# Patient Record
Sex: FEMALE | Race: WHITE | Employment: OTHER | URBAN - METROPOLITAN AREA
[De-identification: names, ages, dates, MRNs, and addresses within clinical notes are randomized per-mention and may not be internally consistent; named-entity substitution may affect disease eponyms.]

---

## 2017-08-17 ENCOUNTER — HOSPITAL ENCOUNTER (OUTPATIENT)
Dept: SLEEP MEDICINE | Age: 72
Discharge: HOME | End: 2017-08-17
Payer: MEDICARE

## 2017-08-17 PROCEDURE — 95810 POLYSOM 6/> YRS 4/> PARAM: CPT

## 2017-09-12 ENCOUNTER — HOSPITAL ENCOUNTER (OUTPATIENT)
Dept: SLEEP MEDICINE | Age: 72
Discharge: HOME | End: 2017-09-12
Payer: MEDICARE

## 2017-09-12 PROCEDURE — 95811 POLYSOM 6/>YRS CPAP 4/> PARM: CPT

## 2017-09-25 PROBLEM — G47.33 OSA AND COPD OVERLAP SYNDROME (HCC): Status: ACTIVE | Noted: 2017-09-25

## 2017-09-25 PROBLEM — G47.33 OSA (OBSTRUCTIVE SLEEP APNEA): Status: ACTIVE | Noted: 2017-09-25

## 2017-09-25 PROBLEM — G25.81 RLS (RESTLESS LEGS SYNDROME): Status: ACTIVE | Noted: 2017-09-25

## 2017-09-25 PROBLEM — J44.9 OSA AND COPD OVERLAP SYNDROME (HCC): Status: ACTIVE | Noted: 2017-09-25

## 2017-09-25 PROBLEM — J44.1 COPD EXACERBATION (HCC): Status: ACTIVE | Noted: 2017-09-25

## 2017-09-25 PROBLEM — E66.9 OBESITY (BMI 30-39.9): Status: ACTIVE | Noted: 2017-09-25

## 2017-10-10 ENCOUNTER — HOSPITAL ENCOUNTER (OUTPATIENT)
Dept: SLEEP MEDICINE | Age: 72
Discharge: HOME | End: 2017-10-10
Payer: MEDICARE

## 2017-10-10 PROCEDURE — 95807 SLEEP STUDY ATTENDED: CPT

## 2017-12-22 ENCOUNTER — HOSPITAL ENCOUNTER (OUTPATIENT)
Dept: SURGERY | Age: 72
Discharge: HOME | End: 2017-12-22
Payer: MEDICARE

## 2017-12-22 VITALS
DIASTOLIC BLOOD PRESSURE: 73 MMHG | BODY MASS INDEX: 33.43 KG/M2 | WEIGHT: 208 LBS | RESPIRATION RATE: 20 BRPM | HEIGHT: 66 IN | OXYGEN SATURATION: 94 % | HEART RATE: 72 BPM | SYSTOLIC BLOOD PRESSURE: 199 MMHG | TEMPERATURE: 98.1 F

## 2017-12-22 LAB — POTASSIUM SERPL-SCNC: 4.1 MMOL/L (ref 3.5–5.1)

## 2017-12-22 PROCEDURE — 84132 ASSAY OF SERUM POTASSIUM: CPT | Performed by: ANESTHESIOLOGY

## 2017-12-22 NOTE — PERIOP NOTES
Patient verified name, , and surgery as listed in [facility]. Patient provided medical/health information and PTA medications to the best of their ability. TYPE  CASE:1b  Orders per surgeon: Received and dated 17. Labs per surgeon:none. *  Labs per anesthesia protocol: potassium. Results: WNL  EKG  :  None per protocol    Patient provided with and instructed on education handouts including Guide to Surgery, blood transfusions, pain management, and hand hygiene for the family and community, and [facility] brochure.     hibiclens and instructions given per hospital policy. Instructed patient to continue previous medications as prescribed prior to surgery unless otherwise directed and to take the following medications the day of surgery according to anesthesia guidelines : anora inhaler, metoprolol, flecainide . Instructed patient to hold  the following medications: eliquis (after 17 per MD), diclofenac, all vitamins supplements and nsaids. Original medication prescription bottles  visualized during patient appointment. Patient teach back successful and patient demonstrates knowledge of instruction.

## 2017-12-28 ENCOUNTER — ANESTHESIA EVENT (OUTPATIENT)
Dept: SURGERY | Age: 72
End: 2017-12-28
Payer: MEDICARE

## 2017-12-29 ENCOUNTER — HOSPITAL ENCOUNTER (OUTPATIENT)
Age: 72
Setting detail: OUTPATIENT SURGERY
Discharge: HOME | End: 2017-12-29
Attending: ORTHOPAEDIC SURGERY | Admitting: ORTHOPAEDIC SURGERY
Payer: MEDICARE

## 2017-12-29 ENCOUNTER — ANESTHESIA (OUTPATIENT)
Dept: SURGERY | Age: 72
End: 2017-12-29
Payer: MEDICARE

## 2017-12-29 VITALS
RESPIRATION RATE: 16 BRPM | WEIGHT: 207 LBS | SYSTOLIC BLOOD PRESSURE: 143 MMHG | TEMPERATURE: 97.3 F | DIASTOLIC BLOOD PRESSURE: 65 MMHG | HEART RATE: 76 BPM | OXYGEN SATURATION: 92 % | BODY MASS INDEX: 33.41 KG/M2

## 2017-12-29 PROCEDURE — 77030002933 HC SUT MCRYL J&J -A: Performed by: ORTHOPAEDIC SURGERY

## 2017-12-29 PROCEDURE — 77030036560 HC SHLDR ARM PAD ABDUCTN S2SG -B: Performed by: ORTHOPAEDIC SURGERY

## 2017-12-29 PROCEDURE — 77030016370 HC SUT ULTBRD S&N -B: Performed by: ORTHOPAEDIC SURGERY

## 2017-12-29 PROCEDURE — 74011250636 HC RX REV CODE- 250/636: Performed by: ORTHOPAEDIC SURGERY

## 2017-12-29 PROCEDURE — 77030006891 HC BLD SHV RESECT STRY -B: Performed by: ORTHOPAEDIC SURGERY

## 2017-12-29 PROCEDURE — 74011250636 HC RX REV CODE- 250/636: Performed by: ANESTHESIOLOGY

## 2017-12-29 PROCEDURE — 77030003602 HC NDL NRV BLK BBMI -B: Performed by: ANESTHESIOLOGY

## 2017-12-29 PROCEDURE — 77030003660 HC NDL SHTTL S&N -C: Performed by: ORTHOPAEDIC SURGERY

## 2017-12-29 PROCEDURE — 76210000006 HC OR PH I REC 0.5 TO 1 HR: Performed by: ORTHOPAEDIC SURGERY

## 2017-12-29 PROCEDURE — 77030016570 HC BLNKT BAIR HGGR 3M -B: Performed by: ANESTHESIOLOGY

## 2017-12-29 PROCEDURE — 77030029203 HC IRR KT CYSTO/TUR BBMI -A: Performed by: ORTHOPAEDIC SURGERY

## 2017-12-29 PROCEDURE — 77030006668 HC BLD SHV MENSCS STRY -B: Performed by: ORTHOPAEDIC SURGERY

## 2017-12-29 PROCEDURE — 77030008703 HC TU ET UNCUF COVD -A: Performed by: ANESTHESIOLOGY

## 2017-12-29 PROCEDURE — 77030004453 HC BUR SHV STRY -B: Performed by: ORTHOPAEDIC SURGERY

## 2017-12-29 PROCEDURE — 74011000250 HC RX REV CODE- 250

## 2017-12-29 PROCEDURE — 76010000161 HC OR TIME 1 TO 1.5 HR INTENSV-TIER 1: Performed by: ORTHOPAEDIC SURGERY

## 2017-12-29 PROCEDURE — 76060000034 HC ANESTHESIA 1.5 TO 2 HR: Performed by: ORTHOPAEDIC SURGERY

## 2017-12-29 PROCEDURE — 74011250636 HC RX REV CODE- 250/636

## 2017-12-29 PROCEDURE — 77030018836 HC SOL IRR NACL ICUM -A: Performed by: ORTHOPAEDIC SURGERY

## 2017-12-29 PROCEDURE — 77030027384 HC PRB ARTHSCP SERFAS STRY -C: Performed by: ORTHOPAEDIC SURGERY

## 2017-12-29 PROCEDURE — 76942 ECHO GUIDE FOR BIOPSY: CPT | Performed by: ORTHOPAEDIC SURGERY

## 2017-12-29 PROCEDURE — 76010010054 HC POST OP PAIN BLOCK: Performed by: ORTHOPAEDIC SURGERY

## 2017-12-29 PROCEDURE — C1713 ANCHOR/SCREW BN/BN,TIS/BN: HCPCS | Performed by: ORTHOPAEDIC SURGERY

## 2017-12-29 PROCEDURE — 77030019605: Performed by: ORTHOPAEDIC SURGERY

## 2017-12-29 PROCEDURE — 77030003666 HC NDL SPINAL BD -A: Performed by: ORTHOPAEDIC SURGERY

## 2017-12-29 PROCEDURE — 77030032490 HC SLV COMPR SCD KNE COVD -B: Performed by: ORTHOPAEDIC SURGERY

## 2017-12-29 PROCEDURE — 77030013367: Performed by: ORTHOPAEDIC SURGERY

## 2017-12-29 PROCEDURE — 77030000032 HC CUF TRNQT ZIMM -B: Performed by: ORTHOPAEDIC SURGERY

## 2017-12-29 PROCEDURE — 77030008477 HC STYL SATN SLP COVD -A: Performed by: ANESTHESIOLOGY

## 2017-12-29 PROCEDURE — 76210000021 HC REC RM PH II 0.5 TO 1 HR: Performed by: ORTHOPAEDIC SURGERY

## 2017-12-29 DEVICE — FOOTPRINT ULTRA PK SUTURE ANCHOR 4.5
Type: IMPLANTABLE DEVICE | Site: SHOULDER | Status: FUNCTIONAL
Brand: FOOTPRINT

## 2017-12-29 RX ORDER — MIDAZOLAM HYDROCHLORIDE 1 MG/ML
2 INJECTION, SOLUTION INTRAMUSCULAR; INTRAVENOUS ONCE
Status: COMPLETED | OUTPATIENT
Start: 2017-12-29 | End: 2017-12-29

## 2017-12-29 RX ORDER — HYDROMORPHONE HYDROCHLORIDE 2 MG/ML
0.5 INJECTION, SOLUTION INTRAMUSCULAR; INTRAVENOUS; SUBCUTANEOUS
Status: DISCONTINUED | OUTPATIENT
Start: 2017-12-29 | End: 2017-12-29 | Stop reason: HOSPADM

## 2017-12-29 RX ORDER — ROPIVACAINE HYDROCHLORIDE 5 MG/ML
INJECTION, SOLUTION EPIDURAL; INFILTRATION; PERINEURAL AS NEEDED
Status: DISCONTINUED | OUTPATIENT
Start: 2017-12-29 | End: 2017-12-29 | Stop reason: HOSPADM

## 2017-12-29 RX ORDER — SODIUM CHLORIDE, SODIUM LACTATE, POTASSIUM CHLORIDE, CALCIUM CHLORIDE 600; 310; 30; 20 MG/100ML; MG/100ML; MG/100ML; MG/100ML
100 INJECTION, SOLUTION INTRAVENOUS CONTINUOUS
Status: DISCONTINUED | OUTPATIENT
Start: 2017-12-29 | End: 2017-12-29 | Stop reason: HOSPADM

## 2017-12-29 RX ORDER — MIDAZOLAM HYDROCHLORIDE 1 MG/ML
2 INJECTION, SOLUTION INTRAMUSCULAR; INTRAVENOUS
Status: DISCONTINUED | OUTPATIENT
Start: 2017-12-29 | End: 2017-12-29 | Stop reason: HOSPADM

## 2017-12-29 RX ORDER — DIPHENHYDRAMINE HYDROCHLORIDE 50 MG/ML
12.5 INJECTION, SOLUTION INTRAMUSCULAR; INTRAVENOUS
Status: DISCONTINUED | OUTPATIENT
Start: 2017-12-29 | End: 2017-12-29 | Stop reason: HOSPADM

## 2017-12-29 RX ORDER — EPINEPHRINE 1 MG/ML
INJECTION, SOLUTION, CONCENTRATE INTRAVENOUS AS NEEDED
Status: DISCONTINUED | OUTPATIENT
Start: 2017-12-29 | End: 2017-12-29 | Stop reason: HOSPADM

## 2017-12-29 RX ORDER — LIDOCAINE HYDROCHLORIDE 20 MG/ML
INJECTION, SOLUTION EPIDURAL; INFILTRATION; INTRACAUDAL; PERINEURAL AS NEEDED
Status: DISCONTINUED | OUTPATIENT
Start: 2017-12-29 | End: 2017-12-29 | Stop reason: HOSPADM

## 2017-12-29 RX ORDER — DEXAMETHASONE SODIUM PHOSPHATE 4 MG/ML
INJECTION, SOLUTION INTRA-ARTICULAR; INTRALESIONAL; INTRAMUSCULAR; INTRAVENOUS; SOFT TISSUE AS NEEDED
Status: DISCONTINUED | OUTPATIENT
Start: 2017-12-29 | End: 2017-12-29 | Stop reason: HOSPADM

## 2017-12-29 RX ORDER — ONDANSETRON 2 MG/ML
4 INJECTION INTRAMUSCULAR; INTRAVENOUS ONCE
Status: COMPLETED | OUTPATIENT
Start: 2017-12-29 | End: 2017-12-29

## 2017-12-29 RX ORDER — FENTANYL CITRATE 50 UG/ML
INJECTION, SOLUTION INTRAMUSCULAR; INTRAVENOUS AS NEEDED
Status: DISCONTINUED | OUTPATIENT
Start: 2017-12-29 | End: 2017-12-29 | Stop reason: HOSPADM

## 2017-12-29 RX ORDER — PROPOFOL 10 MG/ML
INJECTION, EMULSION INTRAVENOUS AS NEEDED
Status: DISCONTINUED | OUTPATIENT
Start: 2017-12-29 | End: 2017-12-29 | Stop reason: HOSPADM

## 2017-12-29 RX ORDER — ROCURONIUM BROMIDE 10 MG/ML
INJECTION, SOLUTION INTRAVENOUS AS NEEDED
Status: DISCONTINUED | OUTPATIENT
Start: 2017-12-29 | End: 2017-12-29 | Stop reason: HOSPADM

## 2017-12-29 RX ORDER — OXYCODONE HYDROCHLORIDE 5 MG/1
5 TABLET ORAL
Status: DISCONTINUED | OUTPATIENT
Start: 2017-12-29 | End: 2017-12-29 | Stop reason: HOSPADM

## 2017-12-29 RX ORDER — OXYCODONE HYDROCHLORIDE 5 MG/1
10 TABLET ORAL
Status: DISCONTINUED | OUTPATIENT
Start: 2017-12-29 | End: 2017-12-29 | Stop reason: HOSPADM

## 2017-12-29 RX ORDER — GLYCOPYRROLATE 0.2 MG/ML
INJECTION INTRAMUSCULAR; INTRAVENOUS AS NEEDED
Status: DISCONTINUED | OUTPATIENT
Start: 2017-12-29 | End: 2017-12-29 | Stop reason: HOSPADM

## 2017-12-29 RX ORDER — NEOSTIGMINE METHYLSULFATE 1 MG/ML
INJECTION INTRAVENOUS AS NEEDED
Status: DISCONTINUED | OUTPATIENT
Start: 2017-12-29 | End: 2017-12-29 | Stop reason: HOSPADM

## 2017-12-29 RX ORDER — ONDANSETRON 2 MG/ML
INJECTION INTRAMUSCULAR; INTRAVENOUS AS NEEDED
Status: DISCONTINUED | OUTPATIENT
Start: 2017-12-29 | End: 2017-12-29 | Stop reason: HOSPADM

## 2017-12-29 RX ORDER — CEFAZOLIN SODIUM/WATER 2 G/20 ML
2 SYRINGE (ML) INTRAVENOUS
Status: COMPLETED | OUTPATIENT
Start: 2017-12-29 | End: 2017-12-29

## 2017-12-29 RX ORDER — ALBUTEROL SULFATE 0.83 MG/ML
2.5 SOLUTION RESPIRATORY (INHALATION) AS NEEDED
Status: DISCONTINUED | OUTPATIENT
Start: 2017-12-29 | End: 2017-12-29 | Stop reason: HOSPADM

## 2017-12-29 RX ORDER — NALOXONE HYDROCHLORIDE 0.4 MG/ML
0.1 INJECTION, SOLUTION INTRAMUSCULAR; INTRAVENOUS; SUBCUTANEOUS AS NEEDED
Status: DISCONTINUED | OUTPATIENT
Start: 2017-12-29 | End: 2017-12-29 | Stop reason: HOSPADM

## 2017-12-29 RX ORDER — EPHEDRINE SULFATE 50 MG/ML
INJECTION, SOLUTION INTRAVENOUS AS NEEDED
Status: DISCONTINUED | OUTPATIENT
Start: 2017-12-29 | End: 2017-12-29 | Stop reason: HOSPADM

## 2017-12-29 RX ORDER — BUPIVACAINE HYDROCHLORIDE AND EPINEPHRINE 2.5; 5 MG/ML; UG/ML
INJECTION, SOLUTION EPIDURAL; INFILTRATION; INTRACAUDAL; PERINEURAL AS NEEDED
Status: DISCONTINUED | OUTPATIENT
Start: 2017-12-29 | End: 2017-12-29 | Stop reason: HOSPADM

## 2017-12-29 RX ORDER — LIDOCAINE HYDROCHLORIDE 10 MG/ML
0.1 INJECTION INFILTRATION; PERINEURAL AS NEEDED
Status: DISCONTINUED | OUTPATIENT
Start: 2017-12-29 | End: 2017-12-29 | Stop reason: HOSPADM

## 2017-12-29 RX ORDER — FENTANYL CITRATE 50 UG/ML
100 INJECTION, SOLUTION INTRAMUSCULAR; INTRAVENOUS ONCE
Status: DISCONTINUED | OUTPATIENT
Start: 2017-12-29 | End: 2017-12-29 | Stop reason: HOSPADM

## 2017-12-29 RX ADMIN — EPHEDRINE SULFATE 5 MG: 50 INJECTION, SOLUTION INTRAVENOUS at 07:46

## 2017-12-29 RX ADMIN — GLYCOPYRROLATE 0.4 MG: 0.2 INJECTION INTRAMUSCULAR; INTRAVENOUS at 08:26

## 2017-12-29 RX ADMIN — EPHEDRINE SULFATE 5 MG: 50 INJECTION, SOLUTION INTRAVENOUS at 07:50

## 2017-12-29 RX ADMIN — ONDANSETRON 4 MG: 2 INJECTION INTRAMUSCULAR; INTRAVENOUS at 08:22

## 2017-12-29 RX ADMIN — Medication 2 G: at 07:28

## 2017-12-29 RX ADMIN — ONDANSETRON 4 MG: 2 INJECTION INTRAMUSCULAR; INTRAVENOUS at 09:08

## 2017-12-29 RX ADMIN — PROPOFOL 150 MG: 10 INJECTION, EMULSION INTRAVENOUS at 07:17

## 2017-12-29 RX ADMIN — NEOSTIGMINE METHYLSULFATE 2 MG: 1 INJECTION INTRAVENOUS at 08:26

## 2017-12-29 RX ADMIN — ROCURONIUM BROMIDE 40 MG: 10 INJECTION, SOLUTION INTRAVENOUS at 07:17

## 2017-12-29 RX ADMIN — SODIUM CHLORIDE, SODIUM LACTATE, POTASSIUM CHLORIDE, AND CALCIUM CHLORIDE 100 ML/HR: 600; 310; 30; 20 INJECTION, SOLUTION INTRAVENOUS at 05:45

## 2017-12-29 RX ADMIN — ROPIVACAINE HYDROCHLORIDE 15 ML: 5 INJECTION, SOLUTION EPIDURAL; INFILTRATION; PERINEURAL at 06:51

## 2017-12-29 RX ADMIN — MIDAZOLAM HYDROCHLORIDE 2 MG: 1 INJECTION, SOLUTION INTRAMUSCULAR; INTRAVENOUS at 06:48

## 2017-12-29 RX ADMIN — BUPIVACAINE HYDROCHLORIDE AND EPINEPHRINE 15 ML: 2.5; 5 INJECTION, SOLUTION EPIDURAL; INFILTRATION; INTRACAUDAL; PERINEURAL at 06:51

## 2017-12-29 RX ADMIN — LIDOCAINE HYDROCHLORIDE 60 MG: 20 INJECTION, SOLUTION EPIDURAL; INFILTRATION; INTRACAUDAL; PERINEURAL at 07:17

## 2017-12-29 RX ADMIN — EPHEDRINE SULFATE 5 MG: 50 INJECTION, SOLUTION INTRAVENOUS at 07:43

## 2017-12-29 RX ADMIN — FENTANYL CITRATE 100 MCG: 50 INJECTION, SOLUTION INTRAMUSCULAR; INTRAVENOUS at 07:17

## 2017-12-29 RX ADMIN — DEXAMETHASONE SODIUM PHOSPHATE 4 MG: 4 INJECTION, SOLUTION INTRA-ARTICULAR; INTRALESIONAL; INTRAMUSCULAR; INTRAVENOUS; SOFT TISSUE at 07:39

## 2017-12-29 RX ADMIN — EPHEDRINE SULFATE 5 MG: 50 INJECTION, SOLUTION INTRAVENOUS at 08:00

## 2017-12-29 RX ADMIN — SODIUM CHLORIDE, SODIUM LACTATE, POTASSIUM CHLORIDE, AND CALCIUM CHLORIDE: 600; 310; 30; 20 INJECTION, SOLUTION INTRAVENOUS at 08:25

## 2017-12-29 NOTE — PROGRESS NOTES
's pre-op visit with patient's spouse in waiting area. Offered prayer for patient, spouse, and staff as requested.     Sheldon Mcqueen MDiv, BS  Board Certified

## 2017-12-29 NOTE — DISCHARGE INSTRUCTIONS
Post-Operative Instructions   For  Shoulder Arthroscopy Rotator Cuff Repair  Phone:  [phone]    1. Apply ice to the shoulder as needed. 2. You may shower after the arthroscopy. Keep dressings in place for the first three days and do not get them wet. After three days, you may remove the dressings and leave the steri-strip bandages in place; they will peel off naturally. 3. Stay in sling at all times except to shower. 4. Begin therapy as ordered. 5. Use any pain medication as instructed. You should take your pain medication as soon as you feel the anesthetic wearing off. Do not wait until you are in severe pain to begin taking your pain medication. 6. You may have some side effects from your pain medication. If you have nausea, try taking your medication with food. For itching, you may take over the counter Benadryl. 7. You may have been given a prescription for Phenergan. This medication is used for nausea and vomiting. You do not need to get this prescription filled unless you have a problem. 8. If you have a problem, please call [facility] at [phone]    Poppy Davis MD    [employer] POST OPERATIVE INSTRUCTIONS FOR KNEE ARTHROSCOPY    1. Unless you receive other instructions, you may weight bear as tolerated      2. Apply ice to your knee for 20-30 minutes several times per day for the first 3 days and then as needed. Icing will decrease the amount of inflammation and is helpful after exercise    3. You may remove the dressings the following day and apply waterproof band aids. Some bleeding and drainage may persist for several days following  surgery. Waterproof band aids may be used while showering and avoid tub baths for two weeks. 4. You will be given pain medications and do not drive under the influence. Some patients take pain medication at night and antiinflammatory medication during day  when pain decreases.     5. You may be given Phenergan for nausea    6. You will receive a phone call from our office notifying you of your follow up visit    · 7. If any problems call [phone]. DIET  · Clear liquids until no nausea or vomiting; then light diet for the first day. · Advance to regular diet on second day, unless your doctor orders otherwise. · If nausea and vomiting continues, call your doctor. CALL YOUR DOCTOR IF   · Excessive bleeding that does not stop after holding pressure over the area  · Temperature of 101 degrees F or above  · Excessive redness, swelling or bruising, and/ or green or yellow, smelly discharge from incision    AFTER ANESTHESIA   · For the first 24 hours: DO NOT Drive, Drink alcoholic beverages, or Make important decisions. · Be aware of dizziness following anesthesia and while taking pain medication. APPOINTMENT DATE/ TIME Office will call    YOUR DOCTOR'S PHONE NUMBER [phone]      DISCHARGE SUMMARY from Nurse    PATIENT INSTRUCTIONS:    After general anesthesia or intravenous sedation, for 24 hours or while taking prescription Narcotics:  · Limit your activities  · Do not drive and operate hazardous machinery  · Do not make important personal or business decisions  · Do  not drink alcoholic beverages  · If you have not urinated within 8 hours after discharge, please contact your surgeon on call. *  Please give a list of your current medications to your Primary Care Provider. *  Please update this list whenever your medications are discontinued, doses are      changed, or new medications (including over-the-counter products) are added. *  Please carry medication information at all times in case of emergency situations. These are general instructions for a healthy lifestyle:    No smoking/ No tobacco products/ Avoid exposure to second hand smoke    Surgeon General's Warning:  Quitting smoking now greatly reduces serious risk to your health.     Obesity, smoking, and sedentary lifestyle greatly increases your risk for illness    A healthy diet, regular physical exercise & weight monitoring are important for maintaining a healthy lifestyle    You may be retaining fluid if you have a history of heart failure or if you experience any of the following symptoms:  Weight gain of 3 pounds or more overnight or 5 pounds in a week, increased swelling in our hands or feet or shortness of breath while lying flat in bed. Please call your doctor as soon as you notice any of these symptoms; do not wait until your next office visit. Recognize signs and symptoms of STROKE:    F-face looks uneven    A-arms unable to move or move unevenly    S-speech slurred or non-existent    T-time-call 911 as soon as signs and symptoms begin-DO NOT go       Back to bed or wait to see if you get better-TIME IS BRAIN.

## 2017-12-29 NOTE — ANESTHESIA PREPROCEDURE EVALUATION
Anesthetic History               Review of Systems / Medical History  Patient summary reviewed, nursing notes reviewed and pertinent labs reviewed    Pulmonary    COPD: moderate    Sleep apnea: CPAP           Neuro/Psych              Cardiovascular    Hypertension: well controlled        Dysrhythmias : atrial fibrillation      Exercise tolerance: >4 METS     GI/Hepatic/Renal     GERD: well controlled           Endo/Other             Other Findings            Physical Exam    Airway  Mallampati: II  TM Distance: 4 - 6 cm  Neck ROM: normal range of motion   Mouth opening: Normal     Cardiovascular  Regular rate and rhythm,  S1 and S2 normal,  no murmur, click, rub, or gallop  Rhythm: irregular  Rate: normal         Dental    Dentition: Full lower dentures, Full upper dentures and Edentulous     Pulmonary  Breath sounds clear to auscultation               Abdominal         Other Findings            Anesthetic Plan    ASA: 3  Anesthesia type: general      Post-op pain plan if not by surgeon: peripheral nerve block single    Induction: Intravenous  Anesthetic plan and risks discussed with: Patient

## 2017-12-29 NOTE — BRIEF OP NOTE
BRIEF OPERATIVE NOTE    Date of Procedure: 12/29/2017   Preoperative Diagnosis: Complete rupture of left rotator cuff [M75.122]  Degenerative joint disease of left acromioclavicular joint [M19.012]  Old complex tear of lateral meniscus of right knee [M23.200]  Postoperative Diagnosis: Left Shoulder Rotator cuff tear/ impingement/ ac joint arthritis  Right Knee lateral meniscus tear    Procedure(s):  LEFT SHOULDER ARTHROSCOPY DISTAL CLAVICLE RESECTION ROTATOR CUFF REPAIR/SUBACROMIAL DECOMPRESSION    RIGHT KNEE ARTHROSCOPY WITH LATERAL MENISCECTOMY  Surgeon(s) and Role:     * Norbert Cuellar MD - Primary         Assistant Staff:       Surgical Staff:  Circ-1: Ramses Mcduffie RN  Scrub Tech-1: Tavo Martinez  Scrub Tech-2: Mary Grimes Time In   Incision Start 9595   Incision Close      Anesthesia: General   Estimated Blood Loss:     Specimens: * No specimens in log *   Findings:      Complications:       Implants:   Implant Name Type Inv.  Item Serial No.  Lot No. LRB No. Used Action   ANCHOR FOOTPRINT SUT 4.5MM --  - [id number]   ANCHOR FOOTPRINT SUT 4.5MM --    [facility] [employer] [id number]   1 Implanted

## 2017-12-29 NOTE — ANESTHESIA PROCEDURE NOTES
Peripheral Block    Start time: 12/29/2017 6:48 AM  End time: 12/29/2017 6:51 AM  Performed by: Marie Walls  Authorized by: Marie Walls       Pre-procedure: Indications: at surgeon's request and post-op pain management    Preanesthetic Checklist: patient identified, risks and benefits discussed, site marked, timeout performed, anesthesia consent given and patient being monitored    Timeout Time: 06:48          Block Type:   Block Type:   Interscalene  Laterality:  Left  Monitoring:  Standard ASA monitoring, continuous pulse ox, frequent vital sign checks, heart rate, oxygen and responsive to questions  Injection Technique:  Single shot  Procedures: ultrasound guided    Patient Position: supine  Prep: chlorhexidine    Location:  Interscalene  Needle Type:  Stimuplex  Needle Gauge:  21 G  Needle Localization:  Ultrasound guidance and anatomical landmarks  Medication Injected:  0.25%  bupivacaine  Adds:  Epi 1:200K  Volume (mL):  15  Add'l Medication Injected:  0.5%  ropivacaine  Volume (mL):  15    Assessment:  Number of attempts:  1  Injection Assessment:  Incremental injection every 5 mL, local visualized surrounding nerve on ultrasound, negative aspiration for blood, no paresthesia, no intravascular symptoms and ultrasound image on chart  Patient tolerance:  Patient tolerated the procedure well with no immediate complications

## 2017-12-29 NOTE — H&P
Outpatient Surgery History and Physical:  Julio Silvestre was seen and examined. CHIEF COMPLAINT:   L shoulder  r knee. PE:     Visit Vitals    /78 (BP 1 Location: Right arm, BP Patient Position: At rest)    Pulse 75    Temp 97.8 °F (36.6 °C)    Resp 17    Wt 93.9 kg (207 lb)    SpO2 96%    BMI 33.41 kg/m2       Heart:   Regular rhythm      Lungs:  Are clear      Past Medical History:    Patient Active Problem List    Diagnosis    GOPAL (obstructive sleep apnea)    GOPAL and COPD overlap syndrome (HCC)    COPD exacerbation (HCC)    Obesity (BMI 30-39. 9)    RLS (restless legs syndrome)    Dyspnea on exertion     08/2014:  Normal coronaries      Atrial fibrillation (HCC)    HLD (hyperlipidemia)    Pulmonary embolus (HCC)    Essential hypertension    COPD (chronic obstructive pulmonary disease) (HCC)       Surgical History:   Past Surgical History:   Procedure Laterality Date    HX CHOLECYSTECTOMY      HX HYSTERECTOMY      HX ORTHOPAEDIC      bilateral feet, rotator cuff    HX UROLOGICAL      bladder tack       Social History: Patient  reports that she quit smoking about 19 years ago. Her smoking use included Cigarettes. She has a 90.00 pack-year smoking history. She has never used smokeless tobacco. She reports that she does not drink alcohol or use illicit drugs. Family History:   Family History   Problem Relation Age of Onset    Stroke Mother     Heart Disease Mother     Cancer Father     Stroke Father     Coronary Artery Disease Father      premature    Heart Disease Brother        Allergies: Reviewed per EMR  Allergies   Allergen Reactions    Latex Atopic Dermatitis    Latex, Natural Rubber Contact Dermatitis    Codeine Other (comments)     Headache       Medications:    No current facility-administered medications on file prior to encounter.       Current Outpatient Prescriptions on File Prior to Encounter   Medication Sig    flecainide (TAMBOCOR) 50 mg tablet TAKE ONE TABLET BY MOUTH TWICE DAILY (Patient taking differently: two (2) times a day. TAKE ONE TABLET BY MOUTH TWICE DAILY  Indications: PAROXYSMAL ATRIAL FIBRILLATION)    umeclidinium-vilanterol (ANORO ELLIPTA) 62.5-25 mcg/actuation inhaler Take 1 Puff by inhalation daily.  metoprolol succinate (TOPROL-XL) 100 mg tablet Take 1 Tab by mouth daily.  acyclovir (ZOVIRAX) 400 mg tablet as needed. Indications: herpes zoster    rOPINIRole (REQUIP) 0.5 mg tablet nightly.  PROAIR HFA 90 mcg/actuation inhaler     furosemide (LASIX) 40 mg tablet Take  by mouth as needed.  apixaban (ELIQUIS) 5 mg tablet Take 5 mg by mouth two (2) times a day. Indications: PREVENT THROMBOEMBOLISM IN CHRONIC ATRIAL FIBRILLATION       The surgery is planned for the  Shoulder knee . History and physical has been reviewed. The patient has been examined. There have been no significant clinical changes since the completion of the originally dated History and Physical.  Patient identified by surgeon; surgical site was confirmed by patient and surgeon. The patient is here today for outpatient surgery. I have examined the patient, no changes are noted in the patient's medical status. Necessity for the procedure/care is still present and the history and physical above is current. See the office notes for the full long term history of the problem. Please see the recent office notes for the musculoskeletal examination.     Signed By: Zafar Gonzalez MD     December 29, 2017 6:52 AM

## 2017-12-29 NOTE — IP AVS SNAPSHOT
[facility] 
 
 
 [address] [facility] [address] [facility] 
[phone] Patient: Olive Parker MRN: [id number] :1945 About your hospitalization You were admitted on:  2017 You last received care in the:  [facility] OP PACU You were discharged on:  2017 Why you were hospitalized Your primary diagnosis was:  Not on File Things You Need To Do (next 8 weeks) Follow up with Nicole Hanna MD  
office will call Phone:  [phone] Where:  [address] [address]  Return appointment with GINNY Walls at  2:20 PM  
Where:  [address] [place], [place] [address] [address] ([facility])  Office Visit with Heather Rizo MD at  2:30 PM  
Where:  [facility] ([address] [place] [address]) Discharge Orders None A check jennie indicates which time of day the medication should be taken. My Medications ASK your physician about these medications Instructions Each Dose to Equal  
 Morning Noon Evening Bedtime  
 acyclovir 400 mg tablet Commonly known as:  ZOVIRAX Your last dose was: Your next dose is:    
   
   
 as needed. Indications: herpes zoster ELIQUIS 5 mg tablet Generic drug:  apixaban Your last dose was: Your next dose is: Take 5 mg by mouth two (2) times a day. Indications: PREVENT THROMBOEMBOLISM IN CHRONIC ATRIAL FIBRILLATION  
 5 mg  
    
   
   
   
  
 flecainide 50 mg tablet Commonly known as:  TAMBOCOR Your last dose was: Your next dose is: TAKE ONE TABLET BY MOUTH TWICE DAILY  
     
   
   
   
  
 furosemide 40 mg tablet Commonly known as:  LASIX Your last dose was: Your next dose is: Take  by mouth as needed. metoprolol succinate 100 mg tablet Commonly known as:  TOPROL-XL Your last dose was: Your next dose is: Take 1 Tab by mouth daily. 100 mg PROAIR HFA 90 mcg/actuation inhaler Generic drug:  albuterol Your last dose was: Your next dose is:    
   
   
      
   
   
   
  
 rOPINIRole 0.5 mg tablet Commonly known as:  Rock Sondra Your last dose was: Your next dose is:    
   
   
 nightly. spironolactone 25 mg tablet Commonly known as:  ALDACTONE Your last dose was: Your next dose is: Take 1 Tab by mouth daily. 25 mg  
    
   
   
   
  
 umeclidinium-vilanterol 62.5-25 mcg/actuation inhaler Commonly known as:  Shaun Lung Your last dose was: Your next dose is: Take 1 Puff by inhalation daily. 1 Puff Discharge Instructions Post-Operative Instructions For Shoulder Arthroscopy Rotator Cuff Repair Phone:  [phone] 1. Apply ice to the shoulder as needed. 2. You may shower after the arthroscopy. Keep dressings in place for the first three days and do not get them wet. After three days, you may remove the dressings and leave the steri-strip bandages in place; they will peel off naturally. 3. Stay in sling at all times except to shower. 4. Begin therapy as ordered. 5. Use any pain medication as instructed. You should take your pain medication as soon as you feel the anesthetic wearing off. Do not wait until you are in severe pain to begin taking your pain medication. 6. You may have some side effects from your pain medication. If you have nausea, try taking your medication with food. For itching, you may take over the counter Benadryl. 7. You may have been given a prescription for Phenergan.   This medication is used for nausea and vomiting. You do not need to get this prescription filled unless you have a problem. 8. If you have a problem, please call [address] [place] [address] at [phone] Emely Rivera MD 
 
[employer] POST OPERATIVE INSTRUCTIONS FOR KNEE ARTHROSCOPY 1. Unless you receive other instructions, you may weight bear as tolerated 2. Apply ice to your knee for 20-30 minutes several times per day for the first 3 days and then as needed. Icing will decrease the amount of inflammation and is helpful after exercise 3. You may remove the dressings the following day and apply waterproof band aids. Some bleeding and drainage may persist for several days following  surgery. Waterproof band aids may be used while showering and avoid tub baths for two weeks. 4. You will be given pain medications and do not drive under the influence. Some patients take pain medication at night and antiinflammatory medication during day  when pain decreases. 5. You may be given Phenergan for nausea 6. You will receive a phone call from our office notifying you of your follow up visit · 7. If any problems call [phone]. DIET · Clear liquids until no nausea or vomiting; then light diet for the first day. · Advance to regular diet on second day, unless your doctor orders otherwise. · If nausea and vomiting continues, call your doctor. CALL YOUR DOCTOR IF  
· Excessive bleeding that does not stop after holding pressure over the area · Temperature of 101 degrees F or above · Excessive redness, swelling or bruising, and/ or green or yellow, smelly discharge from incision AFTER ANESTHESIA · For the first 24 hours: DO NOT Drive, Drink alcoholic beverages, or Make important decisions. · Be aware of dizziness following anesthesia and while taking pain medication. APPOINTMENT DATE/ TIME Office will call YOUR DOCTOR'S PHONE NUMBER [phone] DISCHARGE SUMMARY from Nurse PATIENT INSTRUCTIONS: 
 
After general anesthesia or intravenous sedation, for 24 hours or while taking prescription Narcotics: · Limit your activities · Do not drive and operate hazardous machinery · Do not make important personal or business decisions · Do  not drink alcoholic beverages · If you have not urinated within 8 hours after discharge, please contact your surgeon on call. *  Please give a list of your current medications to your Primary Care Provider. *  Please update this list whenever your medications are discontinued, doses are 
    changed, or new medications (including over-the-counter products) are added. *  Please carry medication information at all times in case of emergency situations. These are general instructions for a healthy lifestyle: No smoking/ No tobacco products/ Avoid exposure to second hand smoke Surgeon General's Warning:  Quitting smoking now greatly reduces serious risk to your health. Obesity, smoking, and sedentary lifestyle greatly increases your risk for illness A healthy diet, regular physical exercise & weight monitoring are important for maintaining a healthy lifestyle You may be retaining fluid if you have a history of heart failure or if you experience any of the following symptoms:  Weight gain of 3 pounds or more overnight or 5 pounds in a week, increased swelling in our hands or feet or shortness of breath while lying flat in bed. Please call your doctor as soon as you notice any of these symptoms; do not wait until your next office visit. Recognize signs and symptoms of STROKE: 
 
F-face looks uneven A-arms unable to move or move unevenly S-speech slurred or non-existent T-time-call 911 as soon as signs and symptoms begin-DO NOT go Back to bed or wait to see if you get better-TIME IS BRAIN. Introducing [facility]! [employer] introduces [employer] patient portal. Now you can access parts of your medical record, email your doctor's office, and request medication refills online. 1. In your internet browser, go to https://[email or web address]. [employer] 2. Click on the First Time User? Click Here link in the Sign In box. You will see the New Member Sign Up page. 3. Enter your [employer] Access Code exactly as it appears below. You will not need to use this code after youve completed the sign-up process. If you do not sign up before the expiration date, you must request a new code. · [employer] Access Code: [email or web address] Expires: 2/14/2018 10:47 AM 
 
4. Enter the last four digits of your Social Security Number (xxxx) and Date of Birth (mm/dd/yyyy) as indicated and click Submit. You will be taken to the next sign-up page. 5. Create a [employer] ID. This will be your [employer] login ID and cannot be changed, so think of one that is secure and easy to remember. 6. Create a [employer] password. You can change your password at any time. 7. Enter your Password Reset Question and Answer. This can be used at a later time if you forget your password. 8. Enter your e-mail address. You will receive e-mail notification when new information is available in [address] 9. Click Sign Up. You can now view and download portions of your medical record. 10. Click the Download Summary menu link to download a portable copy of your medical information. If you have questions, please visit the Frequently Asked Questions section of the [employer] website. Remember, [employer] is NOT to be used for urgent needs. For medical emergencies, dial 911. Now available from your iPhone and Android! Providers Seen During Your Hospitalization Provider Specialty Primary office phone Iris Irizarry MD Orthopedic Surgery [phone] Your Primary Care Physician (PCP) Primary Care Physician Office Phone Office Fax Leanne Monae [phone] You are allergic to the following Allergen Reactions Latex Atopic Dermatitis Latex, Natural Rubber Contact Dermatitis Codeine Other (comments) Headache Recent Documentation Weight BMI OB Status Smoking Status 93.9 kg 33.41 kg/m2 Hysterectomy Former Smoker Emergency Contacts Name Discharge Info Relation Home Work Mobile Joseph Morel  Spouse [3] [phone] Patient Belongings The following personal items are in your possession at time of discharge: 
  Dental Appliances: None  Visual Aid: None      Home Medications: Kept at bedside   Jewelry: None  Clothing: Undergarments, Pants, Shirt    Other Valuables: None Please provide this summary of care documentation to your next provider. Signatures-by signing, you are acknowledging that this After Visit Summary has been reviewed with you and you have received a copy. Patient Signature:  ____________________________________________________________ Date:  ____________________________________________________________  
  
Pancho Buchanan Provider Signature:  ____________________________________________________________ Date:  ____________________________________________________________

## 2017-12-29 NOTE — OP NOTES
[address] REPORT    Dee Grimm  MR#: [id number]  : 1945  ACCOUNT #: [de-identified]   DATE OF SERVICE: 2017    PREOPERATIVE DIAGNOSIS:  Left rotator cuff tear. POSTOPERATIVE DIAGNOSES   1. Full thickness tear of the left rotator cuff. 1.  Acromioclavicular joint arthritis. 2.  Impingement of the left shoulder. PROCEDURES PERFORMED:   1. Arthroscopic rotator cuff repair using ASYM [employer] system. 2.  Arthroscopic resection of distal clavicle. 3.  Subacromial decompression. PREOPERATIVE DIAGNOSIS:  Right knee lateral meniscus tear. POSTOPERATIVE DIAGNOSES:   1. Flap tear of the lateral meniscus. 1.  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 PROCEDURES PERFORMED:  On the right knee was:   1. Lateral meniscectomy. 2.  Chondroplasty of the femoral sulcus at the patellofemoral joint. SURGEON: Rocky Ferraro MD.      ANESTHESIA:  General.     ESTIMATED BLOOD LOSS:  min     SPECIMENS REMOVED: none     COMPLICATIONS:  none     PROCEDURE:  After an adequate level of general anesthesia obtained, the left shoulder was prepped and draped in usual sterile fashion after a block per anesthesia for postop pain management and receiving antibiotics. She had overall good motion of the shoulder, some mild loss of range of motion. I manipulated the shoulder regaining her motion. The joint was distended posteriorly with saline. The arthroscope introduced. The articular surface looked fine. There was some mild chondromalacia. The biceps overall looked good and was probed through an anterior portal.  Otherwise, the labrum looked fine. The arthroscope was placed in the subacromial space and a lateral portal was made. The patient had marked hooking of the acromion. A decompression was performed with a shaver and the bur.   She had degenerative changes noted on the distal clavicle and circumferentially the soft tissue was removed and then the inferior osteophytes were removed and then the superior osteophytes with a bur. This was performed through interchangeable portals. The patient had a small full thickness tear of the rotator cuff and the tear was mobilized. A cannula was used. Additional portals used. [employer] and [employer] footprint system used. The tear was mobilized and pulled to an area where it could be secured without any undue tension. A suture anchor was placed snugly into the bone and the sutures had been spaced accordingly. Suture anchor was placed snugly to the bone, the sutures pulled and the device tightened and it was probed and was very stable. The portals closed with Monocryl. At the end of the procedure, the arm was placed in a shoulder immobilizer. The right knee was then arthroscoped through anteromedial and anterolateral portals after it was prepped and draped in usual sterile fashion. A tourniquet was used for part of the procedure. Findings revealed normal tracking of the patella. The gutters were clear and particularly in the lateral periphery of the lateral meniscus, I did not see a large communication to a soft tissue mass. The patient had a flap tear of the lateral meniscus at the middle third and this was contoured to a stable base, but I did not see a large extension into the periphery. There was not a lot of significant mucoid degeneration. There was, however, some mild chondromalacia on the lateral condyle. There was no eburnated bone. The medial compartment looked fine. Medial meniscus was stressed and probed and stable. There was grade II chondromalacia of the femoral sulcus at the patellofemoral joint and a chondroplasty was performed. The knee was then copiously irrigated. Sterile dressings applied.       MD Steffanie Pinto / ALISHA  D: 12/29/2017 08:31     T: 12/29/2017 09:17  JOB #: [id number]

## 2018-08-30 PROBLEM — E66.01 SEVERE OBESITY (BMI 35.0-39.9): Status: ACTIVE | Noted: 2018-08-30

## 2018-10-27 ENCOUNTER — HOSPITAL ENCOUNTER (EMERGENCY)
Age: 73
Discharge: HOME | End: 2018-10-27
Attending: EMERGENCY MEDICINE
Payer: MEDICARE

## 2018-10-27 ENCOUNTER — APPOINTMENT (OUTPATIENT)
Dept: CT IMAGING | Age: 73
End: 2018-10-27
Attending: EMERGENCY MEDICINE
Payer: MEDICARE

## 2018-10-27 VITALS
WEIGHT: 200 LBS | RESPIRATION RATE: 20 BRPM | HEIGHT: 65 IN | TEMPERATURE: 98 F | DIASTOLIC BLOOD PRESSURE: 66 MMHG | SYSTOLIC BLOOD PRESSURE: 150 MMHG | HEART RATE: 74 BPM | BODY MASS INDEX: 33.32 KG/M2 | OXYGEN SATURATION: 91 %

## 2018-10-27 DIAGNOSIS — K57.92 DIVERTICULITIS: Primary | ICD-10-CM

## 2018-10-27 LAB
ALBUMIN SERPL-MCNC: 3.3 G/DL (ref 3.2–4.6)
ALBUMIN/GLOB SERPL: 0.8 {RATIO} (ref 1.2–3.5)
ALP SERPL-CCNC: 98 U/L (ref 50–136)
ALT SERPL-CCNC: 23 U/L (ref 12–65)
ANION GAP SERPL CALC-SCNC: 8 MMOL/L (ref 7–16)
AST SERPL-CCNC: 18 U/L (ref 15–37)
BASOPHILS # BLD: 0 K/UL (ref 0–0.2)
BASOPHILS NFR BLD: 0 % (ref 0–2)
BILIRUB SERPL-MCNC: 0.6 MG/DL (ref 0.2–1.1)
BUN SERPL-MCNC: 9 MG/DL (ref 8–23)
CALCIUM SERPL-MCNC: 8 MG/DL (ref 8.3–10.4)
CHLORIDE SERPL-SCNC: 105 MMOL/L (ref 98–107)
CO2 SERPL-SCNC: 30 MMOL/L (ref 21–32)
CREAT SERPL-MCNC: 0.78 MG/DL (ref 0.6–1)
DIFFERENTIAL METHOD BLD: NORMAL
EOSINOPHIL # BLD: 0.1 K/UL (ref 0–0.8)
EOSINOPHIL NFR BLD: 1 % (ref 0.5–7.8)
ERYTHROCYTE [DISTWIDTH] IN BLOOD BY AUTOMATED COUNT: 13.1 %
GLOBULIN SER CALC-MCNC: 3.9 G/DL (ref 2.3–3.5)
GLUCOSE SERPL-MCNC: 99 MG/DL (ref 65–100)
HCT VFR BLD AUTO: 42.3 % (ref 35.8–46.3)
HGB BLD-MCNC: 14 G/DL (ref 11.7–15.4)
IMM GRANULOCYTES # BLD: 0 K/UL (ref 0–0.5)
IMM GRANULOCYTES NFR BLD AUTO: 0 % (ref 0–5)
LACTATE BLD-SCNC: 0.9 MMOL/L (ref 0.5–1.9)
LIPASE SERPL-CCNC: 102 U/L (ref 73–393)
LYMPHOCYTES # BLD: 2.2 K/UL (ref 0.5–4.6)
LYMPHOCYTES NFR BLD: 23 % (ref 13–44)
MCH RBC QN AUTO: 31.6 PG (ref 26.1–32.9)
MCHC RBC AUTO-ENTMCNC: 33.1 G/DL (ref 31.4–35)
MCV RBC AUTO: 95.5 FL (ref 79.6–97.8)
MONOCYTES # BLD: 0.7 K/UL (ref 0.1–1.3)
MONOCYTES NFR BLD: 8 % (ref 4–12)
NEUTS SEG # BLD: 6.2 K/UL (ref 1.7–8.2)
NEUTS SEG NFR BLD: 67 % (ref 43–78)
NRBC # BLD: 0 K/UL (ref 0–0.2)
PLATELET # BLD AUTO: 175 K/UL (ref 150–450)
PMV BLD AUTO: 10.7 FL (ref 9.4–12.3)
POTASSIUM SERPL-SCNC: 3.3 MMOL/L (ref 3.5–5.1)
PROT SERPL-MCNC: 7.2 G/DL (ref 6.3–8.2)
RBC # BLD AUTO: 4.43 M/UL (ref 4.05–5.2)
SODIUM SERPL-SCNC: 143 MMOL/L (ref 136–145)
WBC # BLD AUTO: 9.4 K/UL (ref 4.3–11.1)

## 2018-10-27 PROCEDURE — 74177 CT ABD & PELVIS W/CONTRAST: CPT

## 2018-10-27 PROCEDURE — 83690 ASSAY OF LIPASE: CPT

## 2018-10-27 PROCEDURE — 74011250637 HC RX REV CODE- 250/637: Performed by: EMERGENCY MEDICINE

## 2018-10-27 PROCEDURE — 74011000258 HC RX REV CODE- 258: Performed by: EMERGENCY MEDICINE

## 2018-10-27 PROCEDURE — 81003 URINALYSIS AUTO W/O SCOPE: CPT | Performed by: EMERGENCY MEDICINE

## 2018-10-27 PROCEDURE — 83605 ASSAY OF LACTIC ACID: CPT

## 2018-10-27 PROCEDURE — 85025 COMPLETE CBC W/AUTO DIFF WBC: CPT

## 2018-10-27 PROCEDURE — 80053 COMPREHEN METABOLIC PANEL: CPT

## 2018-10-27 PROCEDURE — 99285 EMERGENCY DEPT VISIT HI MDM: CPT | Performed by: EMERGENCY MEDICINE

## 2018-10-27 PROCEDURE — 74011636320 HC RX REV CODE- 636/320: Performed by: EMERGENCY MEDICINE

## 2018-10-27 RX ORDER — SODIUM CHLORIDE 0.9 % (FLUSH) 0.9 %
10 SYRINGE (ML) INJECTION
Status: COMPLETED | OUTPATIENT
Start: 2018-10-27 | End: 2018-10-27

## 2018-10-27 RX ORDER — METRONIDAZOLE 500 MG/1
500 TABLET ORAL 2 TIMES DAILY
Qty: 14 TAB | Refills: 0 | Status: SHIPPED | OUTPATIENT
Start: 2018-10-27 | End: 2018-11-03

## 2018-10-27 RX ORDER — CIPROFLOXACIN 500 MG/1
500 TABLET ORAL
Status: COMPLETED | OUTPATIENT
Start: 2018-10-27 | End: 2018-10-27

## 2018-10-27 RX ORDER — CIPROFLOXACIN 500 MG/1
500 TABLET ORAL 2 TIMES DAILY
Qty: 14 TAB | Refills: 0 | Status: SHIPPED | OUTPATIENT
Start: 2018-10-27 | End: 2018-11-03

## 2018-10-27 RX ORDER — METRONIDAZOLE 500 MG/1
500 TABLET ORAL
Status: COMPLETED | OUTPATIENT
Start: 2018-10-27 | End: 2018-10-27

## 2018-10-27 RX ADMIN — METRONIDAZOLE 500 MG: 500 TABLET ORAL at 21:10

## 2018-10-27 RX ADMIN — IOPAMIDOL 100 ML: 755 INJECTION, SOLUTION INTRAVENOUS at 20:08

## 2018-10-27 RX ADMIN — Medication 10 ML: at 20:08

## 2018-10-27 RX ADMIN — SODIUM CHLORIDE 100 ML: 900 INJECTION, SOLUTION INTRAVENOUS at 20:09

## 2018-10-27 RX ADMIN — CIPROFLOXACIN 500 MG: 500 TABLET, FILM COATED ORAL at 21:10

## 2018-10-27 NOTE — ED TRIAGE NOTES
\"My stomach and my whole stomach area is sore as everything. Feels like its swollen and when I sit down and pressure gets on it its like it presses up on it. \" States problem started Wednesday night. Denies n/v/d, chest pain and SOB.

## 2018-10-27 NOTE — ED PROVIDER NOTES
Patient is a 19-year-old female with history of atrial fibrillation and COPD who presents to the ER today complaining of 4 days of abdominal pain and a bloated sensation. She thought she was constipated but took some milk of magnesia and had a bowel movement. The history is provided by the patient and the spouse. Abdominal Pain This is a new problem. The current episode started more than 2 days ago. The problem has been gradually worsening. The pain is located in the generalized abdominal region. The quality of the pain is dull. The pain is mild. Pertinent negatives include no diarrhea, no nausea, no vomiting and no constipation. Nothing worsens the pain. The pain is relieved by nothing. Past Medical History:  
Diagnosis Date  Arrhythmia   
 palpitations.  Arthritis   
 rt shoulder  Atrial fibrillation ([address] 01/21/2016  
 takes eliquis  Chest pain  Chronic obstructive pulmonary disease ([address]  Dyspnea  Essential hypertension 1/21/2016  GERD (gastroesophageal reflux disease)   
 managed by meds  HLD (hyperlipidemia) 1/21/2016  Hypertension   
 managed by meds  Morbid obesity ([address]  Nausea & vomiting  Pulmonary embolus ([address] 1/21/2016  Sleep apnea   
 waiting stated she is currently waiting on cpap so she can wear it at night  Thromboembolus ([address] 4 month ago. right leg  Venous embolism and thrombosis Past Surgical History:  
Procedure Laterality Date  HX CHOLECYSTECTOMY  HX HYSTERECTOMY  HX ORTHOPAEDIC    
 bilateral feet, rotator cuff  HX UROLOGICAL    
 bladder tack Family History:  
Problem Relation Age of Onset  Stroke Mother  Heart Disease Mother  Cancer Father  Stroke Father  Coronary Artery Disease Father   
     premature  Heart Disease Brother Social History Socioeconomic History  Marital status:  Spouse name: Not on file  Number of children: Not on file  Years of education: Not on file  Highest education level: Not on file Social Needs  Financial resource strain: Not on file  Food insecurity - worry: Not on file  Food insecurity - inability: Not on file  Transportation needs - medical: Not on file  Transportation needs - non-medical: Not on file Occupational History  Not on file Tobacco Use  Smoking status: Former Smoker Packs/day: 3.00 Years: 30.00 Pack years: 90.00 Types: Cigarettes Last attempt to quit: 1998 Years since quittin.1  Smokeless tobacco: Never Used Substance and Sexual Activity  Alcohol use: No  
 Drug use: No  
 Sexual activity: Not on file Other Topics Concern  Not on file Social History Narrative  Not on file ALLERGIES: Latex; Latex, natural rubber; Codeine; and Shellfish derived Review of Systems Constitutional: Negative. HENT: Negative. Eyes: Negative. Respiratory: Negative. Cardiovascular: Negative. Gastrointestinal: Positive for abdominal pain. Negative for constipation, diarrhea, nausea and vomiting. Endocrine: Negative. Genitourinary: Negative. Musculoskeletal: Negative. Neurological: Negative. Vitals:  
 10/27/18 1836 BP: 158/74 Pulse: 75 Resp: 18 Temp: 97.5 °F (36.4 °C) SpO2: 90% Weight: 90.7 kg (200 lb) Height: 5' 5\" (1.651 m) Physical Exam  
Constitutional: She is oriented to person, place, and time. She appears well-developed and well-nourished. HENT:  
Head: Normocephalic and atraumatic. Eyes: Conjunctivae and EOM are normal. Pupils are equal, round, and reactive to light. Neck: Normal range of motion. Neck supple. Cardiovascular: Normal rate, regular rhythm and intact distal pulses. Pulmonary/Chest: Effort normal and breath sounds normal.  
Abdominal: Soft. There is tenderness. There is guarding. There is no rebound. Musculoskeletal: Normal range of motion. She exhibits no edema or deformity. Lymphadenopathy:  
  She has no cervical adenopathy. Neurological: She is alert and oriented to person, place, and time. She has normal strength. No cranial nerve deficit or sensory deficit. GCS eye subscore is 4. GCS verbal subscore is 5. GCS motor subscore is 6. Skin: Skin is warm and dry. No rash noted. Nursing note and vitals reviewed. MDM Number of Diagnoses or Management Options Diagnosis management comments: Blood work is unremarkable, urinalysis negative, CAT scan of the abdomen and pelvis shows mild diverticulitis. She is still refusing any pain medications. I will prescribe some Cipro and Flagyl. She will follow up with her doctor next week for recheck. Voice dictation software was used during the making of this note. This software is not perfect and grammatical and other typographical errors may be present. This note has been proofread, but may still contain errors. Rajeev Denis MD; 10/27/2018 @8:46 PM  
=================================================================== Amount and/or Complexity of Data Reviewed Clinical lab tests: ordered and reviewed Tests in the radiology section of CPT®: ordered and reviewed Review and summarize past medical records: yes Independent visualization of images, tracings, or specimens: yes Risk of Complications, Morbidity, and/or Mortality Presenting problems: moderate Diagnostic procedures: moderate Management options: moderate Patient Progress Patient progress: stable Procedures

## 2018-10-28 NOTE — ED NOTES
I have reviewed discharge instructions with the patient. The patient verbalized understanding. Patient left ED via Discharge Method: ambulatory to Home with family member. Opportunity for questions and clarification provided. Patient given 2 scripts. To continue your aftercare when you leave the hospital, you may receive an automated call from our care team to check in on how you are doing. This is a free service and part of our promise to provide the best care and service to meet your aftercare needs.  If you have questions, or wish to unsubscribe from this service please call [phone]. Thank you for Choosing our [facility].

## 2018-10-28 NOTE — DISCHARGE INSTRUCTIONS

## 2018-12-03 RX ORDER — SODIUM CHLORIDE 0.9 % (FLUSH) 0.9 %
5-10 SYRINGE (ML) INJECTION AS NEEDED
Status: CANCELLED | OUTPATIENT
Start: 2018-12-03

## 2018-12-03 RX ORDER — SODIUM CHLORIDE 0.9 % (FLUSH) 0.9 %
5-10 SYRINGE (ML) INJECTION EVERY 8 HOURS
Status: CANCELLED | OUTPATIENT
Start: 2018-12-03

## 2018-12-06 ENCOUNTER — ANESTHESIA EVENT (OUTPATIENT)
Dept: SURGERY | Age: 73
End: 2018-12-06
Payer: MEDICARE

## 2018-12-07 ENCOUNTER — HOSPITAL ENCOUNTER (OUTPATIENT)
Age: 73
Setting detail: OUTPATIENT SURGERY
Discharge: HOME | End: 2018-12-07
Attending: ORTHOPAEDIC SURGERY | Admitting: ORTHOPAEDIC SURGERY
Payer: MEDICARE

## 2018-12-07 ENCOUNTER — ANESTHESIA (OUTPATIENT)
Dept: SURGERY | Age: 73
End: 2018-12-07
Payer: MEDICARE

## 2018-12-07 VITALS
TEMPERATURE: 97.6 F | OXYGEN SATURATION: 93 % | BODY MASS INDEX: 32.28 KG/M2 | DIASTOLIC BLOOD PRESSURE: 65 MMHG | SYSTOLIC BLOOD PRESSURE: 151 MMHG | HEART RATE: 62 BPM | RESPIRATION RATE: 16 BRPM | WEIGHT: 200 LBS

## 2018-12-07 PROCEDURE — 77030032490 HC SLV COMPR SCD KNE COVD -B: Performed by: ORTHOPAEDIC SURGERY

## 2018-12-07 PROCEDURE — 77030003666 HC NDL SPINAL BD -A: Performed by: ORTHOPAEDIC SURGERY

## 2018-12-07 PROCEDURE — 77030002933 HC SUT MCRYL J&J -A: Performed by: ORTHOPAEDIC SURGERY

## 2018-12-07 PROCEDURE — 77030004453 HC BUR SHV STRY -B: Performed by: ORTHOPAEDIC SURGERY

## 2018-12-07 PROCEDURE — 76010000160 HC OR TIME 0.5 TO 1 HR INTENSV-TIER 1: Performed by: ORTHOPAEDIC SURGERY

## 2018-12-07 PROCEDURE — 76210000063 HC OR PH I REC FIRST 0.5 HR: Performed by: ORTHOPAEDIC SURGERY

## 2018-12-07 PROCEDURE — 77030010509 HC AIRWY LMA MSK TELE -A: Performed by: ANESTHESIOLOGY

## 2018-12-07 PROCEDURE — 76060000032 HC ANESTHESIA 0.5 TO 1 HR: Performed by: ORTHOPAEDIC SURGERY

## 2018-12-07 PROCEDURE — 76210000026 HC REC RM PH II 1 TO 1.5 HR: Performed by: ORTHOPAEDIC SURGERY

## 2018-12-07 PROCEDURE — 74011250636 HC RX REV CODE- 250/636

## 2018-12-07 PROCEDURE — 74011250636 HC RX REV CODE- 250/636: Performed by: ORTHOPAEDIC SURGERY

## 2018-12-07 PROCEDURE — A4565 SLINGS: HCPCS | Performed by: ORTHOPAEDIC SURGERY

## 2018-12-07 PROCEDURE — 77030006891 HC BLD SHV RESECT STRY -B: Performed by: ORTHOPAEDIC SURGERY

## 2018-12-07 PROCEDURE — 74011250636 HC RX REV CODE- 250/636: Performed by: ANESTHESIOLOGY

## 2018-12-07 PROCEDURE — 77030006788 HC BLD SAW OSC STRY -B: Performed by: ORTHOPAEDIC SURGERY

## 2018-12-07 PROCEDURE — 77030018836 HC SOL IRR NACL ICUM -A: Performed by: ORTHOPAEDIC SURGERY

## 2018-12-07 PROCEDURE — 77030027384 HC PRB ARTHSCP SERFAS STRY -C: Performed by: ORTHOPAEDIC SURGERY

## 2018-12-07 PROCEDURE — 77030019605: Performed by: ORTHOPAEDIC SURGERY

## 2018-12-07 RX ORDER — SODIUM CHLORIDE 0.9 % (FLUSH) 0.9 %
5-10 SYRINGE (ML) INJECTION AS NEEDED
Status: DISCONTINUED | OUTPATIENT
Start: 2018-12-07 | End: 2018-12-07 | Stop reason: HOSPADM

## 2018-12-07 RX ORDER — SODIUM CHLORIDE, SODIUM LACTATE, POTASSIUM CHLORIDE, CALCIUM CHLORIDE 600; 310; 30; 20 MG/100ML; MG/100ML; MG/100ML; MG/100ML
75 INJECTION, SOLUTION INTRAVENOUS CONTINUOUS
Status: DISCONTINUED | OUTPATIENT
Start: 2018-12-07 | End: 2018-12-07 | Stop reason: HOSPADM

## 2018-12-07 RX ORDER — EPINEPHRINE 1 MG/ML
INJECTION INTRAMUSCULAR; INTRAVENOUS; SUBCUTANEOUS AS NEEDED
Status: DISCONTINUED | OUTPATIENT
Start: 2018-12-07 | End: 2018-12-07 | Stop reason: HOSPADM

## 2018-12-07 RX ORDER — OXYCODONE HYDROCHLORIDE 5 MG/1
5 TABLET ORAL
Status: DISCONTINUED | OUTPATIENT
Start: 2018-12-07 | End: 2018-12-07 | Stop reason: HOSPADM

## 2018-12-07 RX ORDER — FENTANYL CITRATE 50 UG/ML
100 INJECTION, SOLUTION INTRAMUSCULAR; INTRAVENOUS ONCE
Status: COMPLETED | OUTPATIENT
Start: 2018-12-07 | End: 2018-12-07

## 2018-12-07 RX ORDER — HYDROMORPHONE HYDROCHLORIDE 2 MG/ML
0.5 INJECTION, SOLUTION INTRAMUSCULAR; INTRAVENOUS; SUBCUTANEOUS
Status: DISCONTINUED | OUTPATIENT
Start: 2018-12-07 | End: 2018-12-07 | Stop reason: HOSPADM

## 2018-12-07 RX ORDER — CEFAZOLIN SODIUM/WATER 2 G/20 ML
2 SYRINGE (ML) INTRAVENOUS ONCE
Status: COMPLETED | OUTPATIENT
Start: 2018-12-07 | End: 2018-12-07

## 2018-12-07 RX ORDER — GUAIFENESIN 100 MG/5ML
81 LIQUID (ML) ORAL
Status: DISCONTINUED | OUTPATIENT
Start: 2018-12-07 | End: 2018-12-07

## 2018-12-07 RX ORDER — MIDAZOLAM HYDROCHLORIDE 1 MG/ML
2 INJECTION, SOLUTION INTRAMUSCULAR; INTRAVENOUS
Status: DISCONTINUED | OUTPATIENT
Start: 2018-12-07 | End: 2018-12-07 | Stop reason: HOSPADM

## 2018-12-07 RX ORDER — LIDOCAINE HYDROCHLORIDE 10 MG/ML
0.1 INJECTION INFILTRATION; PERINEURAL AS NEEDED
Status: DISCONTINUED | OUTPATIENT
Start: 2018-12-07 | End: 2018-12-07 | Stop reason: HOSPADM

## 2018-12-07 RX ORDER — SODIUM CHLORIDE, SODIUM LACTATE, POTASSIUM CHLORIDE, CALCIUM CHLORIDE 600; 310; 30; 20 MG/100ML; MG/100ML; MG/100ML; MG/100ML
INJECTION, SOLUTION INTRAVENOUS
Status: DISCONTINUED | OUTPATIENT
Start: 2018-12-07 | End: 2018-12-07 | Stop reason: HOSPADM

## 2018-12-07 RX ORDER — DEXAMETHASONE SODIUM PHOSPHATE 4 MG/ML
INJECTION, SOLUTION INTRA-ARTICULAR; INTRALESIONAL; INTRAMUSCULAR; INTRAVENOUS; SOFT TISSUE
Status: COMPLETED | OUTPATIENT
Start: 2018-12-07 | End: 2018-12-07

## 2018-12-07 RX ORDER — ONDANSETRON 2 MG/ML
INJECTION INTRAMUSCULAR; INTRAVENOUS AS NEEDED
Status: DISCONTINUED | OUTPATIENT
Start: 2018-12-07 | End: 2018-12-07 | Stop reason: HOSPADM

## 2018-12-07 RX ORDER — SODIUM CHLORIDE 0.9 % (FLUSH) 0.9 %
5-10 SYRINGE (ML) INJECTION EVERY 8 HOURS
Status: DISCONTINUED | OUTPATIENT
Start: 2018-12-07 | End: 2018-12-07 | Stop reason: HOSPADM

## 2018-12-07 RX ORDER — PROPOFOL 10 MG/ML
INJECTION, EMULSION INTRAVENOUS AS NEEDED
Status: DISCONTINUED | OUTPATIENT
Start: 2018-12-07 | End: 2018-12-07 | Stop reason: HOSPADM

## 2018-12-07 RX ORDER — MIDAZOLAM HYDROCHLORIDE 1 MG/ML
5 INJECTION, SOLUTION INTRAMUSCULAR; INTRAVENOUS ONCE
Status: COMPLETED | OUTPATIENT
Start: 2018-12-07 | End: 2018-12-07

## 2018-12-07 RX ORDER — HYDROCODONE BITARTRATE AND ACETAMINOPHEN 5; 325 MG/1; MG/1
2 TABLET ORAL AS NEEDED
Status: DISCONTINUED | OUTPATIENT
Start: 2018-12-07 | End: 2018-12-07 | Stop reason: HOSPADM

## 2018-12-07 RX ADMIN — FENTANYL CITRATE 50 MCG: 50 INJECTION INTRAMUSCULAR; INTRAVENOUS at 10:08

## 2018-12-07 RX ADMIN — ONDANSETRON 4 MG: 2 INJECTION INTRAMUSCULAR; INTRAVENOUS at 10:57

## 2018-12-07 RX ADMIN — MIDAZOLAM 3 MG: 1 INJECTION INTRAMUSCULAR; INTRAVENOUS at 10:08

## 2018-12-07 RX ADMIN — DEXAMETHASONE SODIUM PHOSPHATE 4 MG: 4 INJECTION, SOLUTION INTRA-ARTICULAR; INTRALESIONAL; INTRAMUSCULAR; INTRAVENOUS; SOFT TISSUE at 10:13

## 2018-12-07 RX ADMIN — SODIUM CHLORIDE, SODIUM LACTATE, POTASSIUM CHLORIDE, AND CALCIUM CHLORIDE 75 ML/HR: 600; 310; 30; 20 INJECTION, SOLUTION INTRAVENOUS at 10:00

## 2018-12-07 RX ADMIN — LIDOCAINE HYDROCHLORIDE 80 MG: 10 INJECTION, SOLUTION INFILTRATION; PERINEURAL at 10:25

## 2018-12-07 RX ADMIN — Medication 2 G: at 10:25

## 2018-12-07 RX ADMIN — PROPOFOL 200 MG: 10 INJECTION, EMULSION INTRAVENOUS at 10:25

## 2018-12-07 RX ADMIN — SODIUM CHLORIDE, SODIUM LACTATE, POTASSIUM CHLORIDE, CALCIUM CHLORIDE: 600; 310; 30; 20 INJECTION, SOLUTION INTRAVENOUS at 10:21

## 2018-12-07 NOTE — ANESTHESIA POSTPROCEDURE EVALUATION
Procedure(s): LEFT SHOULDER ARTHROSCOPY, SYNOVECTOMY, MANIPULATION GENERAL. Anesthesia Post Evaluation Multimodal analgesia: multimodal analgesia not used between 6 hours prior to anesthesia start to PACU discharge Patient location during evaluation: bedside Patient participation: complete - patient participated Level of consciousness: awake and alert Pain score: 3 Pain management: adequate Airway patency: patent Anesthetic complications: no 
Cardiovascular status: acceptable and hemodynamically stable Respiratory status: acceptable Hydration status: acceptable Visit Vitals /59 Pulse 66 Temp 36.4 °C (97.6 °F) Resp 16 Wt 90.7 kg (200 lb) SpO2 91% BMI 32.28 kg/m²

## 2018-12-07 NOTE — OP NOTES
[facility] REPORT    Rosalina Grade  MR#: [id number]  : 1945  ACCOUNT #: [de-identified]   DATE OF SERVICE: 2018    PREOPERATIVE DIAGNOSES:    1. Adhesive capsulitis of the left shoulder. 2.  Small bursal tearing of the rotator cuff. POSTOPERATIVE DIAGNOSES:    1. Adhesive capsulitis of the left shoulder with synovitis. 2.  Small bursal tearing of the rotator cuff. PROCEDURES PERFORMED:  1. Arthroscopic synovectomy of the left shoulder. 2.  Manipulation of the left shoulder. SURGEON:  Markell Frye MD.    ASSISTANT:  None. ANESTHESIA:  General.    ESTIMATED BLOOD LOSS:  Minimal.    COMPLICATIONS:  None. SPECIMENS REMOVED:  None. IMPLANTS:  None. DESCRIPTION OF PROCEDURE:  After an adequate level of general anesthesia was obtained, the patient was noted to have limited range of motion of the shoulder. I was able to manipulate her shoulder regaining all of her motion. The arthroscope was introduced into the joint after distention of the joint. The articular surface looked good. She had a moderate degree of synovitis which was debrided. The biceps was still intact and the articular surface looked good. Minimal chondromalacia. Arthroscope was placed in the subacromial space and the lateral portal was made. The patient had some bursal tearing of the rotator cuff, but she still had good integrity of the rotator cuff and multiple photos made for the patient. The wound was then copiously irrigated and sterile dressings were then applied. The arm was placed in a sling. Instructions given. The patient tolerated procedure well.       MD Carlos Manuel Adams / TN  D: 2018 10:57     T: 2018 11:19  JOB #: [id number]  CC: Suzette Grande MD

## 2018-12-07 NOTE — ANESTHESIA PREPROCEDURE EVALUATION
Anesthetic History PONV Review of Systems / Medical History Patient summary reviewed and pertinent labs reviewed Pulmonary COPD: mild Sleep apnea: No treatment Neuro/Psych Within defined limits Cardiovascular Hypertension: well controlled Dysrhythmias ( eliquis held taking bASA) : atrial fibrillation Exercise tolerance: >4 METS 
  
GI/Hepatic/Renal 
  
GERD: well controlled Endo/Other Obesity and arthritis Other Findings Physical Exam 
 
Airway Mallampati: II 
TM Distance: 4 - 6 cm Neck ROM: normal range of motion Mouth opening: Normal 
 
 Cardiovascular Rhythm: irregular Rate: abnormal 
 
 
 
 Dental 
 
Dentition: Lower partial plate and Full upper dentures Pulmonary Breath sounds clear to auscultation Abdominal 
 
 
 
 Other Findings Anesthetic Plan ASA: 3 Anesthesia type: general 
 
 
Post-op pain plan if not by surgeon: peripheral nerve block single Induction: Intravenous Anesthetic plan and risks discussed with: Patient

## 2018-12-07 NOTE — DISCHARGE INSTRUCTIONS
Post-Operative Instructions   For  Shoulder Arthroscopy Rotator Cuff Repair  Phone:  [phone]    1. Apply ice to the shoulder as needed. 2. You may shower after the arthroscopy. Keep dressings in place for the first three days and do not get them wet. After three days, you may remove the dressings and leave the steri-strip bandages in place; they will peel off naturally. 3. Stay in sling at all times except to shower. 4. Begin therapy as ordered. 5. Use any pain medication as instructed. You should take your pain medication as soon as you feel the anesthetic wearing off. Do not wait until you are in severe pain to begin taking your pain medication. 6. You may have some side effects from your pain medication. If you have nausea, try taking your medication with food. For itching, you may take over the counter Benadryl. 7. You may have been given a prescription for Phenergan. This medication is used for nausea and vomiting. You do not need to get this prescription filled unless you have a problem. 8. If you have a problem, please call [address] [place] [address] at [phone]    Vince Luong MD    [employer] ACTIVITY  · As tolerated and as directed by your doctor. · Bathe or shower as directed by your doctor. DIET  · Clear liquids until no nausea or vomiting; then light diet for the first day. · Advance to regular diet on second day, unless your doctor orders otherwise. · If nausea and vomiting continues, call your doctor. PAIN  · Take pain medication as directed by your doctor. · Call your doctor if pain is NOT relieved by medication. · DO NOT take aspirin of blood thinners unless directed by your doctor.      CALL YOUR DOCTOR IF   · Excessive bleeding that does not stop after holding pressure over the area  · Temperature of 101 degrees F or above  · Excessive redness, swelling or bruising, and/ or green or yellow, smelly discharge from incision    AFTER ANESTHESIA   · For the first 24 hours: DO NOT Drive, Drink alcoholic beverages, or Make important decisions. · Be aware of dizziness following anesthesia and while taking pain medication. DISCHARGE SUMMARY from Nurse    PATIENT INSTRUCTIONS:    After general anesthesia or intravenous sedation, for 24 hours or while taking prescription Narcotics:  · Limit your activities  · Do not drive and operate hazardous machinery  · Do not make important personal or business decisions  · Do  not drink alcoholic beverages  · If you have not urinated within 8 hours after discharge, please contact your surgeon on call. *  Please give a list of your current medications to your Primary Care Provider. *  Please update this list whenever your medications are discontinued, doses are      changed, or new medications (including over-the-counter products) are added. *  Please carry medication information at all times in case of emergency situations. These are general instructions for a healthy lifestyle:    No smoking/ No tobacco products/ Avoid exposure to second hand smoke    Surgeon General's Warning:  Quitting smoking now greatly reduces serious risk to your health. Obesity, smoking, and sedentary lifestyle greatly increases your risk for illness    A healthy diet, regular physical exercise & weight monitoring are important for maintaining a healthy lifestyle    You may be retaining fluid if you have a history of heart failure or if you experience any of the following symptoms:  Weight gain of 3 pounds or more overnight or 5 pounds in a week, increased swelling in our hands or feet or shortness of breath while lying flat in bed. Please call your doctor as soon as you notice any of these symptoms; do not wait until your next office visit.     Recognize signs and symptoms of STROKE:    F-face looks uneven    A-arms unable to move or move unevenly    S-speech slurred or non-existent    T-time-call 911 as soon as signs and symptoms begin-DO NOT go       Back to bed or wait to see if you get better-TIME IS BRAIN.

## 2018-12-07 NOTE — ANESTHESIA PROCEDURE NOTES
Peripheral Block Start time: 12/7/2018 10:09 AM 
End time: 12/7/2018 10:14 AM 
Performed by: Bessie Angelucci., MD 
Authorized by: Bessie Angelucci., MD  
 
 
Pre-procedure: Indications: at surgeon's request, post-op pain management and procedure for pain Preanesthetic Checklist: patient identified, risks and benefits discussed, site marked, timeout performed, anesthesia consent given and patient being monitored Timeout Time: 10:08 Block Type:  
Block Type: Interscalene Laterality:  Left Monitoring:  Standard ASA monitoring, responsive to questions, oxygen, continuous pulse ox, heart rate and frequent vital sign checks Injection Technique:  Single shot Procedures: ultrasound guided and nerve stimulator Patient Position: supine Prep: chlorhexidine Location:  Interscalene Needle Type:  Stimuplex Needle Gauge:  22 G Needle Localization:  Nerve stimulator and ultrasound guidance Motor Response: minimal motor response >0.4 mA Assessment: 
Number of attempts:  1 Injection Assessment:  Incremental injection every 5 mL, no paresthesia, ultrasound image on chart, local visualized surrounding nerve on ultrasound, negative aspiration for blood and no intravascular symptoms Patient tolerance:  Patient tolerated the procedure well with no immediate complications

## 2018-12-07 NOTE — H&P
Outpatient Surgery History and Physical: [place] was seen and examined. CHIEF COMPLAINT:    l shoulder . PE: There were no vitals taken for this visit. Heart:   Regular rhythm Lungs:  Are clear Past Medical History:   
Patient Active Problem List  
 Diagnosis  Severe obesity (BMI 35.0-39. 9)  GOPAL (obstructive sleep apnea)  GOPAL and COPD overlap syndrome ([address]  COPD exacerbation ([address]  Obesity (BMI 30-39. 9)  RLS (restless legs syndrome)  Dyspnea on exertion 08/2014:  Normal coronaries  Atrial fibrillation ([address]  HLD (hyperlipidemia)  Pulmonary embolus ([address]  Essential hypertension  COPD (chronic obstructive pulmonary disease) (HCC) Surgical History:  
Past Surgical History:  
Procedure Laterality Date  HX APPENDECTOMY  HX CHOLECYSTECTOMY  HX ORTHOPAEDIC    
 bilateral feet,   
 HX ROTATOR CUFF REPAIR Right  HX ROTATOR CUFF REPAIR Left  HX KOURTNEY AND BSO  HX UROLOGICAL    
 bladder tack Social History: Patient  reports that she quit smoking about 20 years ago. Her smoking use included cigarettes. She has a 90.00 pack-year smoking history. she has never used smokeless tobacco. She reports that she does not drink alcohol or use drugs. Family History:  
Family History Problem Relation Age of Onset  Stroke Mother  Heart Disease Mother  Cancer Father   
     prostate  Stroke Father  Coronary Artery Disease Father   
     premature  Heart Disease Brother  Heart Attack Brother  Cancer Sister  Cancer Sister   
     breast  
 
 
Allergies: Reviewed per EMR Allergies Allergen Reactions  Latex Atopic Dermatitis  Latex, Natural Rubber Contact Dermatitis  Codeine Other (comments) Headache  Shellfish Derived Hives Medications: No current facility-administered medications on file prior to encounter. Current Outpatient Medications on File Prior to Encounter Medication Sig  
 lisinopril (PRINIVIL, ZESTRIL) 10 mg tablet Take 1 Tab by mouth daily.  umeclidinium-vilanterol (ANORO ELLIPTA) 62.5-25 mcg/actuation inhaler Take 1 Puff by inhalation daily.  flecainide (TAMBOCOR) 50 mg tablet TAKE ONE TABLET BY MOUTH TWICE DAILY  metoprolol succinate (TOPROL-XL) 100 mg tablet Take 1 Tab by mouth daily.  omeprazole (PRILOSEC) 40 mg capsule Take 40 mg by mouth daily.  apixaban (ELIQUIS) 5 mg tablet Take 5 mg by mouth two (2) times a day. Indications: PREVENT THROMBOEMBOLISM IN CHRONIC ATRIAL FIBRILLATION  
 rOPINIRole (REQUIP) 0.5 mg tablet Take 0.5 mg by mouth nightly.  PROAIR HFA 90 mcg/actuation inhaler Take 2 Puffs by inhalation every four (4) hours as needed.  furosemide (LASIX) 40 mg tablet Take  by mouth as needed. The surgery is planned for the  shoulder. Consent scope shoulder History and physical has been reviewed. The patient has been examined. There have been no significant clinical changes since the completion of the originally dated History and Physical. 
Patient identified by surgeon; surgical site was confirmed by patient and surgeon. The patient is here today for outpatient surgery. I have examined the patient, no changes are noted in the patient's medical status. Necessity for the procedure/care is still present and the history and physical above is current. See the office notes for the full long term history of the problem. Please see the recent office notes for the musculoskeletal examination.  
 
Signed By: Graznya Hernandez MD   
 December 7, 2018 7:09 AM

## 2018-12-07 NOTE — BRIEF OP NOTE
BRIEF OPERATIVE NOTE Date of Procedure: 12/7/2018 Preoperative Diagnosis: Synovitis of left shoulder [H65.290] Adhesive capsulitis of left shoulder [M75.02] Postoperative Diagnosis: Synovitis of left shoulder [C15.487] Adhesive capsulitis of left shoulder [M75.02] Procedure(s): LEFT SHOULDER ARTHROSCOPY, SYNOVECTOMY, MANIPULATION GENERAL Surgeon(s) and Role: Nichol Bhakta MD - Primary Surgical Assistant:  
 
 
 
 
Surgical Staff: 
Circ-1: Vijay Chambers RN Scrub Tech-1: Angella Lopez Event Time In Time Out Incision Start 1046 Incision Close 1055 Anesthesia: General  
Estimated Blood Loss:  
 
Specimens: * No specimens in log * Findings:  
  
Complications:  
 
Implants: * No implants in log *

## 2018-12-07 NOTE — PROGRESS NOTES
Patient was calm Thanked patient for choosing us Prayer offered No concerns voiced James [place], staff [address], [address] [facility] [address]  /   [email or web address]

## 2019-07-02 PROBLEM — I27.20 PULMONARY HYPERTENSION (HCC): Status: ACTIVE | Noted: 2019-07-02

## 2019-07-02 PROBLEM — I51.89 DIASTOLIC DYSFUNCTION: Status: ACTIVE | Noted: 2019-07-02

## 2019-07-02 PROBLEM — Z78.9 INTOLERANCE OF CONTINUOUS POSITIVE AIRWAY PRESSURE (CPAP) VENTILATION: Chronic | Status: ACTIVE | Noted: 2019-07-02

## 2019-07-02 PROBLEM — I27.20 PULMONARY HYPERTENSION (HCC): Chronic | Status: ACTIVE | Noted: 2019-07-02

## 2019-07-02 PROBLEM — Z78.9 INTOLERANCE OF CONTINUOUS POSITIVE AIRWAY PRESSURE (CPAP) VENTILATION: Status: ACTIVE | Noted: 2019-07-02

## 2019-07-02 PROBLEM — I51.89 DIASTOLIC DYSFUNCTION: Chronic | Status: ACTIVE | Noted: 2019-07-02

## 2019-07-02 PROBLEM — J44.1 COPD EXACERBATION (HCC): Status: RESOLVED | Noted: 2017-09-25 | Resolved: 2019-07-02

## 2019-08-13 PROBLEM — G47.34 NOCTURNAL HYPOXIA: Status: ACTIVE | Noted: 2019-08-13

## 2019-09-26 PROBLEM — E66.9 CLASS 1 OBESITY IN ADULT: Status: ACTIVE | Noted: 2018-08-30

## 2019-09-26 PROBLEM — I87.8 VENOUS STASIS: Chronic | Status: ACTIVE | Noted: 2019-09-26

## 2020-06-15 PROBLEM — E66.01 SEVERE OBESITY (HCC): Status: ACTIVE | Noted: 2020-06-15

## 2020-06-15 PROBLEM — L03.116 CELLULITIS OF LEFT LOWER EXTREMITY: Status: ACTIVE | Noted: 2020-06-15

## 2020-06-15 PROBLEM — L97.221 CALF ULCER, LEFT, LIMITED TO BREAKDOWN OF SKIN (HCC): Status: ACTIVE | Noted: 2019-09-13

## 2020-06-24 ENCOUNTER — HOME HEALTH ADMISSION (OUTPATIENT)
Dept: HOME HEALTH SERVICES | Facility: HOME HEALTH | Age: 75
End: 2020-06-24
Payer: MEDICARE

## 2020-06-24 PROBLEM — I83.009 STASIS ULCER (HCC): Status: ACTIVE | Noted: 2020-06-24

## 2020-06-24 PROBLEM — S81.802A OPEN WOUND OF LEFT LOWER EXTREMITY: Status: ACTIVE | Noted: 2020-06-24

## 2020-06-24 PROBLEM — L97.909 STASIS ULCER (HCC): Status: ACTIVE | Noted: 2020-06-24

## 2020-06-24 PROBLEM — L03.119 CELLULITIS OF LOWER EXTREMITY: Status: ACTIVE | Noted: 2020-06-24

## 2020-06-25 ENCOUNTER — HOME CARE VISIT (OUTPATIENT)
Dept: SCHEDULING | Facility: HOME HEALTH | Age: 75
End: 2020-06-25
Payer: MEDICARE

## 2020-06-25 VITALS
HEART RATE: 82 BPM | RESPIRATION RATE: 20 BRPM | SYSTOLIC BLOOD PRESSURE: 132 MMHG | OXYGEN SATURATION: 94 % | TEMPERATURE: 98.3 F | DIASTOLIC BLOOD PRESSURE: 84 MMHG

## 2020-06-25 PROCEDURE — 3331090001 HH PPS REVENUE CREDIT

## 2020-06-25 PROCEDURE — 3331090002 HH PPS REVENUE DEBIT

## 2020-06-25 PROCEDURE — 400013 HH SOC

## 2020-06-25 PROCEDURE — G0299 HHS/HOSPICE OF RN EA 15 MIN: HCPCS

## 2020-06-26 PROCEDURE — 3331090001 HH PPS REVENUE CREDIT

## 2020-06-26 PROCEDURE — 3331090002 HH PPS REVENUE DEBIT

## 2020-06-27 ENCOUNTER — HOME CARE VISIT (OUTPATIENT)
Dept: SCHEDULING | Facility: HOME HEALTH | Age: 75
End: 2020-06-27
Payer: MEDICARE

## 2020-06-27 VITALS
OXYGEN SATURATION: 96 % | TEMPERATURE: 98.1 F | DIASTOLIC BLOOD PRESSURE: 78 MMHG | HEART RATE: 76 BPM | RESPIRATION RATE: 18 BRPM | SYSTOLIC BLOOD PRESSURE: 144 MMHG

## 2020-06-27 PROCEDURE — 3331090001 HH PPS REVENUE CREDIT

## 2020-06-27 PROCEDURE — 3331090002 HH PPS REVENUE DEBIT

## 2020-06-27 PROCEDURE — G0299 HHS/HOSPICE OF RN EA 15 MIN: HCPCS

## 2020-06-28 PROCEDURE — 3331090001 HH PPS REVENUE CREDIT

## 2020-06-28 PROCEDURE — 3331090002 HH PPS REVENUE DEBIT

## 2020-06-29 ENCOUNTER — HOME CARE VISIT (OUTPATIENT)
Dept: SCHEDULING | Facility: HOME HEALTH | Age: 75
End: 2020-06-29
Payer: MEDICARE

## 2020-06-29 VITALS
DIASTOLIC BLOOD PRESSURE: 82 MMHG | SYSTOLIC BLOOD PRESSURE: 142 MMHG | OXYGEN SATURATION: 97 % | RESPIRATION RATE: 18 BRPM | HEART RATE: 82 BPM | TEMPERATURE: 97.1 F

## 2020-06-29 PROCEDURE — 3331090001 HH PPS REVENUE CREDIT

## 2020-06-29 PROCEDURE — A6212 FOAM DRG <=16 SQ IN W/BORDER: HCPCS

## 2020-06-29 PROCEDURE — A6216 NON-STERILE GAUZE<=16 SQ IN: HCPCS

## 2020-06-29 PROCEDURE — G0299 HHS/HOSPICE OF RN EA 15 MIN: HCPCS

## 2020-06-29 PROCEDURE — 3331090002 HH PPS REVENUE DEBIT

## 2020-06-29 PROCEDURE — A6260 WOUND CLEANSER ANY TYPE/SIZE: HCPCS

## 2020-06-30 PROCEDURE — 3331090002 HH PPS REVENUE DEBIT

## 2020-06-30 PROCEDURE — 3331090001 HH PPS REVENUE CREDIT

## 2020-07-01 ENCOUNTER — HOME CARE VISIT (OUTPATIENT)
Dept: SCHEDULING | Facility: HOME HEALTH | Age: 75
End: 2020-07-01
Payer: MEDICARE

## 2020-07-01 VITALS
OXYGEN SATURATION: 97 % | HEART RATE: 76 BPM | TEMPERATURE: 97.9 F | DIASTOLIC BLOOD PRESSURE: 78 MMHG | RESPIRATION RATE: 18 BRPM | SYSTOLIC BLOOD PRESSURE: 138 MMHG

## 2020-07-01 PROCEDURE — G0299 HHS/HOSPICE OF RN EA 15 MIN: HCPCS

## 2020-07-01 PROCEDURE — 3331090001 HH PPS REVENUE CREDIT

## 2020-07-01 PROCEDURE — 3331090002 HH PPS REVENUE DEBIT

## 2020-07-02 PROBLEM — L02.415 ABSCESS OF LEG, RIGHT: Status: ACTIVE | Noted: 2020-07-02

## 2020-07-02 PROBLEM — I73.9 PERIPHERAL VASCULAR DISEASE (HCC): Status: ACTIVE | Noted: 2020-07-02

## 2020-07-02 PROCEDURE — 3331090001 HH PPS REVENUE CREDIT

## 2020-07-02 PROCEDURE — 3331090002 HH PPS REVENUE DEBIT

## 2020-07-03 PROCEDURE — 3331090002 HH PPS REVENUE DEBIT

## 2020-07-03 PROCEDURE — 3331090001 HH PPS REVENUE CREDIT

## 2020-07-04 PROCEDURE — 3331090002 HH PPS REVENUE DEBIT

## 2020-07-04 PROCEDURE — 3331090001 HH PPS REVENUE CREDIT

## 2020-07-05 PROCEDURE — 3331090002 HH PPS REVENUE DEBIT

## 2020-07-05 PROCEDURE — 3331090001 HH PPS REVENUE CREDIT

## 2020-07-06 ENCOUNTER — HOME CARE VISIT (OUTPATIENT)
Dept: SCHEDULING | Facility: HOME HEALTH | Age: 75
End: 2020-07-06
Payer: MEDICARE

## 2020-07-06 PROCEDURE — 3331090001 HH PPS REVENUE CREDIT

## 2020-07-06 PROCEDURE — 3331090002 HH PPS REVENUE DEBIT

## 2020-07-07 PROCEDURE — 3331090001 HH PPS REVENUE CREDIT

## 2020-07-07 PROCEDURE — 3331090002 HH PPS REVENUE DEBIT

## 2020-07-08 PROCEDURE — 3331090001 HH PPS REVENUE CREDIT

## 2020-07-08 PROCEDURE — 3331090002 HH PPS REVENUE DEBIT

## 2020-07-09 ENCOUNTER — HOME CARE VISIT (OUTPATIENT)
Dept: SCHEDULING | Facility: HOME HEALTH | Age: 75
End: 2020-07-09
Payer: MEDICARE

## 2020-07-09 VITALS
DIASTOLIC BLOOD PRESSURE: 64 MMHG | HEART RATE: 70 BPM | SYSTOLIC BLOOD PRESSURE: 120 MMHG | RESPIRATION RATE: 16 BRPM | TEMPERATURE: 97.1 F | OXYGEN SATURATION: 92 %

## 2020-07-09 PROCEDURE — 3331090001 HH PPS REVENUE CREDIT

## 2020-07-09 PROCEDURE — G0299 HHS/HOSPICE OF RN EA 15 MIN: HCPCS

## 2020-07-09 PROCEDURE — 3331090002 HH PPS REVENUE DEBIT

## 2020-07-10 PROCEDURE — 3331090002 HH PPS REVENUE DEBIT

## 2020-07-10 PROCEDURE — 3331090001 HH PPS REVENUE CREDIT

## 2020-07-11 PROCEDURE — 3331090002 HH PPS REVENUE DEBIT

## 2020-07-11 PROCEDURE — 3331090001 HH PPS REVENUE CREDIT

## 2020-07-12 PROCEDURE — 3331090001 HH PPS REVENUE CREDIT

## 2020-07-12 PROCEDURE — 3331090002 HH PPS REVENUE DEBIT

## 2020-07-13 PROCEDURE — 3331090001 HH PPS REVENUE CREDIT

## 2020-07-13 PROCEDURE — 3331090002 HH PPS REVENUE DEBIT

## 2020-07-13 PROCEDURE — A4649 SURGICAL SUPPLIES: HCPCS

## 2020-07-14 ENCOUNTER — HOME CARE VISIT (OUTPATIENT)
Dept: SCHEDULING | Facility: HOME HEALTH | Age: 75
End: 2020-07-14
Payer: MEDICARE

## 2020-07-14 VITALS
OXYGEN SATURATION: 93 % | TEMPERATURE: 98.1 F | HEART RATE: 82 BPM | RESPIRATION RATE: 18 BRPM | DIASTOLIC BLOOD PRESSURE: 76 MMHG | SYSTOLIC BLOOD PRESSURE: 132 MMHG

## 2020-07-14 PROCEDURE — 3331090002 HH PPS REVENUE DEBIT

## 2020-07-14 PROCEDURE — G0299 HHS/HOSPICE OF RN EA 15 MIN: HCPCS

## 2020-07-14 PROCEDURE — 3331090001 HH PPS REVENUE CREDIT

## 2020-07-15 PROCEDURE — 3331090002 HH PPS REVENUE DEBIT

## 2020-07-15 PROCEDURE — 3331090001 HH PPS REVENUE CREDIT

## 2020-07-16 PROCEDURE — 3331090001 HH PPS REVENUE CREDIT

## 2020-07-16 PROCEDURE — 3331090002 HH PPS REVENUE DEBIT

## 2020-07-17 PROCEDURE — 3331090001 HH PPS REVENUE CREDIT

## 2020-07-17 PROCEDURE — 3331090002 HH PPS REVENUE DEBIT

## 2020-07-17 PROCEDURE — A6454 SELF-ADHER BAND W>=3" <5"/YD: HCPCS

## 2020-07-18 ENCOUNTER — HOME CARE VISIT (OUTPATIENT)
Dept: SCHEDULING | Facility: HOME HEALTH | Age: 75
End: 2020-07-18
Payer: MEDICARE

## 2020-07-18 PROCEDURE — 3331090002 HH PPS REVENUE DEBIT

## 2020-07-18 PROCEDURE — 3331090001 HH PPS REVENUE CREDIT

## 2020-07-19 PROCEDURE — 3331090002 HH PPS REVENUE DEBIT

## 2020-07-19 PROCEDURE — 3331090001 HH PPS REVENUE CREDIT

## 2020-07-20 PROCEDURE — 3331090001 HH PPS REVENUE CREDIT

## 2020-07-20 PROCEDURE — 3331090002 HH PPS REVENUE DEBIT

## 2020-07-21 ENCOUNTER — HOME CARE VISIT (OUTPATIENT)
Dept: SCHEDULING | Facility: HOME HEALTH | Age: 75
End: 2020-07-21
Payer: MEDICARE

## 2020-07-21 VITALS
RESPIRATION RATE: 18 BRPM | TEMPERATURE: 98.1 F | SYSTOLIC BLOOD PRESSURE: 142 MMHG | OXYGEN SATURATION: 94 % | HEART RATE: 68 BPM | DIASTOLIC BLOOD PRESSURE: 72 MMHG

## 2020-07-21 PROCEDURE — 3331090002 HH PPS REVENUE DEBIT

## 2020-07-21 PROCEDURE — 3331090001 HH PPS REVENUE CREDIT

## 2020-07-21 PROCEDURE — G0299 HHS/HOSPICE OF RN EA 15 MIN: HCPCS

## 2020-07-22 PROCEDURE — 3331090001 HH PPS REVENUE CREDIT

## 2020-07-22 PROCEDURE — A6456 ZINC PASTE BAND W >=3"<5"/YD: HCPCS

## 2020-07-22 PROCEDURE — 3331090002 HH PPS REVENUE DEBIT

## 2020-07-23 PROCEDURE — 3331090001 HH PPS REVENUE CREDIT

## 2020-07-23 PROCEDURE — 3331090002 HH PPS REVENUE DEBIT

## 2020-07-24 ENCOUNTER — HOME CARE VISIT (OUTPATIENT)
Dept: SCHEDULING | Facility: HOME HEALTH | Age: 75
End: 2020-07-24
Payer: MEDICARE

## 2020-07-24 VITALS
OXYGEN SATURATION: 93 % | RESPIRATION RATE: 18 BRPM | TEMPERATURE: 97.1 F | HEART RATE: 83 BPM | SYSTOLIC BLOOD PRESSURE: 142 MMHG | DIASTOLIC BLOOD PRESSURE: 82 MMHG

## 2020-07-24 PROCEDURE — G0299 HHS/HOSPICE OF RN EA 15 MIN: HCPCS

## 2020-07-24 PROCEDURE — 3331090002 HH PPS REVENUE DEBIT

## 2020-07-24 PROCEDURE — 3331090001 HH PPS REVENUE CREDIT

## 2020-07-25 PROCEDURE — 3331090002 HH PPS REVENUE DEBIT

## 2020-07-25 PROCEDURE — 3331090001 HH PPS REVENUE CREDIT

## 2020-07-26 ENCOUNTER — HOME CARE VISIT (OUTPATIENT)
Dept: SCHEDULING | Facility: HOME HEALTH | Age: 75
End: 2020-07-26
Payer: MEDICARE

## 2020-07-26 VITALS
HEART RATE: 74 BPM | RESPIRATION RATE: 16 BRPM | OXYGEN SATURATION: 94 % | SYSTOLIC BLOOD PRESSURE: 158 MMHG | TEMPERATURE: 98.2 F | DIASTOLIC BLOOD PRESSURE: 82 MMHG

## 2020-07-26 PROCEDURE — 3331090001 HH PPS REVENUE CREDIT

## 2020-07-26 PROCEDURE — 3331090002 HH PPS REVENUE DEBIT

## 2020-07-26 PROCEDURE — G0299 HHS/HOSPICE OF RN EA 15 MIN: HCPCS

## 2020-07-26 PROCEDURE — 400013 HH SOC

## 2020-07-27 PROCEDURE — 3331090001 HH PPS REVENUE CREDIT

## 2020-07-27 PROCEDURE — 3331090002 HH PPS REVENUE DEBIT

## 2020-07-28 PROCEDURE — 3331090001 HH PPS REVENUE CREDIT

## 2020-07-28 PROCEDURE — 3331090002 HH PPS REVENUE DEBIT

## 2020-07-29 PROCEDURE — 3331090002 HH PPS REVENUE DEBIT

## 2020-07-29 PROCEDURE — 3331090001 HH PPS REVENUE CREDIT

## 2020-07-30 ENCOUNTER — HOME CARE VISIT (OUTPATIENT)
Dept: SCHEDULING | Facility: HOME HEALTH | Age: 75
End: 2020-07-30
Payer: MEDICARE

## 2020-07-30 VITALS
RESPIRATION RATE: 12 BRPM | DIASTOLIC BLOOD PRESSURE: 70 MMHG | OXYGEN SATURATION: 94 % | HEART RATE: 67 BPM | SYSTOLIC BLOOD PRESSURE: 138 MMHG | TEMPERATURE: 97.3 F

## 2020-07-30 PROCEDURE — 3331090002 HH PPS REVENUE DEBIT

## 2020-07-30 PROCEDURE — G0299 HHS/HOSPICE OF RN EA 15 MIN: HCPCS

## 2020-07-30 PROCEDURE — 3331090001 HH PPS REVENUE CREDIT

## 2020-07-31 PROCEDURE — 3331090001 HH PPS REVENUE CREDIT

## 2020-07-31 PROCEDURE — 3331090002 HH PPS REVENUE DEBIT

## 2020-08-01 PROCEDURE — 3331090002 HH PPS REVENUE DEBIT

## 2020-08-01 PROCEDURE — 3331090001 HH PPS REVENUE CREDIT

## 2020-08-02 PROCEDURE — 3331090001 HH PPS REVENUE CREDIT

## 2020-08-02 PROCEDURE — 3331090002 HH PPS REVENUE DEBIT

## 2020-08-03 PROCEDURE — 3331090001 HH PPS REVENUE CREDIT

## 2020-08-03 PROCEDURE — 3331090002 HH PPS REVENUE DEBIT

## 2020-08-04 ENCOUNTER — HOME CARE VISIT (OUTPATIENT)
Dept: SCHEDULING | Facility: HOME HEALTH | Age: 75
End: 2020-08-04
Payer: MEDICARE

## 2020-08-04 VITALS
RESPIRATION RATE: 18 BRPM | SYSTOLIC BLOOD PRESSURE: 142 MMHG | TEMPERATURE: 97.9 F | DIASTOLIC BLOOD PRESSURE: 72 MMHG | HEART RATE: 64 BPM | OXYGEN SATURATION: 94 %

## 2020-08-04 PROCEDURE — G0299 HHS/HOSPICE OF RN EA 15 MIN: HCPCS

## 2020-08-04 PROCEDURE — 3331090001 HH PPS REVENUE CREDIT

## 2020-08-04 PROCEDURE — 3331090002 HH PPS REVENUE DEBIT

## 2020-08-05 PROCEDURE — 3331090001 HH PPS REVENUE CREDIT

## 2020-08-05 PROCEDURE — 3331090002 HH PPS REVENUE DEBIT

## 2020-08-06 PROCEDURE — 3331090001 HH PPS REVENUE CREDIT

## 2020-08-06 PROCEDURE — 3331090002 HH PPS REVENUE DEBIT

## 2020-08-07 ENCOUNTER — HOME CARE VISIT (OUTPATIENT)
Dept: SCHEDULING | Facility: HOME HEALTH | Age: 75
End: 2020-08-07
Payer: MEDICARE

## 2020-08-07 VITALS
SYSTOLIC BLOOD PRESSURE: 146 MMHG | TEMPERATURE: 96.9 F | HEART RATE: 76 BPM | OXYGEN SATURATION: 97 % | RESPIRATION RATE: 18 BRPM | DIASTOLIC BLOOD PRESSURE: 84 MMHG

## 2020-08-07 PROCEDURE — G0299 HHS/HOSPICE OF RN EA 15 MIN: HCPCS

## 2020-08-07 PROCEDURE — 3331090002 HH PPS REVENUE DEBIT

## 2020-08-07 PROCEDURE — 3331090001 HH PPS REVENUE CREDIT

## 2020-08-08 PROCEDURE — 3331090001 HH PPS REVENUE CREDIT

## 2020-08-08 PROCEDURE — 3331090002 HH PPS REVENUE DEBIT

## 2020-08-09 PROCEDURE — 3331090002 HH PPS REVENUE DEBIT

## 2020-08-09 PROCEDURE — 3331090001 HH PPS REVENUE CREDIT

## 2020-08-10 PROCEDURE — 3331090001 HH PPS REVENUE CREDIT

## 2020-08-10 PROCEDURE — 3331090002 HH PPS REVENUE DEBIT

## 2020-08-11 PROCEDURE — 3331090002 HH PPS REVENUE DEBIT

## 2020-08-11 PROCEDURE — 3331090001 HH PPS REVENUE CREDIT

## 2020-08-12 ENCOUNTER — HOME CARE VISIT (OUTPATIENT)
Dept: SCHEDULING | Facility: HOME HEALTH | Age: 75
End: 2020-08-12
Payer: MEDICARE

## 2020-08-12 VITALS
SYSTOLIC BLOOD PRESSURE: 148 MMHG | TEMPERATURE: 97.1 F | OXYGEN SATURATION: 93 % | DIASTOLIC BLOOD PRESSURE: 76 MMHG | HEART RATE: 72 BPM | RESPIRATION RATE: 18 BRPM

## 2020-08-12 PROCEDURE — G0299 HHS/HOSPICE OF RN EA 15 MIN: HCPCS

## 2020-08-12 PROCEDURE — 3331090002 HH PPS REVENUE DEBIT

## 2020-08-12 PROCEDURE — 3331090001 HH PPS REVENUE CREDIT

## 2020-08-13 PROCEDURE — 3331090001 HH PPS REVENUE CREDIT

## 2020-08-13 PROCEDURE — 3331090002 HH PPS REVENUE DEBIT

## 2020-08-14 ENCOUNTER — HOME CARE VISIT (OUTPATIENT)
Dept: SCHEDULING | Facility: HOME HEALTH | Age: 75
End: 2020-08-14
Payer: MEDICARE

## 2020-08-14 PROCEDURE — 3331090002 HH PPS REVENUE DEBIT

## 2020-08-14 PROCEDURE — 3331090001 HH PPS REVENUE CREDIT

## 2020-08-15 PROCEDURE — 3331090001 HH PPS REVENUE CREDIT

## 2020-08-15 PROCEDURE — 3331090002 HH PPS REVENUE DEBIT

## 2020-08-16 PROCEDURE — 3331090002 HH PPS REVENUE DEBIT

## 2020-08-16 PROCEDURE — 3331090001 HH PPS REVENUE CREDIT

## 2020-08-17 ENCOUNTER — HOME CARE VISIT (OUTPATIENT)
Dept: SCHEDULING | Facility: HOME HEALTH | Age: 75
End: 2020-08-17
Payer: MEDICARE

## 2020-08-17 VITALS
SYSTOLIC BLOOD PRESSURE: 132 MMHG | RESPIRATION RATE: 18 BRPM | TEMPERATURE: 98.1 F | DIASTOLIC BLOOD PRESSURE: 76 MMHG | HEART RATE: 78 BPM | OXYGEN SATURATION: 96 %

## 2020-08-17 PROCEDURE — A6216 NON-STERILE GAUZE<=16 SQ IN: HCPCS

## 2020-08-17 PROCEDURE — A6260 WOUND CLEANSER ANY TYPE/SIZE: HCPCS

## 2020-08-17 PROCEDURE — G0299 HHS/HOSPICE OF RN EA 15 MIN: HCPCS

## 2020-08-17 PROCEDURE — A6212 FOAM DRG <=16 SQ IN W/BORDER: HCPCS

## 2020-08-17 PROCEDURE — 3331090001 HH PPS REVENUE CREDIT

## 2020-08-17 PROCEDURE — 3331090002 HH PPS REVENUE DEBIT

## 2020-08-18 PROCEDURE — 3331090002 HH PPS REVENUE DEBIT

## 2020-08-18 PROCEDURE — 3331090001 HH PPS REVENUE CREDIT

## 2020-08-19 PROCEDURE — 3331090001 HH PPS REVENUE CREDIT

## 2020-08-19 PROCEDURE — 3331090002 HH PPS REVENUE DEBIT

## 2020-08-20 PROCEDURE — 3331090002 HH PPS REVENUE DEBIT

## 2020-08-20 PROCEDURE — 3331090001 HH PPS REVENUE CREDIT

## 2020-08-21 ENCOUNTER — HOME CARE VISIT (OUTPATIENT)
Dept: SCHEDULING | Facility: HOME HEALTH | Age: 75
End: 2020-08-21
Payer: MEDICARE

## 2020-08-21 VITALS
SYSTOLIC BLOOD PRESSURE: 140 MMHG | HEART RATE: 70 BPM | OXYGEN SATURATION: 92 % | RESPIRATION RATE: 18 BRPM | TEMPERATURE: 98.2 F | DIASTOLIC BLOOD PRESSURE: 72 MMHG

## 2020-08-21 PROCEDURE — 3331090002 HH PPS REVENUE DEBIT

## 2020-08-21 PROCEDURE — 3331090001 HH PPS REVENUE CREDIT

## 2020-08-21 PROCEDURE — G0299 HHS/HOSPICE OF RN EA 15 MIN: HCPCS

## 2020-08-22 PROCEDURE — 3331090001 HH PPS REVENUE CREDIT

## 2020-08-22 PROCEDURE — 3331090002 HH PPS REVENUE DEBIT

## 2020-08-23 PROCEDURE — 3331090001 HH PPS REVENUE CREDIT

## 2020-08-23 PROCEDURE — 3331090002 HH PPS REVENUE DEBIT

## 2020-08-24 PROCEDURE — 3331090001 HH PPS REVENUE CREDIT

## 2020-08-24 PROCEDURE — 3331090002 HH PPS REVENUE DEBIT

## 2020-08-25 ENCOUNTER — HOME CARE VISIT (OUTPATIENT)
Dept: SCHEDULING | Facility: HOME HEALTH | Age: 75
End: 2020-08-25
Payer: MEDICARE

## 2020-08-25 VITALS
HEART RATE: 82 BPM | OXYGEN SATURATION: 95 % | SYSTOLIC BLOOD PRESSURE: 128 MMHG | TEMPERATURE: 97.3 F | DIASTOLIC BLOOD PRESSURE: 76 MMHG | RESPIRATION RATE: 18 BRPM

## 2020-08-25 PROCEDURE — G0299 HHS/HOSPICE OF RN EA 15 MIN: HCPCS

## 2020-08-25 PROCEDURE — 3331090001 HH PPS REVENUE CREDIT

## 2020-08-25 PROCEDURE — 400014 HH F/U

## 2020-08-25 PROCEDURE — 3331090002 HH PPS REVENUE DEBIT

## 2020-08-26 PROCEDURE — 3331090002 HH PPS REVENUE DEBIT

## 2020-08-26 PROCEDURE — 3331090001 HH PPS REVENUE CREDIT

## 2020-08-27 PROCEDURE — 3331090002 HH PPS REVENUE DEBIT

## 2020-08-27 PROCEDURE — 3331090001 HH PPS REVENUE CREDIT

## 2020-08-28 ENCOUNTER — HOME CARE VISIT (OUTPATIENT)
Dept: SCHEDULING | Facility: HOME HEALTH | Age: 75
End: 2020-08-28
Payer: MEDICARE

## 2020-08-28 PROCEDURE — 3331090001 HH PPS REVENUE CREDIT

## 2020-08-28 PROCEDURE — 3331090002 HH PPS REVENUE DEBIT

## 2020-08-29 PROCEDURE — 3331090001 HH PPS REVENUE CREDIT

## 2020-08-29 PROCEDURE — 3331090002 HH PPS REVENUE DEBIT

## 2020-08-30 PROCEDURE — 3331090002 HH PPS REVENUE DEBIT

## 2020-08-30 PROCEDURE — 3331090001 HH PPS REVENUE CREDIT

## 2020-08-31 PROCEDURE — 3331090002 HH PPS REVENUE DEBIT

## 2020-08-31 PROCEDURE — 3331090001 HH PPS REVENUE CREDIT

## 2020-09-01 ENCOUNTER — HOME CARE VISIT (OUTPATIENT)
Dept: SCHEDULING | Facility: HOME HEALTH | Age: 75
End: 2020-09-01
Payer: MEDICARE

## 2020-09-01 VITALS
RESPIRATION RATE: 18 BRPM | SYSTOLIC BLOOD PRESSURE: 132 MMHG | HEART RATE: 66 BPM | TEMPERATURE: 97.9 F | OXYGEN SATURATION: 95 % | DIASTOLIC BLOOD PRESSURE: 74 MMHG

## 2020-09-01 PROCEDURE — G0299 HHS/HOSPICE OF RN EA 15 MIN: HCPCS

## 2020-09-01 PROCEDURE — 3331090001 HH PPS REVENUE CREDIT

## 2020-09-01 PROCEDURE — 3331090002 HH PPS REVENUE DEBIT

## 2020-09-02 PROCEDURE — 3331090002 HH PPS REVENUE DEBIT

## 2020-09-02 PROCEDURE — 3331090001 HH PPS REVENUE CREDIT

## 2020-09-03 PROCEDURE — 3331090001 HH PPS REVENUE CREDIT

## 2020-09-03 PROCEDURE — 3331090002 HH PPS REVENUE DEBIT

## 2020-09-04 ENCOUNTER — HOME CARE VISIT (OUTPATIENT)
Dept: SCHEDULING | Facility: HOME HEALTH | Age: 75
End: 2020-09-04
Payer: MEDICARE

## 2020-09-04 VITALS
SYSTOLIC BLOOD PRESSURE: 128 MMHG | DIASTOLIC BLOOD PRESSURE: 82 MMHG | TEMPERATURE: 97.5 F | HEART RATE: 72 BPM | OXYGEN SATURATION: 96 % | RESPIRATION RATE: 18 BRPM

## 2020-09-04 PROCEDURE — 3331090002 HH PPS REVENUE DEBIT

## 2020-09-04 PROCEDURE — 3331090001 HH PPS REVENUE CREDIT

## 2020-09-04 PROCEDURE — G0299 HHS/HOSPICE OF RN EA 15 MIN: HCPCS

## 2020-09-05 PROCEDURE — 3331090002 HH PPS REVENUE DEBIT

## 2020-09-05 PROCEDURE — 3331090001 HH PPS REVENUE CREDIT

## 2020-09-06 PROCEDURE — 3331090002 HH PPS REVENUE DEBIT

## 2020-09-06 PROCEDURE — 3331090001 HH PPS REVENUE CREDIT

## 2020-09-07 ENCOUNTER — HOME CARE VISIT (OUTPATIENT)
Dept: SCHEDULING | Facility: HOME HEALTH | Age: 75
End: 2020-09-07
Payer: MEDICARE

## 2020-09-07 VITALS
SYSTOLIC BLOOD PRESSURE: 138 MMHG | OXYGEN SATURATION: 98 % | DIASTOLIC BLOOD PRESSURE: 76 MMHG | HEART RATE: 78 BPM | RESPIRATION RATE: 18 BRPM | TEMPERATURE: 98 F

## 2020-09-07 PROCEDURE — 3331090002 HH PPS REVENUE DEBIT

## 2020-09-07 PROCEDURE — 3331090001 HH PPS REVENUE CREDIT

## 2020-09-07 PROCEDURE — G0299 HHS/HOSPICE OF RN EA 15 MIN: HCPCS

## 2020-09-08 PROCEDURE — 3331090001 HH PPS REVENUE CREDIT

## 2020-09-08 PROCEDURE — 3331090002 HH PPS REVENUE DEBIT

## 2020-09-09 ENCOUNTER — HOME CARE VISIT (OUTPATIENT)
Dept: SCHEDULING | Facility: HOME HEALTH | Age: 75
End: 2020-09-09
Payer: MEDICARE

## 2020-09-09 VITALS
SYSTOLIC BLOOD PRESSURE: 128 MMHG | OXYGEN SATURATION: 97 % | DIASTOLIC BLOOD PRESSURE: 68 MMHG | RESPIRATION RATE: 18 BRPM | HEART RATE: 72 BPM | TEMPERATURE: 98.1 F

## 2020-09-09 PROCEDURE — 3331090002 HH PPS REVENUE DEBIT

## 2020-09-09 PROCEDURE — G0299 HHS/HOSPICE OF RN EA 15 MIN: HCPCS

## 2020-09-09 PROCEDURE — 3331090001 HH PPS REVENUE CREDIT

## 2020-09-10 PROCEDURE — 3331090001 HH PPS REVENUE CREDIT

## 2020-09-10 PROCEDURE — 3331090002 HH PPS REVENUE DEBIT

## 2020-09-11 PROCEDURE — 3331090001 HH PPS REVENUE CREDIT

## 2020-09-11 PROCEDURE — 3331090002 HH PPS REVENUE DEBIT

## 2020-09-12 PROCEDURE — 3331090002 HH PPS REVENUE DEBIT

## 2020-09-12 PROCEDURE — 3331090001 HH PPS REVENUE CREDIT

## 2020-09-13 PROCEDURE — 3331090002 HH PPS REVENUE DEBIT

## 2020-09-13 PROCEDURE — 3331090001 HH PPS REVENUE CREDIT

## 2020-09-14 PROCEDURE — 3331090002 HH PPS REVENUE DEBIT

## 2020-09-14 PROCEDURE — 3331090001 HH PPS REVENUE CREDIT

## 2020-09-15 ENCOUNTER — HOME CARE VISIT (OUTPATIENT)
Dept: SCHEDULING | Facility: HOME HEALTH | Age: 75
End: 2020-09-15
Payer: MEDICARE

## 2020-09-15 VITALS
DIASTOLIC BLOOD PRESSURE: 82 MMHG | TEMPERATURE: 97.3 F | SYSTOLIC BLOOD PRESSURE: 142 MMHG | OXYGEN SATURATION: 96 % | HEART RATE: 76 BPM | RESPIRATION RATE: 18 BRPM

## 2020-09-15 PROCEDURE — 3331090001 HH PPS REVENUE CREDIT

## 2020-09-15 PROCEDURE — 3331090002 HH PPS REVENUE DEBIT

## 2020-09-15 PROCEDURE — G0299 HHS/HOSPICE OF RN EA 15 MIN: HCPCS

## 2020-09-16 PROCEDURE — 3331090002 HH PPS REVENUE DEBIT

## 2020-09-16 PROCEDURE — 3331090001 HH PPS REVENUE CREDIT

## 2020-09-17 ENCOUNTER — HOME CARE VISIT (OUTPATIENT)
Dept: SCHEDULING | Facility: HOME HEALTH | Age: 75
End: 2020-09-17
Payer: MEDICARE

## 2020-09-17 VITALS
OXYGEN SATURATION: 97 % | DIASTOLIC BLOOD PRESSURE: 76 MMHG | HEART RATE: 80 BPM | SYSTOLIC BLOOD PRESSURE: 142 MMHG | RESPIRATION RATE: 18 BRPM | TEMPERATURE: 97.2 F

## 2020-09-17 PROCEDURE — G0299 HHS/HOSPICE OF RN EA 15 MIN: HCPCS

## 2020-09-17 PROCEDURE — 3331090001 HH PPS REVENUE CREDIT

## 2020-09-17 PROCEDURE — 3331090002 HH PPS REVENUE DEBIT

## 2020-09-18 PROCEDURE — 3331090002 HH PPS REVENUE DEBIT

## 2020-09-18 PROCEDURE — 3331090001 HH PPS REVENUE CREDIT

## 2020-09-19 PROCEDURE — 3331090002 HH PPS REVENUE DEBIT

## 2020-09-19 PROCEDURE — 3331090001 HH PPS REVENUE CREDIT

## 2020-09-20 PROCEDURE — 3331090002 HH PPS REVENUE DEBIT

## 2020-09-20 PROCEDURE — 3331090001 HH PPS REVENUE CREDIT

## 2020-09-21 PROCEDURE — 3331090001 HH PPS REVENUE CREDIT

## 2020-09-21 PROCEDURE — 3331090002 HH PPS REVENUE DEBIT

## 2020-09-22 PROCEDURE — 3331090001 HH PPS REVENUE CREDIT

## 2020-09-22 PROCEDURE — 3331090002 HH PPS REVENUE DEBIT

## 2020-09-23 ENCOUNTER — HOME CARE VISIT (OUTPATIENT)
Dept: SCHEDULING | Facility: HOME HEALTH | Age: 75
End: 2020-09-23
Payer: MEDICARE

## 2020-09-23 VITALS
OXYGEN SATURATION: 94 % | SYSTOLIC BLOOD PRESSURE: 128 MMHG | HEART RATE: 72 BPM | DIASTOLIC BLOOD PRESSURE: 78 MMHG | RESPIRATION RATE: 18 BRPM | TEMPERATURE: 98.8 F

## 2020-09-23 PROCEDURE — G0299 HHS/HOSPICE OF RN EA 15 MIN: HCPCS

## 2020-09-23 PROCEDURE — 400014 HH F/U

## 2020-09-23 PROCEDURE — 3331090002 HH PPS REVENUE DEBIT

## 2020-09-23 PROCEDURE — 3331090001 HH PPS REVENUE CREDIT

## 2020-09-23 PROCEDURE — 3331090003 HH PPS REVENUE ADJ

## 2021-01-28 PROBLEM — I87.2 VENOUS INSUFFICIENCY: Chronic | Status: ACTIVE | Noted: 2021-01-28

## 2021-02-04 ENCOUNTER — HOSPITAL ENCOUNTER (OUTPATIENT)
Dept: LAB | Age: 76
Discharge: HOME | End: 2021-02-04
Payer: MEDICARE

## 2021-02-04 DIAGNOSIS — R06.09 DYSPNEA ON EXERTION: Chronic | ICD-10-CM

## 2021-02-04 DIAGNOSIS — J44.9 CHRONIC OBSTRUCTIVE PULMONARY DISEASE, UNSPECIFIED COPD TYPE (HCC): Chronic | ICD-10-CM

## 2021-02-04 DIAGNOSIS — I27.20 PULMONARY HYPERTENSION (HCC): Chronic | ICD-10-CM

## 2021-02-04 DIAGNOSIS — G47.33 OSA (OBSTRUCTIVE SLEEP APNEA): ICD-10-CM

## 2021-02-04 LAB
HCV AB SER QL: NONREACTIVE
HIV 1+2 AB+HIV1 P24 AG SERPL QL IA: NONREACTIVE
HIV12 RESULT COMMENT, HHIVC: ABNORMAL

## 2021-02-04 PROCEDURE — 86038 ANTINUCLEAR ANTIBODIES: CPT

## 2021-02-04 PROCEDURE — 87389 HIV-1 AG W/HIV-1&-2 AB AG IA: CPT

## 2021-02-04 PROCEDURE — 36415 COLL VENOUS BLD VENIPUNCTURE: CPT

## 2021-02-04 PROCEDURE — 86803 HEPATITIS C AB TEST: CPT

## 2021-02-05 LAB — ANA SER QL: NEGATIVE

## 2021-02-18 ENCOUNTER — HOSPITAL ENCOUNTER (OUTPATIENT)
Dept: LAB | Age: 76
Discharge: HOME | End: 2021-02-18
Payer: MEDICARE

## 2021-02-18 LAB — BNP SERPL-MCNC: 301 PG/ML

## 2021-02-18 PROCEDURE — 83880 ASSAY OF NATRIURETIC PEPTIDE: CPT

## 2021-02-18 PROCEDURE — 36415 COLL VENOUS BLD VENIPUNCTURE: CPT

## 2021-03-22 ENCOUNTER — TRANSCRIBE ORDER (OUTPATIENT)
Dept: SCHEDULING | Age: 76
End: 2021-03-22

## 2021-03-22 DIAGNOSIS — Z12.31 VISIT FOR SCREENING MAMMOGRAM: Primary | ICD-10-CM

## 2021-03-24 ENCOUNTER — HOSPITAL ENCOUNTER (OUTPATIENT)
Dept: CT IMAGING | Age: 76
Discharge: HOME | End: 2021-03-24
Attending: INTERNAL MEDICINE
Payer: MEDICARE

## 2021-03-24 DIAGNOSIS — J96.11 CHRONIC RESPIRATORY FAILURE WITH HYPOXIA (HCC): ICD-10-CM

## 2021-03-24 DIAGNOSIS — I27.20 PULMONARY HYPERTENSION (HCC): ICD-10-CM

## 2021-03-24 DIAGNOSIS — J44.9 STAGE 3 SEVERE COPD BY GOLD CLASSIFICATION (HCC): ICD-10-CM

## 2021-03-24 PROCEDURE — 71250 CT THORAX DX C-: CPT

## 2021-03-26 ENCOUNTER — HOSPITAL ENCOUNTER (OUTPATIENT)
Dept: NUCLEAR MEDICINE | Age: 76
Discharge: HOME | End: 2021-03-26
Attending: INTERNAL MEDICINE
Payer: MEDICARE

## 2021-03-26 ENCOUNTER — HOSPITAL ENCOUNTER (OUTPATIENT)
Dept: GENERAL RADIOLOGY | Age: 76
Discharge: HOME | End: 2021-03-26
Attending: INTERNAL MEDICINE
Payer: MEDICARE

## 2021-03-26 DIAGNOSIS — I27.20 PULMONARY HYPERTENSION (HCC): ICD-10-CM

## 2021-03-26 PROCEDURE — 78582 LUNG VENTILAT&PERFUS IMAGING: CPT

## 2021-03-26 PROCEDURE — 71046 X-RAY EXAM CHEST 2 VIEWS: CPT

## 2021-04-23 ENCOUNTER — HOSPITAL ENCOUNTER (OUTPATIENT)
Dept: MAMMOGRAPHY | Age: 76
Discharge: HOME | End: 2021-04-23
Attending: FAMILY MEDICINE
Payer: MEDICARE

## 2021-04-23 DIAGNOSIS — Z12.31 VISIT FOR SCREENING MAMMOGRAM: ICD-10-CM

## 2021-04-23 PROCEDURE — 77067 SCR MAMMO BI INCL CAD: CPT

## 2021-04-28 ENCOUNTER — HOSPITAL ENCOUNTER (OUTPATIENT)
Dept: LAB | Age: 76
Discharge: HOME | End: 2021-04-28
Payer: MEDICARE

## 2021-04-28 DIAGNOSIS — I27.20 PULMONARY HYPERTENSION (HCC): ICD-10-CM

## 2021-04-28 LAB
ANION GAP SERPL CALC-SCNC: 4 MMOL/L (ref 7–16)
BUN SERPL-MCNC: 16 MG/DL (ref 8–23)
CALCIUM SERPL-MCNC: 9 MG/DL (ref 8.3–10.4)
CHLORIDE SERPL-SCNC: 106 MMOL/L (ref 98–107)
CO2 SERPL-SCNC: 31 MMOL/L (ref 21–32)
CREAT SERPL-MCNC: 0.69 MG/DL (ref 0.6–1)
GLUCOSE SERPL-MCNC: 95 MG/DL (ref 65–100)
POTASSIUM SERPL-SCNC: 4 MMOL/L (ref 3.5–5.1)
SODIUM SERPL-SCNC: 141 MMOL/L (ref 136–145)

## 2021-04-28 PROCEDURE — 36415 COLL VENOUS BLD VENIPUNCTURE: CPT

## 2021-04-28 PROCEDURE — 80048 BASIC METABOLIC PNL TOTAL CA: CPT

## 2021-04-29 NOTE — PROGRESS NOTES
Please let patient know that labs look good and she is to continue diuretics as we discussed. Also her V/Q scan did not show any blood clots.   Armaan Aguilar MD

## 2021-04-30 NOTE — PROGRESS NOTES
Patient was called and notified that labs look good. She was told Dr. Cassie Underwood recommends that she continue diuretics. She was also told her V/Q scan did not show any blood clots. She verbalized understanding.

## 2021-05-11 ENCOUNTER — HOSPITAL ENCOUNTER (OUTPATIENT)
Dept: SLEEP MEDICINE | Age: 76
Discharge: HOME | End: 2021-05-11
Payer: MEDICARE

## 2021-05-11 PROCEDURE — 95806 SLEEP STUDY UNATT&RESP EFFT: CPT

## 2022-01-13 ENCOUNTER — HOSPITAL ENCOUNTER (OUTPATIENT)
Dept: LAB | Age: 77
Discharge: HOME | End: 2022-01-13
Payer: MEDICARE

## 2022-01-13 ENCOUNTER — HOSPITAL ENCOUNTER (OUTPATIENT)
Dept: GENERAL RADIOLOGY | Age: 77
Discharge: HOME | End: 2022-01-13
Payer: MEDICARE

## 2022-01-13 DIAGNOSIS — I27.20 PULMONARY HYPERTENSION (HCC): ICD-10-CM

## 2022-01-13 DIAGNOSIS — I48.0 PAROXYSMAL ATRIAL FIBRILLATION (HCC): ICD-10-CM

## 2022-01-13 DIAGNOSIS — R06.02 SOB (SHORTNESS OF BREATH): ICD-10-CM

## 2022-01-13 PROBLEM — R07.9 CHEST PAIN: Status: ACTIVE | Noted: 2022-01-13

## 2022-01-13 LAB
ANION GAP SERPL CALC-SCNC: 6 MMOL/L (ref 7–16)
BASOPHILS # BLD: 0 K/UL (ref 0–0.2)
BASOPHILS NFR BLD: 1 % (ref 0–2)
BNP SERPL-MCNC: 557 PG/ML
BUN SERPL-MCNC: 19 MG/DL (ref 8–23)
CALCIUM SERPL-MCNC: 8.9 MG/DL (ref 8.3–10.4)
CHLORIDE SERPL-SCNC: 109 MMOL/L (ref 98–107)
CO2 SERPL-SCNC: 28 MMOL/L (ref 21–32)
CREAT SERPL-MCNC: 0.83 MG/DL (ref 0.6–1)
DIFFERENTIAL METHOD BLD: NORMAL
EOSINOPHIL # BLD: 0.1 K/UL (ref 0–0.8)
EOSINOPHIL NFR BLD: 2 % (ref 0.5–7.8)
ERYTHROCYTE [DISTWIDTH] IN BLOOD BY AUTOMATED COUNT: 14.1 % (ref 11.9–14.6)
GLUCOSE SERPL-MCNC: 93 MG/DL (ref 65–100)
HCT VFR BLD AUTO: 39.2 % (ref 35.8–46.3)
HGB BLD-MCNC: 12.9 G/DL (ref 11.7–15.4)
IMM GRANULOCYTES # BLD AUTO: 0.1 K/UL (ref 0–0.5)
IMM GRANULOCYTES NFR BLD AUTO: 1 % (ref 0–5)
LYMPHOCYTES # BLD: 2.5 K/UL (ref 0.5–4.6)
LYMPHOCYTES NFR BLD: 29 % (ref 13–44)
MAGNESIUM SERPL-MCNC: 1.8 MG/DL (ref 1.8–2.4)
MCH RBC QN AUTO: 31.8 PG (ref 26.1–32.9)
MCHC RBC AUTO-ENTMCNC: 32.9 G/DL (ref 31.4–35)
MCV RBC AUTO: 96.6 FL (ref 79.6–97.8)
MONOCYTES # BLD: 0.5 K/UL (ref 0.1–1.3)
MONOCYTES NFR BLD: 6 % (ref 4–12)
NEUTS SEG # BLD: 5.3 K/UL (ref 1.7–8.2)
NEUTS SEG NFR BLD: 62 % (ref 43–78)
NRBC # BLD: 0 K/UL (ref 0–0.2)
PLATELET # BLD AUTO: 171 K/UL (ref 150–450)
PMV BLD AUTO: 10.5 FL (ref 9.4–12.3)
POTASSIUM SERPL-SCNC: 3.8 MMOL/L (ref 3.5–5.1)
RBC # BLD AUTO: 4.06 M/UL (ref 4.05–5.2)
SODIUM SERPL-SCNC: 143 MMOL/L (ref 136–145)
WBC # BLD AUTO: 8.6 K/UL (ref 4.3–11.1)

## 2022-01-13 PROCEDURE — 83735 ASSAY OF MAGNESIUM: CPT

## 2022-01-13 PROCEDURE — 71046 X-RAY EXAM CHEST 2 VIEWS: CPT

## 2022-01-13 PROCEDURE — 83880 ASSAY OF NATRIURETIC PEPTIDE: CPT

## 2022-01-13 PROCEDURE — 80048 BASIC METABOLIC PNL TOTAL CA: CPT

## 2022-01-13 PROCEDURE — 85025 COMPLETE CBC W/AUTO DIFF WBC: CPT

## 2022-01-14 NOTE — PROGRESS NOTES
Patient's laboratory work and chest x-ray do not suggest heart failure as etiology. We will plan to proceed with right heart catheterization. If patient has significant dyspnea may need to speak to pulmonary also.

## 2022-01-19 NOTE — PROGRESS NOTES
Patient pre-assessment complete for RHC/ORI/CVN scheduled for 0800, arrival time 0600. Called pt to come in early, since first case was cancelled. Patient verified using . Patient instructed to bring a list of all home medications on the day of procedure. NPO status reinforced. Patient instructed to only take Cozzar and metoprolol. . Instructed they can take all other medications excluding vitamins & supplements. Patient verbalizes understanding of all instructions & denies any questions at this time.

## 2022-01-20 ENCOUNTER — HOSPITAL ENCOUNTER (OUTPATIENT)
Dept: CARDIAC CATH/INVASIVE PROCEDURES | Age: 77
Discharge: HOME | End: 2022-01-20
Attending: INTERNAL MEDICINE
Payer: MEDICARE

## 2022-01-20 ENCOUNTER — HOSPITAL ENCOUNTER (OUTPATIENT)
Age: 77
Setting detail: OUTPATIENT SURGERY
Discharge: HOME | End: 2022-01-20
Attending: INTERNAL MEDICINE | Admitting: INTERNAL MEDICINE
Payer: MEDICARE

## 2022-01-20 VITALS
DIASTOLIC BLOOD PRESSURE: 64 MMHG | HEART RATE: 62 BPM | WEIGHT: 215 LBS | RESPIRATION RATE: 16 BRPM | SYSTOLIC BLOOD PRESSURE: 138 MMHG | HEIGHT: 66 IN | BODY MASS INDEX: 34.55 KG/M2 | OXYGEN SATURATION: 93 %

## 2022-01-20 DIAGNOSIS — I48.91 ATRIAL FIBRILLATION, UNSPECIFIED TYPE (HCC): ICD-10-CM

## 2022-01-20 DIAGNOSIS — R07.9 CHEST PAIN: ICD-10-CM

## 2022-01-20 LAB
ATRIAL RATE: 61 BPM
CALCULATED P AXIS, ECG09: 59 DEGREES
CALCULATED R AXIS, ECG10: 21 DEGREES
CALCULATED T AXIS, ECG11: 19 DEGREES
COVID-19 RAPID TEST, COVR: DETECTED
DIAGNOSIS, 93000: NORMAL
P-R INTERVAL, ECG05: 168 MS
Q-T INTERVAL, ECG07: 498 MS
QRS DURATION, ECG06: 92 MS
QTC CALCULATION (BEZET), ECG08: 501 MS
SOURCE, COVRS: ABNORMAL
VENTRICULAR RATE, ECG03: 61 BPM

## 2022-01-20 PROCEDURE — 93010 ELECTROCARDIOGRAM REPORT: CPT | Performed by: INTERNAL MEDICINE

## 2022-01-20 PROCEDURE — 87635 SARS-COV-2 COVID-19 AMP PRB: CPT

## 2022-01-20 PROCEDURE — 93005 ELECTROCARDIOGRAM TRACING: CPT | Performed by: INTERNAL MEDICINE

## 2022-01-20 RX ORDER — METOPROLOL SUCCINATE 100 MG/1
100 TABLET, EXTENDED RELEASE ORAL 2 TIMES DAILY
Qty: 60 TABLET | Refills: 3 | Status: SHIPPED | OUTPATIENT
Start: 2022-01-20 | End: 2022-05-13

## 2022-01-20 RX ORDER — SODIUM CHLORIDE 9 MG/ML
75 INJECTION, SOLUTION INTRAVENOUS CONTINUOUS
Status: DISCONTINUED | OUTPATIENT
Start: 2022-01-20 | End: 2022-01-20 | Stop reason: HOSPADM

## 2022-01-20 NOTE — ROUTINE PROCESS
Patient received to [address] [place] [address] room # 17  Ambulatory from [facility]. Patient scheduled for ORI/RHC today with Dr Priscilla Meng. Procedure reviewed & questions answered, voiced good understanding consent obtained & placed on chart. All medications and medical history reviewed. Will prep patient per orders. Patient & family updated on plan of care.       The patient has a fraility score of 3-MANAGING WELL, based on ambulation from [facility]

## 2022-01-20 NOTE — PROGRESS NOTES
Pt procedure cancelled d/t positive covid test.  Medication changes reviewed with pt and pt discharged home

## 2022-01-22 ENCOUNTER — HOSPITAL ENCOUNTER (EMERGENCY)
Age: 77
Discharge: HOME | End: 2022-01-22
Attending: EMERGENCY MEDICINE
Payer: MEDICARE

## 2022-01-22 VITALS
SYSTOLIC BLOOD PRESSURE: 173 MMHG | BODY MASS INDEX: 34.7 KG/M2 | HEART RATE: 61 BPM | WEIGHT: 215 LBS | TEMPERATURE: 98.2 F | DIASTOLIC BLOOD PRESSURE: 69 MMHG | RESPIRATION RATE: 18 BRPM | OXYGEN SATURATION: 98 %

## 2022-01-22 DIAGNOSIS — U07.1 COVID-19: Primary | ICD-10-CM

## 2022-01-22 PROCEDURE — M0247 HC SOTROVIMAB INFUSION: HCPCS

## 2022-01-22 PROCEDURE — 74011000258 HC RX REV CODE- 258: Performed by: NURSE PRACTITIONER

## 2022-01-22 PROCEDURE — 99283 EMERGENCY DEPT VISIT LOW MDM: CPT

## 2022-01-22 PROCEDURE — 74011250636 HC RX REV CODE- 250/636: Performed by: NURSE PRACTITIONER

## 2022-01-22 RX ADMIN — SODIUM CHLORIDE 500 MG: 9 INJECTION, SOLUTION INTRAVENOUS at 16:58

## 2022-01-22 NOTE — ED NOTES
Pleasant and well-appearing 70-year-old female presents to the emergency department requesting monoclonal antibody therapy for COVID-19. States that she developed URI symptoms approximately 4 days a go, tested positive for COVID-19 on Wednesday. States she has been doing well, has no new or worsening symptoms, cough resolved, no fevers or chills, no difficulty breathing, chest pain, GAITAN, hemoptysis. In the ED to have her  tested for COVID-19 today and while in the department, decided she would like monoclonal antibody therapy. Patient evaluated initially in triage. Rapid Medical Evaluation was conducted and necessary orders have been placed. I have performed a medical screening exam.  Care will now be transferred to the provider in the back of the emergency department.   Rojelio Bolanos NP 3:58 PM

## 2022-01-22 NOTE — ED PROVIDER NOTES
51-year-old female presents today for evaluation of positive COVID-19 test 2 days ago. Patient is requesting monoclonal antibody treatment. Patient reports underlying atrial fibrillation, COPD for which she does follow with pulmonology, hypertension, and hyperlipidemia. Patient states that she was scheduled to undergo heart catheterization for her atrial fibrillation on 1/20 and had a rapid COVID test performed prior to the procedure which did result positive. Patient states that her only symptom over the last couple days has been chills and states that these have since resolved and she is currently asymptomatic. She denies any cough, shortness of breath, chest pain, or fever today. Patient is the primary historian. Patient is here with her  who is sick with COVID symptoms as well. Past Medical History:   Diagnosis Date    Arrhythmia     palpitations.  Arthritis     rt shoulder    Atrial fibrillation ([address] 01/21/2016    takes eliquis    Cancer ([address]     skin ca on nose    Chest pain     Chronic obstructive pulmonary disease (HCC)     COPD exacerbation ([address] 9/25/2017    Dyspnea     Essential hypertension 1/21/2016    GERD (gastroesophageal reflux disease)     managed by meds    HLD (hyperlipidemia) 1/21/2016    Hypertension     managed by meds    Menopause     Morbid obesity (HCC)     Nausea & vomiting     Pulmonary embolus ([address] 1/21/2016    Sleep apnea     no cpap    Thromboembolus (HCC) 4 month ago.      right leg    Venous embolism and thrombosis        Past Surgical History:   Procedure Laterality Date    HX APPENDECTOMY      HX CHOLECYSTECTOMY      HX ORTHOPAEDIC      bilateral feet,     HX ROTATOR CUFF REPAIR Right     HX ROTATOR CUFF REPAIR Left     HX KOURTNEY AND BSO      AGE 29    HX UROLOGICAL      bladder tack         Family History:   Problem Relation Age of Onset    Stroke Mother     Heart Disease Mother     Cancer Father         prostate    Stroke Father     Coronary Art Dis Father         premature    Heart Disease Brother     Heart Attack Brother     Cancer Sister     Cancer Sister         breast    Breast Cancer Sister 70       Social History     Socioeconomic History    Marital status:      Spouse name: Not on file    Number of children: Not on file    Years of education: Not on file    Highest education level: Not on file   Occupational History    Not on file   Tobacco Use    Smoking status: Former Smoker     Packs/day: 3.00     Years: 30.00     Pack years: 90.00     Types: Cigarettes     Quit date: 1998     Years since quittin.4    Smokeless tobacco: Never Used   Substance and Sexual Activity    Alcohol use: No    Drug use: No    Sexual activity: Not on file   Other Topics Concern    Not on file   Social History Narrative    Not on file     Social Determinants of Health     Financial Resource Strain:     Difficulty of Paying Living Expenses: Not on file   Food Insecurity:     Worried About [address] [employer] in the Last Year: Not on file    [place] of Food in the Last Year: Not on file   Transportation Needs:     Lack of Transportation (Medical): Not on file    Lack of Transportation (Non-Medical):  Not on file   Physical Activity:     Days of Exercise per Week: Not on file    Minutes of Exercise per Session: Not on file   Stress:     Feeling of Stress : Not on file   Social Connections:     Frequency of Communication with Friends and Family: Not on file    Frequency of Social Gatherings with Friends and Family: Not on file    Attends Yazidi Services: Not on file    Active Member of Clubs or Organizations: Not on file    Attends Club or Organization Meetings: Not on file    Marital Status: Not on file   Intimate Partner Violence:     Fear of Current or Ex-Partner: Not on file    Emotionally Abused: Not on file    Physically Abused: Not on file    Sexually Abused: Not on file   Housing Stability:  Unable to Pay for Housing in the Last Year: Not on file    Number of Places Lived in the Last Year: Not on file    Unstable Housing in the Last Year: Not on file         ALLERGIES: Latex; Latex, natural rubber; Codeine; and Shellfish derived    Review of Systems   Constitutional: Positive for chills. Negative for fatigue and fever. HENT: Negative for congestion, ear pain, sore throat, trouble swallowing and voice change. Respiratory: Negative for cough, chest tightness and shortness of breath. Cardiovascular: Negative for chest pain. Gastrointestinal: Negative for abdominal pain, diarrhea, nausea and vomiting. Musculoskeletal: Negative for myalgias, neck pain and neck stiffness. Skin: Negative for rash. Neurological: Negative for dizziness, weakness, light-headedness and headaches. Vitals:    01/22/22 1557 01/22/22 1801   BP: (!) 189/97 (!) 173/69   Pulse: (!) 59 61   Resp: 18 18   Temp: 98.2 °F (36.8 °C)    SpO2: 98% 98%   Weight: 97.5 kg (215 lb)             Physical Exam  Vitals and nursing note reviewed. Constitutional:       General: She is awake. She is not in acute distress. Appearance: She is well-developed and well-groomed. She is not toxic-appearing. HENT:      Head: Normocephalic and atraumatic. Right Ear: Tympanic membrane and ear canal normal. No hemotympanum. Tympanic membrane is not erythematous or bulging. Left Ear: Tympanic membrane and ear canal normal. No hemotympanum. Tympanic membrane is not erythematous or bulging. Mouth/Throat:      Mouth: Mucous membranes are moist.      Pharynx: Oropharynx is clear. Uvula midline. No pharyngeal swelling, oropharyngeal exudate or posterior oropharyngeal erythema. Tonsils: No tonsillar abscesses. Cardiovascular:      Rate and Rhythm: Normal rate and regular rhythm. Heart sounds: S1 normal and S2 normal. No murmur heard. No friction rub. No gallop.     Pulmonary:      Effort: Pulmonary effort is normal.      Breath sounds: No wheezing, rhonchi or rales. Abdominal:      General: Bowel sounds are normal.      Palpations: Abdomen is soft. Tenderness: There is no abdominal tenderness. There is no guarding or rebound. Musculoskeletal:      Cervical back: Normal range of motion. No rigidity. Lymphadenopathy:      Cervical: No cervical adenopathy. Skin:     General: Skin is warm and dry. Capillary Refill: Capillary refill takes less than 2 seconds. Neurological:      General: No focal deficit present. Mental Status: She is alert and oriented to person, place, and time. Sensory: Sensation is intact. Motor: Motor function is intact. Coordination: Coordination is intact. Psychiatric:         Mood and Affect: Mood normal.         Speech: Speech normal.         Behavior: Behavior normal.          MDM  Number of Diagnoses or Management Options  COVID-19  Diagnosis management comments: In summary this is a well-appearing 79-year-old female with underlying hypertension, hyperlipidemia, COPD, and atrial fibrillation who tested positive for COVID 2 days ago. Patient states that she is currently asymptomatic and has no complaints here today. She is only requesting antibody treatment. Vital signs are stable and physical exam is unremarkable. Patient treated with the antibodies, she has already been started on azithromycin and prednisone by her primary care physician due to her underlying COPD. She will follow-up with her primary care physician to ensure clinical improvement or return to the ER if she develops any new or worsening symptoms. Riley Tobar, [id number]; 1/22/2022 @6:26 PM Voice dictation software was used during the making of this note. This software is not perfect and grammatical and other typographical errors may be present.   This note has not been proofread for errors.  ====================================         Amount and/or Complexity of Data Reviewed  Tests in the medicine section of CPT®: ordered  Review and summarize past medical records: yes    Patient Progress  Patient progress: improved         Procedures

## 2022-01-22 NOTE — ED TRIAGE NOTES
Patient presents covid positive with request for antibodies. Patient tested positive on Wednesday. Positive for vaccine.

## 2022-01-22 NOTE — DISCHARGE INSTRUCTIONS
Your Covid test was positive on 1/20. Per most recently updated CDC guidelines, you should self quarantine for 5 days. On day 6 if you are asymptomatic you may wear a mask while being around others, however if you still have any COVID-19 symptoms you should continue to self quarantine for the full 10 days. Increase oral hydration at home. Follow up with your PCP in the next 1-2 days if no improvement. Return to the ER for any new or worsening symptoms.

## 2022-01-23 NOTE — ED NOTES
I have reviewed discharge instructions with the patient and spouse. The patient and spouse verbalized understanding. Patient left ED via Discharge Method: ambulatory to Home with family    Opportunity for questions and clarification provided. Patient given 0 scripts. To continue your aftercare when you leave the hospital, you may receive an automated call from our care team to check in on how you are doing. This is a free service and part of our promise to provide the best care and service to meet your aftercare needs.  If you have questions, or wish to unsubscribe from this service please call [phone]. Thank you for Choosing our [address] [place] [address] Emergency Department.

## 2022-02-07 ENCOUNTER — HOSPITAL ENCOUNTER (OUTPATIENT)
Dept: CARDIAC CATH/INVASIVE PROCEDURES | Age: 77
Discharge: HOME | End: 2022-02-07

## 2022-02-07 ENCOUNTER — HOSPITAL ENCOUNTER (OUTPATIENT)
Age: 77
Setting detail: OUTPATIENT SURGERY
Discharge: HOME | End: 2022-02-07
Attending: INTERNAL MEDICINE | Admitting: INTERNAL MEDICINE
Payer: MEDICARE

## 2022-02-07 VITALS
HEART RATE: 63 BPM | DIASTOLIC BLOOD PRESSURE: 59 MMHG | BODY MASS INDEX: 34.55 KG/M2 | HEIGHT: 66 IN | WEIGHT: 215 LBS | SYSTOLIC BLOOD PRESSURE: 134 MMHG | OXYGEN SATURATION: 93 % | TEMPERATURE: 97.4 F

## 2022-02-07 DIAGNOSIS — I48.0 PAROXYSMAL ATRIAL FIBRILLATION (HCC): ICD-10-CM

## 2022-02-07 DIAGNOSIS — R07.9 CHEST PAIN, UNSPECIFIED TYPE: ICD-10-CM

## 2022-02-07 DIAGNOSIS — J43.2 CENTRILOBULAR EMPHYSEMA (HCC): Chronic | ICD-10-CM

## 2022-02-07 LAB
ANION GAP SERPL CALC-SCNC: 5 MMOL/L (ref 7–16)
BUN SERPL-MCNC: 21 MG/DL (ref 8–23)
CALCIUM SERPL-MCNC: 8.7 MG/DL (ref 8.3–10.4)
CHLORIDE SERPL-SCNC: 107 MMOL/L (ref 98–107)
CO2 SERPL-SCNC: 28 MMOL/L (ref 21–32)
CREAT SERPL-MCNC: 0.9 MG/DL (ref 0.6–1)
ERYTHROCYTE [DISTWIDTH] IN BLOOD BY AUTOMATED COUNT: 13.4 % (ref 11.9–14.6)
GLUCOSE SERPL-MCNC: 98 MG/DL (ref 65–100)
HCT VFR BLD AUTO: 40.4 % (ref 35.8–46.3)
HGB BLD-MCNC: 13.3 G/DL (ref 11.7–15.4)
MCH RBC QN AUTO: 31.1 PG (ref 26.1–32.9)
MCHC RBC AUTO-ENTMCNC: 32.9 G/DL (ref 31.4–35)
MCV RBC AUTO: 94.4 FL (ref 79.6–97.8)
NRBC # BLD: 0 K/UL (ref 0–0.2)
PLATELET # BLD AUTO: 139 K/UL (ref 150–450)
PMV BLD AUTO: 10.6 FL (ref 9.4–12.3)
POTASSIUM SERPL-SCNC: 3.9 MMOL/L (ref 3.5–5.1)
RBC # BLD AUTO: 4.28 M/UL (ref 4.05–5.2)
SODIUM SERPL-SCNC: 140 MMOL/L (ref 136–145)
WBC # BLD AUTO: 7.1 K/UL (ref 4.3–11.1)

## 2022-02-07 PROCEDURE — 74011250636 HC RX REV CODE- 250/636: Performed by: INTERNAL MEDICINE

## 2022-02-07 PROCEDURE — C1894 INTRO/SHEATH, NON-LASER: HCPCS | Performed by: INTERNAL MEDICINE

## 2022-02-07 PROCEDURE — 99214 OFFICE O/P EST MOD 30 MIN: CPT | Performed by: INTERNAL MEDICINE

## 2022-02-07 PROCEDURE — 93460 R&L HRT ART/VENTRICLE ANGIO: CPT | Performed by: INTERNAL MEDICINE

## 2022-02-07 PROCEDURE — 99153 MOD SED SAME PHYS/QHP EA: CPT | Performed by: INTERNAL MEDICINE

## 2022-02-07 PROCEDURE — 77030016699 HC CATH ANGI DX INFN1 CARD -A: Performed by: INTERNAL MEDICINE

## 2022-02-07 PROCEDURE — 93005 ELECTROCARDIOGRAM TRACING: CPT | Performed by: INTERNAL MEDICINE

## 2022-02-07 PROCEDURE — 99152 MOD SED SAME PHYS/QHP 5/>YRS: CPT | Performed by: INTERNAL MEDICINE

## 2022-02-07 PROCEDURE — 80048 BASIC METABOLIC PNL TOTAL CA: CPT

## 2022-02-07 PROCEDURE — 85027 COMPLETE CBC AUTOMATED: CPT

## 2022-02-07 PROCEDURE — C1769 GUIDE WIRE: HCPCS | Performed by: INTERNAL MEDICINE

## 2022-02-07 PROCEDURE — 74011250637 HC RX REV CODE- 250/637: Performed by: INTERNAL MEDICINE

## 2022-02-07 PROCEDURE — C1751 CATH, INF, PER/CENT/MIDLINE: HCPCS | Performed by: INTERNAL MEDICINE

## 2022-02-07 PROCEDURE — 74011000250 HC RX REV CODE- 250: Performed by: INTERNAL MEDICINE

## 2022-02-07 PROCEDURE — 77030042317 HC BND COMPR HEMSTAT -B: Performed by: INTERNAL MEDICINE

## 2022-02-07 PROCEDURE — 74011000636 HC RX REV CODE- 636: Performed by: INTERNAL MEDICINE

## 2022-02-07 PROCEDURE — 77030013687 HC GD NDL BARD -B: Performed by: INTERNAL MEDICINE

## 2022-02-07 RX ORDER — FENTANYL CITRATE 50 UG/ML
INJECTION, SOLUTION INTRAMUSCULAR; INTRAVENOUS AS NEEDED
Status: DISCONTINUED | OUTPATIENT
Start: 2022-02-07 | End: 2022-02-07 | Stop reason: HOSPADM

## 2022-02-07 RX ORDER — LIDOCAINE HYDROCHLORIDE 20 MG/ML
15 SOLUTION OROPHARYNGEAL AS NEEDED
Status: DISCONTINUED | OUTPATIENT
Start: 2022-02-07 | End: 2022-02-07

## 2022-02-07 RX ORDER — SODIUM CHLORIDE 0.9 % (FLUSH) 0.9 %
5-40 SYRINGE (ML) INJECTION AS NEEDED
Status: CANCELLED | OUTPATIENT
Start: 2022-02-07

## 2022-02-07 RX ORDER — HYDROCODONE BITARTRATE AND ACETAMINOPHEN 5; 325 MG/1; MG/1
1 TABLET ORAL
Status: CANCELLED | OUTPATIENT
Start: 2022-02-07

## 2022-02-07 RX ORDER — HEPARIN SODIUM 200 [USP'U]/100ML
INJECTION, SOLUTION INTRAVENOUS
Status: DISCONTINUED | OUTPATIENT
Start: 2022-02-07 | End: 2022-02-07 | Stop reason: HOSPADM

## 2022-02-07 RX ORDER — ONDANSETRON 2 MG/ML
4 INJECTION INTRAMUSCULAR; INTRAVENOUS
Status: CANCELLED | OUTPATIENT
Start: 2022-02-07 | End: 2022-02-08

## 2022-02-07 RX ORDER — NALOXONE HYDROCHLORIDE 0.4 MG/ML
0.4 INJECTION, SOLUTION INTRAMUSCULAR; INTRAVENOUS; SUBCUTANEOUS AS NEEDED
Status: CANCELLED | OUTPATIENT
Start: 2022-02-07

## 2022-02-07 RX ORDER — GUAIFENESIN 100 MG/5ML
324 LIQUID (ML) ORAL DAILY
Status: DISCONTINUED | OUTPATIENT
Start: 2022-02-07 | End: 2022-02-07 | Stop reason: HOSPADM

## 2022-02-07 RX ORDER — FENTANYL CITRATE 50 UG/ML
25-50 INJECTION, SOLUTION INTRAMUSCULAR; INTRAVENOUS
Status: DISCONTINUED | OUTPATIENT
Start: 2022-02-07 | End: 2022-02-07

## 2022-02-07 RX ORDER — ACETAMINOPHEN 325 MG/1
650 TABLET ORAL
Status: CANCELLED | OUTPATIENT
Start: 2022-02-07

## 2022-02-07 RX ORDER — SODIUM CHLORIDE 9 MG/ML
75 INJECTION, SOLUTION INTRAVENOUS CONTINUOUS
Status: DISCONTINUED | OUTPATIENT
Start: 2022-02-07 | End: 2022-02-07 | Stop reason: HOSPADM

## 2022-02-07 RX ORDER — LIDOCAINE HYDROCHLORIDE 10 MG/ML
INJECTION, SOLUTION EPIDURAL; INFILTRATION; INTRACAUDAL; PERINEURAL AS NEEDED
Status: DISCONTINUED | OUTPATIENT
Start: 2022-02-07 | End: 2022-02-07 | Stop reason: HOSPADM

## 2022-02-07 RX ORDER — MIDAZOLAM HYDROCHLORIDE 1 MG/ML
.5-2 INJECTION, SOLUTION INTRAMUSCULAR; INTRAVENOUS
Status: DISCONTINUED | OUTPATIENT
Start: 2022-02-07 | End: 2022-02-07

## 2022-02-07 RX ORDER — SODIUM CHLORIDE 9 MG/ML
75 INJECTION, SOLUTION INTRAVENOUS CONTINUOUS
Status: CANCELLED | OUTPATIENT
Start: 2022-02-07

## 2022-02-07 RX ORDER — SODIUM CHLORIDE 0.9 % (FLUSH) 0.9 %
5-40 SYRINGE (ML) INJECTION EVERY 8 HOURS
Status: CANCELLED | OUTPATIENT
Start: 2022-02-07

## 2022-02-07 RX ORDER — MIDAZOLAM HYDROCHLORIDE 1 MG/ML
INJECTION, SOLUTION INTRAMUSCULAR; INTRAVENOUS AS NEEDED
Status: DISCONTINUED | OUTPATIENT
Start: 2022-02-07 | End: 2022-02-07 | Stop reason: HOSPADM

## 2022-02-07 RX ORDER — MORPHINE SULFATE 4 MG/ML
1 INJECTION INTRAVENOUS
Status: CANCELLED | OUTPATIENT
Start: 2022-02-07

## 2022-02-07 RX ADMIN — SODIUM CHLORIDE 75 ML/HR: 900 INJECTION, SOLUTION INTRAVENOUS at 08:09

## 2022-02-07 RX ADMIN — ASPIRIN 324 MG: 81 TABLET, CHEWABLE ORAL at 08:08

## 2022-02-07 NOTE — Clinical Note
TRANSFER - OUT REPORT:     Verbal report given to: sarah. Report consisted of patient's Situation, Background, Assessment and   Recommendations(SBAR). Opportunity for questions and clarification was provided. Patient transported with a Cardiac Cath Tech / Patient Care Tech.

## 2022-02-07 NOTE — H&P
[address] Cardiology Consult    Consult Cardiologist: Sydnie Stephenson MD     Primary Cardiologist: Katt Serna MD    Primary Care Physician: Stephen Lemus MD     Subjective: Bonnie Tariq is a 68 y.o.   presents after recent evaluation by pulmonology for a right heart cath. She also has a history of atrial fibrillation and was scheduled for a ORI cardioversion today however she is in normal sinus rhythm by her EKG. Over the last week she has developed some nonexertional chest pain most recently had some pain that woke her from sleep. Past Medical History:   Diagnosis Date    Arrhythmia     palpitations.  Arthritis     rt shoulder    Atrial fibrillation ([address] 01/21/2016    takes eliquis    Cancer ([address]     skin ca on nose    Chest pain     Chronic obstructive pulmonary disease (HCC)     COPD exacerbation ([address] 9/25/2017    Dyspnea     Essential hypertension 1/21/2016    GERD (gastroesophageal reflux disease)     managed by meds    HLD (hyperlipidemia) 1/21/2016    Hypertension     managed by meds    Menopause     Morbid obesity (HCC)     Nausea & vomiting     Pulmonary embolus ([address] 1/21/2016    Sleep apnea     no cpap    Thromboembolus (HCC) 4 month ago.      right leg    Venous embolism and thrombosis       Past Surgical History:   Procedure Laterality Date    HX APPENDECTOMY      HX CHOLECYSTECTOMY      HX ORTHOPAEDIC      bilateral feet,     HX ROTATOR CUFF REPAIR Right     HX ROTATOR CUFF REPAIR Left     HX KOURTNEY AND BSO      AGE 28    HX UROLOGICAL      bladder tack      Current Facility-Administered Medications   Medication Dose Route Frequency    0.9% sodium chloride infusion  75 mL/hr IntraVENous CONTINUOUS    aspirin chewable tablet 324 mg  324 mg Oral DAILY    fentaNYL citrate (PF) injection 25-50 mcg  25-50 mcg IntraVENous Multiple    lidocaine (XYLOCAINE) 2 % viscous solution 15 mL  15 mL Mouth/Throat PRN    midazolam (VERSED) injection 0.5-2 mg 0.5-2 mg IntraVENous Multiple     Allergies   Allergen Reactions    Latex Atopic Dermatitis    Latex, Natural Rubber Contact Dermatitis    Codeine Other (comments)     Headache    Shellfish Derived Hives      Social History     Tobacco Use    Smoking status: Former Smoker     Packs/day: 3.00     Years: 30.00     Pack years: 90.00     Types: Cigarettes     Quit date: 1998     Years since quittin.4    Smokeless tobacco: Never Used   Substance Use Topics    Alcohol use: No      Family History   Problem Relation Age of Onset    Stroke Mother     Heart Disease Mother     Cancer Father         prostate    Stroke Father     Coronary Art Dis Father         premature    Heart Disease Brother     Heart Attack Brother     Cancer Sister     Cancer Sister         breast    Breast Cancer Sister 70        Review of Systems  Gen: Denies fever, chills, malaise or fatigue. Appetite good. HEENT: Denies frequent headaches, dizzyness, visual disturbances, Neck pain or swallowing difficulty  Lungs: History of COPD and shortness of breath  Cardiovascular: Chest pain as above  GI: Denies hememesis, dark tarry stools, No prior Hx of GI bleed, Denies constipation  : Denies dysuria, no complaints of frequency, nocturia  Heme: No prior bleeding disorders, no prior Cancer  Neuro: Denies prior CVA, TIA. Endocrine: no diabetes, thyroid disorders  Psychiatric: Denies anxiety, or other psychiatric illnesses. Objective:     Visit Vitals  BP (!) 146/86 (BP 1 Location: Left upper arm, BP Patient Position: At rest)   Pulse 63   Temp 97.4 °F (36.3 °C)   Ht 5' 6\" (1.676 m)   Wt 215 lb (97.5 kg)   SpO2 97%   BMI 34.70 kg/m²     General:Alert, cooperative, no distress, appears stated age  Head: Normocephalic, without obvious abnormality, atraumatic. Eyes: Conjunctivae/corneas clear. PERRL, EOMs intact  Neck: Supple, symmetrical, trachea midline,  no carotid bruit and no JVD.    Lungs:Clear to auscultation bilaterally. Chest wall: No tenderness or deformity. Heart: Regular rate and rhythm, S1, S2 normal, no murmur, click, rub or gallop. Abdomen:Soft, non-tender. Bowel sounds normal. No masses, No organomegaly. Extremities: Extremities normal, atraumatic, no cyanosis or edema. Pulses: 2+ and symmetric all extremities. Skin: Skin color, texture, turgor normal. No rashes or lesions  Neurologic:No focal deficits identified                 ECG: Sinus rhythm    Data Review:     Recent Results (from the past 24 hour(s))   EKG, 12 LEAD, INITIAL    Collection Time: 02/07/22  7:36 AM   Result Value Ref Range    Ventricular Rate 67 BPM    Atrial Rate 67 BPM    P-R Interval 200 ms    QRS Duration 94 ms    Q-T Interval 454 ms    QTC Calculation (Bezet) 479 ms    Calculated P Axis 63 degrees    Calculated R Axis 3 degrees    Calculated T Axis 36 degrees    Diagnosis       Normal sinus rhythm  Normal ECG  When compared with ECG of 20-JAN-2022 07:30,  No significant change was found           Assessment / Plan     Active Problems:    * No active hospital problems. *    A/p:  1)CP - LHC today, some degree of atypical chest pain however she is here for a right heart cath we will plan on doing left heart cath as well  2)Pulm HTN -right heart cath today  3) Afib - continue OAC rate control, back in SR today    Discussed risk of cardiac catheterization with patient in detail. The risk of a major complication (death, MI or major embolization) during or after cardiac catheterization is below 1%. Risk of mortality is under 0.1%. Local complications at the site of catheter insertion were discussed. This includes but not limited to:  acute thrombosis, distal embolization, dissection, poorly controlled bleeding, hematoma, retroperitoneal hemorrhage, pseudoaneurysm or AV fistula formation.   Other potential serious complication were discussed including risk of cardiac arrhythmias, allergic reactions, atheroemboli and acute kidney injury.           Amanda Rodriguez MD

## 2022-02-07 NOTE — Clinical Note
Sheath #1: sheath exchanged for [facility] INTRO W/GW 7F 11CM W/O OB -- TONI+ - ORDER AS EA.

## 2022-02-07 NOTE — PROGRESS NOTES
7FR sheath removed from right brachial vein at 1117. Manual pressure held for 30 minutes until hemostasis achieved. No bleeding or hematoma noted afterwards. Sterile dressing placed. Pt instructed to keep right arm straight and to remain flat. Pt verbalized understanding of post procedural instructions. Call bell within reach. Will continue to monitor. Hemostasis achieved at 1247.

## 2022-02-07 NOTE — DISCHARGE INSTRUCTIONS
HEART CATHETERIZATION/ANGIOGRAPHY DISCHARGE INSTRUCTIONS    1. Check puncture site frequently for swelling or bleeding. If there is any bleeding, apply pressure over the area with a clean towel or washcloth and call 911. Notify your doctor for any redness, swelling, drainage, or oozing from the puncture site. Notify your doctor for any fever or chills. 2. If the extremity becomes cold, numb, or painful call [facility] Cardiology at [phone].  3. Activity should be limited for the next 48 hours. No heavy lifting, pushing, pulling  or strenuous activity for 48 hours. No heavy lifting (anything over 10 pounds) for 3 days. 4. You may resume your usual diet. Drink more fluids than usual.  5. Have a responsible person drive you home and stay with you for at least 24 hours after your heart catheterization/angiography. 6. You may remove bandage from your Right wrist and elbow in 24 hours. You may shower in 24 hours. No tub baths, hot tubs, or swimming for 1 week. Do not place any lotions, creams, powders, or ointments over puncture site for 1 week. You may place a clean band-aid over the puncture site each day for 5 days. Change daily. Sedation for a Medical Procedure: Care Instructions     You were given a sedative medication during your visit. While many of the effects will have worn   off before you leave; you may continue to feel some effects for several hours. Common side effects from sedation include:  · Feeling sleepy. (Your doctors and nurses will make sure you are not too sleepy to go home.)  · Nausea and vomiting. This usually does not last long. · Feeling tired. How can you care for yourself at home? Activity    · Don't do anything for 24 hours that requires attention to detail. It takes time for the medicine effects to completely wear off. · Do not make important legal decisions for 24 hours. · Do not sign any legal documents for 24 hours.      · Do not drink alcohol today     · For your safety, you should not drive or operate heavy machinery for the remainder of the day     · Rest when you feel tired. Getting enough sleep will help you recover. Diet    · You can eat your normal diet, unless your doctor gives you other instructions. If your stomach is upset, try clear liquids and bland, low-fat foods like plain toast or rice. · Drink plenty of fluids (unless your doctor tells you not to). · Don't drink alcohol for 24 hours. Medicines    · Be safe with medicines. Read and follow all instructions on the label. · If the doctor gave you a prescription medicine for pain, take it as prescribed. · If you are not taking a prescription pain medicine, ask your doctor if you can take an over-the-counter medicine. · If you think your pain medicine is making you sick to your stomach:  · Take your medicine after meals (unless your doctor has told you not to). · Ask your doctor for a different pain medicine. I have read the above instructions and have had the opportunity to ask questions.       Patient: ________________________   Date: _____________    Witness: _______________________   Date: _____________

## 2022-02-07 NOTE — PROGRESS NOTES
Report received from Vance Garcia Procedural findings communicated. Intra procedural  medication administration reviewed. Progression of care discussed. Patient received into CPRU [place] [facility] post sheath removal.     Right Radial access site without bleeding or swelling     TR band dry and intact     7FR sheath to right brachial vein.  No bleeding or hematoma    Patient instructed to limit movement to right upper extremity    Routine post procedural vital signs and site assessment initiated

## 2022-02-07 NOTE — Clinical Note
Contrast Dose Calculator:   Patient's age: 68.   Patient's sex: Female. Patient weight (kg) = 97.5. Creatinine level (mg/dL) = 0.83. Creatinine clearance (mL/min): 88.76. Contrast concentration (mg/mL) = 370. MACD = 300 mL. Max Contrast dose per Creatinine Cl calculator = 199.7 mL.

## 2022-02-07 NOTE — PROGRESS NOTES
Patient received to [address] [place] [address] room # 15  Ambulatory from [facility]. Patient scheduled for [facility] today with Dr Soo Napier. Procedure reviewed & questions answered, voiced good understanding consent obtained & placed on chart. All medications and medical history reviewed. Will prep patient per orders. Patient & family updated on plan of care. The patient has a fraility score of 3-MANAGING WELL, based on ability to ambulate to room unassisted. 324mg Aspirin taken PTA.

## 2022-02-07 NOTE — PROGRESS NOTES
R/LHC by Dr. Serra Woodruff  Right radial arterial access  Right brachial venous access  No interventions  Right wrist TR band with 12ml  RBV 7FR left in place  Versed 2mg IV  Fentanyl 25mcg IV  Access site soft, clean, dry, and intact; with no signs or bleeding or hematoma  Pt. Tolerated procedure    TRANSFER - OUT REPORT:    Verbal report given to Jina(name) annmarie London  being transferred to CPRU(unit) for routine progression of care       Report consisted of patients Situation, Background, Assessment and   Recommendations(SBAR). Information from the following report(s) Procedure Summary and MAR was reviewed with the receiving nurse. Lines:   Peripheral IV 02/07/22 Left Antecubital (Active)       Peripheral IV 02/07/22 Anterior;Right Forearm (Active)        Opportunity for questions and clarification was provided.       Patient transported with:   Registered Nurse Adequate: hears normal conversation without difficulty

## 2022-02-07 NOTE — Clinical Note
TRANSFER - IN REPORT:     Verbal report received from: sarah. Report consisted of patient's Situation, Background, Assessment and   Recommendations(SBAR). Opportunity for questions and clarification was provided. Assessment completed upon patient's arrival to unit and care assumed. Patient transported with a Cardiac Cath Tech / Patient Care Tech.

## 2022-02-08 LAB
ATRIAL RATE: 67 BPM
CALCULATED P AXIS, ECG09: 63 DEGREES
CALCULATED R AXIS, ECG10: 3 DEGREES
CALCULATED T AXIS, ECG11: 36 DEGREES
DIAGNOSIS, 93000: NORMAL
P-R INTERVAL, ECG05: 200 MS
Q-T INTERVAL, ECG07: 454 MS
QRS DURATION, ECG06: 94 MS
QTC CALCULATION (BEZET), ECG08: 479 MS
VENTRICULAR RATE, ECG03: 67 BPM

## 2022-03-18 PROBLEM — L03.119 CELLULITIS OF LOWER EXTREMITY: Status: ACTIVE | Noted: 2020-06-24

## 2022-03-18 PROBLEM — L02.415 ABSCESS OF LEG, RIGHT: Status: ACTIVE | Noted: 2020-07-02

## 2022-03-18 PROBLEM — I83.009 STASIS ULCER (HCC): Status: ACTIVE | Noted: 2020-06-24

## 2022-03-18 PROBLEM — G25.81 RLS (RESTLESS LEGS SYNDROME): Status: ACTIVE | Noted: 2017-09-25

## 2022-03-18 PROBLEM — L97.909 STASIS ULCER (HCC): Status: ACTIVE | Noted: 2020-06-24

## 2022-03-18 PROBLEM — I27.20 PULMONARY HYPERTENSION (HCC): Status: ACTIVE | Noted: 2019-07-02

## 2022-03-19 PROBLEM — G47.34 NOCTURNAL HYPOXIA: Status: ACTIVE | Noted: 2019-08-13

## 2022-03-19 PROBLEM — Z78.9 INTOLERANCE OF CONTINUOUS POSITIVE AIRWAY PRESSURE (CPAP) VENTILATION: Status: ACTIVE | Noted: 2019-07-02

## 2022-03-19 PROBLEM — E66.01 SEVERE OBESITY (HCC): Status: ACTIVE | Noted: 2020-06-15

## 2022-03-19 PROBLEM — I87.2 VENOUS INSUFFICIENCY: Status: ACTIVE | Noted: 2021-01-28

## 2022-03-19 PROBLEM — I51.89 DIASTOLIC DYSFUNCTION: Status: ACTIVE | Noted: 2019-07-02

## 2022-03-19 PROBLEM — I87.8 VENOUS STASIS: Status: ACTIVE | Noted: 2019-09-26

## 2022-03-19 PROBLEM — L97.221 CALF ULCER, LEFT, LIMITED TO BREAKDOWN OF SKIN (HCC): Status: ACTIVE | Noted: 2019-09-13

## 2022-03-19 PROBLEM — G47.33 OSA AND COPD OVERLAP SYNDROME (HCC): Status: ACTIVE | Noted: 2017-09-25

## 2022-03-19 PROBLEM — J44.9 OSA AND COPD OVERLAP SYNDROME (HCC): Status: ACTIVE | Noted: 2017-09-25

## 2022-03-19 PROBLEM — I73.9 PERIPHERAL VASCULAR DISEASE (HCC): Status: ACTIVE | Noted: 2020-07-02

## 2022-03-19 PROBLEM — R07.9 CHEST PAIN: Status: ACTIVE | Noted: 2022-01-13

## 2022-03-20 PROBLEM — S81.802A OPEN WOUND OF LEFT LOWER EXTREMITY: Status: ACTIVE | Noted: 2020-06-24

## 2022-03-20 PROBLEM — L03.116 CELLULITIS OF LEFT LOWER EXTREMITY: Status: ACTIVE | Noted: 2020-06-15

## 2022-03-20 PROBLEM — E66.811 CLASS 1 OBESITY IN ADULT: Status: ACTIVE | Noted: 2018-08-30

## 2022-03-20 PROBLEM — E66.9 CLASS 1 OBESITY IN ADULT: Status: ACTIVE | Noted: 2018-08-30

## 2022-03-20 PROBLEM — E66.9 OBESITY (BMI 30-39.9): Status: ACTIVE | Noted: 2017-09-25

## 2022-03-20 PROBLEM — G47.33 OSA (OBSTRUCTIVE SLEEP APNEA): Status: ACTIVE | Noted: 2017-09-25

## 2022-04-12 ENCOUNTER — NURSE TRIAGE (OUTPATIENT)
Dept: OTHER | Facility: CLINIC | Age: 77
End: 2022-04-12

## 2022-06-03 ENCOUNTER — TELEPHONE (OUTPATIENT)
Dept: CARDIOLOGY CLINIC | Age: 77
End: 2022-06-03

## 2022-06-03 NOTE — TELEPHONE ENCOUNTER
Patient assistance application for Eliquis, including insurance card, out-of-pocket expense report, income verification & a current medication list, faxed to [facility] at [phone].  Fax confirmation received. //pg

## 2022-06-03 NOTE — TELEPHONE ENCOUNTER
Received fax from  [facility], stating that patient has been APPROVED for Eliquis Pt Asst, effective today, 6/3 until 12/31/22. Called patient, no answer. LMOM notifying her of above and that [address] [place] [address] should be mailing her a letter with further information.  The [facility] office number was left on voicemail in case there are any questions. //pg

## 2022-06-14 ENCOUNTER — OFFICE VISIT (OUTPATIENT)
Dept: ORTHOPEDIC SURGERY | Age: 77
End: 2022-06-14
Payer: MEDICARE

## 2022-06-14 DIAGNOSIS — M25.562 LEFT KNEE PAIN, UNSPECIFIED CHRONICITY: Primary | ICD-10-CM

## 2022-06-14 DIAGNOSIS — M17.12 PRIMARY OSTEOARTHRITIS OF LEFT KNEE: ICD-10-CM

## 2022-06-14 PROCEDURE — 1123F ACP DISCUSS/DSCN MKR DOCD: CPT | Performed by: ORTHOPAEDIC SURGERY

## 2022-06-14 PROCEDURE — 99204 OFFICE O/P NEW MOD 45 MIN: CPT | Performed by: ORTHOPAEDIC SURGERY

## 2022-06-14 PROCEDURE — 4004F PT TOBACCO SCREEN RCVD TLK: CPT | Performed by: ORTHOPAEDIC SURGERY

## 2022-06-14 PROCEDURE — G8417 CALC BMI ABV UP PARAM F/U: HCPCS | Performed by: ORTHOPAEDIC SURGERY

## 2022-06-14 PROCEDURE — G8400 PT W/DXA NO RESULTS DOC: HCPCS | Performed by: ORTHOPAEDIC SURGERY

## 2022-06-14 PROCEDURE — 1090F PRES/ABSN URINE INCON ASSESS: CPT | Performed by: ORTHOPAEDIC SURGERY

## 2022-06-14 PROCEDURE — 20610 DRAIN/INJ JOINT/BURSA W/O US: CPT | Performed by: ORTHOPAEDIC SURGERY

## 2022-06-14 PROCEDURE — G8428 CUR MEDS NOT DOCUMENT: HCPCS | Performed by: ORTHOPAEDIC SURGERY

## 2022-06-14 RX ORDER — METHYLPREDNISOLONE ACETATE 40 MG/ML
40 INJECTION, SUSPENSION INTRA-ARTICULAR; INTRALESIONAL; INTRAMUSCULAR; SOFT TISSUE ONCE
Status: COMPLETED | OUTPATIENT
Start: 2022-06-14 | End: 2022-06-14

## 2022-06-14 RX ADMIN — METHYLPREDNISOLONE ACETATE 40 MG: 40 INJECTION, SUSPENSION INTRA-ARTICULAR; INTRALESIONAL; INTRAMUSCULAR; SOFT TISSUE at 15:03

## 2022-06-14 NOTE — LETTER
Joint Injection Precert Authorization Form    Name: Montse Jennings  : 1945  Age: 68 y.o. Gender: female    Clinical Information    Referring Doctor: Jamaal Davis  Diagnosis:  OA M17.12    ICD-10-CM    1. Left knee pain, unspecified chronicity  M25.562 XR KNEE LEFT (MIN 4 VIEWS)   2. Primary osteoarthritis of left knee  M17.12 methylPREDNISolone acetate (DEPO-MEDROL) injection 40 mg     DC ARTHROCENTESIS ASPIR&/INJ MAJOR JT/BURSA W/O US     Ambulatory referral to Physical Therapy   . Body Part: left knee    Type of Service    [ ] [place] [facility].Sandifer    [ ] Flora Muñoz.Marielena    [ ] [facility] - Wali.Kayla    [ ] [facility] - J18      Comments-MONOVISC      Insurance:  Active Insurance as of 2022     Patient has no active insurance coverage on file for 2022.          Radiologic evidence to support diagnosis:     Non-drug therapy:     Pharmacologic Therapy:

## 2022-06-14 NOTE — PROGRESS NOTES
Name: Ashwini Lou  YOB: 1945  Gender: female  MRN: [id number]    CC:   Chief Complaint   Patient presents with    Knee Pain     new-L          HPI: Ashwini Lou is a 68 y.o. female with a PMHx of A. fib on Eliquis, COPD followed by Dr. Mahanz Delgado of [facility] and here for left knee pain. She is taking Tylenol. Is been going on for several months. She has a history of multiple falls. She has undergone some vascular procedures with both her lower legs for what she describes as venous stasis disease. She has not undergone injection or physical therapy. She occasionally has some giving way with her left knee but denies any locking or catching. She denies any groin pain. Allergies   Allergen Reactions    Latex Other (See Comments)    Codeine Other (See Comments)     Headache         Review of Systems:  As per HPI. Pertinent positives and negatives are addressed with the patient, particularly those related to musculoskeletal concerns. Non-orthopaedic concerns were referred back to the primary care physician. PHYSICAL EXAMINATION:   The patient appears their stated age and they are in no distress. There were no vitals taken for this visit. The lower extremities are as described below. Mild distal edema, there is some noted venous stasis changes  There is no lymph adenopathy in the popliteal or malleolar region. Sensation is intact to light touch bilaterally. The gait is noted to be mildly antalgic  Range of motion is 0 to 120 degrees bilaterally  AP varus and valgus stressing of the bilateral knees reveal stability globally  Limb lengths are equal.  Hip flexion is excellent. Their judgment and insight are normal.  They are oriented to situation. mood and affect appropriate. X-RAY: AP, lateral, skier, sunrise views of the bilateral knees reveals narrowing of the anterior compartment and medial compartment with some scalloping noted.   There is subchondral sclerosis. IMPRESSION: Patellofemoral  osteoarthropathy of the right knee    RECOMMENDATIONS:    Reviewed x-ray findings with the patient. The patient has osteoarthritis of the left knee. She is not a good surgical candidate because of her A. fib, COPD, and history of venous stasis disease. I have recommended nonoperative treatment for her which will include a cortisone injection today. I have also recommended viscosupplementation. I will also refer her for a knee exercise program.  She cannot use a nonsteroidal anti-inflammatory because she is on Eliquis for A. fib. She may use Tylenol for pain      4--this is a chronic illness/condition with exacerbation      Procedure Note    Patient Name: Susan Castaneda    Date of Procedure: June 14, 2022    Preoperative Diagnosis:     ICD-10-CM    1. Left knee pain, unspecified chronicity  M25.562 XR KNEE LEFT (MIN 4 VIEWS)       Post Operative Diagnosis: same    Procedure: left knee joint Injection    Consent: The patient was given the opportunity to ask questions regarding the procedure and its associated risks. Procedure: The patient was placed in a sitting position on the exam table and the left knee was prepped in the usual sterile manner. The knee was anesthestized with 3 cc of xylocaine with a 25 gauge needle, the needle was left in place a total of 80 methylprednisolone and 5 cc of Lidocaine was slowly injected. The patient tolerated the procedure well. The injection area was cleaned and Band-Aid applied. No excessive bleeding was noted. Patient dressed and was discharged to home with instructions. Discussion:  The patient tolerated the procedure well.     Follow up: four months        Luisito Grimes MD  June 14, 2022

## 2022-06-21 ENCOUNTER — TELEPHONE (OUTPATIENT)
Dept: ORTHOPEDIC SURGERY | Age: 77
End: 2022-06-21

## 2022-06-27 NOTE — PROGRESS NOTES
[facility]  [address] [facility],[address] [address]  Dept: [phone]      Physical Therapy Initial Assessment     Insurance: Today is 1/20 visits [facility])    Total Timed Procedure Codes: 30 min, Total Time: 60 min      Referring MD: Stanley Fletcher., *   Referral for: Chronic L knee pain  Onset date: 1/28/2022    Diagnosis:     ICD-10-CM    1. Chronic pain of left knee  M25.562     G89.29    2. Joint stiffness of left lower leg  M25.662    3. Left leg swelling  M79.89    4. Difficulty walking  R26.2    5. Impaired mobility and ADLs  Z74.09     Z78.9    6. Muscle weakness  M62.81           Therapy precautions:Latex allergy  Co-morbidities affecting plan of care: Arrhythmia, R shoulder OA, A fib, COPD, HTN, PE (9/2016), Thromboembolus (2/2022), venous embolism & thrombosis, B RC repairs. PERTINENT MEDICAL HISTORY     Past medical and surgical history:   Past Medical History:   Diagnosis Date    Arrhythmia     palpitations.  Arthritis     rt shoulder    Atrial fibrillation ([address] 01/21/2016    takes eliquis    Cancer ([facility]     skin ca on nose    Chest pain     Chronic obstructive pulmonary disease (HCC)     COPD exacerbation ([address] 9/25/2017    Dyspnea     Essential hypertension 1/21/2016    GERD (gastroesophageal reflux disease)     managed by meds    HLD (hyperlipidemia) 1/21/2016    Hypertension     managed by meds    Menopause     Morbid obesity (HCC)     Nausea & vomiting     Pulmonary embolus ([address] 1/21/2016    Sleep apnea     no cpap    Thromboembolus (HCC) 4 month ago.      right leg    Venous embolism and thrombosis       Past Surgical History:   Procedure Laterality Date    APPENDECTOMY      CHOLECYSTECTOMY      ORTHOPEDIC SURGERY      bilateral feet,     ROTATOR CUFF REPAIR Right     ROTATOR CUFF REPAIR Left     TOTAL ABDOMINAL HYSTERECTOMY W/ BILATERAL SALPINGOOPHORECTOMY      AGE 28    UROLOGICAL SURGERY      bladder tack     Medications: reviewed in chart   Allergies: Allergies   Allergen Reactions    Latex Other (See Comments)    Codeine Other (See Comments)     Headache          SUBJECTIVE     Chief complaints/history of injury: Patient presents to therapy with left chronic knee pain due to OA. She has no knowledge of how this started. She is not a candidate for TKA. She states left knee pain started 6 months ago and has progressively worsened. She states left knee shyanne when walking. She fell 3 months ago due to buckling knee. She has not injured knee. She states she has swelling left knee. She states she had cortisone shot 2 weeks ago and the shot has reduce the pain left knee. Received previous outpatient therapy? No    Pain Assessment:   Pain location: left knee     Average Pain/symptom intensity (0-10 scale)  o Last 24 hours: 0/10  o Last week (1-7 days): 5/10   How often do you feel symptoms? Frequently (51-75%)   Description: dull   Aggravating factors: prolonged sitting, transferring sit-stand, squatting and jumping or kneeling   Alleviating factors: none    Neuro screen: denies numbness, tingling, and radiating pain    Social/Functional Hx: How would you rate your overall health? fair  Pt lives with independent spouse in a(n) 1 story house with no exterior steps. Current DME: none  Work Status: Retired   Sleep: minimally disturbed  [address] [facility] Hx/Interests: Independent and active without physical limitations  How much have your symptoms interfered with daily activities?  Not at all  Current level of function: able to do ADLs with extra pain    Patient Stated Goals: independent with HEP    OBJECTIVE EXAMINATION     Functional Outcome Questionnaire: Lower Extremity Functional Scale: 36/80= 45% function (55% deficit)  Observation:   Posture: Weight shift to right  Swelling/Edema: minimal left LE/she has poor circulation left LE                 Palpation: no pain with palpation left knee      A/PROM Measures:      Right Left Comment   Knee Flexion 121A 117A    Knee Extension 0 3 L lacks full extension                 Strength/MMT (0-5 Scale):     Right Left Comment   Knee Flexion      Knee Extension            Hip Flexion      Hip Extension      Hip Abduction      Hip ER      Hip IR      Ankle DF      Ankle PF               Special Tests/Function:   Gait: antalgic left/no device  Stair management:step-to leading right ascending, step-to leading left descending          Treatment provided today consisted of initial evaluation followed by: Therapeutic exercise (49357) x 30 min to address ROM/strength deficits and to develop an initial HEP as noted below. Included:   Quad sets 20x   SLR 10x2  SAQ 20x  LAQ 20x  Standing hip 3 way 20x  Sit to stand 20\" 10x  Hip adduction ball squeeze 20x      Patient Education on the condition/pathology, involved anatomy, and exercise rationale. CLINICAL DECISION MAKING/ASSESSMENT     Personal Factors/co-morbidities affecting POC (1-2 Medium/3+High): coping styles/strategies  past/current experiences  co-morbidities as previously noted   Problem List: (1-2 Low/ 3 Medium/ 4+ High) Pain  Localized swelling/edema  ROM limitations  Hypermobility/instability  Strength deficits  Impaired transfers  Impaired gait  Decreased endurance/activity Tolerance  ADL/functional limitations/modifications  Decreased Lares with Home Exercise Program  Difficulty sleeping    Clinical decision making: low complexity with therapist able to easily and confidently determine and predict expectations and future outcomes for the POC. Prognosis: good   Benefits and precautions of treatment explained to patient. Hayden Bynum is a 68 y.o. female who presents to therapy today with evolving/changing clinical presentation (moderate complexity)  related to left chronic knee pain. Pt would benefit from skilled physical therapy services to address the deficits noted above for return to prior level of function.     PLAN OF CARE     Effective Dates: 6/28/2022 TO 9/11/2022 ( days). Frequency/Duration: 1x per week every 2-3 weeks for HEP for 90 Day(s)  Interventions may include but are not limited to: (53198) Therapeutic exercise to develop ROM, strength, endurance and flexibility  (11796) Therapeutic activities using dynamic activities to improve function  Modalities prn to address pain, spasms, and swelling: (06789) Vasopneumatic compression  (80386) Hot/cold pack    The referring physician has reviewed and approved this evaluation and plan of care as noted by the electronic signature attached to note. GOALS     Short term goals to be met by 7/26/2022 (4 weeks): Independent with HEP  Increase AROM L knee flexion to 121 degrees   Increased AROM L knee extension to 0 (full extension)   Able to move sit to stand without difficulty from 20\" seat  Able to ambulate with normal gait using heel strike and toe off L LE  Patient will report 1/10 pain at worst left knee        Long term goals to be met by 9/20/2022 (12 weeks):   Able to do household chores with mild left knee pain  Able to ambulate safely for community distances on all surfaces without difficulty  Improve LEFs to 40% functional deficit  Able to perform HEP without increased left knee pain  5. [facility] [email or web address]  URL: [email or web address]  Date: 06/28/2022  Prepared by:  Ravinder Gao    Exercises  Supine Quad Set - 1 x daily - 7 x weekly - 1 sets - 20 reps - 5 seconds hold  Seated Long Arc Quad - 1 x daily - 7 x weekly - 1 sets - 20 reps - don't hold  Sit to Stand - 1 x daily - 7 x weekly - 2 sets - 10 reps - don't hold  Small Range Straight Leg Raise - 1 x daily - 7 x weekly - 2 sets - 10 reps - don't hold  Supine Knee Extension Strengthening - 1 x daily - 7 x weekly - 1 sets - 20 reps - don't hold  Seated Hip Adduction Isometrics with Ball - 1 x daily - 7 x weekly - 1 sets - 20 reps - don't hold  Standing Knee Flexion Stretch on Step - 1 x daily - 7 x weekly - 1 sets - 10 reps - don't hold  Standing Hip Abduction AROM - 1 x daily - 7 x weekly - 1 sets - 20 reps - don't hold  Standing Hip Extension with Chair - 1 x daily - 7 x weekly - 1 sets - 20 reps - don't hold  Standing Hip Flexion with Counter Support - 1 x daily - 7 x weekly - 1 sets - 20 reps - don't hold

## 2022-06-28 ENCOUNTER — OFFICE VISIT (OUTPATIENT)
Dept: ORTHOPEDIC SURGERY | Age: 77
End: 2022-06-28
Payer: MEDICARE

## 2022-06-28 DIAGNOSIS — R26.2 DIFFICULTY WALKING: ICD-10-CM

## 2022-06-28 DIAGNOSIS — M25.662 JOINT STIFFNESS OF LEFT LOWER LEG: ICD-10-CM

## 2022-06-28 DIAGNOSIS — M25.562 CHRONIC PAIN OF LEFT KNEE: Primary | ICD-10-CM

## 2022-06-28 DIAGNOSIS — M79.89 LEFT LEG SWELLING: ICD-10-CM

## 2022-06-28 DIAGNOSIS — Z78.9 IMPAIRED MOBILITY AND ADLS: ICD-10-CM

## 2022-06-28 DIAGNOSIS — M62.81 MUSCLE WEAKNESS: ICD-10-CM

## 2022-06-28 DIAGNOSIS — Z74.09 IMPAIRED MOBILITY AND ADLS: ICD-10-CM

## 2022-06-28 DIAGNOSIS — G89.29 CHRONIC PAIN OF LEFT KNEE: Primary | ICD-10-CM

## 2022-06-28 PROCEDURE — 97162 PT EVAL MOD COMPLEX 30 MIN: CPT | Performed by: PHYSICAL THERAPIST

## 2022-06-28 PROCEDURE — 97110 THERAPEUTIC EXERCISES: CPT | Performed by: PHYSICAL THERAPIST

## 2022-07-01 RX ORDER — FUROSEMIDE 20 MG/1
TABLET ORAL
Qty: 90 TABLET | Refills: 3 | Status: ON HOLD | OUTPATIENT
Start: 2022-07-01

## 2022-07-01 RX ORDER — SPIRONOLACTONE 50 MG/1
TABLET, FILM COATED ORAL
Qty: 90 TABLET | Refills: 3 | Status: ON HOLD | OUTPATIENT
Start: 2022-07-01

## 2022-07-19 NOTE — PROGRESS NOTES
[facility]  [address] [facility],[address] [address]  Dept: [phone]      Physical Therapy Daily Note     Insurance: Today is 2/20 visits [facility])    Total Timed Procedure Codes: 5521 min, Total Time: 45 min      Referring MD: Arturo Green., *   Referral for: Chronic L knee pain  Onset date: 1/28/2022    Diagnosis:     ICD-10-CM    1. Chronic pain of left knee  M25.562     G89.29       2. Joint stiffness of left lower leg  M25.662       3. Left leg swelling  M79.89       4. Difficulty walking  R26.2       5. Impaired mobility and ADLs  Z74.09     Z78.9       6. Muscle weakness  M62.81              Therapy precautions:Latex allergy  Co-morbidities affecting plan of care: Arrhythmia, R shoulder OA, A fib, COPD, HTN, PE (9/2016), Thromboembolus (2/2022), venous embolism & thrombosis, B RC repairs. SUBJECTIVE     Patient reports she has not done HEP due to her 's health and having a death in the family. She states left knee is painful. She has no increase in pain with exercises. OBJECTIVE       Treatment provided today:  Therapeutic exercise (84722) x 45 min to develop ROM, strength, endurance and flexibility. Included:   Quad sets 30x   SLR 10x2  SAQ 30x  LAQ 30x  Standing hip 3 way 20x with teal band  Sit to stand 20\" 10x 2  Hip adduction ball squeeze 30x  Hooklying hip abduction with teal band 30x      Patient Education on the condition/pathology, involved anatomy, and exercise rationale. A/PROM Measures:      Right Left Comment   Knee Flexion 121A 117A    Knee Extension 0 0 L lacks full extension                     ASSESSMENT     Patient has full extension left knee today. She has moderate pain left knee. No increase in left knee pain with exercises. She has difficulty with sit to stand from 20\" seat. PLAN     Continue with stretching and strengthening exercises to improve function and gait. Progress HEP as tolerated.           PLAN OF CARE Effective Dates: 6/28/2022 TO 9/11/2022 ( days). GOALS     Short term goals to be met by 8/16/2022 (4 weeks): Independent with HEP  Increase AROM L knee flexion to 121 degrees   Increased AROM L knee extension to 0 (full extension) -MET  Able to move sit to stand without difficulty from 20\" seat  Able to ambulate with normal gait using heel strike and toe off L LE  Patient will report 1/10 pain at worst left knee        Long term goals to be met by 10/11/2022 (12 weeks):   Able to do household chores with mild left knee pain  Able to ambulate safely for community distances on all surfaces without difficulty  Improve LEFs to 40% functional deficit  Able to perform HEP without increased left knee pain  5.    Discharged from [address] [place] [address] Ne: [id number]

## 2022-07-20 ENCOUNTER — OFFICE VISIT (OUTPATIENT)
Dept: ORTHOPEDIC SURGERY | Age: 77
End: 2022-07-20
Payer: MEDICARE

## 2022-07-20 DIAGNOSIS — Z78.9 IMPAIRED MOBILITY AND ADLS: ICD-10-CM

## 2022-07-20 DIAGNOSIS — M25.662 JOINT STIFFNESS OF LEFT LOWER LEG: ICD-10-CM

## 2022-07-20 DIAGNOSIS — R26.2 DIFFICULTY WALKING: ICD-10-CM

## 2022-07-20 DIAGNOSIS — M79.89 LEFT LEG SWELLING: ICD-10-CM

## 2022-07-20 DIAGNOSIS — G89.29 CHRONIC PAIN OF LEFT KNEE: Primary | ICD-10-CM

## 2022-07-20 DIAGNOSIS — M25.562 CHRONIC PAIN OF LEFT KNEE: Primary | ICD-10-CM

## 2022-07-20 DIAGNOSIS — M62.81 MUSCLE WEAKNESS: ICD-10-CM

## 2022-07-20 DIAGNOSIS — Z74.09 IMPAIRED MOBILITY AND ADLS: ICD-10-CM

## 2022-07-20 PROCEDURE — 97110 THERAPEUTIC EXERCISES: CPT | Performed by: PHYSICAL THERAPIST

## 2022-07-21 ENCOUNTER — TELEPHONE (OUTPATIENT)
Dept: PULMONOLOGY | Age: 77
End: 2022-07-21

## 2022-07-21 ENCOUNTER — OFFICE VISIT (OUTPATIENT)
Dept: PULMONOLOGY | Age: 77
End: 2022-07-21
Payer: MEDICARE

## 2022-07-21 ENCOUNTER — NURSE ONLY (OUTPATIENT)
Dept: PULMONOLOGY | Age: 77
End: 2022-07-21
Payer: MEDICARE

## 2022-07-21 VITALS
DIASTOLIC BLOOD PRESSURE: 58 MMHG | HEART RATE: 62 BPM | OXYGEN SATURATION: 91 % | HEIGHT: 66 IN | SYSTOLIC BLOOD PRESSURE: 131 MMHG | TEMPERATURE: 98.2 F | BODY MASS INDEX: 34.87 KG/M2 | WEIGHT: 217 LBS

## 2022-07-21 DIAGNOSIS — I27.20 PULMONARY HTN (HCC): Primary | ICD-10-CM

## 2022-07-21 DIAGNOSIS — R09.02 EXERCISE HYPOXEMIA: ICD-10-CM

## 2022-07-21 DIAGNOSIS — J44.9 STAGE 3 SEVERE COPD BY GOLD CLASSIFICATION (HCC): ICD-10-CM

## 2022-07-21 DIAGNOSIS — G47.33 OSA (OBSTRUCTIVE SLEEP APNEA): ICD-10-CM

## 2022-07-21 DIAGNOSIS — I27.20 PULMONARY HYPERTENSION (HCC): Primary | ICD-10-CM

## 2022-07-21 PROCEDURE — 1036F TOBACCO NON-USER: CPT | Performed by: INTERNAL MEDICINE

## 2022-07-21 PROCEDURE — 99215 OFFICE O/P EST HI 40 MIN: CPT | Performed by: INTERNAL MEDICINE

## 2022-07-21 PROCEDURE — G8417 CALC BMI ABV UP PARAM F/U: HCPCS | Performed by: INTERNAL MEDICINE

## 2022-07-21 PROCEDURE — G8427 DOCREV CUR MEDS BY ELIG CLIN: HCPCS | Performed by: INTERNAL MEDICINE

## 2022-07-21 PROCEDURE — 3023F SPIROM DOC REV: CPT | Performed by: INTERNAL MEDICINE

## 2022-07-21 PROCEDURE — 1090F PRES/ABSN URINE INCON ASSESS: CPT | Performed by: INTERNAL MEDICINE

## 2022-07-21 PROCEDURE — 94618 PULMONARY STRESS TESTING: CPT | Performed by: INTERNAL MEDICINE

## 2022-07-21 PROCEDURE — G8400 PT W/DXA NO RESULTS DOC: HCPCS | Performed by: INTERNAL MEDICINE

## 2022-07-21 PROCEDURE — 1123F ACP DISCUSS/DSCN MKR DOCD: CPT | Performed by: INTERNAL MEDICINE

## 2022-07-21 ASSESSMENT — ENCOUNTER SYMPTOMS
DIARRHEA: 0
CONSTIPATION: 0
CHEST TIGHTNESS: 0
APNEA: 0
VOMITING: 0
WHEEZING: 1
ABDOMINAL PAIN: 0
COUGH: 0
NAUSEA: 0
SHORTNESS OF BREATH: 1

## 2022-07-21 NOTE — PROGRESS NOTES
Dr. Juanita Stevens MD  [address] [place]. [address] [facility], [address] [facility]  [phone]    Patient Name:  Prudence Acharya  YOB: 1945  Office Visit: 7/21/2022    ASSESSMENT AND PLAN:  (Medical Decision Making)  Prudence Acharya is a 68 y.o. 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 She continues to have Functional Class 1-2 symptoms of dyspnea, but she reports hypoxemia has been improved recently. 1. Pulmonary hypertension ([address]):  Group 3 due to MARISA/COPD. Recommend continued conditioning exercises they would do at pulmonary rehab. Tolerating CPAP well and continuing on Trelegy. Symptoms are stable and oxygen requirement has improved. 2. MARISA (obstructive sleep apnea):  Continue CPAP with supplemental oxygen. 3. Stage 3 severe COPD by GOLD classification ([address]):  Continue Trelegy ellipta inhalers with prn xopenex. 4. Exercise hypoxemia: requires 3lpm with exertion. 6 minute walk distance improving. She continues to benefit from exertional oxygen and required it during her 6-minute walk today. Benedicto Carmona MD  Electronically signed    Clinical time for encounter was 35 minutes. _____________________________________________________________________________________________________________________    Reason for Visit:  Follow-up (Shortness of breath)      History of Present Illness: Prudence Acharya Is a 68 y.o. female with PMH of MARISA now on CPAP (3lpm), obesity, DVT of RLE (1971), PE (7963), diastolic dysfunction, atrial fibrillation  on eliquis, 90-pack-year smoking history with GOLD stage III COPD,  who is followed today for pulmonary hypertension with functional class 2 symptoms of exertional dyspnea and dyspnea bending over.         At her last visit we discussed optimization of MARISA with CPAP, lifestyle modifications like weight loss and exercise,continued diuretics and oxygen. We also reviewed the severity of her COPD and the causes for pulmonary hypertension in general.   She has continued on the Trelegy ellipta inhaler and request samples today. She has been taking spironolactone 50mg and lasix 20mg. She thinks she may have taken \"medications for weight loss that were bad for my heart\" in the past but details are unclear. Today her weight is stable at 217 pounds, which is 2lbs up from her dry weight. She does not require as needed Xopenex frequently. There have been no COPD exacerbations. She does not have any edema, aside from slight edema in L leg.which is chronic. No coughing. Her air conditioning failed and so she has not worn her CPAP the last couple of nights but generally she is quite compliant. No recent hospitalizations. She is doing physical therapy for severe osteoarthritis of her left knee. She is not interested in pulmonary rehab right now. Tobacco Use: Medium Risk    Smoking Tobacco Use: Former    Smokeless Tobacco Use: Never       Review of Systems:  Review of Systems   Constitutional:  Positive for fatigue. Negative for chills, fever and unexpected weight change. Respiratory:  Positive for shortness of breath and wheezing. Negative for apnea, cough and chest tightness. Cardiovascular:  Positive for leg swelling. Negative for chest pain and palpitations. Gastrointestinal:  Negative for abdominal pain, constipation, diarrhea, nausea and vomiting. Neurological:  Negative for dizziness, tremors, seizures, weakness and headaches. Physical exam:    Vitals:    07/21/22 1337   BP: (!) 131/58   Pulse: 62   Temp: 98.2 °F (36.8 °C)   TempSrc: Temporal   SpO2: 91%   Weight: 217 lb (98.4 kg)   Height: 5' 6\" (1.676 m)       Body mass index is 35.02 kg/m². General Appearance: The patient is pleasant and in no respiratory distress. HEENT: PERRLA. Conjunctivae unremarkable.   Nasal mucosa is without epistaxis, exudate, or polyps. Gums and dentition are unremarkable. Neck/Lymphatic:  Symmetrical with no elevation of jugular venous pulsation. Trachea midline. No thyroid enlargement. No cervical adenopathy. Lungs:  Normal respiratory effort with symmetrical lung expansion. Breath sounds with few crackles in right base. Heart: Irregular  Abdomen:  Soft and non-tender. No hepatosplenomegaly. Bowel sounds are normal.    Extremity: Trace left ankle edema, clubbing or cyanosis  Neuro: The patient is alert and oriented to person, place, and time. Memory appears intact and mood is normal.  No gross sensorimotor deficits are present. DIAGNOSTIC TESTS:                                                                                                             Spirometry- GOLD 3 COPD no really a  Date   12/15/2020        FVC 1.75 (58%)        FEV1 1.11 (49%)        FEV1/FVC    0.63                                                     No flowsheet data found. 6MWT  (2/4/2021) 7/21/2022   distance   228 249 m (71% predicted)   Start;end O2 sat   96; 87% 94% 90% on 2 L   Max JIMMY dyspnea   5; 10 4; 10   COMMENTS   desaturated to 82% walking on room air           Right Heart Cath  2/7/22   RA     RV     PA  50/12 (27)   PCWP  5   CO  4.33 (TD)   PVR  5.08WU   DPG  7, LVEDP normal     Weight-215lb   Isolated mild precapillary PH     TTE  12/29/20 9/28/21   EF  EF 41-40%; + diastolic dysfunction  74-77%; grade 2 diastolic dysfunction   RV  Normal size/systolic function  Normal size/systolic function   Peak TRV  3.9     RVSP  69  62mmHg   COMMENTS  Severely dilated LA, mild dilation RA. Suggestive of severe PH  Mild MV regurg         Chest CT 3/24/21:  Images through the lungs demonstrate no evidence of pulmonary nodule or mass. No effusions are identified. High-resolution imaging demonstrates no evidence of peripheral  reticulation, honeycombing, or bronchiectasis to suggest interstitial  lung disease.  There is a small Bochdalek hernia posterior right lung base. Normal vasculature is demonstrated allowing for noncontrast examination. No   evidence of aortic aneurysmal dilatation is seen. The heart and mediastinum are grossly unremarkable. There is an incidental small  10 mm right paratracheal lymph node. Images chest wall structures as well as visualized portions of the upper abdomen   are unremarkable in appearance. There are cholecystectomy clips      There are no acute osseous abnormalities. CTAP from 2018 and CT coronaries from 2008 without parenchymal lung disease present. V/Q Scan 3/26/21:   IMPRESSION: Low probability for PE. COPD. Lab Diagnostics:  HIV neg 2/4/21  HCV neg 2/4/21  NELSON pending  BNP pending              AMBULATING OXIMETRY: 10/21/21  O2 sat  HR     Room air at Rest   93%   68bpm     Room air ambulating   86%  71bpm   (recovery) Ambulating on 3Lpm  90%  75bpm         Labs:   Lab Results   Component Value Date/Time     02/07/2022 07:36 AM    K 3.9 02/07/2022 07:36 AM     02/07/2022 07:36 AM    CO2 28 02/07/2022 07:36 AM    BUN 21 02/07/2022 07:36 AM    CREATININE 0.90 02/07/2022 07:36 AM    GLUCOSE 98 02/07/2022 07:36 AM    CALCIUM 8.7 02/07/2022 07:36 AM      Lab Results   Component Value Date    WBC 7.1 02/07/2022    HGB 13.3 02/07/2022    HCT 40.4 02/07/2022    MCV 94.4 02/07/2022     (L) 02/07/2022     No results found for: DDIMER  Lab Results   Component Value Date/Time    ALKPHOS 91 12/10/2020 02:25 PM    ALT 15 12/10/2020 02:25 PM    AST 21 12/10/2020 02:25 PM    PROT 6.2 12/10/2020 02:25 PM    BILITOT 0.4 12/10/2020 02:25 PM    LABALBU 4.1 12/10/2020 02:25 PM     Lab Results   Component Value Date     (H) 01/13/2022     No results for input(s): PHAPOC, TUY0WFGQ, IT2WSJY, QRC2HQE, BE in the last 72 hours.

## 2022-07-21 NOTE — PROGRESS NOTES
[address] [facility], [address] [facility]  [phone]    6 Minute Walk Test      Patient's Name Rabia Saul   Age 68 y.o. Gender female   Height 66 inches   Weight 217 lbs   Ordering Provider  Reynold Goode MD     Medications taken before test are up to date:  Yes   Supplemental oxygen during the test:  Yes, @ 2 LPM added during test     Base End    Time 01:30 01:36 Pm   Blood Pressure 131/58 153/73 mmHg   Heart Rate  62 102 bpm   Dyspnea  4 10 Zan Scale   Fatigue 3 10 Zan Scale   O2 Saturation  94 90 RA / 2L O2       Stopped or paused before 6 minutes? Yes SOB and to add O2   Other symptoms at end of exercise: None     Number of laps: __4___ x 60 meters = ___240___ meters + final partial lap:___9___ meters =  ___249____ meters    Total distance walked in 6 minutes: 249  Meters  Predicted distance: _____350______ meters  Percent of predicted distance: __71__ %                Tech comments: Patient sat dropped to 87% after 3 minutes we stopped and added 2L of O2 and sats came up to 92% and stayed at 92%   until the end of test where she dropped to 90% then right back up to 92%.  Up to 98% on 2L at rest.     Test Performed by: Gabriele Duty, RRT

## 2022-07-26 ENCOUNTER — OFFICE VISIT (OUTPATIENT)
Dept: ORTHOPEDIC SURGERY | Age: 77
End: 2022-07-26
Payer: MEDICARE

## 2022-07-26 DIAGNOSIS — M17.12 PRIMARY OSTEOARTHRITIS OF LEFT KNEE: Primary | ICD-10-CM

## 2022-07-26 PROCEDURE — 20610 DRAIN/INJ JOINT/BURSA W/O US: CPT | Performed by: PHYSICIAN ASSISTANT

## 2022-07-26 NOTE — PROGRESS NOTES
Name: Evelia Dave  YOB: 1945  Gender: female  MRN: [id number]    Allergies   Allergen Reactions    Latex Other (See Comments)    Codeine Other (See Comments)     Headache       CC:  left knee pain, Osteoarthritis    PROCEDURE:  HA injection(s). All questions answered. Procedure Note: The patient was placed in upright position with both knees hanging freely from exam table. The left knee was prepped in sterile fashion using alcohol wipe(s). Using an infrapatellar approach, 4cc of Monovisc was injected freely. The needle was then removed, pressure hemostatis achieved, injection site was cleansed with alcohol wipe and dressed with band aid. The patient tolerated the procedure without complication. The patient  will follow up as scheduled.     07/26/22

## 2022-08-16 NOTE — PROGRESS NOTES
week (1-7 days): 3/10      Functional Outcome Questionnaire: Lower Extremity Functional Scale: 18/80= 23% function (77% deficit)      ASSESSMENT     Patient has good AROM bilateral knees today. Patient has full extension bilateral knees today. She has mild pain at the worst Left knee. She has normal gait without assistive device. She is unable to step up with left LE but is able to right. She is independent with HEP. PLAN     Patient is discharged with HEP. PLAN OF CARE     Effective Dates: 6/28/2022 TO 9/11/2022 ( days). GOALS     Short term goals to be met by 9/13/2022 (4 weeks): Independent with HEP-MET  Increase AROM L knee flexion to 121 degrees -MET  Increased AROM L knee extension to 0 (full extension) -MET  Able to move sit to stand without difficulty from 20\" seat-MET  Able to ambulate with normal gait using heel strike and toe off L LE-MET  Patient will report 1/10 pain at worst left knee-NOT MET        Long term goals to be met by 11/8/2022 (12 weeks):   Able to do household chores with mild left knee pain-MET  Able to ambulate safely for community distances on all surfaces without difficulty-MET  Improve LEFs to 40% functional deficit-NOT MET  Able to perform HEP without increased left knee pain-MET  5.    Discharged from PT-MET    [address] [place] [address]  Access Code: [email or web address]

## 2022-08-17 ENCOUNTER — OFFICE VISIT (OUTPATIENT)
Dept: ORTHOPEDIC SURGERY | Age: 77
End: 2022-08-17
Payer: MEDICARE

## 2022-08-17 DIAGNOSIS — R26.2 DIFFICULTY WALKING: ICD-10-CM

## 2022-08-17 DIAGNOSIS — Z74.09 IMPAIRED MOBILITY AND ADLS: ICD-10-CM

## 2022-08-17 DIAGNOSIS — G89.29 CHRONIC PAIN OF LEFT KNEE: Primary | ICD-10-CM

## 2022-08-17 DIAGNOSIS — M25.662 JOINT STIFFNESS OF LEFT LOWER LEG: ICD-10-CM

## 2022-08-17 DIAGNOSIS — M25.562 CHRONIC PAIN OF LEFT KNEE: Primary | ICD-10-CM

## 2022-08-17 DIAGNOSIS — Z78.9 IMPAIRED MOBILITY AND ADLS: ICD-10-CM

## 2022-08-17 DIAGNOSIS — M79.89 LEFT LEG SWELLING: ICD-10-CM

## 2022-08-17 DIAGNOSIS — M62.81 MUSCLE WEAKNESS: ICD-10-CM

## 2022-08-17 PROCEDURE — 97110 THERAPEUTIC EXERCISES: CPT | Performed by: PHYSICAL THERAPIST

## 2022-09-23 RX ORDER — ROPINIROLE 0.5 MG/1
TABLET, FILM COATED ORAL
Qty: 90 TABLET | Refills: 3 | Status: ON HOLD | OUTPATIENT
Start: 2022-09-23

## 2022-09-26 ENCOUNTER — TELEPHONE (OUTPATIENT)
Dept: FAMILY MEDICINE CLINIC | Facility: CLINIC | Age: 77
End: 2022-09-26

## 2022-09-26 NOTE — TELEPHONE ENCOUNTER
----- Message from Kevin Witt sent at 9/26/2022 10:23 AM EDT -----  Subject: Message to Provider    QUESTIONS  Information for Provider? Pt received a no call no show letter from PCP   but pt is stating that she contacted scheduling to get appt cancelled and   it was never cancelled, only her  was rescheduled. Not sure who she   spoke with but would like the no show no call removed from her account if   possible. Please contact pt to discuss this in further detail.  ---------------------------------------------------------------------------  --------------  [employer]  [phone]; OK to leave message on voicemail  ---------------------------------------------------------------------------  --------------  SCRIPT ANSWERS  Relationship to Patient?  Self

## 2022-09-26 NOTE — TELEPHONE ENCOUNTER
Pt received a no call no show letter from PCP   but pt is stating that she contacted scheduling to get appt cancelled and   it was never cancelled, only her  was rescheduled. Not sure who she   spoke with but would like the no show no call removed from her account if   possible. Please contact pt to discuss this in further detail.

## 2022-09-26 NOTE — TELEPHONE ENCOUNTER
Can you please call and discuss this with patient, I can not see or do anything with a no show status, I am not sure if only front office staff can handle this but we do not have access to her cancelled appointments.

## 2022-10-20 ENCOUNTER — NURSE ONLY (OUTPATIENT)
Dept: FAMILY MEDICINE CLINIC | Facility: CLINIC | Age: 77
End: 2022-10-20
Payer: MEDICARE

## 2022-10-20 DIAGNOSIS — Z23 ENCOUNTER FOR IMMUNIZATION: Primary | ICD-10-CM

## 2022-10-20 PROCEDURE — G0008 ADMIN INFLUENZA VIRUS VAC: HCPCS | Performed by: FAMILY MEDICINE

## 2022-10-20 PROCEDURE — 90694 VACC AIIV4 NO PRSRV 0.5ML IM: CPT | Performed by: FAMILY MEDICINE

## 2022-10-31 ENCOUNTER — TELEPHONE (OUTPATIENT)
Dept: CARDIOLOGY CLINIC | Age: 77
End: 2022-10-31

## 2022-10-31 DIAGNOSIS — I27.20 PULMONARY HYPERTENSION (HCC): Primary | ICD-10-CM

## 2022-10-31 DIAGNOSIS — I10 ESSENTIAL HYPERTENSION: ICD-10-CM

## 2022-10-31 DIAGNOSIS — I48.91 ATRIAL FIBRILLATION (HCC): ICD-10-CM

## 2022-10-31 NOTE — TELEPHONE ENCOUNTER
Per : Increase Lasix to 40mg for the next 5 days. Weigh daily. BMP,BNP and Mg. on Friday. FU w/PCP as well. Pt.notified of MD response. Labs ordered as below.   Orders Placed This Encounter   Procedures    Basic Metabolic Panel     Standing Status:   Future     Standing Expiration Date:   10/31/2023    Brain Natriuretic Peptide     Standing Status:   Future     Standing Expiration Date:   10/31/2023    Magnesium     Standing Status:   Future     Standing Expiration Date:   10/31/2023

## 2022-10-31 NOTE — TELEPHONE ENCOUNTER
Pt called because their HR and O2 have been dropping. Yesterday, pt states their HR dropped into the 40s around lunchtime. Today, pt's O2 has been \"crazy\" all day. States her O2 is fine if she sits and does nothing but if she gets up and tries walking around her O2 drops down into the 80s. States she experiences SOB when this happens. Pt states she feels cold. Denies CP and dizziness.

## 2022-11-01 ENCOUNTER — HOSPITAL ENCOUNTER (EMERGENCY)
Dept: GENERAL RADIOLOGY | Age: 77
Discharge: HOME | DRG: 291 | End: 2022-11-04
Payer: MEDICARE

## 2022-11-01 ENCOUNTER — NURSE TRIAGE (OUTPATIENT)
Dept: OTHER | Facility: CLINIC | Age: 77
End: 2022-11-01

## 2022-11-01 ENCOUNTER — HOSPITAL ENCOUNTER (INPATIENT)
Age: 77
LOS: 4 days | Discharge: HOME | DRG: 291 | End: 2022-11-05
Attending: EMERGENCY MEDICINE | Admitting: INTERNAL MEDICINE
Payer: MEDICARE

## 2022-11-01 DIAGNOSIS — I10 ESSENTIAL (PRIMARY) HYPERTENSION: ICD-10-CM

## 2022-11-01 DIAGNOSIS — J96.01 ACUTE RESPIRATORY FAILURE WITH HYPOXIA (HCC): Primary | ICD-10-CM

## 2022-11-01 DIAGNOSIS — I48.0 PAROXYSMAL ATRIAL FIBRILLATION (HCC): ICD-10-CM

## 2022-11-01 DIAGNOSIS — I50.9 ACUTE CONGESTIVE HEART FAILURE, UNSPECIFIED HEART FAILURE TYPE (HCC): ICD-10-CM

## 2022-11-01 DIAGNOSIS — I21.4 NSTEMI (NON-ST ELEVATED MYOCARDIAL INFARCTION) (HCC): ICD-10-CM

## 2022-11-01 DIAGNOSIS — I50.32 CHRONIC DIASTOLIC CONGESTIVE HEART FAILURE (HCC): ICD-10-CM

## 2022-11-01 PROBLEM — I73.9 PERIPHERAL VASCULAR DISEASE (HCC): Status: ACTIVE | Noted: 2020-07-02

## 2022-11-01 PROBLEM — G47.33 OSA AND COPD OVERLAP SYNDROME (HCC): Status: ACTIVE | Noted: 2017-09-25

## 2022-11-01 PROBLEM — J44.9 OSA AND COPD OVERLAP SYNDROME (HCC): Status: ACTIVE | Noted: 2017-09-25

## 2022-11-01 PROBLEM — I25.119 CORONARY ARTERY DISEASE INVOLVING NATIVE CORONARY ARTERY OF NATIVE HEART WITH ANGINA PECTORIS (HCC): Status: ACTIVE | Noted: 2022-11-01

## 2022-11-01 LAB
ALBUMIN SERPL-MCNC: 3.6 G/DL (ref 3.2–4.6)
ALBUMIN/GLOB SERPL: 1.1 {RATIO} (ref 0.4–1.6)
ALP SERPL-CCNC: 94 U/L (ref 50–136)
ALT SERPL-CCNC: 49 U/L (ref 12–65)
ANION GAP SERPL CALC-SCNC: 7 MMOL/L (ref 2–11)
AST SERPL-CCNC: 35 U/L (ref 15–37)
BASOPHILS # BLD: 0 K/UL (ref 0–0.2)
BASOPHILS NFR BLD: 1 % (ref 0–2)
BILIRUB SERPL-MCNC: 0.6 MG/DL (ref 0.2–1.1)
BILIRUB UR QL: NEGATIVE
BUN SERPL-MCNC: 15 MG/DL (ref 8–23)
CALCIUM SERPL-MCNC: 8.1 MG/DL (ref 8.3–10.4)
CHLORIDE SERPL-SCNC: 105 MMOL/L (ref 101–110)
CO2 SERPL-SCNC: 30 MMOL/L (ref 21–32)
CREAT SERPL-MCNC: 0.9 MG/DL (ref 0.6–1)
DIFFERENTIAL METHOD BLD: ABNORMAL
EKG DIAGNOSIS: ABNORMAL
EKG Q-T INTERVAL: 480 MS
EKG QRS DURATION: 76 MS
EKG QTC CALCULATION (BAZETT): 475 MS
EKG R AXIS: 69 DEGREES
EKG T AXIS: 4 DEGREES
EKG VENTRICULAR RATE: 59 BPM
EOSINOPHIL # BLD: 0.1 K/UL (ref 0–0.8)
EOSINOPHIL NFR BLD: 2 % (ref 0.5–7.8)
ERYTHROCYTE [DISTWIDTH] IN BLOOD BY AUTOMATED COUNT: 13.7 % (ref 11.9–14.6)
FLUAV AG NPH QL IA: NEGATIVE
FLUBV AG NPH QL IA: NEGATIVE
GLOBULIN SER CALC-MCNC: 3.2 G/DL (ref 2.8–4.5)
GLUCOSE SERPL-MCNC: 67 MG/DL (ref 65–100)
GLUCOSE UR QL STRIP.AUTO: NEGATIVE MG/DL
HCT VFR BLD AUTO: 39.2 % (ref 35.8–46.3)
HGB BLD-MCNC: 12.7 G/DL (ref 11.7–15.4)
IMM GRANULOCYTES # BLD AUTO: 0.1 K/UL (ref 0–0.5)
IMM GRANULOCYTES NFR BLD AUTO: 1 % (ref 0–5)
KETONES UR-MCNC: NEGATIVE MG/DL
LEUKOCYTE ESTERASE UR QL STRIP: NEGATIVE
LYMPHOCYTES # BLD: 2.5 K/UL (ref 0.5–4.6)
LYMPHOCYTES NFR BLD: 34 % (ref 13–44)
MAGNESIUM SERPL-MCNC: 1.9 MG/DL (ref 1.8–2.4)
MCH RBC QN AUTO: 32.6 PG (ref 26.1–32.9)
MCHC RBC AUTO-ENTMCNC: 32.4 G/DL (ref 31.4–35)
MCV RBC AUTO: 100.8 FL (ref 82–102)
MONOCYTES # BLD: 0.7 K/UL (ref 0.1–1.3)
MONOCYTES NFR BLD: 9 % (ref 4–12)
NEUTS SEG # BLD: 3.8 K/UL (ref 1.7–8.2)
NEUTS SEG NFR BLD: 53 % (ref 43–78)
NITRITE UR QL: NEGATIVE
NRBC # BLD: 0 K/UL (ref 0–0.2)
NT PRO BNP: 699 PG/ML
PH UR: 7 [PH] (ref 5–9)
PLATELET # BLD AUTO: 131 K/UL (ref 150–450)
PMV BLD AUTO: 10.5 FL (ref 9.4–12.3)
POTASSIUM SERPL-SCNC: 3.5 MMOL/L (ref 3.5–5.1)
PROT SERPL-MCNC: 6.8 G/DL (ref 6.3–8.2)
PROT UR QL: NEGATIVE MG/DL
RBC # BLD AUTO: 3.89 M/UL (ref 4.05–5.2)
RBC # UR STRIP: NEGATIVE /UL
SARS-COV-2 RDRP RESP QL NAA+PROBE: NOT DETECTED
SERVICE CMNT-IMP: ABNORMAL
SODIUM SERPL-SCNC: 142 MMOL/L (ref 133–143)
SOURCE: NORMAL
SP GR UR: >1.03 (ref 1–1.02)
SPECIMEN SOURCE: NORMAL
TROPONIN I SERPL HS-MCNC: 262.6 PG/ML (ref 0–14)
TROPONIN I SERPL HS-MCNC: 340.7 PG/ML (ref 0–14)
UROBILINOGEN UR QL: 0.2 EU/DL (ref 0.2–1)
WBC # BLD AUTO: 7.2 K/UL (ref 4.3–11.1)

## 2022-11-01 PROCEDURE — 94762 N-INVAS EAR/PLS OXIMTRY CONT: CPT

## 2022-11-01 PROCEDURE — 96374 THER/PROPH/DIAG INJ IV PUSH: CPT

## 2022-11-01 PROCEDURE — 93005 ELECTROCARDIOGRAM TRACING: CPT | Performed by: EMERGENCY MEDICINE

## 2022-11-01 PROCEDURE — 96375 TX/PRO/DX INJ NEW DRUG ADDON: CPT

## 2022-11-01 PROCEDURE — 6360000002 HC RX W HCPCS: Performed by: EMERGENCY MEDICINE

## 2022-11-01 PROCEDURE — 71045 X-RAY EXAM CHEST 1 VIEW: CPT

## 2022-11-01 PROCEDURE — 6370000000 HC RX 637 (ALT 250 FOR IP): Performed by: EMERGENCY MEDICINE

## 2022-11-01 PROCEDURE — 99223 1ST HOSP IP/OBS HIGH 75: CPT | Performed by: INTERNAL MEDICINE

## 2022-11-01 PROCEDURE — 1100000000 HC RM PRIVATE

## 2022-11-01 PROCEDURE — 80053 COMPREHEN METABOLIC PANEL: CPT

## 2022-11-01 PROCEDURE — 87635 SARS-COV-2 COVID-19 AMP PRB: CPT

## 2022-11-01 PROCEDURE — 83735 ASSAY OF MAGNESIUM: CPT

## 2022-11-01 PROCEDURE — 87804 INFLUENZA ASSAY W/OPTIC: CPT

## 2022-11-01 PROCEDURE — 81003 URINALYSIS AUTO W/O SCOPE: CPT

## 2022-11-01 PROCEDURE — 84484 ASSAY OF TROPONIN QUANT: CPT

## 2022-11-01 PROCEDURE — 85025 COMPLETE CBC W/AUTO DIFF WBC: CPT

## 2022-11-01 PROCEDURE — 83880 ASSAY OF NATRIURETIC PEPTIDE: CPT

## 2022-11-01 PROCEDURE — 99285 EMERGENCY DEPT VISIT HI MDM: CPT

## 2022-11-01 RX ORDER — FUROSEMIDE 10 MG/ML
40 INJECTION INTRAMUSCULAR; INTRAVENOUS ONCE
Status: COMPLETED | OUTPATIENT
Start: 2022-11-01 | End: 2022-11-01

## 2022-11-01 RX ORDER — IPRATROPIUM BROMIDE AND ALBUTEROL SULFATE 2.5; .5 MG/3ML; MG/3ML
1 SOLUTION RESPIRATORY (INHALATION)
Status: ACTIVE | OUTPATIENT
Start: 2022-11-01 | End: 2022-11-02

## 2022-11-01 RX ORDER — METHYLPREDNISOLONE SODIUM SUCCINATE 125 MG/2ML
125 INJECTION, POWDER, LYOPHILIZED, FOR SOLUTION INTRAMUSCULAR; INTRAVENOUS
Status: COMPLETED | OUTPATIENT
Start: 2022-11-01 | End: 2022-11-01

## 2022-11-01 RX ORDER — ASPIRIN 81 MG/1
324 TABLET, CHEWABLE ORAL
Status: COMPLETED | OUTPATIENT
Start: 2022-11-01 | End: 2022-11-01

## 2022-11-01 RX ADMIN — ASPIRIN 324 MG: 81 TABLET, CHEWABLE ORAL at 18:47

## 2022-11-01 RX ADMIN — FUROSEMIDE 40 MG: 10 INJECTION, SOLUTION INTRAMUSCULAR; INTRAVENOUS at 18:47

## 2022-11-01 RX ADMIN — METHYLPREDNISOLONE SODIUM SUCCINATE 125 MG: 125 INJECTION, POWDER, FOR SOLUTION INTRAMUSCULAR; INTRAVENOUS at 18:47

## 2022-11-01 ASSESSMENT — ENCOUNTER SYMPTOMS: SHORTNESS OF BREATH: 1

## 2022-11-01 NOTE — TELEPHONE ENCOUNTER
Location of patient: [address] [place] [address]    Received call from [place] at [email or web address] with [employer]. Subjective: Caller states \"I can usually tell when I am in afib\"     Current Symptoms: HR a few minutes ago was 47 and O2 was 99%. HR at time of call is 92 and dropped to 63. O2 is 94%. Currently on oxygen. Chronic SOB at rest and is worse than normal at time of call. Talked to her cardiologist yesterday and was told to start taking 40 mg of Lasix everyday. Onset: 2 days ago; worsening    Associated Symptoms: reduced activity    Temperature: denies fever     What has been tried: wearing her oxygen    LMP: NA Pregnant: NA    Recommended disposition: Go to ED/UCC Now (Or to Office with PCP Approval)    Care advice provided, patient verbalizes understanding; denies any other questions or concerns; instructed to call back for any new or worsening symptoms. Warm transferred patient to [facility] at [place] [address] for second level triage. Attention Provider: Thank you for allowing me to participate in the care of your patient. The patient was connected to triage in response to information provided to the ECC/PSC. Please do not respond through this encounter as the response is not directed to a shared pool.     Reason for Disposition   Patient sounds very sick or weak to the triager    Protocols used: Heart Rate and Heartbeat Questions-ADULT-[place]

## 2022-11-01 NOTE — ED TRIAGE NOTES
Pt ambulatory to triage. Pt states she is here because she has been having issues with her heart rate dropping, as low as in the 40s. Pt states here oxygen drops on exertion, as low as 88%. Pt states she has a.fib, takes Eliquis, metoprolol and flecainide. Pt denies chest pain, abd pain, fever/chills, dysuria, hematuria, leg swelling. Pt states she started taking lasix 40mg once a day, yesterday, prescribed by cardiology.

## 2022-11-01 NOTE — ED PROVIDER NOTES
Emergency Department Provider Note                   PCP:                Wagner Herrera MD, MD               Age: 68 y.o. Sex: female       ICD-10-CM    1. Acute respiratory failure with hypoxia (HCC)  J96.01       2. NSTEMI (non-ST elevated myocardial infarction) ([facility]  I21.4       3. Acute congestive heart failure, unspecified heart failure type ([facility] [facility]  I50.9           DISPOSITION Decision To Admit 11/01/2022 08:10:56 PM       MDM  Number of Diagnoses or Management Options  Acute congestive heart failure, unspecified heart failure type [facility]): new, needed workup  Acute respiratory failure with hypoxia [facility]): new, needed workup  NSTEMI (non-ST elevated myocardial infarction) [facility]): new, needed workup  Diagnosis management comments: 77-year-old female patient presents with progressive dyspnea on exertion bradycardia and hypoxia    Work-up today reveals evidence of acute congestive heart failure as well as an NSTEMI with elevated troponins    Other work-up is unremarkable    Reviewed findings with cardiology, Dr. Tae Breen, who will see the patient for admission       Amount and/or Complexity of Data Reviewed  Clinical lab tests: ordered and reviewed  Tests in the radiology section of CPT®: ordered and reviewed  Tests in the medicine section of CPT®: ordered and reviewed  Decide to obtain previous medical records or to obtain history from someone other than the patient: yes  Obtain history from someone other than the patient: yes  Review and summarize past medical records: yes  Discuss the patient with other providers: yes  Independent visualization of images, tracings, or specimens: yes    Risk of Complications, Morbidity, and/or Mortality  Presenting problems: high  Diagnostic procedures: high  Management options: moderate  General comments: Elements of this note have been dictated via voice recognition software. Text and phrases may be limited by the accuracy of the software.   The chart has been reviewed, but history of present illness section. Review of Systems   Respiratory:  Positive for shortness of breath. Past Medical History:   Diagnosis Date    Arrhythmia     palpitations. Arthritis     rt shoulder    Atrial fibrillation ([address] 2016    takes eliquis    Cancer ([facility] [facility]     skin ca on nose    Chest pain     Chronic obstructive pulmonary disease ([address]     COPD exacerbation ([address] 2017    Dyspnea     Essential hypertension 2016    GERD (gastroesophageal reflux disease)     managed by meds    HLD (hyperlipidemia) 2016    Hypertension     managed by meds    Menopause     Morbid obesity ([address]     Nausea & vomiting     Pulmonary embolus ([address] 2016    Sleep apnea     no cpap    Thromboembolus ([facility] [address] 4 month ago.      right leg    Venous embolism and thrombosis         Past Surgical History:   Procedure Laterality Date    APPENDECTOMY      CHOLECYSTECTOMY      ORTHOPEDIC SURGERY      bilateral feet,     ROTATOR CUFF REPAIR Right     ROTATOR CUFF REPAIR Left     RADHA AND BSO (CERVIX REMOVED)      AGE 28    UROLOGICAL SURGERY      bladder tack        Family History   Problem Relation Age of Onset    Stroke Mother     Heart Disease Mother     Cancer Father         prostate    Stroke Father     Coronary Art Dis Father         premature    Heart Disease Brother     Heart Attack Brother     Cancer Sister     Cancer Sister         breast    Breast Cancer Sister 70        Social History     Socioeconomic History    Marital status:    Tobacco Use    Smoking status: Former     Packs/day: 3.00     Types: Cigarettes     Quit date: 1998     Years since quittin.2    Smokeless tobacco: Never   Substance and Sexual Activity    Alcohol use: No    Drug use: No        Allergies: Latex, Codeine, and Shellfish-derived products    Previous Medications    ACETAMINOPHEN (TYLENOL) 325 MG TABLET    Take 325 mg by mouth every 6 hours as needed    APIXABAN (ELIQUIS) 5 MG TABS TABLET    Take 5 mg by mouth 2 times daily    AZITHROMYCIN (ZITHROMAX) 250 MG TABLET    Take two tablets today then one tablet daily x 4 days    COLESTIPOL (COLESTID) 1 G TABLET    Take 1 g by mouth 2 times daily    CYANOCOBALAMIN 500 MCG TABLET    Take 500 mcg by mouth daily    FLECAINIDE (TAMBOCOR) 100 MG TABLET    Take 100 mg by mouth 2 times daily    FUROSEMIDE (LASIX) 20 MG TABLET    TAKE 1 TABLET BY MOUTH EVERY DAY    LOSARTAN (COZAAR) 50 MG TABLET    TAKE 1 TABLET BY MOUTH EVERY DAY    METHYLPREDNISOLONE (MEDROL DOSEPACK) 4 MG TABLET    As directed per dose pack    METOPROLOL SUCCINATE (TOPROL XL) 100 MG EXTENDED RELEASE TABLET    Take 100 mg by mouth 2 times daily    OMEPRAZOLE (PRILOSEC) 40 MG DELAYED RELEASE CAPSULE    TAKE 1 CAPSULE BY MOUTH EVERY DAY    OXYGEN    3 L/min by Nasal route    PROMETHAZINE (PHENERGAN) 25 MG TABLET    Take 25 mg by mouth every 6 hours as needed    ROPINIROLE (REQUIP) 0.5 MG TABLET    TAKE 1 TABLET BY MOUTH EVERY DAY AT NIGHT    SPIRONOLACTONE (ALDACTONE) 50 MG TABLET    TAKE 1 TABLET BY MOUTH EVERY DAY        Vitals signs and nursing note reviewed.    Patient Vitals for the past 4 hrs:   Pulse Resp BP SpO2   11/01/22 1930 57 21 (!) 144/56 94 %   11/01/22 1915 57 17 (!) 180/82 92 %   11/01/22 1901 60 22 (!) 175/72 92 %   11/01/22 1846 64 23 (!) 197/95 90 %   11/01/22 1837 64 19 -- (!) 88 %   11/01/22 1835 65 29 (!) 167/86 92 %   11/01/22 1832 57 24 (!) 193/91 93 %   11/01/22 1831 58 22 (!) 181/88 95 %          Physical Exam     EKG 12 Lead    Date/Time: 11/1/2022 6:25 PM  Performed by: Rosemary Hart MD  Authorized by: Rosemary Hart MD     ECG reviewed by ED Physician in the absence of a cardiologist: yes    Previous ECG:     Previous ECG:  Compared to current  Interpretation:     Interpretation: abnormal    Rate:     ECG rate:  58    ECG rate assessment: bradycardic    Rhythm:     Rhythm: sinus rhythm and sinus bradycardia    Ectopy:     Ectopy: none    QRS:     QRS axis:  Normal    QRS intervals: Normal  ST segments:     ST segments:  Non-specific  T waves:     T waves: flattening    Q waves:     Abnormal Q-waves: not present    Comments:      Sinus bradycardia without acute ischemic changes  Critical Care  Performed by: Navi Gavin MD  Authorized by: Navi Gavin MD     Critical care provider statement:     Critical care time (minutes):  55    Critical care time was exclusive of:  Separately billable procedures and treating other patients    Critical care was necessary to treat or prevent imminent or life-threatening deterioration of the following conditions:  Cardiac failure and respiratory failure    Critical care was time spent personally by me on the following activities:  Development of treatment plan with patient or surrogate, discussions with consultants, evaluation of patient's response to treatment, examination of patient, obtaining history from patient or surrogate, ordering and performing treatments and interventions, ordering and review of laboratory studies, ordering and review of radiographic studies, pulse oximetry, re-evaluation of patient's condition and review of old charts    I assumed direction of critical care for this patient from another provider in my specialty: no      Care discussed with: admitting provider      ED EKG Interpretation  EKG was interpreted in the absence of a cardiologist.      Results for orders placed or performed during the hospital encounter of 11/01/22   XR CHEST PORTABLE    Narrative    EXAMINATION: XR CHEST PORTABLE 11/1/2022 2:30 PM    ACCESSION NUMBER: [id number]    COMPARISON: Chest radiograph dated 1/13/2022    INDICATION: Shortness of Breath    TECHNIQUE: A single view of the chest was obtained. FINDINGS:   Support Devices:   *  None    Cardiac Silhouette: Cardiac silhouette is mildly enlarged, unchanged. Mediastinum: Normal mediastinal contours. Aortic arch calcifications. Lungs: No airspace consolidation.   No pneumothorax or sizable pleural effusion. Upper Abdomen: Normal     Miscellaneous: No fracture or suspicious osseous lesion. Impression    Stable cardiomegaly without focal airspace consolidation.        CBC with Auto Differential   Result Value Ref Range    WBC 7.2 4.3 - 11.1 K/uL    RBC 3.89 (L) 4.05 - 5.2 M/uL    Hemoglobin 12.7 11.7 - 15.4 g/dL    Hematocrit 39.2 35.8 - 46.3 %    .8 82 - 102 FL    MCH 32.6 26.1 - 32.9 PG    MCHC 32.4 31.4 - 35.0 g/dL    RDW 13.7 11.9 - 14.6 %    Platelets 465 (L) 007 - 450 K/uL    MPV 10.5 9.4 - 12.3 FL    nRBC 0.00 0.0 - 0.2 K/uL    Differential Type AUTOMATED      Seg Neutrophils 53 43 - 78 %    Lymphocytes 34 13 - 44 %    Monocytes 9 4.0 - 12.0 %    Eosinophils % 2 0.5 - 7.8 %    Basophils 1 0.0 - 2.0 %    Immature Granulocytes 1 0.0 - 5.0 %    Segs Absolute 3.8 1.7 - 8.2 K/UL    Absolute Lymph # 2.5 0.5 - 4.6 K/UL    Absolute Mono # 0.7 0.1 - 1.3 K/UL    Absolute Eos # 0.1 0.0 - 0.8 K/UL    Basophils Absolute 0.0 0.0 - 0.2 K/UL    Absolute Immature Granulocyte 0.1 0.0 - 0.5 K/UL   Comprehensive Metabolic Panel   Result Value Ref Range    Sodium 142 133 - 143 mmol/L    Potassium 3.5 3.5 - 5.1 mmol/L    Chloride 105 101 - 110 mmol/L    CO2 30 21 - 32 mmol/L    Anion Gap 7 2 - 11 mmol/L    Glucose 67 65 - 100 mg/dL    BUN 15 8 - 23 MG/DL    Creatinine 0.90 0.6 - 1.0 MG/DL    Est, Glom Filt Rate >60 >60 ml/min/1.73m2    Calcium 8.1 (L) 8.3 - 10.4 MG/DL    Total Bilirubin 0.6 0.2 - 1.1 MG/DL    ALT 49 12 - 65 U/L    AST 35 15 - 37 U/L    Alk Phosphatase 94 50 - 136 U/L    Total Protein 6.8 6.3 - 8.2 g/dL    Albumin 3.6 3.2 - 4.6 g/dL    Globulin 3.2 2.8 - 4.5 g/dL    Albumin/Globulin Ratio 1.1 0.4 - 1.6     Magnesium   Result Value Ref Range    Magnesium 1.9 1.8 - 2.4 mg/dL   Troponin   Result Value Ref Range    Troponin, High Sensitivity 262.6 (HH) 0 - 14 pg/mL   Troponin   Result Value Ref Range    Troponin, High Sensitivity 340.7 (HH) 0 - 14 pg/mL   Brain Natriuretic Peptide   Result Value Ref Range    NT Pro- (H) <450 PG/ML   EKG 12 Lead   Result Value Ref Range    Ventricular Rate 59 BPM    QRS Duration 76 ms    Q-T Interval 480 ms    QTc Calculation (Bazett) 475 ms    R Axis 69 degrees    T Axis 4 degrees    Diagnosis Sinus bradycardia         XR CHEST PORTABLE   Final Result   Stable cardiomegaly without focal airspace consolidation. Voice dictation software was used during the making of this note. This software is not perfect and grammatical and other typographical errors may be present. This note has not been completely proofread for errors.      Rosemary Hart MD  11/01/22 2014

## 2022-11-02 ENCOUNTER — APPOINTMENT (OUTPATIENT)
Dept: NON INVASIVE DIAGNOSTICS | Age: 77
DRG: 291 | End: 2022-11-02
Payer: MEDICARE

## 2022-11-02 LAB
ALBUMIN SERPL-MCNC: 3.7 G/DL (ref 3.2–4.6)
ALBUMIN SERPL-MCNC: 3.7 G/DL (ref 3.2–4.6)
ALBUMIN/GLOB SERPL: 0.9 {RATIO} (ref 0.4–1.6)
ALBUMIN/GLOB SERPL: 0.9 {RATIO} (ref 0.4–1.6)
ALP SERPL-CCNC: 108 U/L (ref 50–136)
ALP SERPL-CCNC: 109 U/L (ref 50–136)
ALT SERPL-CCNC: 52 U/L (ref 12–65)
ALT SERPL-CCNC: 53 U/L (ref 12–65)
ANION GAP SERPL CALC-SCNC: 7 MMOL/L (ref 2–11)
ANION GAP SERPL CALC-SCNC: 7 MMOL/L (ref 2–11)
AST SERPL-CCNC: 31 U/L (ref 15–37)
AST SERPL-CCNC: 32 U/L (ref 15–37)
BASOPHILS # BLD: 0 K/UL (ref 0–0.2)
BASOPHILS NFR BLD: 0 % (ref 0–2)
BILIRUB DIRECT SERPL-MCNC: 0.1 MG/DL
BILIRUB SERPL-MCNC: 0.5 MG/DL (ref 0.2–1.1)
BILIRUB SERPL-MCNC: 0.6 MG/DL (ref 0.2–1.1)
BUN SERPL-MCNC: 15 MG/DL (ref 8–23)
BUN SERPL-MCNC: 16 MG/DL (ref 8–23)
CALCIUM SERPL-MCNC: 9 MG/DL (ref 8.3–10.4)
CALCIUM SERPL-MCNC: 9.1 MG/DL (ref 8.3–10.4)
CHLORIDE SERPL-SCNC: 99 MMOL/L (ref 101–110)
CHLORIDE SERPL-SCNC: 99 MMOL/L (ref 101–110)
CHOLEST SERPL-MCNC: 206 MG/DL
CO2 SERPL-SCNC: 30 MMOL/L (ref 21–32)
CO2 SERPL-SCNC: 30 MMOL/L (ref 21–32)
CREAT SERPL-MCNC: 0.9 MG/DL (ref 0.6–1)
CREAT SERPL-MCNC: 1 MG/DL (ref 0.6–1)
DIFFERENTIAL METHOD BLD: ABNORMAL
ECHO AO ASC DIAM: 3 CM
ECHO AO ASCENDING AORTA INDEX: 1.49 CM/M2
ECHO AO ROOT DIAM: 2.9 CM
ECHO AO ROOT INDEX: 1.44 CM/M2
ECHO AV AREA PEAK VELOCITY: 2 CM2
ECHO AV AREA VTI: 2.4 CM2
ECHO AV AREA/BSA PEAK VELOCITY: 1 CM2/M2
ECHO AV AREA/BSA VTI: 1.2 CM2/M2
ECHO AV MEAN GRADIENT: 3 MMHG
ECHO AV MEAN VELOCITY: 0.8 M/S
ECHO AV PEAK GRADIENT: 7 MMHG
ECHO AV PEAK VELOCITY: 1.4 M/S
ECHO AV VELOCITY RATIO: 0.71
ECHO AV VTI: 25.7 CM
ECHO BSA: 2.07 M2
ECHO EST RA PRESSURE: 8 MMHG
ECHO IVC PROX: 2.1 CM
ECHO LA AREA 2C: 24.8 CM2
ECHO LA AREA 4C: 20 CM2
ECHO LA DIAMETER INDEX: 2.28 CM/M2
ECHO LA DIAMETER: 4.6 CM
ECHO LA MAJOR AXIS: 5.8 CM
ECHO LA MINOR AXIS: 5.7 CM
ECHO LA TO AORTIC ROOT RATIO: 1.59
ECHO LA VOL BP: 70 ML (ref 22–52)
ECHO LA VOL/BSA BIPLANE: 35 ML/M2 (ref 16–34)
ECHO LV E' LATERAL VELOCITY: 8 CM/S
ECHO LV E' SEPTAL VELOCITY: 8 CM/S
ECHO LV EDV A2C: 93 ML
ECHO LV EDV A4C: 74 ML
ECHO LV EDV INDEX A4C: 37 ML/M2
ECHO LV EDV NDEX A2C: 46 ML/M2
ECHO LV EJECTION FRACTION A2C: 70 %
ECHO LV EJECTION FRACTION A4C: 63 %
ECHO LV EJECTION FRACTION BIPLANE: 66 % (ref 55–100)
ECHO LV ESV A2C: 28 ML
ECHO LV ESV A4C: 27 ML
ECHO LV ESV INDEX A2C: 14 ML/M2
ECHO LV ESV INDEX A4C: 13 ML/M2
ECHO LV FRACTIONAL SHORTENING: 34 % (ref 28–44)
ECHO LV INTERNAL DIMENSION DIASTOLE INDEX: 2.03 CM/M2
ECHO LV INTERNAL DIMENSION DIASTOLIC: 4.1 CM (ref 3.9–5.3)
ECHO LV INTERNAL DIMENSION SYSTOLIC INDEX: 1.34 CM/M2
ECHO LV INTERNAL DIMENSION SYSTOLIC: 2.7 CM
ECHO LV IVSD: 1.2 CM (ref 0.6–0.9)
ECHO LV MASS 2D: 171.7 G (ref 67–162)
ECHO LV MASS INDEX 2D: 85 G/M2 (ref 43–95)
ECHO LV POSTERIOR WALL DIASTOLIC: 1.2 CM (ref 0.6–0.9)
ECHO LV RELATIVE WALL THICKNESS RATIO: 0.59
ECHO LVOT AREA: 2.8 CM2
ECHO LVOT AV VTI INDEX: 0.85
ECHO LVOT DIAM: 1.9 CM
ECHO LVOT MEAN GRADIENT: 2 MMHG
ECHO LVOT PEAK GRADIENT: 4 MMHG
ECHO LVOT PEAK VELOCITY: 1 M/S
ECHO LVOT STROKE VOLUME INDEX: 30.6 ML/M2
ECHO LVOT SV: 61.8 ML
ECHO LVOT VTI: 21.8 CM
ECHO MV A VELOCITY: 0.5 M/S
ECHO MV AREA VTI: 2.1 CM2
ECHO MV E DECELERATION TIME (DT): 306 MS
ECHO MV E VELOCITY: 0.74 M/S
ECHO MV E/A RATIO: 1.48
ECHO MV E/E' LATERAL: 9.25
ECHO MV E/E' RATIO (AVERAGED): 9.25
ECHO MV E/E' SEPTAL: 9.25
ECHO MV LVOT VTI INDEX: 1.35
ECHO MV MAX VELOCITY: 1 M/S
ECHO MV MEAN GRADIENT: 1 MMHG
ECHO MV MEAN VELOCITY: 0.5 M/S
ECHO MV PEAK GRADIENT: 4 MMHG
ECHO MV VTI: 29.5 CM
ECHO PULMONARY ARTERY END DIASTOLIC PRESSURE: 9 MMHG
ECHO PV ACCELERATION TIME (AT): 121 MS
ECHO PV MAX VELOCITY: 0.9 M/S
ECHO PV PEAK GRADIENT: 4 MMHG
ECHO PV REGURGITANT MAX VELOCITY: 1.5 M/S
ECHO RIGHT VENTRICULAR SYSTOLIC PRESSURE (RVSP): 38 MMHG
ECHO RV BASAL DIMENSION: 4.2 CM
ECHO RV INTERNAL DIMENSION: 3.6 CM
ECHO RV TAPSE: 1.9 CM (ref 1.7–?)
ECHO TV REGURGITANT MAX VELOCITY: 2.73 M/S
ECHO TV REGURGITANT PEAK GRADIENT: 30 MMHG
EOSINOPHIL # BLD: 0 K/UL (ref 0–0.8)
EOSINOPHIL NFR BLD: 0 % (ref 0.5–7.8)
ERYTHROCYTE [DISTWIDTH] IN BLOOD BY AUTOMATED COUNT: 13.5 % (ref 11.9–14.6)
GLOBULIN SER CALC-MCNC: 3.9 G/DL (ref 2.8–4.5)
GLOBULIN SER CALC-MCNC: 4 G/DL (ref 2.8–4.5)
GLUCOSE SERPL-MCNC: 124 MG/DL (ref 65–100)
GLUCOSE SERPL-MCNC: 127 MG/DL (ref 65–100)
HCT VFR BLD AUTO: 45.9 % (ref 35.8–46.3)
HDLC SERPL-MCNC: 34 MG/DL (ref 40–60)
HDLC SERPL: 6.1 {RATIO}
HGB BLD-MCNC: 15.2 G/DL (ref 11.7–15.4)
IMM GRANULOCYTES # BLD AUTO: 0.1 K/UL (ref 0–0.5)
IMM GRANULOCYTES NFR BLD AUTO: 1 % (ref 0–5)
LACTATE SERPL-SCNC: 2.2 MMOL/L (ref 0.4–2)
LDLC SERPL CALC-MCNC: 150 MG/DL
LV EF: 63 %
LVEF MODALITY: ABNORMAL
LYMPHOCYTES # BLD: 1.2 K/UL (ref 0.5–4.6)
LYMPHOCYTES NFR BLD: 22 % (ref 13–44)
MAGNESIUM SERPL-MCNC: 2.2 MG/DL (ref 1.8–2.4)
MAGNESIUM SERPL-MCNC: 2.2 MG/DL (ref 1.8–2.4)
MCH RBC QN AUTO: 32.8 PG (ref 26.1–32.9)
MCHC RBC AUTO-ENTMCNC: 33.1 G/DL (ref 31.4–35)
MCV RBC AUTO: 98.9 FL (ref 82–102)
MONOCYTES # BLD: 0.2 K/UL (ref 0.1–1.3)
MONOCYTES NFR BLD: 3 % (ref 4–12)
NEUTS SEG # BLD: 4 K/UL (ref 1.7–8.2)
NEUTS SEG NFR BLD: 74 % (ref 43–78)
NRBC # BLD: 0 K/UL (ref 0–0.2)
PLATELET # BLD AUTO: 148 K/UL (ref 150–450)
PMV BLD AUTO: 10.2 FL (ref 9.4–12.3)
POTASSIUM SERPL-SCNC: 3.8 MMOL/L (ref 3.5–5.1)
POTASSIUM SERPL-SCNC: 3.9 MMOL/L (ref 3.5–5.1)
PROT SERPL-MCNC: 7.6 G/DL (ref 6.3–8.2)
PROT SERPL-MCNC: 7.7 G/DL (ref 6.3–8.2)
RBC # BLD AUTO: 4.64 M/UL (ref 4.05–5.2)
SODIUM SERPL-SCNC: 136 MMOL/L (ref 133–143)
SODIUM SERPL-SCNC: 136 MMOL/L (ref 133–143)
TRIGL SERPL-MCNC: 110 MG/DL (ref 35–150)
TROPONIN I SERPL HS-MCNC: 209.2 PG/ML (ref 0–14)
TSH, 3RD GENERATION: 1.22 UIU/ML (ref 0.36–3.74)
VLDLC SERPL CALC-MCNC: 22 MG/DL (ref 6–23)
WBC # BLD AUTO: 5.4 K/UL (ref 4.3–11.1)

## 2022-11-02 PROCEDURE — 83735 ASSAY OF MAGNESIUM: CPT

## 2022-11-02 PROCEDURE — 2700000000 HC OXYGEN THERAPY PER DAY

## 2022-11-02 PROCEDURE — 6370000000 HC RX 637 (ALT 250 FOR IP): Performed by: INTERNAL MEDICINE

## 2022-11-02 PROCEDURE — 97530 THERAPEUTIC ACTIVITIES: CPT

## 2022-11-02 PROCEDURE — 6370000000 HC RX 637 (ALT 250 FOR IP): Performed by: PHYSICIAN ASSISTANT

## 2022-11-02 PROCEDURE — 80053 COMPREHEN METABOLIC PANEL: CPT

## 2022-11-02 PROCEDURE — 6360000002 HC RX W HCPCS: Performed by: INTERNAL MEDICINE

## 2022-11-02 PROCEDURE — 93306 TTE W/DOPPLER COMPLETE: CPT | Performed by: INTERNAL MEDICINE

## 2022-11-02 PROCEDURE — 85025 COMPLETE CBC W/AUTO DIFF WBC: CPT

## 2022-11-02 PROCEDURE — 94640 AIRWAY INHALATION TREATMENT: CPT

## 2022-11-02 PROCEDURE — 99223 1ST HOSP IP/OBS HIGH 75: CPT | Performed by: INTERNAL MEDICINE

## 2022-11-02 PROCEDURE — 97161 PT EVAL LOW COMPLEX 20 MIN: CPT

## 2022-11-02 PROCEDURE — 84443 ASSAY THYROID STIM HORMONE: CPT

## 2022-11-02 PROCEDURE — C8929 TTE W OR WO FOL WCON,DOPPLER: HCPCS

## 2022-11-02 PROCEDURE — 1100000003 HC PRIVATE W/ TELEMETRY

## 2022-11-02 PROCEDURE — 84484 ASSAY OF TROPONIN QUANT: CPT

## 2022-11-02 PROCEDURE — 99232 SBSQ HOSP IP/OBS MODERATE 35: CPT | Performed by: INTERNAL MEDICINE

## 2022-11-02 PROCEDURE — 80061 LIPID PANEL: CPT

## 2022-11-02 PROCEDURE — 83605 ASSAY OF LACTIC ACID: CPT

## 2022-11-02 PROCEDURE — 94760 N-INVAS EAR/PLS OXIMETRY 1: CPT

## 2022-11-02 PROCEDURE — 80076 HEPATIC FUNCTION PANEL: CPT

## 2022-11-02 PROCEDURE — 2580000003 HC RX 258: Performed by: INTERNAL MEDICINE

## 2022-11-02 PROCEDURE — 36415 COLL VENOUS BLD VENIPUNCTURE: CPT

## 2022-11-02 RX ORDER — PANTOPRAZOLE SODIUM 40 MG/1
40 TABLET, DELAYED RELEASE ORAL
Status: DISCONTINUED | OUTPATIENT
Start: 2022-11-02 | End: 2022-11-05 | Stop reason: HOSPADM

## 2022-11-02 RX ORDER — SPIRONOLACTONE 25 MG/1
50 TABLET ORAL DAILY
Status: DISCONTINUED | OUTPATIENT
Start: 2022-11-02 | End: 2022-11-05 | Stop reason: HOSPADM

## 2022-11-02 RX ORDER — FLECAINIDE ACETATE 100 MG/1
100 TABLET ORAL 2 TIMES DAILY
Status: DISCONTINUED | OUTPATIENT
Start: 2022-11-02 | End: 2022-11-05 | Stop reason: HOSPADM

## 2022-11-02 RX ORDER — POLYETHYLENE GLYCOL 3350 17 G/17G
17 POWDER, FOR SOLUTION ORAL DAILY PRN
Status: DISCONTINUED | OUTPATIENT
Start: 2022-11-02 | End: 2022-11-05 | Stop reason: HOSPADM

## 2022-11-02 RX ORDER — SODIUM CHLORIDE 0.9 % (FLUSH) 0.9 %
5-40 SYRINGE (ML) INJECTION PRN
Status: DISCONTINUED | OUTPATIENT
Start: 2022-11-02 | End: 2022-11-05 | Stop reason: HOSPADM

## 2022-11-02 RX ORDER — FUROSEMIDE 10 MG/ML
40 INJECTION INTRAMUSCULAR; INTRAVENOUS 2 TIMES DAILY
Status: DISCONTINUED | OUTPATIENT
Start: 2022-11-02 | End: 2022-11-03

## 2022-11-02 RX ORDER — ACETAMINOPHEN 325 MG/1
650 TABLET ORAL EVERY 6 HOURS PRN
Status: DISCONTINUED | OUTPATIENT
Start: 2022-11-02 | End: 2022-11-05 | Stop reason: HOSPADM

## 2022-11-02 RX ORDER — SODIUM CHLORIDE 9 MG/ML
INJECTION, SOLUTION INTRAVENOUS PRN
Status: DISCONTINUED | OUTPATIENT
Start: 2022-11-02 | End: 2022-11-05 | Stop reason: HOSPADM

## 2022-11-02 RX ORDER — ACETAMINOPHEN 650 MG/1
650 SUPPOSITORY RECTAL EVERY 6 HOURS PRN
Status: DISCONTINUED | OUTPATIENT
Start: 2022-11-02 | End: 2022-11-05 | Stop reason: HOSPADM

## 2022-11-02 RX ORDER — SODIUM CHLORIDE 0.9 % (FLUSH) 0.9 %
5-40 SYRINGE (ML) INJECTION EVERY 12 HOURS SCHEDULED
Status: DISCONTINUED | OUTPATIENT
Start: 2022-11-02 | End: 2022-11-05 | Stop reason: HOSPADM

## 2022-11-02 RX ORDER — ONDANSETRON 4 MG/1
4 TABLET, ORALLY DISINTEGRATING ORAL EVERY 8 HOURS PRN
Status: DISCONTINUED | OUTPATIENT
Start: 2022-11-02 | End: 2022-11-05 | Stop reason: HOSPADM

## 2022-11-02 RX ORDER — LOSARTAN POTASSIUM 50 MG/1
50 TABLET ORAL DAILY
Status: DISCONTINUED | OUTPATIENT
Start: 2022-11-02 | End: 2022-11-05 | Stop reason: HOSPADM

## 2022-11-02 RX ORDER — ONDANSETRON 2 MG/ML
4 INJECTION INTRAMUSCULAR; INTRAVENOUS EVERY 6 HOURS PRN
Status: DISCONTINUED | OUTPATIENT
Start: 2022-11-02 | End: 2022-11-05 | Stop reason: HOSPADM

## 2022-11-02 RX ORDER — LOSARTAN POTASSIUM 50 MG/1
TABLET ORAL
Qty: 90 TABLET | Refills: 3 | OUTPATIENT
Start: 2022-11-02

## 2022-11-02 RX ORDER — POTASSIUM CHLORIDE 20 MEQ/1
20 TABLET, EXTENDED RELEASE ORAL 2 TIMES DAILY WITH MEALS
Status: DISCONTINUED | OUTPATIENT
Start: 2022-11-02 | End: 2022-11-05 | Stop reason: HOSPADM

## 2022-11-02 RX ORDER — OMEPRAZOLE 40 MG/1
CAPSULE, DELAYED RELEASE ORAL
Qty: 90 CAPSULE | Refills: 3 | OUTPATIENT
Start: 2022-11-02

## 2022-11-02 RX ORDER — METOPROLOL SUCCINATE 100 MG/1
100 TABLET, EXTENDED RELEASE ORAL 2 TIMES DAILY
Status: DISCONTINUED | OUTPATIENT
Start: 2022-11-02 | End: 2022-11-05 | Stop reason: HOSPADM

## 2022-11-02 RX ADMIN — METOPROLOL SUCCINATE 100 MG: 100 TABLET, EXTENDED RELEASE ORAL at 08:50

## 2022-11-02 RX ADMIN — POTASSIUM CHLORIDE 20 MEQ: 1500 TABLET, EXTENDED RELEASE ORAL at 16:37

## 2022-11-02 RX ADMIN — SPIRONOLACTONE 50 MG: 25 TABLET ORAL at 08:50

## 2022-11-02 RX ADMIN — MOMETASONE FUROATE AND FORMOTEROL FUMARATE DIHYDRATE 2 PUFF: 200; 5 AEROSOL RESPIRATORY (INHALATION) at 19:17

## 2022-11-02 RX ADMIN — FUROSEMIDE 40 MG: 10 INJECTION, SOLUTION INTRAMUSCULAR; INTRAVENOUS at 17:37

## 2022-11-02 RX ADMIN — PANTOPRAZOLE SODIUM 40 MG: 40 TABLET, DELAYED RELEASE ORAL at 05:47

## 2022-11-02 RX ADMIN — TIOTROPIUM BROMIDE INHALATION SPRAY 2 PUFF: 3.12 SPRAY, METERED RESPIRATORY (INHALATION) at 14:52

## 2022-11-02 RX ADMIN — POTASSIUM CHLORIDE 20 MEQ: 1500 TABLET, EXTENDED RELEASE ORAL at 08:50

## 2022-11-02 RX ADMIN — MOMETASONE FUROATE AND FORMOTEROL FUMARATE DIHYDRATE 2 PUFF: 200; 5 AEROSOL RESPIRATORY (INHALATION) at 14:52

## 2022-11-02 RX ADMIN — FLECAINIDE ACETATE 100 MG: 100 TABLET ORAL at 21:05

## 2022-11-02 RX ADMIN — SODIUM CHLORIDE, PRESERVATIVE FREE 10 ML: 5 INJECTION INTRAVENOUS at 08:49

## 2022-11-02 RX ADMIN — FLECAINIDE ACETATE 100 MG: 100 TABLET ORAL at 08:50

## 2022-11-02 RX ADMIN — SODIUM CHLORIDE, PRESERVATIVE FREE 5 ML: 5 INJECTION INTRAVENOUS at 20:19

## 2022-11-02 RX ADMIN — LOSARTAN POTASSIUM 50 MG: 50 TABLET, FILM COATED ORAL at 08:50

## 2022-11-02 RX ADMIN — FUROSEMIDE 40 MG: 10 INJECTION, SOLUTION INTRAMUSCULAR; INTRAVENOUS at 08:49

## 2022-11-02 RX ADMIN — METOPROLOL SUCCINATE 100 MG: 100 TABLET, EXTENDED RELEASE ORAL at 21:05

## 2022-11-02 ASSESSMENT — PAIN SCALES - WONG BAKER: WONGBAKER_NUMERICALRESPONSE: 0

## 2022-11-02 NOTE — PROGRESS NOTES
TRANSFER - IN REPORT:    Verbal report received from Carlos Spear RN on [employer]  being received from ED for routine progression of patient care      Report consisted of patient's Situation, Background, Assessment and   Recommendations(SBAR). Information from the following report(s) ED Encounter Summary and ED SBAR was reviewed with the receiving nurse. Opportunity for questions and clarification was provided. Assessment completed upon patient's arrival to unit and care assumed.      Dual skin assessment performed

## 2022-11-02 NOTE — PROGRESS NOTES
Bedside and Verbal shift change report given to self (oncoming nurse) by Chad Cole RN (offgoing nurse). Report included the following information Nurse Handoff Report, Index, Intake/Output, MAR, and Recent Results.

## 2022-11-02 NOTE — ED NOTES
TRANSFER - OUT REPORT:    Verbal report given to MADI Craft on [employer]  being transferred to Telemetry for routine progression of patient care       Report consisted of patient's Situation, Background, Assessment and   Recommendations(SBAR). Information from the following report(s) Nurse Handoff Report, ED Encounter Summary, ED SBAR, Intake/Output, and MAR was reviewed with the receiving nurse. Lines:   Peripheral IV 11/01/22 Left; Anterior Forearm (Active)        Opportunity for questions and clarification was provided.       Patient transported with:  O2 @ 2lpm      Deepak Quintero RN  11/02/22 [id number]

## 2022-11-02 NOTE — PROGRESS NOTES
Bedside and Verbal shift change report given to Minerva Neal RN (oncoming nurse) by self Rose Mary almaguer). Report included the following information Nurse Handoff Report, Index, Intake/Output, MAR, Recent Results, and Cardiac Rhythm SR/SB .

## 2022-11-02 NOTE — CONSULTS
Nutrition Note:   Acknowledge Consult for Education: CHF diet education    Initial education to be completed by CHF Nurse Navigator. Will defer RD intervention at this time. Navigator will reconsult RD if additional diet education intervention needed.     Maile Mar RD

## 2022-11-02 NOTE — H&P
[facility] CARDIOLOGY   Initial Cardiac Evaluation         22      NAME:  Gwen Aguilar  : 1945  MRN: [id number]       Reason for evaluation:  Hypoxia, MCCAULEY, SOB      HPI:  This is a 68yo WF with h/o Stage 3 severe COPD, pHTN from COPD and MARISA, mod CAD by [facility] 2022, [facility] who is being admitted for hypoxic resp failure. She has been having worsening MCCAULEY, SOB and sats in the 80s at home. Of note, she became tired of wearing oxygen and CPAP and stopped them some time ago. She also stopped her home lasix because \"I am lazy and did not want to use the bathroom\". She stays at home, taking care of her spouse. She has no CP, no pressure. Some more LE edema and orthopnea. She has been compliant with NOAC and other meds. Given IV lasix in ER, but still with sats in the 80s on walking. I have Independently reviewed prior care notes, any ER records available, cardiac testing, labs and results. Also independently reviewed outside records when available. Past Medical History, Past Surgical History, Family history, Social History, and Medications were all reviewed.     Current Facility-Administered Medications   Medication Dose Route Frequency Provider Last Rate Last Admin    ipratropium-albuterol (DUONEB) nebulizer solution 1 ampule  1 ampule Inhalation NOW Manda Olivares MD         Current Outpatient Medications   Medication Sig Dispense Refill    fluticasone-umeclidin-vilant (TRELEGY ELLIPTA) 100-62.5-25 MCG/INH AEPB 1 INHALATION DAILY, RINSE MOUTH AFTER USE 60 each 11    rOPINIRole (REQUIP) 0.5 MG tablet TAKE 1 TABLET BY MOUTH EVERY DAY AT NIGHT 90 tablet 3    spironolactone (ALDACTONE) 50 MG tablet TAKE 1 TABLET BY MOUTH EVERY DAY 90 tablet 3    furosemide (LASIX) 20 MG tablet TAKE 1 TABLET BY MOUTH EVERY DAY 90 tablet 3    OXYGEN 3 L/min by Nasal route      acetaminophen (TYLENOL) 325 MG tablet Take 325 mg by mouth every 6 hours as needed      apixaban (ELIQUIS) 5 MG TABS tablet Take 5 mg by mouth 2 times daily      azithromycin (ZITHROMAX) 250 MG tablet Take two tablets today then one tablet daily x 4 days (Patient not taking: Reported on 7/21/2022)      colestipol (COLESTID) 1 g tablet Take 1 g by mouth 2 times daily      cyanocobalamin 500 MCG tablet Take 500 mcg by mouth daily (Patient not taking: Reported on 7/21/2022)      flecainide (TAMBOCOR) 100 MG tablet Take 100 mg by mouth 2 times daily      losartan (COZAAR) 50 MG tablet TAKE 1 TABLET BY MOUTH EVERY DAY      methylPREDNISolone (MEDROL DOSEPACK) 4 MG tablet As directed per dose pack      metoprolol succinate (TOPROL XL) 100 MG extended release tablet Take 100 mg by mouth 2 times daily      omeprazole (PRILOSEC) 40 MG delayed release capsule TAKE 1 CAPSULE BY MOUTH EVERY DAY      promethazine (PHENERGAN) 25 MG tablet Take 25 mg by mouth every 6 hours as needed          Allergies   Allergen Reactions    Latex Other (See Comments)    Codeine Other (See Comments)     Headache    Shellfish-Derived Products Hives     Patient Active Problem List    Diagnosis Date Noted    Chest pain 01/13/2022     Added automatically from request for surgery [id number]        Venous insufficiency 01/28/2021    Abscess of leg, right 07/02/2020    Peripheral vascular disease ([address] 07/02/2020    Cellulitis of lower extremity 06/24/2020    Stasis ulcer ([address] 06/24/2020    Open wound of left lower extremity 06/24/2020    Severe obesity ([address] 06/15/2020    Cellulitis of left lower extremity 06/15/2020    Venous stasis 09/26/2019    Calf ulcer, left, limited to breakdown of skin ([address] 09/13/2019     Last Assessment & Plan:   Since she is unable to adequately doff and don compression stockings and   is already on high-dose diuretics, we will supplement her compression   therapy with Velcro compression wraps. She will follow-up in 6 months   time, sooner if she does not respond.         Nocturnal hypoxia 08/13/2019    Pulmonary hypertension ([address] 00/05/2133    Diastolic dysfunction 2019    Intolerance of continuous positive airway pressure (CPAP) ventilation 2019    Class 1 obesity in adult 2018    RLS (restless legs syndrome) 2017    MARISA and COPD overlap syndrome ([facility] 2017    Obesity (BMI 30-39.9) 2017    MARISA (obstructive sleep apnea) 2017    Dyspnea on exertion 2016:  Normal coronaries        Essential hypertension 2016    HLD (hyperlipidemia) 2016    Atrial fibrillation ([facility] 2016    Pulmonary embolus ([facility] 2016    COPD (chronic obstructive pulmonary disease) ([facility] 2014      Past Surgical History:   Procedure Laterality Date    APPENDECTOMY      CHOLECYSTECTOMY      ORTHOPEDIC SURGERY      bilateral feet,     ROTATOR CUFF REPAIR Right     ROTATOR CUFF REPAIR Left     RADHA AND BSO (CERVIX REMOVED)      AGE 28    UROLOGICAL SURGERY      bladder tack     Family History   Problem Relation Age of Onset    Stroke Mother     Heart Disease Mother     Cancer Father         prostate    Stroke Father     Coronary Art Dis Father         premature    Heart Disease Brother     Heart Attack Brother     Cancer Sister     Cancer Sister         breast    Breast Cancer Sister 70     Social History     Tobacco Use    Smoking status: Former     Packs/day: 3.00     Types: Cigarettes     Quit date: 1998     Years since quittin.2    Smokeless tobacco: Never   Substance Use Topics    Alcohol use: No       ROS:    Constitutional:   Negative for fevers and unexplained weight loss. Eyes:   Negative for visual disturbance. ENT:   Negative for significant hearing loss and tinnitus. Respiratory:   Negative for hemoptysis. Cardiovascular:   Negative except as noted in HPI. Gastrointestinal:   Negative for melena and abdominal pain. Genitourinary:   Negative for hematuria, renal stones.   Integumentary:   Negative for rash or non-healing wounds  Hematologic/Lymphatic:   Negative for excessive bleeding hx or clotting disorder. Musculoskeletal:  Negative for active, unexplained/severe joint pain. Neurological:   Negative for stroke. Behavioral/Psych:   Negative for suicidal ideations. Endocrine:   Negative for uncontrolled diabetic symptoms including polyuria, polydipsia and poor wound healing. PHYSICAL EXAM:     BP (!) 144/56   Pulse 57   Temp 98.4 °F (36.9 °C) (Oral)   Resp 21   SpO2 94%    General/Constitutional:   Alert and oriented x 3, no acute distress  HEENT:   normocephalic, atraumatic, moist mucous membranes  Neck:   No JVD   Cardiovascular:   regular rate and rhythm, no murmur/rub/gallop appreciated  Pulmonary:   coarse BS and wheezing  Abdomen:   soft, non-tender, non-distended  Ext:  midl LE edema bilaterally  Skin:  warm and dry, no obvious rashes seen  Neuro:   no obvious sensory or motor deficits  Psychiatric:   normal mood and affect        Medical problems, medical history and test results were reviewed today.        Lab Results   Component Value Date/Time     11/01/2022 02:13 PM    K 3.5 11/01/2022 02:13 PM     11/01/2022 02:13 PM    CO2 30 11/01/2022 02:13 PM    BUN 15 11/01/2022 02:13 PM    CREATININE 0.90 11/01/2022 02:13 PM    GLUCOSE 67 11/01/2022 02:13 PM    CALCIUM 8.1 11/01/2022 02:13 PM        Lab Results   Component Value Date    WBC 7.2 11/01/2022    HGB 12.7 11/01/2022    HCT 39.2 11/01/2022    .8 11/01/2022     (L) 11/01/2022       Lab Results   Component Value Date    TSH 2.910 12/10/2020       Lab Results   Component Value Date    LABA1C 5.2 08/17/2020     Lab Results   Component Value Date     08/17/2020         Lab Results   Component Value Date    CHOL 182 02/17/2020     Lab Results   Component Value Date    TRIG 155 (H) 02/17/2020     Lab Results   Component Value Date    HDL 38 (L) 02/17/2020     Lab Results   Component Value Date    LDLCALC 113 (H) 02/17/2020     Lab Results   Component Value Date    LABVLDL 31 02/17/2020     No results found for: TAHIR        I have Independently reviewed prior care notes, any ER records available, cardiac testing, labs and results before seeing the patient today. Also independently reviewed outside records when available. ASSESSMENT:    Acute on Chronic Hypoxic Resp Failure  Acute COPD Exac  PHTN  Acute on Chronic DHF  Untreated COPD  Non-compliance  PAF  CAD  Pos troponin      PLAN:     Acute on Chronic Hypoxic Resp Failure:  likely multi-factorial from DHF and COPD, resulted from not wearing CPAP or home oxygen and not taking home lasix. Does NOT appear to be a MI. We will follow on tele, consult pulm and give IV lasix. Will follow AM labs, trend trop as well. Acute COPD Exac: consult pulm, COVID neg  Acute on Chronic DHF:  IV diuresis, follow AM labs. Check echo in AM.  Eval EF and pHTN. Replace K and follow labs. CAD/Pos trop: Does not appear to be a MI. On NOAC, will not use IV heparin. Follow trop, echo in AM.  Remain on meds. PAF:  remain on BB, Ic for now. HOLD NOAC for now, add back AC in AM if trop ok and no procedures planned. MARISA:  needs back on CPAP  Non-compliance:  this is main issue, needs to be compliant with home lasix and home COPD meds, CPAP and oxygen. Will admit and follow on tele.            OSCAR SHIELDS DO  11/1/2022  9:40pm

## 2022-11-02 NOTE — CONSULTS
CHF teaching post introduction mix of issuses, will follow. Emphasis to report worsening dyspnea quickly.       Cardiac diet/ FR  Palliaitve care: on hold, 12%

## 2022-11-02 NOTE — PROGRESS NOTES
[facility] CARDIOLOGY PROGRESS NOTE           11/2/2022 7:22 AM    Admit Date: 11/1/2022    Admit Diagnosis: Paroxysmal atrial fibrillation (HCC) [I48.0]  NSTEMI (non-ST elevated myocardial infarction) ([facility] [L13.1]  Chronic diastolic congestive heart failure (HCC) [I50.32]  Acute respiratory failure with hypoxia (HCC) [J96.01]  Acute congestive heart failure, unspecified heart failure type ([facility] [I50.9]    Assessment:   Principal Problem:    Chronic diastolic congestive heart failure (HCC)  Active Problems:    Acute respiratory failure with hypoxia (HCC)    Acute congestive heart failure (HCC)    Coronary artery disease involving native coronary artery of native heart with angina pectoris (HCC)    Dyspnea on exertion    Essential hypertension    COPD exacerbation (HCC)    Peripheral vascular disease (HCC)    Atrial fibrillation (HCC)    MARISA and COPD overlap syndrome ([facility]  Resolved Problems:    * No resolved hospital problems. *      Plan:   Acute on Chronic Hypoxic Resp Failure:  likely multi-factorial from DHF and COPD, resulted from not wearing CPAP or home oxygen and not taking home lasix. Does NOT appear to be a MI. We will follow on tele, consult pulm and give IV lasix. Will follow AM labs, trend trop as well. Strict I/O. Acute COPD Exac: consult pulm, COVID neg  Acute on Chronic DHF:  IV diuresis, follow AM labs. Check echo in AM.  Eval EF and pHTN. Replace K and follow labs. CAD/Pos trop: Demand ischemia. Trop now reduced. Does not appear to be a MI. On NOAC, will not use IV heparin. Follow trop, echo in AM.  Remain on meds. PAF:  remain on BB, Ic for now. HOLD NOAC for now, will add back if no procedures planned. MARISA:  needs back on CPAP  Non-compliance:  this is main issue, needs to be compliant with home lasix and home COPD meds, CPAP and oxygen. Thank you for allowing me to participate in the electrophysiologic care of this most pleasant patient.  Please feel free to contact me if there are any questions or concerns. Jethro Ndiaye. MD [place], MS  Clinical Cardiac Electrophysiology  [facility]    Subjective:   No complaints this AM, no chest pain or shortness of breath    Interval History: (History of pertinent interval events obtained from nursing staff)    ROS:  GEN:  No fever or chills  Cardiovascular:  As noted above  Pulmonary:  As noted above  Neuro:  No new focal motor or sensory loss      Objective:     Vitals:    11/02/22 0130 11/02/22 0400 11/02/22 0430 11/02/22 0548   BP:  (!) 132/93 (!) 141/91 (!) 144/97   Pulse: (!) 109 (!) 104 (!) 115 92   Resp: 16 15 18 18   Temp:    97.4 °F (36.3 °C)   TempSrc:    Oral   SpO2: 94% 95% 95% 93%   Weight:    203 lb 9.6 oz (92.4 kg)   Height:    5' 6\" (1.676 m)       Physical Exam:  Odella Stevenson, Well Nourished, No Acute Distress, Alert & Oriented x 3, appropriate mood. Neck- supple, no JVD  CV- regular rate and rhythm no MRG  Lung- clear bilaterally  Abd- soft, nontender, nondistended  Ext- no edema bilaterally.   Skin- warm and dry    Current Facility-Administered Medications   Medication Dose Route Frequency    flecainide (TAMBOCOR) tablet 100 mg  100 mg Oral BID    losartan (COZAAR) tablet 50 mg  50 mg Oral Daily    metoprolol succinate (TOPROL XL) extended release tablet 100 mg  100 mg Oral BID    pantoprazole (PROTONIX) tablet 40 mg  40 mg Oral QAM AC    spironolactone (ALDACTONE) tablet 50 mg  50 mg Oral Daily    sodium chloride flush 0.9 % injection 5-40 mL  5-40 mL IntraVENous 2 times per day    sodium chloride flush 0.9 % injection 5-40 mL  5-40 mL IntraVENous PRN    0.9 % sodium chloride infusion   IntraVENous PRN    ondansetron (ZOFRAN-ODT) disintegrating tablet 4 mg  4 mg Oral Q8H PRN    Or    ondansetron (ZOFRAN) injection 4 mg  4 mg IntraVENous Q6H PRN    polyethylene glycol (GLYCOLAX) packet 17 g  17 g Oral Daily PRN    acetaminophen (TYLENOL) tablet 650 mg  650 mg Oral Q6H PRN    Or acetaminophen (TYLENOL) suppository 650 mg  650 mg Rectal Q6H PRN    furosemide (LASIX) injection 40 mg  40 mg IntraVENous BID    potassium chloride (KLOR-CON M) extended release tablet 20 mEq  20 mEq Oral BID        Data Review:   Recent Results (from the past 24 hour(s))   EKG 12 Lead    Collection Time: 11/01/22  1:44 PM   Result Value Ref Range    Ventricular Rate 59 BPM    QRS Duration 76 ms    Q-T Interval 480 ms    QTc Calculation (Bazett) 475 ms    R Axis 69 degrees    T Axis 4 degrees    Diagnosis Sinus bradycardia    CBC with Auto Differential    Collection Time: 11/01/22  2:13 PM   Result Value Ref Range    WBC 7.2 4.3 - 11.1 K/uL    RBC 3.89 (L) 4.05 - 5.2 M/uL    Hemoglobin 12.7 11.7 - 15.4 g/dL    Hematocrit 39.2 35.8 - 46.3 %    .8 82 - 102 FL    MCH 32.6 26.1 - 32.9 PG    MCHC 32.4 31.4 - 35.0 g/dL    RDW 13.7 11.9 - 14.6 %    Platelets 118 (L) 505 - 450 K/uL    MPV 10.5 9.4 - 12.3 FL    nRBC 0.00 0.0 - 0.2 K/uL    Differential Type AUTOMATED      Seg Neutrophils 53 43 - 78 %    Lymphocytes 34 13 - 44 %    Monocytes 9 4.0 - 12.0 %    Eosinophils % 2 0.5 - 7.8 %    Basophils 1 0.0 - 2.0 %    Immature Granulocytes 1 0.0 - 5.0 %    Segs Absolute 3.8 1.7 - 8.2 K/UL    Absolute Lymph # 2.5 0.5 - 4.6 K/UL    Absolute Mono # 0.7 0.1 - 1.3 K/UL    Absolute Eos # 0.1 0.0 - 0.8 K/UL    Basophils Absolute 0.0 0.0 - 0.2 K/UL    Absolute Immature Granulocyte 0.1 0.0 - 0.5 K/UL   Comprehensive Metabolic Panel    Collection Time: 11/01/22  2:13 PM   Result Value Ref Range    Sodium 142 133 - 143 mmol/L    Potassium 3.5 3.5 - 5.1 mmol/L    Chloride 105 101 - 110 mmol/L    CO2 30 21 - 32 mmol/L    Anion Gap 7 2 - 11 mmol/L    Glucose 67 65 - 100 mg/dL    BUN 15 8 - 23 MG/DL    Creatinine 0.90 0.6 - 1.0 MG/DL    Est, Glom Filt Rate >60 >60 ml/min/1.73m2    Calcium 8.1 (L) 8.3 - 10.4 MG/DL    Total Bilirubin 0.6 0.2 - 1.1 MG/DL    ALT 49 12 - 65 U/L    AST 35 15 - 37 U/L    Alk Phosphatase 94 50 - 136 U/L Total Protein 6.8 6.3 - 8.2 g/dL    Albumin 3.6 3.2 - 4.6 g/dL    Globulin 3.2 2.8 - 4.5 g/dL    Albumin/Globulin Ratio 1.1 0.4 - 1.6     Magnesium    Collection Time: 11/01/22  2:13 PM   Result Value Ref Range    Magnesium 1.9 1.8 - 2.4 mg/dL   Troponin    Collection Time: 11/01/22  2:13 PM   Result Value Ref Range    Troponin, High Sensitivity 262.6 (HH) 0 - 14 pg/mL   Brain Natriuretic Peptide    Collection Time: 11/01/22  2:13 PM   Result Value Ref Range    NT Pro- (H) <450 PG/ML   Troponin    Collection Time: 11/01/22  5:58 PM   Result Value Ref Range    Troponin, High Sensitivity 340.7 (HH) 0 - 14 pg/mL   COVID-19, Rapid    Collection Time: 11/01/22  7:02 PM    Specimen: Nasopharyngeal   Result Value Ref Range    Source NASAL      SARS-CoV-2, Rapid Not detected NOTD     Rapid influenza A/B antigens    Collection Time: 11/01/22  7:02 PM    Specimen: Nasal Washing   Result Value Ref Range    Influenza A Ag Negative NEG      Influenza B Ag Negative NEG      Source Nasopharyngeal     POCT Urinalysis no Micro    Collection Time: 11/01/22  8:28 PM   Result Value Ref Range    Specific Gravity, Urine, POC >1.030 (H) 1.001 - 1.023    pH, Urine, POC 7.0 5.0 - 9.0      Protein, Urine, POC Negative NEG mg/dL    Glucose, UA POC Negative NEG mg/dL    Ketones, Urine, POC Negative NEG mg/dL    Bilirubin, Urine, POC Negative NEG      Blood, UA POC Negative NEG      URINE UROBILINOGEN POC 0.2 0.2 - 1.0 EU/dL    Nitrate, Urine, POC Negative NEG      Leukocyte Est, UA POC Negative NEG      Performed by: Jeffry Thomas    Basic Metabolic Panel    Collection Time: 11/02/22  5:45 AM   Result Value Ref Range    Sodium 136 133 - 143 mmol/L    Potassium 3.8 3.5 - 5.1 mmol/L    Chloride 99 (L) 101 - 110 mmol/L    CO2 30 21 - 32 mmol/L    Anion Gap 7 2 - 11 mmol/L    Glucose 124 (H) 65 - 100 mg/dL    BUN 15 8 - 23 MG/DL    Creatinine 0.90 0.6 - 1.0 MG/DL    Est, Glom Filt Rate >60 >60 ml/min/1.73m2    Calcium 9.0 8.3 - 10.4 MG/DL   Magnesium    Collection Time: 11/02/22  5:45 AM   Result Value Ref Range    Magnesium 2.2 1.8 - 2.4 mg/dL   Lipid Panel    Collection Time: 11/02/22  5:45 AM   Result Value Ref Range    Cholesterol, Total 206 (H) <200 MG/DL    Triglycerides 110 35 - 150 MG/DL    HDL 34 (L) 40 - 60 MG/DL    LDL Calculated 150 (H) <100 MG/DL    VLDL Cholesterol Calculated 22 6.0 - 23.0 MG/DL    Chol/HDL Ratio 6.1     Comprehensive Metabolic Panel    Collection Time: 11/02/22  5:45 AM   Result Value Ref Range    Sodium 136 133 - 143 mmol/L    Potassium 3.9 3.5 - 5.1 mmol/L    Chloride 99 (L) 101 - 110 mmol/L    CO2 30 21 - 32 mmol/L    Anion Gap 7 2 - 11 mmol/L    Glucose 127 (H) 65 - 100 mg/dL    BUN 16 8 - 23 MG/DL    Creatinine 1.00 0.6 - 1.0 MG/DL    Est, Glom Filt Rate 58 (L) >60 ml/min/1.73m2    Calcium 9.1 8.3 - 10.4 MG/DL    Total Bilirubin 0.5 0.2 - 1.1 MG/DL    ALT 53 12 - 65 U/L    AST 31 15 - 37 U/L    Alk Phosphatase 108 50 - 136 U/L    Total Protein 7.7 6.3 - 8.2 g/dL    Albumin 3.7 3.2 - 4.6 g/dL    Globulin 4.0 2.8 - 4.5 g/dL    Albumin/Globulin Ratio 0.9 0.4 - 1.6     Hepatic function panel    Collection Time: 11/02/22  5:45 AM   Result Value Ref Range    Total Protein 7.6 6.3 - 8.2 g/dL    Albumin 3.7 3.2 - 4.6 g/dL    Globulin 3.9 2.8 - 4.5 g/dL    Albumin/Globulin Ratio 0.9 0.4 - 1.6      Total Bilirubin 0.6 0.2 - 1.1 MG/DL    Bilirubin, Direct 0.1 <0.4 MG/DL    Alk Phosphatase 109 50 - 136 U/L    AST 32 15 - 37 U/L    ALT 52 12 - 65 U/L   Lactic Acid    Collection Time: 11/02/22  5:45 AM   Result Value Ref Range    Lactic Acid, Plasma 2.2 (H) 0.4 - 2.0 MMOL/L   TSH without Reflex    Collection Time: 11/02/22  5:45 AM   Result Value Ref Range    TSH, 3RD GENERATION 1.220 0.358 - 3.740 uIU/mL   Troponin    Collection Time: 11/02/22  5:45 AM   Result Value Ref Range    Troponin, High Sensitivity 209.2 (HH) 0 - 14 pg/mL   CBC with Auto Differential    Collection Time: 11/02/22  5:45 AM   Result Value Ref Range WBC 5.4 4.3 - 11.1 K/uL    RBC 4.64 4.05 - 5.2 M/uL    Hemoglobin 15.2 11.7 - 15.4 g/dL    Hematocrit 45.9 35.8 - 46.3 %    MCV 98.9 82 - 102 FL    MCH 32.8 26.1 - 32.9 PG    MCHC 33.1 31.4 - 35.0 g/dL    RDW 13.5 11.9 - 14.6 %    Platelets 216 (L) 133 - 450 K/uL    MPV 10.2 9.4 - 12.3 FL    nRBC 0.00 0.0 - 0.2 K/uL    Differential Type AUTOMATED      Seg Neutrophils 74 43 - 78 %    Lymphocytes 22 13 - 44 %    Monocytes 3 (L) 4.0 - 12.0 %    Eosinophils % 0 (L) 0.5 - 7.8 %    Basophils 0 0.0 - 2.0 %    Immature Granulocytes 1 0.0 - 5.0 %    Segs Absolute 4.0 1.7 - 8.2 K/UL    Absolute Lymph # 1.2 0.5 - 4.6 K/UL    Absolute Mono # 0.2 0.1 - 1.3 K/UL    Absolute Eos # 0.0 0.0 - 0.8 K/UL    Basophils Absolute 0.0 0.0 - 0.2 K/UL    Absolute Immature Granulocyte 0.1 0.0 - 0.5 K/UL   Magnesium    Collection Time: 11/02/22  5:45 AM   Result Value Ref Range    Magnesium 2.2 1.8 - 2.4 mg/dL       EKG:  (EKG has been independently visualized by me with interpretation below)

## 2022-11-02 NOTE — THERAPY EVALUATION
ACUTE PHYSICAL THERAPY GOALS:   (Developed with and agreed upon by patient and/or caregiver. )    LTG:  (1.)Ms. Keisha Healy will move from supine to sit and sit to supine  in bed with SUPERVISION within 7 treatment day(s). (2.)Ms. Keisha Healy will transfer from bed to chair and chair to bed with SUPERVISION using the least restrictive device within 7 treatment day(s). (3.)Ms. Keisha Healy will ambulate with SUPERVISION for 250 feet with the least restrictive device within 7 treatment day(s). ________________________________________________________________________________________________      PHYSICAL THERAPY Initial Assessment  (Link to Caseload Tracking: PT Visit Days : 1  Acknowledge Orders  Time In/Out  PT Charge Capture  Rehab Caseload Tracker    Gwen Aguilar is a 68 y.o. female   PRIMARY DIAGNOSIS: Chronic diastolic congestive heart failure (HCC)  Paroxysmal atrial fibrillation (HCC) [I48.0]  NSTEMI (non-ST elevated myocardial infarction) ([facility] [N97.2]  Chronic diastolic congestive heart failure (HCC) [I50.32]  Acute respiratory failure with hypoxia (HCC) [J96.01]  Acute congestive heart failure, unspecified heart failure type ([facility] [I50.9]       Reason for Referral: Generalized Muscle Weakness (M62.81)  Difficulty in walking, Not elsewhere classified (R26.2)  Abnormal posture (R29.3)  Inpatient: Payor: MEDICARE / Plan: MEDICARE PART A AND B / Product Type: *No Product type* /     ASSESSMENT:     REHAB RECOMMENDATIONS:   Recommendation to date pending progress:  Setting:  Home Health Therapy    Equipment:     Rollator (Has at home but does not use at baseline)     ASSESSMENT:  Ms. Keisha Healy was admitted to [facility] on 11/1/2022 w/ chief complaint of bradycardia and hypoxia w/ sats in the low 80s at home. Pt has PMH relevant for severe COPD on 2-3L O2 at baseline, pulm HTN, MARISA, CAD, and DHF.  Pt reports that prior to admission she had stopped wearing O2 and CPAP and had stopped taking her Lasix, leading to exacerbation of DHF and COPD. Pt has no stairs that she needs to navigate to get around her home and has a rollator but does not normally use it. Today pt presents w/ relatively good mobility needing Dalila and bedrail use to transfer from supine to sitting EOB. Pt is able to ambulate comfortably at EOB w/ only PT CGA, although unable to walk further today due to [address] catheter needing to remain in place due to high volume urine output. Pt to benefit from skilled PT services to improve gross LE strength and balance as well as overall mobility and transfer performance in order to improve functional independence and reduce caregiver burden upon DC. Pt is able to ambulate small distances relatively easily w/out DME and should be able to safely DC home w/ [facility] when medically ready. [address] [facility] [address] [address] AM-PAC 6 Clicks Basic Mobility Inpatient Short Form  AM-PAC Mobility Inpatient   How much difficulty turning over in bed?: None  How much difficulty sitting down on / standing up from a chair with arms?: A Little  How much difficulty moving from lying on back to sitting on side of bed?: None  How much help from another person moving to and from a bed to a chair?: A Little  How much help from another person needed to walk in hospital room?: A Little  How much help from another person for climbing 3-5 steps with a railing?: A Little  -[facility] Inpatient Mobility Raw Score : 20  AM-PAC Inpatient T-Scale Score : 47.67  Mobility Inpatient CMS 0-100% Score: 35.83  Mobility Inpatient CMS G-Code Modifier : CJ    SUBJECTIVE:   Ms. Momo Pinzon states, \"I feel much better just lying here than I did at home. \" Does report that she feels much better after urinating.       Social/Functional Lives With: Spouse  Home Layout: One level  Bathroom Shower/Tub: Walk-in shower  Bathroom Equipment: Shower chair  Active : Yes  Mode of Transportation: Car    OBJECTIVE:     PAIN: Diane Holter / O2: Laura Rummer / Beverly Pencil / DRAINS:   Pre Treatment: Post Treatment: 0 - No Pain Vitals    WNL throughout    Oxygen   WNL throughout   Purewick    RESTRICTIONS/PRECAUTIONS:  Restrictions/Precautions: Fall Risk                 GROSS EVALUATION: Intact Impaired (Comments):   AROM [x]     PROM [x]    Strength []  Reduced LE strength evident throughout very brief ambulation today   Balance []  Good static balance but PT CGA is required to maintain static and dynamic standing balance, though w/ no LOB evident. Posture [] Forward Head  Rounded Shoulders   Sensation [x]     Coordination [x]      Tone [x]     Edema [] Some LE edema is evident bilaterally   Activity Tolerance []  No constitutional signs evident during very brief ambulation,     []      COGNITION/  PERCEPTION: Intact Impaired (Comments):   Orientation [x]     Vision [x]     Hearing [x]     Cognition  [x]       MOBILITY: I Mod I S SBA CGA Min Mod Max Total  NT x2 Comments:   Bed Mobility    Rolling [] [x] [] [] [] [] [] [] [] [] [] Bedrail   Supine to Sit [] [] [] [] [] [x] [] [] [] [] [] For raising trunk to side   Scooting [] [] [] [] [x] [] [] [] [] [] []    Sit to Supine [] [] [] [] [x] [] [] [] [] [] []    Transfers    Sit to Stand [] [] [] [] [] [x] [] [] [] [] []    Bed to Chair [] [] [] [] [] [] [] [] [] [] []    Stand to Sit [] [] [] [] [x] [] [] [] [] [] []     [] [] [] [] [] [] [] [] [] [] []    I=Independent, Mod I=Modified Independent, S=Supervision, SBA=Standby Assistance, CGA=Contact Guard Assistance,   Min=Minimal Assistance, Mod=Moderate Assistance, Max=Maximal Assistance, Total=Total Assistance, NT=Not Tested    GAIT: I Mod I S SBA CGA Min Mod Max Total  NT x2 Comments:   Level of Assistance [] [] [] [] [x] [] [] [] [] [] [] Significantly reduced step length and requiring PT CGA to maintain balance, but no respiratory distress evident.     Distance 40' feet    DME None    Gait Quality Altered arm swing, Decreased julio , Decreased step clearance, Decreased step length, Shuffling , and Wide base of support    Weightbearing Status      Stairs Stairs/Curb  Stairs?: No    I=Independent, Mod I=Modified Independent, S=Supervision, SBA=Standby Assistance, CGA=Contact Guard Assistance,   Min=Minimal Assistance, Mod=Moderate Assistance, Max=Maximal Assistance, Total=Total Assistance, NT=Not Tested    PLAN:   FREQUENCY AND DURATION: 3 times/week 5x weekly for duration of hospital stay or until stated goals are met, whichever comes first.    THERAPY PROGNOSIS: Good    PROBLEM LIST:   (Skilled intervention is medically necessary to address:)  Decreased ADL/Functional Activities  Decreased Activity Tolerance  Decreased Balance  Decreased Gait Ability  Decreased Strength  Decreased Transfer Abilities INTERVENTIONS PLANNED:   (Benefits and precautions of physical therapy have been discussed with the patient.)  Self Care Training  Therapeutic Activity  Therapeutic Exercise/HEP  Gait Training  Education       TREATMENT:   EVALUATION: LOW COMPLEXITY: (Untimed Charge)    TREATMENT:   Therapeutic Activity (23 Minutes): Therapeutic activity included Rolling, Supine to Sit, Sit to Supine, Lateral Scooting, Transfer Training, Ambulation on level ground, Sitting balance , and Standing balance to improve functional Activity tolerance, Balance, Mobility, and Strength. TREATMENT GRID:   Date:  11/3/2022 Date:   Date:     Activity/Exercise Parameters Parameters Parameters   NT NT                                             AFTER TREATMENT PRECAUTIONS: Alarm Activated, Bed, Bed/Chair Locked, Call light within reach, Needs within reach, RN notified, and Side rails x2    INTERDISCIPLINARY COLLABORATION:  RN/ PCT    EDUCATION: Education Given To: Patient; Family  Education Provided: Role of Therapy  Education Method: Verbal  Barriers to Learning: None  Education Outcome: Continued education needed    TIME IN/OUT: 23 minutes + initial eval  Time In: 1125  Time Out: [address] [facility]  Minutes: [facility], [address], [place]

## 2022-11-02 NOTE — CONSULTS
History and Physical Initial Visit NOTE           11/2/2022    Susan Maxwell                        Date of Admission:  11/1/2022    The patient's chart is reviewed and the patient is discussed with the staff. Subjective:     Patient is a 68 y.o.  female seen and evaluated at the request of Dr. Gaudencio Ng. Pt is well known to [facility] with a history of Pulmonary HTN (Group 3 due to MARISA/COPD), COPD, MARISA on PAP therapy, HFpEF, DVT/PE,  CAD, and afib on eliquis. Pt was last seen in our office on 7/21/22 by Dr. Danette Yang and was doing well at that time. Pt presented to the ER on 11/1 with increased dyspnea on exertion and sats in the 80s on RA. Pt reportedly became tired of wearing O2 and CPAP so she stopped them. She also stopped lasix. Her BNP was 699 and she was diuresed. CXR revealed cardiomegaly. Pt's troponin was 340.7. Pt was admitted by cardiology and we were consulted to assist.   Pt reports that she stopped using APAP a long time ago. Per download, she has not used APAP consistently since 4/2022. Pt reports that she just got tired of using it. She also reports that she was using a full face mask and it was leaking terribly. She also reports issues with her mask leaving marks on her face. She stopped using her oxygen around the same time as she stopped using CPAP. She found that the oxygent was too cumbersome to use because she doesn't have portable O2 that is easy to use. She would like a POC. She reports that despite stopping CPAP, O2, and lasix that she had been doing the best she had been doing for a very long time. It was not until 10/30 that she started having any issues with her breathing. She started noticing shortness of breath, hypoxemia into the 80s on RA, and wheezing. She had not noticed any lower extremity edema although she reports that she had been off of her lasix for much longer than she had been off of CPAP.  She did not like having to get up and go to the bathroom constantly which is why she stopped lasix. She reports dry cough. She denies fever, chills, or sweats. She has been compliant with Trelegy until she ran out a few days ago. She needs a new refill. She has not been using albuterol nebs because she doesn't know where her nebulizer is. She would like a new nebulizer. She needs refills for her nebulizer medications at home as well. Review of Systems: Comprehensive ROS negative except in HPI    Current Outpatient Medications   Medication Instructions    acetaminophen (TYLENOL) 325 mg, Oral, EVERY 6 HOURS PRN    apixaban (ELIQUIS) 5 mg, Oral, 2 TIMES DAILY    azithromycin (ZITHROMAX) 250 MG tablet Take two tablets today then one tablet daily x 4 days    colestipol (COLESTID) 1 g, Oral, 2 TIMES DAILY    cyanocobalamin 500 mcg, DAILY    flecainide (TAMBOCOR) 100 mg, Oral, 2 TIMES DAILY    fluticasone-umeclidin-vilant (TRELEGY ELLIPTA) 100-62.5-25 MCG/INH AEPB 1 INHALATION DAILY, RINSE MOUTH AFTER USE    furosemide (LASIX) 20 MG tablet TAKE 1 TABLET BY MOUTH EVERY DAY    losartan (COZAAR) 50 MG tablet TAKE 1 TABLET BY MOUTH EVERY DAY    methylPREDNISolone (MEDROL DOSEPACK) 4 MG tablet As directed per dose pack    metoprolol succinate (TOPROL XL) 100 mg, Oral, 2 TIMES DAILY    omeprazole (PRILOSEC) 40 MG delayed release capsule TAKE 1 CAPSULE BY MOUTH EVERY DAY    OXYGEN 3 L/min, Nasal    promethazine (PHENERGAN) 25 mg, Oral, EVERY 6 HOURS PRN    rOPINIRole (REQUIP) 0.5 MG tablet TAKE 1 TABLET BY MOUTH EVERY DAY AT NIGHT    spironolactone (ALDACTONE) 50 MG tablet TAKE 1 TABLET BY MOUTH EVERY DAY      Past Medical History:   Diagnosis Date    Arrhythmia     palpitations.      Arthritis     rt shoulder    Atrial fibrillation ([facility] [address] 01/21/2016    takes eliquis    Cancer ([facility] [address]     skin ca on nose    Chest pain     Chronic obstructive pulmonary disease ([facility] [address]     COPD exacerbation ([facility] [address] 9/25/2017    Dyspnea     Essential hypertension 1/21/2016    GERD (gastroesophageal reflux disease)     managed by meds    HLD (hyperlipidemia) 2016    Hypertension     managed by meds    Menopause     Morbid obesity (HCC)     Nausea & vomiting     Pulmonary embolus ([address] 2016    Sleep apnea     no cpap    Thromboembolus (HCC) 4 month ago.      right leg    Venous embolism and thrombosis      Past Surgical History:   Procedure Laterality Date    APPENDECTOMY      CHOLECYSTECTOMY      ORTHOPEDIC SURGERY      bilateral feet,     ROTATOR CUFF REPAIR Right     ROTATOR CUFF REPAIR Left     RADHA AND BSO (CERVIX REMOVED)      AGE 28    UROLOGICAL SURGERY      bladder tack     Social History     Socioeconomic History    Marital status:      Spouse name: Not on file    Number of children: Not on file    Years of education: Not on file    Highest education level: Not on file   Occupational History    Not on file   Tobacco Use    Smoking status: Former     Packs/day: 3.00     Types: Cigarettes     Quit date: 1998     Years since quittin.2    Smokeless tobacco: Never   Substance and Sexual Activity    Alcohol use: No    Drug use: No    Sexual activity: Not on file   Other Topics Concern    Not on file   Social History Narrative    Not on file     Social Determinants of Health     Financial Resource Strain: Not on file   Food Insecurity: Not on file   Transportation Needs: Not on file   Physical Activity: Not on file   Stress: Not on file   Social Connections: Not on file   Intimate Partner Violence: Not on file   Housing Stability: Not on file     Family History   Problem Relation Age of Onset    Stroke Mother     Heart Disease Mother     Cancer Father         prostate    Stroke Father     Coronary Art Dis Father         premature    Heart Disease Brother     Heart Attack Brother     Cancer Sister     Cancer Sister         breast    Breast Cancer Sister 70     Allergies   Allergen Reactions    Latex Other (See Comments)    Codeine Other (See Comments)     Headache Shellfish-Derived Products Hives     Objective:   Blood pressure (!) 136/96, pulse (!) 113, temperature 97.7 °F (36.5 °C), temperature source Oral, resp. rate 17, height 5' 6\" (1.676 m), weight 203 lb 9.6 oz (92.4 kg), SpO2 96 %. Intake/Output Summary (Last 24 hours) at 11/2/2022 0933  Last data filed at 11/2/2022 0850  Gross per 24 hour   Intake 120 ml   Output --   Net 120 ml     Weight Metrics 11/2/2022 7/21/2022 5/2/2022 4/13/2022 2/28/2022 2/22/2022 2/7/2022   Weight 203 lb 9.6 oz 217 lb 217 lb 12.8 oz 212 lb 220 lb 3.2 oz 218 lb 215 lb   BMI (Calculated) 32.9 kg/m2 35.1 kg/m2 35.2 kg/m2 34.3 kg/m2 35.6 kg/m2 35.3 kg/m2 34.8 kg/m2       PHYSICAL EXAM   Constitutional:  the patient is well developed and in no acute distress, on 3L O2  EENMT:  Sclera clear, pupils equal, oral mucosa moist  Respiratory: symmetric chest rise. Fairly clear, no wheezing  Cardiovascular:  RRR without M,G,R. There is trace lower extremity edema. Gastrointestinal: soft and non-tender; with positive bowel sounds. Musculoskeletal: warm without cyanosis. Normal muscle tone. Skin:  no jaundice or rashes, no wounds   Neurologic: symmetric strength, fluent speech  Psychiatric:  calm, appropriate, oriented x 4    Imaging: I performed an independent interpretation of the patient's images. CXR:      DIAGNOSTIC TESTS:                                                                                                             Spirometry- GOLD 3 COPD no really a  Date   12/15/2020             FVC 1.75 (58%)             FEV1 1.11 (49%)             FEV1/FVC    0.63                                                                                             No flowsheet data found.      6MWT  (2/4/2021) 7/21/2022   distance   228 249 m (71% predicted)   Start;end O2 sat   96; 87% 94% 90% on 2 L   Max JIMMY dyspnea   5; 10 4; 10   COMMENTS   desaturated to 82% walking on room air            Right Heart Cath  2/7/22   RA     RV     PA  50/12 (27) PCWP  5   CO  4.33 (TD)   PVR  5.08WU   DPG  7, LVEDP normal     Weight-215lb   Isolated mild precapillary PH     TTE  12/29/20 9/28/21   EF  EF 14-25%; + diastolic dysfunction  17-67%; grade 2 diastolic dysfunction   RV  Normal size/systolic function  Normal size/systolic function   Peak TRV  3.9     RVSP  69  62mmHg   COMMENTS  Severely dilated LA, mild dilation RA. Suggestive of severe PH  Mild MV regurg          Chest CT 3/24/21:  Images through the lungs demonstrate no evidence of pulmonary nodule or mass. No effusions are identified. High-resolution imaging demonstrates no evidence of peripheral  reticulation, honeycombing, or bronchiectasis to suggest interstitial  lung disease. There is a small Bochdalek hernia posterior right lung base. Normal vasculature is demonstrated allowing for noncontrast examination. No   evidence of aortic aneurysmal dilatation is seen. The heart and mediastinum are grossly unremarkable. There is an incidental small  10 mm right paratracheal lymph node. Images chest wall structures as well as visualized portions of the upper abdomen   are unremarkable in appearance. There are cholecystectomy clips      There are no acute osseous abnormalities. CTAP from 2018 and CT coronaries from 2008 without parenchymal lung disease present. V/Q Scan 3/26/21:   IMPRESSION: Low probability for PE. COPD.      Lab Diagnostics:  HIV neg 2/4/21  HCV neg 2/4/21  NELSON pending  BNP pending              AMBULATING OXIMETRY: 10/21/21  O2 sat  HR     Room air at Rest   93%   68bpm     Room air ambulating   86%  71bpm   (recovery) Ambulating on 3Lpm  90%  75bpm           Recent Labs     11/01/22  1413 11/01/22  1758 11/02/22  0545   WBC 7.2  --  5.4   HGB 12.7  --  15.2   HCT 39.2  --  45.9   *  --  148*     --  136  136   K 3.5  --  3.8  3.9     --  99*  99*   CO2 30  --  30  30   BUN 15  --  15  16   CREATININE 0.90  --  0.90  1.00   MG 1.9  --  2.2  2.2   BILITOT 0.6 --  0.6  0.5   AST 35  --  32  31   ALT 49  --  52  53   ALKPHOS 94  --  109  108   TROPHS 262.6* 340.7* 209.2*   NTPROBNP 699*  --   --        Lab Results   Component Value Date/Time     11/02/2022 05:45 AM     11/02/2022 05:45 AM    K 3.8 11/02/2022 05:45 AM    K 3.9 11/02/2022 05:45 AM    CL 99 11/02/2022 05:45 AM    CL 99 11/02/2022 05:45 AM    CO2 30 11/02/2022 05:45 AM    CO2 30 11/02/2022 05:45 AM    BUN 15 11/02/2022 05:45 AM    BUN 16 11/02/2022 05:45 AM    CREATININE 0.90 11/02/2022 05:45 AM    CREATININE 1.00 11/02/2022 05:45 AM    GLUCOSE 124 11/02/2022 05:45 AM    GLUCOSE 127 11/02/2022 05:45 AM    CALCIUM 9.0 11/02/2022 05:45 AM    CALCIUM 9.1 11/02/2022 05:45 AM      Lab Results   Component Value Date     (H) 01/13/2022       ECHO: 09/28/21    TRANSTHORACIC ECHOCARDIOGRAM (TTE) COMPLETE (CONTRAST/BUBBLE/3D PRN) 09/29/2021  5:21 PM, 09/29/2021 12:00 AM (Final)    Narrative  This is a summary report. The complete report is available in the patient's medical record. If you cannot access the medical record, please contact the sending organization for a detailed fax or copy. · LA: Moderately dilated left atrium. Left Atrium volume index is 55 mL/m2. · TV: Mild tricuspid valve regurgitation is present. Right Ventricular Arterial Pressure (RVSP) is 62 mmHg. Pulmonary hypertension found to be moderate to severe. · LV: Estimated LVEF is 60 - 65%. Normal cavity size, wall thickness and systolic function (ejection fraction normal). Moderate (grade 2) left ventricular diastolic dysfunction. · MV: Mild mitral valve regurgitation is present. · PV: Mild pulmonic valve regurgitation is present. Signed by: Josue Abarca MD on 9/29/2021  5:21 PM, Signed by: Unknown Provider Result on 9/29/2021 12:00 AM    MICRO: No results for input(s): CULTURE in the last 72 hours.   Assessment and Plan:  (Medical Decision Making)   Impression: Pt is a 67 yo female with a history of Pulm HTN, MARISA, this split/shared evaluation I performed reviewed the patients's H&P, available images, labs, cultures. , discussed case in detail with NPP, performed a medically appropriate history and exam, counseled and educated the patient and/or family member, ordered and/or reviewed medications, tests or procedures, documented information in EMR, independently interpreted images, and coordinated care. which accounted for 30 minutes clinical time. Impression: Mrs. Chino Dial has had some recent noncompliance with CPAP and diuretics which are the likely cause of her current symptoms of shortness of breath. Her TTE is pending and we will review. Adjustments made to CPAP to encourage better compliance. Precapillary PH is likely due to untreated PH.   Will start bronchodilators for COPD  Appreciate medical therapy for volume overload/diastolic dysfunction and a fib by cardiology team.   Goal weight is typically about 215lb      Yaniv Mazariegos MD

## 2022-11-03 ENCOUNTER — HOME HEALTH ADMISSION (OUTPATIENT)
Dept: HOME HEALTH SERVICES | Facility: HOME HEALTH | Age: 77
End: 2022-11-03
Payer: MEDICARE

## 2022-11-03 LAB
ANION GAP SERPL CALC-SCNC: 5 MMOL/L (ref 2–11)
BUN SERPL-MCNC: 29 MG/DL (ref 8–23)
CALCIUM SERPL-MCNC: 8.5 MG/DL (ref 8.3–10.4)
CHLORIDE SERPL-SCNC: 102 MMOL/L (ref 101–110)
CO2 SERPL-SCNC: 31 MMOL/L (ref 21–32)
CREAT SERPL-MCNC: 1.2 MG/DL (ref 0.6–1)
GLUCOSE SERPL-MCNC: 95 MG/DL (ref 65–100)
MAGNESIUM SERPL-MCNC: 1.9 MG/DL (ref 1.8–2.4)
POTASSIUM SERPL-SCNC: 3.9 MMOL/L (ref 3.5–5.1)
SODIUM SERPL-SCNC: 138 MMOL/L (ref 133–143)

## 2022-11-03 PROCEDURE — 36415 COLL VENOUS BLD VENIPUNCTURE: CPT

## 2022-11-03 PROCEDURE — 83735 ASSAY OF MAGNESIUM: CPT

## 2022-11-03 PROCEDURE — 6360000002 HC RX W HCPCS: Performed by: INTERNAL MEDICINE

## 2022-11-03 PROCEDURE — 2580000003 HC RX 258: Performed by: INTERNAL MEDICINE

## 2022-11-03 PROCEDURE — 1100000003 HC PRIVATE W/ TELEMETRY

## 2022-11-03 PROCEDURE — 6370000000 HC RX 637 (ALT 250 FOR IP): Performed by: INTERNAL MEDICINE

## 2022-11-03 PROCEDURE — 94760 N-INVAS EAR/PLS OXIMETRY 1: CPT

## 2022-11-03 PROCEDURE — 97530 THERAPEUTIC ACTIVITIES: CPT

## 2022-11-03 PROCEDURE — 80048 BASIC METABOLIC PNL TOTAL CA: CPT

## 2022-11-03 PROCEDURE — 2700000000 HC OXYGEN THERAPY PER DAY

## 2022-11-03 PROCEDURE — 99232 SBSQ HOSP IP/OBS MODERATE 35: CPT | Performed by: INTERNAL MEDICINE

## 2022-11-03 PROCEDURE — 94640 AIRWAY INHALATION TREATMENT: CPT

## 2022-11-03 RX ADMIN — METOPROLOL SUCCINATE 100 MG: 100 TABLET, EXTENDED RELEASE ORAL at 21:14

## 2022-11-03 RX ADMIN — FLECAINIDE ACETATE 100 MG: 100 TABLET ORAL at 08:25

## 2022-11-03 RX ADMIN — TIOTROPIUM BROMIDE INHALATION SPRAY 2 PUFF: 3.12 SPRAY, METERED RESPIRATORY (INHALATION) at 07:23

## 2022-11-03 RX ADMIN — POTASSIUM CHLORIDE 20 MEQ: 1500 TABLET, EXTENDED RELEASE ORAL at 16:37

## 2022-11-03 RX ADMIN — POTASSIUM CHLORIDE 20 MEQ: 1500 TABLET, EXTENDED RELEASE ORAL at 08:25

## 2022-11-03 RX ADMIN — MOMETASONE FUROATE AND FORMOTEROL FUMARATE DIHYDRATE 2 PUFF: 200; 5 AEROSOL RESPIRATORY (INHALATION) at 20:32

## 2022-11-03 RX ADMIN — FUROSEMIDE 40 MG: 10 INJECTION, SOLUTION INTRAMUSCULAR; INTRAVENOUS at 08:25

## 2022-11-03 RX ADMIN — APIXABAN 5 MG: 5 TABLET, FILM COATED ORAL at 10:36

## 2022-11-03 RX ADMIN — ACETAMINOPHEN 650 MG: 325 TABLET ORAL at 21:08

## 2022-11-03 RX ADMIN — SODIUM CHLORIDE, PRESERVATIVE FREE 10 ML: 5 INJECTION INTRAVENOUS at 08:25

## 2022-11-03 RX ADMIN — SODIUM CHLORIDE, PRESERVATIVE FREE 5 ML: 5 INJECTION INTRAVENOUS at 21:09

## 2022-11-03 RX ADMIN — FLECAINIDE ACETATE 100 MG: 100 TABLET ORAL at 21:09

## 2022-11-03 RX ADMIN — LOSARTAN POTASSIUM 50 MG: 50 TABLET, FILM COATED ORAL at 08:25

## 2022-11-03 RX ADMIN — SPIRONOLACTONE 50 MG: 25 TABLET ORAL at 08:25

## 2022-11-03 RX ADMIN — PANTOPRAZOLE SODIUM 40 MG: 40 TABLET, DELAYED RELEASE ORAL at 05:31

## 2022-11-03 RX ADMIN — MOMETASONE FUROATE AND FORMOTEROL FUMARATE DIHYDRATE 2 PUFF: 200; 5 AEROSOL RESPIRATORY (INHALATION) at 07:23

## 2022-11-03 RX ADMIN — APIXABAN 5 MG: 5 TABLET, FILM COATED ORAL at 21:09

## 2022-11-03 ASSESSMENT — PAIN DESCRIPTION - DESCRIPTORS: DESCRIPTORS: ACHING

## 2022-11-03 ASSESSMENT — PAIN DESCRIPTION - ORIENTATION: ORIENTATION: MID

## 2022-11-03 ASSESSMENT — PAIN SCALES - GENERAL
PAINLEVEL_OUTOF10: 3
PAINLEVEL_OUTOF10: 0

## 2022-11-03 ASSESSMENT — PAIN DESCRIPTION - LOCATION: LOCATION: HEAD

## 2022-11-03 ASSESSMENT — PAIN SCALES - WONG BAKER: WONGBAKER_NUMERICALRESPONSE: 0

## 2022-11-03 NOTE — PROGRESS NOTES
polyethylene glycol (GLYCOLAX) packet 17 g  17 g Oral Daily PRN    acetaminophen (TYLENOL) tablet 650 mg  650 mg Oral Q6H PRN    Or    acetaminophen (TYLENOL) suppository 650 mg  650 mg Rectal Q6H PRN    furosemide (LASIX) injection 40 mg  40 mg IntraVENous BID    potassium chloride (KLOR-CON M) extended release tablet 20 mEq  20 mEq Oral BID WC    mometasone-formoterol (DULERA) 200-5 MCG/ACT inhaler 2 puff  2 puff Inhalation BID    tiotropium (SPIRIVA RESPIMAT) 2.5 MCG/ACT inhaler 2 puff  2 puff Inhalation Daily     Review of Systems: Comprehensive ROS negative except in HPI  Objective:   Blood pressure (!) 122/47, pulse (!) 48, temperature 98.1 °F (36.7 °C), temperature source Oral, resp. rate 15, height 5' 6\" (1.676 m), weight 201 lb 8 oz (91.4 kg), SpO2 95 %. Intake/Output Summary (Last 24 hours) at 11/3/2022 6597  Last data filed at 11/2/2022 2108  Gross per 24 hour   Intake 490 ml   Output 800 ml   Net -310 ml     Physical Exam:   Constitutional:  the patient is well developed and in no acute distress  EENMT:  Sclera clear, pupils equal, oral mucosa moist  Respiratory: symmetric chest rise. CTA B, no w/r/r  Cardiovascular:  RRR without M,G,R. There is no lower extremity edema. Gastrointestinal: soft and non-tender; with positive bowel sounds. Musculoskeletal: warm without cyanosis. Normal muscle tone. Skin:  no jaundice or rashes, no wounds   Neurologic: symmetric strength, fluent speech  Psychiatric:  calm, appropriate, oriented x 4    Imaging: I performed an independent interpretation of the patient's images.   CXR:     LAB:  Recent Labs     11/01/22  1413 11/02/22  0545   WBC 7.2 5.4   HGB 12.7 15.2   HCT 39.2 45.9   * 148*     Recent Labs     11/01/22  1413 11/02/22  0545 11/03/22  0524    136  136 138   K 3.5 3.8  3.9 3.9    99*  99* 102   CO2 30 30  30 31   BUN 15 15  16 29*   CREATININE 0.90 0.90  1.00 1.20*   MG 1.9 2.2  2.2 1.9   BILITOT 0.6 0.6  0.5  --    AST 35 32 31  --    ALT 49 52  53  --    ALKPHOS 94 109  108  --      Recent Labs     11/01/22  1413 11/01/22  1758 11/02/22  0545   TROPHS 262.6* 340.7* 209.2*   NTPROBNP 699*  --   --      Recent Labs     11/01/22  1413 11/02/22  0545 11/03/22  0524   GLUCOSE 67 124*  127* 95      Microbiology:   No results for input(s): CULTURE in the last 72 hours. ECHO: 11/01/22    TRANSTHORACIC ECHOCARDIOGRAM (TTE) COMPLETE (CONTRAST/BUBBLE/3D PRN) 11/02/2022 12:17 PM (Final)    Interpretation Summary    Left Ventricle: Normal left ventricular systolic function with a visually estimated EF of 60 - 65%. Left ventricle size is normal. Mildly increased wall thickness. Normal wall motion. Normal diastolic function. Left Atrium: Left atrium is dilated. Right Atrium: Right atrium is dilated. Pericardium: Trivial pericardial effusion present. No indication of cardiac tamponade. Evidence includes normal LV size, no septal bounce and no chamber collapse. Technical qualifiers: Color flow Doppler was performed and pulse wave and/or continuous wave Doppler was performed. Contrast used: Definity. RVSP=38mmHg    Signed by: Torey Parker MD on 11/2/2022 12:17 PM    Assessment and Plan:  (Medical Decision Making)     Impression: Pt is a 69 yo female with a history of Pulm HTN, MARISA, COPD, chronic respiratory failure on 3L with exertion, CAD, afib/prior PE/DVT on Eliquis, and HFpEF. Pt stopped taking lasix, using CPAP and O2 months ago. She developed worsening shortness of breath on 10/30 and presented to the ER on 11/1. She was admitted for acute on chronic diastolic CHF and we were consulted to assist.     Principal Problem:    Chronic diastolic congestive heart failure ([address]  Plan: appears euvolemic. On lasix iv bid. Cont one more day then will taper. Active Problems:    Acute respiratory failure with hypoxia (HCC)  Plan: On 3L O2 at home with exertion. On 1L at rest. Cont to try and wean. PT seeing.      Acute congestive heart failure ([facility]  Plan: lasix and spironolactone. COPD exacerbation ([facility]  Plan: no wheeze on exam currently. Cont inhalers. MARISA and COPD overlap syndrome ([facility]  Plan: no using Trelegy. States family cannot bring in home machine. Consult case management to make sure she has functional equipment at home. Bipap at night for now. More than 50% of the time documented was spent in face-to-face contact with the patient and in the care of the patient on the floor/unit where the patient is located.     Harriet Turner MD

## 2022-11-03 NOTE — PROGRESS NOTES
[facility] CARDIOLOGY PROGRESS NOTE           11/3/2022 9:33 AM    Admit Date: 11/1/2022    Admit Diagnosis: Paroxysmal atrial fibrillation (HCC) [I48.0]  NSTEMI (non-ST elevated myocardial infarction) ([facility] [D33.2]  Chronic diastolic congestive heart failure (HCC) [I50.32]  Acute respiratory failure with hypoxia ([facility]) [J96.01]  Acute congestive heart failure, unspecified heart failure type ([facility] [I50.9]      Subjective:   No complaints this AM, no chest pain or shortness of breath    Interval History: (History of pertinent interval events obtained from nursing staff)    ROS:  GEN:  No fever or chills  Cardiovascular:  As noted above  Pulmonary:  As noted above  Neuro:  No new focal motor or sensory loss      Objective:     Vitals:    11/03/22 0025 11/03/22 0422 11/03/22 0723 11/03/22 0832   BP: (!) 101/56 (!) 116/50  (!) 122/47   Pulse: 57 52 (!) 47 (!) 48   Resp: 16 17 15    Temp: 98.3 °F (36.8 °C) 98.1 °F (36.7 °C)     TempSrc: Oral Oral     SpO2: 97% 98% 95%    Weight:  201 lb 8 oz (91.4 kg)     Height:           Physical Exam:  General-Well Developed, Well Nourished, No Acute Distress, Alert & Oriented x 3, appropriate mood. Neck- supple, no JVD  CV- regular rate and rhythm no MRG  Lung- clear bilaterally  Abd- soft, nontender, nondistended  Ext- no edema bilaterally.   Skin- warm and dry    Current Facility-Administered Medications   Medication Dose Route Frequency    flecainide (TAMBOCOR) tablet 100 mg  100 mg Oral BID    losartan (COZAAR) tablet 50 mg  50 mg Oral Daily    metoprolol succinate (TOPROL XL) extended release tablet 100 mg  100 mg Oral BID    pantoprazole (PROTONIX) tablet 40 mg  40 mg Oral QAM AC    spironolactone (ALDACTONE) tablet 50 mg  50 mg Oral Daily    sodium chloride flush 0.9 % injection 5-40 mL  5-40 mL IntraVENous 2 times per day    sodium chloride flush 0.9 % injection 5-40 mL  5-40 mL IntraVENous PRN    0.9 % sodium chloride infusion   IntraVENous PRN ondansetron (ZOFRAN-ODT) disintegrating tablet 4 mg  4 mg Oral Q8H PRN    Or    ondansetron (ZOFRAN) injection 4 mg  4 mg IntraVENous Q6H PRN    polyethylene glycol (GLYCOLAX) packet 17 g  17 g Oral Daily PRN    acetaminophen (TYLENOL) tablet 650 mg  650 mg Oral Q6H PRN    Or    acetaminophen (TYLENOL) suppository 650 mg  650 mg Rectal Q6H PRN    furosemide (LASIX) injection 40 mg  40 mg IntraVENous BID    potassium chloride (KLOR-CON M) extended release tablet 20 mEq  20 mEq Oral BID WC    mometasone-formoterol (DULERA) 200-5 MCG/ACT inhaler 2 puff  2 puff Inhalation BID    tiotropium (SPIRIVA RESPIMAT) 2.5 MCG/ACT inhaler 2 puff  2 puff Inhalation Daily     Data Review:   Recent Results (from the past 24 hour(s))   Transthoracic echocardiogram (TTE) complete with contrast, bubble, strain, and 3D PRN    Collection Time: 11/02/22 11:24 AM   Result Value Ref Range    LV EDV A2C 93 mL    LV EDV A4C 74 mL    LV ESV A2C 28 mL    LV ESV A4C 27 mL    IVSd 1.2 (A) 0.6 - 0.9 cm    LVIDd 4.1 3.9 - 5.3 cm    LVIDs 2.7 cm    LVOT Diameter 1.9 cm    LVOT Mean Gradient 2 mmHg    LVOT VTI 21.8 cm    LVOT Peak Velocity 1.0 m/s    LVOT Peak Gradient 4 mmHg    LVPWd 1.2 (A) 0.6 - 0.9 cm    LV E' Lateral Velocity 8 cm/s    LV E' Septal Velocity 8 cm/s    LV Ejection Fraction A2C 70 %    LV Ejection Fraction A4C 63 %    EF BP 66 55 - 100 %    LVOT Area 2.8 cm2    LVOT SV 61.8 ml    LA Minor Axis 5.7 cm    LA Major Axis 5.8 cm    LA Area 2C 24.8 cm2    LA Area 4C 20.0 cm2    LA Volume BP 70 (A) 22 - 52 mL    LA Diameter 4.6 cm    AV Mean Velocity 0.8 m/s    AV Mean Gradient 3 mmHg    AV VTI 25.7 cm    AV Peak Velocity 1.4 m/s    AV Peak Gradient 7 mmHg    AV Area by VTI 2.4 cm2    AV Area by Peak Velocity 2.0 cm2    Aortic Root 2.9 cm    Ascending Aorta 3.0 cm    IVC Proxmal 2.1 cm    MV E Wave Deceleration Time 306.0 ms    MV A Velocity 0.50 m/s    MV E Velocity 0.74 m/s    MV Mean Gradient 1 mmHg    MV VTI 29.5 cm    MV Mean Velocity 0.5 m/s    MV Max Velocity 1.0 m/s    MV Peak Gradient 4 mmHg    MV Area by VTI 2.1 cm2    CO Max Velocity 1.5 m/s    Pulmonary Artery EDP 9 mmHg    PV .0 ms    PV Max Velocity 0.9 m/s    PV Peak Gradient 4 mmHg    Est. RA Pressure 8 mmHg    RVIDd 3.6 cm    RV Basal Dimension 4.2 cm    TAPSE 1.9 1.7 cm    TR Max Velocity 2.73 m/s    TR Peak Gradient 30 mmHg    Body Surface Area 2.07 m2    Fractional Shortening 2D 34 28 - 44 %    LV ESV Index A4C 13 mL/m2    LV EDV Index A4C 37 mL/m2    LV ESV Index A2C 14 mL/m2    LV EDV Index A2C 46 mL/m2    LVIDd Index 2.03 cm/m2    LVIDs Index 1.34 cm/m2    LV RWT Ratio 0.59     LV Mass 2D 171.7 (A) 67 - 162 g    LV Mass 2D Index 85.0 43 - 95 g/m2    MV E/A 1.48     E/E' Ratio (Averaged) 9.25     E/E' Lateral 9.25     E/E' Septal 9.25     LA Volume Index BP 35 (A) 16 - 34 ml/m2    LVOT Stroke Volume Index 30.6 mL/m2    LA Size Index 2.28 cm/m2    LA/AO Root Ratio 1.59     Ao Root Index 1.44 cm/m2    Ascending Aorta Index 1.49 cm/m2    AV Velocity Ratio 0.71     LVOT:AV VTI Index 0.85     GLEN/BSA VTI 1.2 cm2/m2    GLEN/BSA Peak Velocity 1.0 cm2/m2    MV:LVOT VTI Index 1.35     RVSP 38 mmHg   Basic Metabolic Panel    Collection Time: 11/03/22  5:24 AM   Result Value Ref Range    Sodium 138 133 - 143 mmol/L    Potassium 3.9 3.5 - 5.1 mmol/L    Chloride 102 101 - 110 mmol/L    CO2 31 21 - 32 mmol/L    Anion Gap 5 2 - 11 mmol/L    Glucose 95 65 - 100 mg/dL    BUN 29 (H) 8 - 23 MG/DL    Creatinine 1.20 (H) 0.6 - 1.0 MG/DL    Est, Glom Filt Rate 47 (L) >60 ml/min/1.73m2    Calcium 8.5 8.3 - 10.4 MG/DL   Magnesium    Collection Time: 11/03/22  5:24 AM   Result Value Ref Range    Magnesium 1.9 1.8 - 2.4 mg/dL       EKG:  (EKG has been independently visualized by me with interpretation below)  Assessment:     Principal Problem:    Chronic diastolic congestive heart failure (HCC)  Active Problems:    Acute respiratory failure with hypoxia (HCC)    Acute congestive heart failure (HCC) Coronary artery disease involving native coronary artery of native heart with angina pectoris (HCC)    Dyspnea on exertion    Essential hypertension    COPD exacerbation (HCC)    Peripheral vascular disease (HCC)    Atrial fibrillation (HCC)    MARISA and COPD overlap syndrome ([address]  Resolved Problems:    * No resolved hospital problems. *    Plan:   Acute on Chronic Hypoxic Resp Failure:  likely multi-factorial from DHF and COPD, resulted from not wearing CPAP or home oxygen and not taking home lasix. Does NOT appear to be a MI. We will follow on tele, consult pulm, creatinine sightly increased this AM, will hold lasix today. Acute COPD Exac: consult pulm, COVID neg  Acute on Chronic DHF:  IV diuresis, echo with normal EF and normal diastolic function, respiratory failure likely more 2/2 COPD and obesity  CAD/Pos trop: Demand ischemia. Trop now reduced. Does not appear to be a MI. On NOAC, will not use IV heparin. Echo with normal EF, no WMA. PAF:  remain on BB, Ic for now, restart home eliquis  MARISA:  needs back on CPAP  Non-compliance:  this is main issue, needs to be compliant with home lasix and home COPD meds, CPAP and oxygen. Summer Feliz MD  Cardiology/Electrophysiology

## 2022-11-03 NOTE — CARE COORDINATION
Pt admitted for hypoxic resp failure. She has been having worsening MCCAULEY and SOB; some more LE edema and orthopnea. Pt lives with her spouse in a one-level private residence. Is indep at baseline. On 2L supplemental, [employer] supplies her home oxygen. Has a CPAP at home as well but does not wear it. Reported that she stopped wearing her oxygen and \"taking care of herself\" to care for her  who has concerning kyle issues. Uses a rolator prn. Denies current home care services. PT consulted and recommended HH. Pt initially declined but eventually changed her mind and agreed to its benefits. Requested [facility]. Referral sent for RN & PT. PCP confirmed. Insurance verified. Able to afford home meds. Will continue to follow. 11/03/22 1103   Service Assessment   Patient Orientation Alert and Oriented   Cognition Alert   History Provided By Patient   Primary Caregiver Self   Support Systems Spouse/Significant Other;Children;Family Members;[employer];Friends/Neighbors   PCP Verified by CM Yes  (Dayami Trevizo)   Prior Functional Level Independent in ADLs/IADLs   Current Functional Level Independent in ADLs/IADLs   Can patient return to prior living arrangement Yes   Ability to make needs known: Good   Family able to assist with home care needs: Yes   Would you like for me to discuss the discharge plan with any other family members/significant others, and if so, who?  Yes   Financial Resources Medicare   Social/Functional History   Lives With Spouse   Type of [address] One level   Bathroom Shower/Tub Tub/Shower unit   [address]   Ambulation Assistance Independent   Active  Yes   Mode of Transportation Car   Occupation Retired   Discharge Planning   Type of [address] Prior To Admission Oxygen Therapy;C-pap  ([employer])   Potential Assistance Needed N/A   DME Ordered? No   Potential Assistance Purchasing Medications No   Type of Home Care Services PT;OT;Nursing Services   Patient expects to be discharged to: [address] Discharge   Transition of Care Consult (CM Consult) [facility] Discharge OT;PT;Nursing services   [facility] Resource Information Provided?  No   Mode of Transport at Discharge Other (see comment)  (Family)   Confirm Follow Up Transport Family

## 2022-11-03 NOTE — PROGRESS NOTES
ACUTE PHYSICAL THERAPY GOALS:   (Developed with and agreed upon by patient and/or caregiver. )  LTG:  (1.)Ms. Keisha Healy will move from supine to sit and sit to supine  in bed with SUPERVISION within 7 treatment day(s). GOAL MET 11/3/2022    (2.)Ms. Keisha Healy will transfer from bed to chair and chair to bed with SUPERVISION using the least restrictive device within 7 treatment day(s). (3.)Ms. Keisha Healy will ambulate with SUPERVISION for 250 feet with the least restrictive device within 7 treatment day(s). GOAL MET 11/3/2022      PHYSICAL THERAPY: Daily Note PM   (Link to Caseload Tracking: PT Visit Days : 2  Time In/Out PT Charge Capture  Rehab Caseload Tracker  Orders    Gwen Aguilar is a 68 y.o. female   PRIMARY DIAGNOSIS: Chronic diastolic congestive heart failure (HCC)  Paroxysmal atrial fibrillation (HCC) [I48.0]  NSTEMI (non-ST elevated myocardial infarction) ([facility] [N32.0]  Chronic diastolic congestive heart failure (HCC) [I50.32]  Acute respiratory failure with hypoxia (HCC) [J96.01]  Acute congestive heart failure, unspecified heart failure type ([address] [I50.9]       Inpatient: Payor: MEDICARE / Plan: MEDICARE PART A AND B / Product Type: *No Product type* /     ASSESSMENT:     REHAB RECOMMENDATIONS:   Recommendation to date pending progress:  Setting:  Home Health Therapy    Equipment:    None     ASSESSMENT:  Ms. Keisha Healy is supine and happy to get up and walk. She was independent in bed mobility and walked 2 times around the [place] with the rollator with SBA. Her sats were 92% on RA (wears O2 at night)  she is moving well.      SUBJECTIVE:   Ms. Keisha Healy states, \"I can\"     Social/Functional Lives With: Spouse  Type of Home: House  Home Layout: One level  Bathroom Shower/Tub: Tub/Shower unit  Bathroom Equipment: Built-in shower seat  Home Equipment: Rollator  Receives Help From: Family  ADL Assistance: Independent  Ambulation Assistance: Independent  Active : Yes  Mode of Transportation: Car  Occupation: Retired  OBJECTIVE:     PAIN: VITALS / O2: PRECAUTION / Neandreaa Ryanch / Gradie Papa:   Pre Treatment:          Post Treatment: 0 Vitals        Oxygen    Purewick    RESTRICTIONS/PRECAUTIONS:  Restrictions/Precautions  Restrictions/Precautions: Fall Risk  Restrictions/Precautions: Fall Risk     MOBILITY: I Mod I S SBA CGA Min Mod Max Total  NT x2 Comments:   Bed Mobility    Rolling [] [] [] [] [] [] [] [] [] [] []    Supine to Sit [] [x] [] [] [] [] [] [] [] [] []    Scooting [x] [] [] [] [] [] [] [] [] [] []    Sit to Supine [] [] [] [] [] [] [] [] [] [] []    Transfers    Sit to Stand [] [x] [] [] [] [] [] [] [] [] []    Bed to Chair [] [x] [] [] [] [] [] [] [] [] []    Stand to Sit [x] [] [] [] [] [] [] [] [] [] []     [] [] [] [] [] [] [] [] [] [] []    I=Independent, Mod I=Modified Independent, S=Supervision, SBA=Standby Assistance, CGA=Contact Guard Assistance,   Min=Minimal Assistance, Mod=Moderate Assistance, Max=Maximal Assistance, Total=Total Assistance, NT=Not Tested    BALANCE: Good Fair+ Fair Fair- Poor NT Comments   Sitting Static [x] [] [] [] [] []    Sitting Dynamic [x] [] [] [] [] []              Standing Static [] [x] [] [] [] []    Standing Dynamic [] [x] [] [] [] []      GAIT: I Mod I S SBA CGA Min Mod Max Total  NT x2 Comments:   Level of Assistance [] [] [x] [] [] [] [] [] [] [] []    Distance 500 feet    DME Rollator    Gait Quality good    Weightbearing Status      Stairs      I=Independent, Mod I=Modified Independent, S=Supervision, SBA=Standby Assistance, CGA=Contact Guard Assistance,   Min=Minimal Assistance, Mod=Moderate Assistance, Max=Maximal Assistance, Total=Total Assistance, NT=Not Tested    PLAN:   FREQUENCY AND DURATION: 3 times/week for duration of hospital stay or until stated goals are met, whichever comes first.    TREATMENT:   TREATMENT:   Therapeutic Activity (25 Minutes):  Therapeutic activity included Supine to Sit, Transfer Training, Ambulation on level ground, and Standing balance to improve functional Activity tolerance, Balance, Mobility, and Strength.     TREATMENT GRID:  N/A    AFTER TREATMENT PRECAUTIONS: Bed/Chair Locked, Call light within reach, Chair, Needs within reach, and RN notified    INTERDISCIPLINARY COLLABORATION:  RN/ PCT and PT/ PTA    EDUCATION:      TIME IN/OUT:  Time In: 1410  Time Out: 1039 [facility]  Minutes: 25    Ranjith Bonilla PTA

## 2022-11-03 NOTE — PROGRESS NOTES
Spiritual Care Visit, initial visit. Visited with patient at bedside. Prayed for patient's healing and health. Visit by Esta Mikes Beverely Nyhan, Staff .  Lyric., Th.B., B.A.

## 2022-11-04 LAB
ANION GAP SERPL CALC-SCNC: 6 MMOL/L (ref 2–11)
BUN SERPL-MCNC: 34 MG/DL (ref 8–23)
CALCIUM SERPL-MCNC: 8.5 MG/DL (ref 8.3–10.4)
CHLORIDE SERPL-SCNC: 104 MMOL/L (ref 101–110)
CO2 SERPL-SCNC: 28 MMOL/L (ref 21–32)
CREAT SERPL-MCNC: 1 MG/DL (ref 0.6–1)
GLUCOSE SERPL-MCNC: 93 MG/DL (ref 65–100)
MAGNESIUM SERPL-MCNC: 2 MG/DL (ref 1.8–2.4)
NT PRO BNP: 527 PG/ML
POTASSIUM SERPL-SCNC: 4.4 MMOL/L (ref 3.5–5.1)
SODIUM SERPL-SCNC: 138 MMOL/L (ref 133–143)

## 2022-11-04 PROCEDURE — 80048 BASIC METABOLIC PNL TOTAL CA: CPT

## 2022-11-04 PROCEDURE — 83735 ASSAY OF MAGNESIUM: CPT

## 2022-11-04 PROCEDURE — 6360000002 HC RX W HCPCS: Performed by: INTERNAL MEDICINE

## 2022-11-04 PROCEDURE — 36415 COLL VENOUS BLD VENIPUNCTURE: CPT

## 2022-11-04 PROCEDURE — 99232 SBSQ HOSP IP/OBS MODERATE 35: CPT | Performed by: INTERNAL MEDICINE

## 2022-11-04 PROCEDURE — 6370000000 HC RX 637 (ALT 250 FOR IP): Performed by: INTERNAL MEDICINE

## 2022-11-04 PROCEDURE — 2700000000 HC OXYGEN THERAPY PER DAY

## 2022-11-04 PROCEDURE — 6370000000 HC RX 637 (ALT 250 FOR IP): Performed by: PHYSICIAN ASSISTANT

## 2022-11-04 PROCEDURE — 94640 AIRWAY INHALATION TREATMENT: CPT

## 2022-11-04 PROCEDURE — 83880 ASSAY OF NATRIURETIC PEPTIDE: CPT

## 2022-11-04 PROCEDURE — 94760 N-INVAS EAR/PLS OXIMETRY 1: CPT

## 2022-11-04 PROCEDURE — 2580000003 HC RX 258: Performed by: INTERNAL MEDICINE

## 2022-11-04 PROCEDURE — 1100000003 HC PRIVATE W/ TELEMETRY

## 2022-11-04 PROCEDURE — 97530 THERAPEUTIC ACTIVITIES: CPT

## 2022-11-04 PROCEDURE — 94761 N-INVAS EAR/PLS OXIMETRY MLT: CPT

## 2022-11-04 RX ORDER — FUROSEMIDE 10 MG/ML
40 INJECTION INTRAMUSCULAR; INTRAVENOUS ONCE
Status: COMPLETED | OUTPATIENT
Start: 2022-11-04 | End: 2022-11-04

## 2022-11-04 RX ORDER — FUROSEMIDE 10 MG/ML
20 INJECTION INTRAMUSCULAR; INTRAVENOUS ONCE
Status: DISCONTINUED | OUTPATIENT
Start: 2022-11-04 | End: 2022-11-04

## 2022-11-04 RX ADMIN — SODIUM CHLORIDE, PRESERVATIVE FREE 5 ML: 5 INJECTION INTRAVENOUS at 21:06

## 2022-11-04 RX ADMIN — MOMETASONE FUROATE AND FORMOTEROL FUMARATE DIHYDRATE 2 PUFF: 200; 5 AEROSOL RESPIRATORY (INHALATION) at 08:42

## 2022-11-04 RX ADMIN — FLECAINIDE ACETATE 100 MG: 100 TABLET ORAL at 21:03

## 2022-11-04 RX ADMIN — FLECAINIDE ACETATE 100 MG: 100 TABLET ORAL at 09:23

## 2022-11-04 RX ADMIN — METOPROLOL SUCCINATE 100 MG: 100 TABLET, EXTENDED RELEASE ORAL at 21:03

## 2022-11-04 RX ADMIN — POTASSIUM CHLORIDE 20 MEQ: 1500 TABLET, EXTENDED RELEASE ORAL at 09:23

## 2022-11-04 RX ADMIN — PANTOPRAZOLE SODIUM 40 MG: 40 TABLET, DELAYED RELEASE ORAL at 05:28

## 2022-11-04 RX ADMIN — LOSARTAN POTASSIUM 50 MG: 50 TABLET, FILM COATED ORAL at 09:24

## 2022-11-04 RX ADMIN — SODIUM CHLORIDE, PRESERVATIVE FREE 10 ML: 5 INJECTION INTRAVENOUS at 09:24

## 2022-11-04 RX ADMIN — MOMETASONE FUROATE AND FORMOTEROL FUMARATE DIHYDRATE 2 PUFF: 200; 5 AEROSOL RESPIRATORY (INHALATION) at 21:20

## 2022-11-04 RX ADMIN — SPIRONOLACTONE 50 MG: 25 TABLET ORAL at 09:23

## 2022-11-04 RX ADMIN — Medication 30 ML: at 14:14

## 2022-11-04 RX ADMIN — FUROSEMIDE 40 MG: 10 INJECTION, SOLUTION INTRAMUSCULAR; INTRAVENOUS at 09:24

## 2022-11-04 RX ADMIN — APIXABAN 5 MG: 5 TABLET, FILM COATED ORAL at 21:03

## 2022-11-04 RX ADMIN — POTASSIUM CHLORIDE 20 MEQ: 1500 TABLET, EXTENDED RELEASE ORAL at 16:31

## 2022-11-04 RX ADMIN — APIXABAN 5 MG: 5 TABLET, FILM COATED ORAL at 09:23

## 2022-11-04 RX ADMIN — TIOTROPIUM BROMIDE INHALATION SPRAY 2 PUFF: 3.12 SPRAY, METERED RESPIRATORY (INHALATION) at 08:42

## 2022-11-04 RX ADMIN — METOPROLOL SUCCINATE 100 MG: 100 TABLET, EXTENDED RELEASE ORAL at 09:23

## 2022-11-04 ASSESSMENT — PAIN SCALES - GENERAL
PAINLEVEL_OUTOF10: 0

## 2022-11-04 ASSESSMENT — PAIN SCALES - WONG BAKER: WONGBAKER_NUMERICALRESPONSE: 0

## 2022-11-04 NOTE — PROGRESS NOTES
Pt on RA when this writer was in her room this morning. Pt stated that her mask to her CPAP  machine does not fit her properly. CM contacted [employer] and arranged for them to send a new mask and lines, etc; scheduled to be delivered in 7-10 days. Pt reported understanding. If pts oxygen need increased she will need a walk test for [employer]. [facility] arranged with RN & PT. Will continue to follow.

## 2022-11-04 NOTE — PROGRESS NOTES
Rylee Orr  Admission Date: 11/1/2022         Daily Progress Note: 11/4/2022    The patient's chart is reviewed and the patient is discussed with the staff. Background: 68yo WF with history of PH (group 3 due to MARISA on CPAP / COPD - FEV1 49%), DVT/PE, CAD, and a fib on eliquis, supposed to be on 3L O2 with exertion at home. Admitted 11/1 with increased MCCAULEY and hypoxia to the 80's. Had recently stopped wearing her CPAP (none since April) and lasix. BNP was 700 and she was started on diuretics. CXR with cardiomegaly. Trop 340. Admitted by cardiology and pulm consulted. Had not been using her portable O2 due to inconvenience. Denied LE edema but reported wheezing. Had been using Trelegy at home but ran out. Subjective:   Feels better, is -595 cc since admission and 525 cc output for the past 24 hours I am not sure this is accurate this morning she already had 350 cc out. She tells me that she wears oxygen only at night or as needed at home, she was seen at the bedside today off oxygen accidentally after she had gone to the bathroom, her oxygen saturation was 95%. She was in no distress. She tells me that in the past on ambulation her oxygen would drop to 80% but that she normally does not wear oxygen. I am not sure whether she is supposed to be on oxygen or not based on what the patient is telling me she is giving me conflicting information.       Current Facility-Administered Medications   Medication Dose Route Frequency    apixaban (ELIQUIS) tablet 5 mg  5 mg Oral BID    flecainide (TAMBOCOR) tablet 100 mg  100 mg Oral BID    losartan (COZAAR) tablet 50 mg  50 mg Oral Daily    metoprolol succinate (TOPROL XL) extended release tablet 100 mg  100 mg Oral BID    pantoprazole (PROTONIX) tablet 40 mg  40 mg Oral QAM AC    spironolactone (ALDACTONE) tablet 50 mg  50 mg Oral Daily    sodium chloride flush 0.9 % injection 5-40 mL  5-40 mL IntraVENous 2 times per day    sodium chloride flush 0.9 % injection 5-40 mL  5-40 mL IntraVENous PRN    0.9 % sodium chloride infusion   IntraVENous PRN    ondansetron (ZOFRAN-ODT) disintegrating tablet 4 mg  4 mg Oral Q8H PRN    Or    ondansetron (ZOFRAN) injection 4 mg  4 mg IntraVENous Q6H PRN    polyethylene glycol (GLYCOLAX) packet 17 g  17 g Oral Daily PRN    acetaminophen (TYLENOL) tablet 650 mg  650 mg Oral Q6H PRN    Or    acetaminophen (TYLENOL) suppository 650 mg  650 mg Rectal Q6H PRN    potassium chloride (KLOR-CON M) extended release tablet 20 mEq  20 mEq Oral BID WC    mometasone-formoterol (DULERA) 200-5 MCG/ACT inhaler 2 puff  2 puff Inhalation BID    tiotropium (SPIRIVA RESPIMAT) 2.5 MCG/ACT inhaler 2 puff  2 puff Inhalation Daily     Review of Systems: Comprehensive ROS negative except in HPI  Objective:   Blood pressure 114/83, pulse (!) 103, temperature 97.7 °F (36.5 °C), temperature source Oral, resp. rate 17, height 5' 6\" (1.676 m), weight 201 lb 9.6 oz (91.4 kg), SpO2 96 %. Intake/Output Summary (Last 24 hours) at 11/4/2022 7276  Last data filed at 11/4/2022 0932  Gross per 24 hour   Intake 360 ml   Output 550 ml   Net -190 ml       Physical Exam:   Constitutional:  the patient is well developed and in no acute distress morbidly obese  EENMT:  Sclera clear, pupils equal, oral mucosa moist  Respiratory: symmetric chest rise. CTA B, no w/r/r  Cardiovascular:  RRR without M,G,R. There is trace lower extremity edema. Gastrointestinal: soft and non-tender; with positive bowel sounds. Musculoskeletal: warm without cyanosis. Normal muscle tone. Skin:  no jaundice or rashes, no wounds stasis dermatitis on both lower extremities noted  Neurologic: symmetric strength, fluent speech  Psychiatric:  calm, appropriate, oriented x 4    Imaging: I performed an independent interpretation of the patient's images.   CXR:     LAB:  Recent Labs     11/01/22  1413 11/02/22  0545   WBC 7.2 5.4   HGB 12.7 15.2   HCT 39.2 45.9   * 148* Recent Labs     11/01/22  1413 11/02/22  0545 11/03/22  0524 11/04/22  0413    136  136 138 138   K 3.5 3.8  3.9 3.9 4.4    99*  99* 102 104   CO2 30 30  30 31 28   BUN 15 15  16 29* 34*   CREATININE 0.90 0.90  1.00 1.20* 1.00   MG 1.9 2.2  2.2 1.9 2.0   BILITOT 0.6 0.6  0.5  --   --    AST 35 32  31  --   --    ALT 49 52  53  --   --    ALKPHOS 94 109  108  --   --        Recent Labs     11/01/22  1413 11/01/22  1758 11/02/22  0545 11/04/22  0413   TROPHS 262.6* 340.7* 209.2*  --    NTPROBNP 699*  --   --  527*       Recent Labs     11/02/22  0545 11/03/22  0524 11/04/22  0413   GLUCOSE 124*  127* 95 93        Microbiology:   No results for input(s): CULTURE in the last 72 hours. ECHO: 11/01/22    TRANSTHORACIC ECHOCARDIOGRAM (TTE) COMPLETE (CONTRAST/BUBBLE/3D PRN) 11/02/2022 12:17 PM (Final)    Interpretation Summary    Left Ventricle: Normal left ventricular systolic function with a visually estimated EF of 60 - 65%. Left ventricle size is normal. Mildly increased wall thickness. Normal wall motion. Normal diastolic function. Left Atrium: Left atrium is dilated. Right Atrium: Right atrium is dilated. Pericardium: Trivial pericardial effusion present. No indication of cardiac tamponade. Evidence includes normal LV size, no septal bounce and no chamber collapse. Technical qualifiers: Color flow Doppler was performed and pulse wave and/or continuous wave Doppler was performed. Contrast used: Definity. RVSP=38mmHg    Signed by: Torey Parker MD on 11/2/2022 12:17 PM    Assessment and Plan:  (Medical Decision Making)     Impression: Pt is a 69 yo female with a history of Pulm HTN, MARISA, COPD, chronic respiratory failure on 3L with exertion per records but not actually done by the patient, CAD, afib/prior PE/DVT on Eliquis, and HFpEF. Pt stopped taking lasix, using CPAP and O2 months ago.  She developed worsening shortness of breath on 10/30 and presented to the ER on 11/1. She was admitted for acute on chronic diastolic CHF and we were consulted to assist.     Principal Problem:    Chronic diastolic congestive heart failure ([facility]  Plan: appears euvolemic. On lasix iv bid. Active Problems:    Acute respiratory failure with hypoxia (HCC)  Plan: On 3L O2 at home with exertion. On 1L at rest. Cont to try and wean. PT seeing. I think we will need to perform exercise oximetry on room air prior to the patient's discharge to consolidate the amount of oxygen needed for the patient to function properly. It seems that she is not hypoxic at rest on room air but given the conflicting data the patient is giving me I would recommend exercise oximetry on room air prior to discharge to assess for oxygen needs and adjust oxygen levels needed accordingly. Acute congestive heart failure (HCC)  Plan: lasix and spironolactone. COPD not exacerbated ([facility]  Plan: no wheeze on exam currently. Cont inhalers. MARISA and COPD overlap syndrome ([facility]  Plan: Needs to use Trelegy and intermittent albuterol. States family cannot bring in home machine. Consult case management to make sure she has functional equipment at home. Bipap at night for now. Discussed with patient in detail. More than 50% of the time documented was spent in face-to-face contact with the patient and in the care of the patient on the floor/unit where the patient is located.     Rossi Pagan MD

## 2022-11-04 NOTE — PROGRESS NOTES
[facility] CARDIOLOGY PROGRESS NOTE           11/4/2022 7:43 AM    Admit Date: 11/1/2022    Admit Diagnosis: Paroxysmal atrial fibrillation (HCC) [I48.0]  NSTEMI (non-ST elevated myocardial infarction) ([facility] [place] [S71.4]  Chronic diastolic congestive heart failure (HCC) [I50.32]  Acute respiratory failure with hypoxia (HCC) [J96.01]  Acute congestive heart failure, unspecified heart failure type ([facility] [I50.9]      Subjective: Went back into AF overnight. Interval History: (History of pertinent interval events obtained from nursing staff)    ROS:  GEN:  No fever or chills  Cardiovascular:  As noted above  Pulmonary:  As noted above  Neuro:  No new focal motor or sensory loss      Objective:     Vitals:    11/03/22 2033 11/03/22 2114 11/04/22 0054 11/04/22 0506   BP: (!) 130/57 (!) 145/54 122/67 (!) 111/54   Pulse: 59 60 61 58   Resp: 18  20 18   Temp: 97.1 °F (36.2 °C)  97.3 °F (36.3 °C) 97.5 °F (36.4 °C)   TempSrc: Temporal  Temporal Temporal   SpO2: 94%  97% 94%   Weight:    201 lb 9.6 oz (91.4 kg)   Height:           Physical Exam:  General-Well Developed, Well Nourished, No Acute Distress, Alert & Oriented x 3, appropriate mood. Neck- supple, no JVD  CV- IRIR, no MRG  Lung- clear bilaterally  Abd- soft, nontender, nondistended  Ext- no edema bilaterally.   Skin- warm and dry    Current Facility-Administered Medications   Medication Dose Route Frequency    apixaban (ELIQUIS) tablet 5 mg  5 mg Oral BID    flecainide (TAMBOCOR) tablet 100 mg  100 mg Oral BID    losartan (COZAAR) tablet 50 mg  50 mg Oral Daily    metoprolol succinate (TOPROL XL) extended release tablet 100 mg  100 mg Oral BID    pantoprazole (PROTONIX) tablet 40 mg  40 mg Oral QAM AC    spironolactone (ALDACTONE) tablet 50 mg  50 mg Oral Daily    sodium chloride flush 0.9 % injection 5-40 mL  5-40 mL IntraVENous 2 times per day    sodium chloride flush 0.9 % injection 5-40 mL  5-40 mL IntraVENous PRN    0.9 % sodium chloride infusion   IntraVENous PRN    ondansetron (ZOFRAN-ODT) disintegrating tablet 4 mg  4 mg Oral Q8H PRN    Or    ondansetron (ZOFRAN) injection 4 mg  4 mg IntraVENous Q6H PRN    polyethylene glycol (GLYCOLAX) packet 17 g  17 g Oral Daily PRN    acetaminophen (TYLENOL) tablet 650 mg  650 mg Oral Q6H PRN    Or    acetaminophen (TYLENOL) suppository 650 mg  650 mg Rectal Q6H PRN    potassium chloride (KLOR-CON M) extended release tablet 20 mEq  20 mEq Oral BID WC    mometasone-formoterol (DULERA) 200-5 MCG/ACT inhaler 2 puff  2 puff Inhalation BID    tiotropium (SPIRIVA RESPIMAT) 2.5 MCG/ACT inhaler 2 puff  2 puff Inhalation Daily     Data Review:   Recent Results (from the past 24 hour(s))   Basic Metabolic Panel    Collection Time: 11/04/22  4:13 AM   Result Value Ref Range    Sodium 138 133 - 143 mmol/L    Potassium 4.4 3.5 - 5.1 mmol/L    Chloride 104 101 - 110 mmol/L    CO2 28 21 - 32 mmol/L    Anion Gap 6 2 - 11 mmol/L    Glucose 93 65 - 100 mg/dL    BUN 34 (H) 8 - 23 MG/DL    Creatinine 1.00 0.6 - 1.0 MG/DL    Est, Glom Filt Rate 58 (L) >60 ml/min/1.73m2    Calcium 8.5 8.3 - 10.4 MG/DL   Magnesium    Collection Time: 11/04/22  4:13 AM   Result Value Ref Range    Magnesium 2.0 1.8 - 2.4 mg/dL   Brain Natriuretic Peptide    Collection Time: 11/04/22  4:13 AM   Result Value Ref Range    NT Pro- (H) <450 PG/ML       EKG:  (EKG has been independently visualized by me with interpretation below)  Assessment:     Principal Problem:    Chronic diastolic congestive heart failure (HCC)  Active Problems:    Acute respiratory failure with hypoxia (HCC)    Acute congestive heart failure (HCC)    Coronary artery disease involving native coronary artery of native heart with angina pectoris (HCC)    Dyspnea on exertion    Essential hypertension    COPD exacerbation (HCC)    Peripheral vascular disease (HCC)    Atrial fibrillation (HCC)    MARISA and COPD overlap syndrome ([address]  Resolved Problems:    * No resolved hospital problems. *    Plan:   Acute on Chronic Hypoxic Resp Failure:  likely multi-factorial from DHF and COPD, resulted from not wearing CPAP or home oxygen and not taking home lasix. Does NOT appear to be a MI. We will follow on tele, consult pulm. 20 IV lasix today. Acute COPD Exac: consult pulm, COVID neg  Acute on Chronic DHF: IV diuresis, echo with normal EF and normal diastolic function, respiratory failure likely more 2/2 COPD and obesity. CAD/Pos trop: Demand ischemia. Trop now reduced. Does not appear to be a MI. On NOAC, will not use IV heparin. Echo with normal EF, no WMA. PAF: remain on BB, Ic for now, restart home eliquis. Back in AF overnight. Continue therapy. MARISA:  needs back on CPAP  Non-compliance:  this is main issue, needs to be compliant with home lasix and home COPD meds, CPAP and oxygen. Angeline Garcia.  Severo Golas, MD, [address] [facility] Cardiac Electrophysiology  [facility] Cardiology

## 2022-11-04 NOTE — PROGRESS NOTES
ACUTE PHYSICAL THERAPY GOALS:   (Developed with and agreed upon by patient and/or caregiver. )  LTG:  (1.)Ms. Ayana Caceres will move from supine to sit and sit to supine  in bed with SUPERVISION within 7 treatment day(s). GOAL MET 11/4/2022    (2.)Ms. Ayana Caceres will transfer from bed to chair and chair to bed with SUPERVISION using the least restrictive device within 7 treatment day(s). (3.)Ms. Ayana Caceres will ambulate with SUPERVISION for 250 feet with the least restrictive device within 7 treatment day(s). GOAL MET 11/4/2022      PHYSICAL THERAPY: Daily Note AM   (Link to Caseload Tracking: PT Visit Days : 3  Time In/Out PT Charge Capture  Rehab Caseload Tracker  Orders    Susan Maxwell is a 68 y.o. female   PRIMARY DIAGNOSIS: Chronic diastolic congestive heart failure (HCC)  Paroxysmal atrial fibrillation (HCC) [I48.0]  NSTEMI (non-ST elevated myocardial infarction) ([facility] [address] [F89.5]  Chronic diastolic congestive heart failure (HCC) [I50.32]  Acute respiratory failure with hypoxia ([facility]) [J96.01]  Acute congestive heart failure, unspecified heart failure type ([address] [I50.9]       Inpatient: Payor: MEDICARE / Plan: MEDICARE PART A AND B / Product Type: *No Product type* /     ASSESSMENT:     REHAB RECOMMENDATIONS:   Recommendation to date pending progress:  Setting:  Home Health Therapy    Equipment:    None     ASSESSMENT:  Ms. Ayana Caceres is happy to walk. She walked 2 laps of the hallway again with me using the rollator for a little support. Moving well and should be fine to return home at [place].        SUBJECTIVE:   Ms. Ayana Caceres states, \"ok\"     Social/Functional Lives With: Spouse  Type of Home: House  Home Layout: One level  Bathroom Shower/Tub: Tub/Shower unit  Bathroom Equipment: Built-in shower seat  Home Equipment: Rollator  Receives Help From: Family  ADL Assistance: Independent  Ambulation Assistance: Independent  Active : Yes  Mode of Transportation: Car  Occupation: Retired  OBJECTIVE:     PAIN: VITALS / O2: PRECAUTION / [employer] / DRAINS:   Pre Treatment:          Post Treatment: 0 Vitals        Oxygen    Purewick    RESTRICTIONS/PRECAUTIONS:  Restrictions/Precautions  Restrictions/Precautions: Fall Risk  Restrictions/Precautions: Fall Risk     MOBILITY: I Mod I S SBA CGA Min Mod Max Total  NT x2 Comments:   Bed Mobility    Rolling [] [] [] [] [] [] [] [] [] [] []    Supine to Sit [] [x] [] [] [] [] [] [] [] [] []    Scooting [x] [] [] [] [] [] [] [] [] [] []    Sit to Supine [] [] [] [] [] [] [] [] [] [] []    Transfers    Sit to Stand [] [x] [] [] [] [] [] [] [] [] []    Bed to Chair [] [x] [] [] [] [] [] [] [] [] []    Stand to Sit [x] [] [] [] [] [] [] [] [] [] []     [] [] [] [] [] [] [] [] [] [] []    I=Independent, Mod I=Modified Independent, S=Supervision, SBA=Standby Assistance, CGA=Contact Guard Assistance,   Min=Minimal Assistance, Mod=Moderate Assistance, Max=Maximal Assistance, Total=Total Assistance, NT=Not Tested    BALANCE: Good Fair+ Fair Fair- Poor NT Comments   Sitting Static [x] [] [] [] [] []    Sitting Dynamic [x] [] [] [] [] []              Standing Static [] [x] [] [] [] []    Standing Dynamic [] [x] [] [] [] []      GAIT: I Mod I S SBA CGA Min Mod Max Total  NT x2 Comments:   Level of Assistance [] [x] [] [] [] [] [] [] [] [] []    Distance 500 feet    DME Rollator    Gait Quality good    Weightbearing Status      Stairs      I=Independent, Mod I=Modified Independent, S=Supervision, SBA=Standby Assistance, CGA=Contact Guard Assistance,   Min=Minimal Assistance, Mod=Moderate Assistance, Max=Maximal Assistance, Total=Total Assistance, NT=Not Tested    PLAN:   FREQUENCY AND DURATION: 3 times/week for duration of hospital stay or until stated goals are met, whichever comes first.    TREATMENT:   TREATMENT:   Therapeutic Activity (10 Minutes):  Therapeutic activity included Supine to Sit, Transfer Training, Ambulation on level ground, and Standing balance to improve functional Activity tolerance, Balance, Mobility, and Strength.     TREATMENT GRID:  N/A    AFTER TREATMENT PRECAUTIONS: Bed/Chair Locked, Call light within reach, Chair, Needs within reach, and RN notified    INTERDISCIPLINARY COLLABORATION:  RN/ PCT and PT/ PTA    EDUCATION:      TIME IN/OUT:  Time In: 1020  Time Out: 1030  Minutes: 10    Sujey Adhikari PTA

## 2022-11-05 VITALS
TEMPERATURE: 98.2 F | DIASTOLIC BLOOD PRESSURE: 63 MMHG | SYSTOLIC BLOOD PRESSURE: 109 MMHG | HEART RATE: 99 BPM | OXYGEN SATURATION: 95 % | BODY MASS INDEX: 32.95 KG/M2 | RESPIRATION RATE: 17 BRPM | WEIGHT: 205 LBS | HEIGHT: 66 IN

## 2022-11-05 LAB
ANION GAP SERPL CALC-SCNC: 5 MMOL/L (ref 2–11)
BUN SERPL-MCNC: 29 MG/DL (ref 8–23)
CALCIUM SERPL-MCNC: 8.5 MG/DL (ref 8.3–10.4)
CHLORIDE SERPL-SCNC: 105 MMOL/L (ref 101–110)
CO2 SERPL-SCNC: 27 MMOL/L (ref 21–32)
CREAT SERPL-MCNC: 1.1 MG/DL (ref 0.6–1)
GLUCOSE SERPL-MCNC: 101 MG/DL (ref 65–100)
MAGNESIUM SERPL-MCNC: 1.9 MG/DL (ref 1.8–2.4)
POTASSIUM SERPL-SCNC: 4.6 MMOL/L (ref 3.5–5.1)
SODIUM SERPL-SCNC: 137 MMOL/L (ref 133–143)

## 2022-11-05 PROCEDURE — 80048 BASIC METABOLIC PNL TOTAL CA: CPT

## 2022-11-05 PROCEDURE — 94760 N-INVAS EAR/PLS OXIMETRY 1: CPT

## 2022-11-05 PROCEDURE — 99238 HOSP IP/OBS DSCHRG MGMT 30/<: CPT | Performed by: INTERNAL MEDICINE

## 2022-11-05 PROCEDURE — 99232 SBSQ HOSP IP/OBS MODERATE 35: CPT | Performed by: INTERNAL MEDICINE

## 2022-11-05 PROCEDURE — 94761 N-INVAS EAR/PLS OXIMETRY MLT: CPT

## 2022-11-05 PROCEDURE — 94660 CPAP INITIATION&MGMT: CPT

## 2022-11-05 PROCEDURE — 6370000000 HC RX 637 (ALT 250 FOR IP): Performed by: INTERNAL MEDICINE

## 2022-11-05 PROCEDURE — 94640 AIRWAY INHALATION TREATMENT: CPT

## 2022-11-05 PROCEDURE — 83735 ASSAY OF MAGNESIUM: CPT

## 2022-11-05 PROCEDURE — 6370000000 HC RX 637 (ALT 250 FOR IP): Performed by: PHYSICIAN ASSISTANT

## 2022-11-05 PROCEDURE — 36415 COLL VENOUS BLD VENIPUNCTURE: CPT

## 2022-11-05 PROCEDURE — 2580000003 HC RX 258: Performed by: INTERNAL MEDICINE

## 2022-11-05 RX ADMIN — SPIRONOLACTONE 50 MG: 25 TABLET ORAL at 08:59

## 2022-11-05 RX ADMIN — SODIUM CHLORIDE, PRESERVATIVE FREE 10 ML: 5 INJECTION INTRAVENOUS at 08:59

## 2022-11-05 RX ADMIN — PANTOPRAZOLE SODIUM 40 MG: 40 TABLET, DELAYED RELEASE ORAL at 05:06

## 2022-11-05 RX ADMIN — Medication 30 ML: at 13:54

## 2022-11-05 RX ADMIN — LOSARTAN POTASSIUM 50 MG: 50 TABLET, FILM COATED ORAL at 08:59

## 2022-11-05 RX ADMIN — FLECAINIDE ACETATE 100 MG: 100 TABLET ORAL at 08:59

## 2022-11-05 RX ADMIN — MOMETASONE FUROATE AND FORMOTEROL FUMARATE DIHYDRATE 2 PUFF: 200; 5 AEROSOL RESPIRATORY (INHALATION) at 08:47

## 2022-11-05 RX ADMIN — POTASSIUM CHLORIDE 20 MEQ: 1500 TABLET, EXTENDED RELEASE ORAL at 08:59

## 2022-11-05 RX ADMIN — TIOTROPIUM BROMIDE INHALATION SPRAY 2 PUFF: 3.12 SPRAY, METERED RESPIRATORY (INHALATION) at 08:47

## 2022-11-05 RX ADMIN — APIXABAN 5 MG: 5 TABLET, FILM COATED ORAL at 08:59

## 2022-11-05 RX ADMIN — METOPROLOL SUCCINATE 100 MG: 100 TABLET, EXTENDED RELEASE ORAL at 08:59

## 2022-11-05 ASSESSMENT — PAIN SCALES - GENERAL
PAINLEVEL_OUTOF10: 0

## 2022-11-05 ASSESSMENT — 6 MINUTE WALK TEST (6MWT)
O2 SATURATION: 90
BORG DYSPNEA SCALE SCORE: 2
DID PATIENT STOP OR PAUSE BEFORE 6 MINUTES?: YES
BORG FATIGUE SCALE SCORE: 5
OXYGEN DEVICE: ROOM AIR
O2 FLOW RATE (L/MIN): 0
HEART RATE: 78
O2 SATURATION: 95
SYMPTOMS: SOB
O2 SATURATION: 93
WHY PATIENT STOPPED OR PAUSED: FATIGUE/SOB
ANY PROBLEMS WHILE EXERCISING?: FATIGUE
HEART RATE: 106
O2 SATURATION: 96
BORG FATIGUE SCALE SCORE: 3
O2 FLOW RATE (L/MIN): 0
BORG DYSPNEA SCALE SCORE: 3
BORG FATIGUE SCALE SCORE: 2
HEART RATE: 97
BORG DYSPNEA SCALE SCORE: 5
ADDTIONAL O2 FLOW RATE (L/MIN): NO
HEART RATE: 86

## 2022-11-05 NOTE — PROGRESS NOTES
11/05/22 1156   Data Measured Before Walk   Height 5' 6\" (1.676 m)   Weight 205 lb (93 kg)   HR 78   O2 Saturation 96   O2 Device Room air   Zan Dyspnea Scale 2   Zan Fatigue Scale 2   Data Measured During the Walk   Heart Rate 106   O2 Flow Rate (l/min) 0 l/min   O2 Saturation 90   Need Additional O2 Flow Rate Rows No   Symptoms SOB   Any Problems While Exercising fatigue   Zan Dyspnea Scale 5   Zan Fatigue Scale 5   Data Measured Immediately After Walk   Did Patient Stop or Pause Before 6 Minutes Yes   Why Patient Stopped or Paused fatigue/SOB   Predicted Distance (m) 363 Meters   Heart Rate 97   O2 Flow Rate (l/min) 0 l/min   O2 Saturation 93   Zan Dyspnea Scale 3   Zan Fatigue Scale 3   Data Measured 5 Minutes After Walk   Heart Rate 86   O2 Saturation 95

## 2022-11-05 NOTE — PROGRESS NOTES
Rylee Orr  Admission Date: 11/1/2022         Daily Progress Note: 11/5/2022    The patient's chart is reviewed and the patient is discussed with the staff. Background: 66yo WF with history of PH (group 3 due to MARISA on CPAP / COPD - FEV1 49%), DVT/PE, CAD, and a fib on eliquis, supposed to be on 3L O2 with exertion at home. Admitted 11/1 with increased MCCAULEY and hypoxia to the 80's. Had recently stopped wearing her CPAP (none since April) and lasix. BNP was 700 and she was started on diuretics. CXR with cardiomegaly. Trop 340. Admitted by cardiology and pulm consulted. Had not been using her portable O2 due to inconvenience. Denied LE edema but reported wheezing. Had been using Trelegy at home but ran out. Subjective: Total U/O 1050cc -975cc overall. No major overnight events.         Current Facility-Administered Medications   Medication Dose Route Frequency    bismuth subsalicylate (PEPTO BISMOL) 262 MG/15ML suspension 30 mL  30 mL Oral Q6H PRN    apixaban (ELIQUIS) tablet 5 mg  5 mg Oral BID    flecainide (TAMBOCOR) tablet 100 mg  100 mg Oral BID    losartan (COZAAR) tablet 50 mg  50 mg Oral Daily    metoprolol succinate (TOPROL XL) extended release tablet 100 mg  100 mg Oral BID    pantoprazole (PROTONIX) tablet 40 mg  40 mg Oral QAM AC    spironolactone (ALDACTONE) tablet 50 mg  50 mg Oral Daily    sodium chloride flush 0.9 % injection 5-40 mL  5-40 mL IntraVENous 2 times per day    sodium chloride flush 0.9 % injection 5-40 mL  5-40 mL IntraVENous PRN    0.9 % sodium chloride infusion   IntraVENous PRN    ondansetron (ZOFRAN-ODT) disintegrating tablet 4 mg  4 mg Oral Q8H PRN    Or    ondansetron (ZOFRAN) injection 4 mg  4 mg IntraVENous Q6H PRN    polyethylene glycol (GLYCOLAX) packet 17 g  17 g Oral Daily PRN    acetaminophen (TYLENOL) tablet 650 mg  650 mg Oral Q6H PRN    Or    acetaminophen (TYLENOL) suppository 650 mg  650 mg Rectal Q6H PRN    potassium chloride (KLOR-CON M) extended release tablet 20 mEq  20 mEq Oral BID WC    mometasone-formoterol (DULERA) 200-5 MCG/ACT inhaler 2 puff  2 puff Inhalation BID    tiotropium (SPIRIVA RESPIMAT) 2.5 MCG/ACT inhaler 2 puff  2 puff Inhalation Daily     Review of Systems: Comprehensive ROS negative except in HPI  Objective:   Blood pressure 138/69, pulse 93, temperature 97.7 °F (36.5 °C), temperature source Oral, resp. rate 18, height 5' 6\" (1.676 m), weight 200 lb 8 oz (90.9 kg), SpO2 92 %. Intake/Output Summary (Last 24 hours) at 11/5/2022 1115  Last data filed at 11/5/2022 0815  Gross per 24 hour   Intake 670 ml   Output 900 ml   Net -230 ml       Physical Exam:   Constitutional:  the patient is well developed and in no acute distress morbidly obese  EENMT:  Sclera clear, pupils equal, oral mucosa moist  Respiratory: symmetric chest rise. CTA B, no w/r/r  Cardiovascular:  RRR without M,G,R. There is trace lower extremity edema. Gastrointestinal: soft and non-tender; with positive bowel sounds. Musculoskeletal: warm without cyanosis. Normal muscle tone. Skin:  no jaundice or rashes, no wounds stasis dermatitis on both lower extremities noted  Neurologic: symmetric strength, fluent speech  Psychiatric:  calm, appropriate, oriented x 4    Imaging: I performed an independent interpretation of the patient's images. CXR:     LAB:  No results for input(s): WBC, HGB, HCT, PLT, INR, PROCAL, LACTATE in the last 72 hours. Recent Labs     11/03/22 0524 11/04/22 0413 11/05/22 0554    138 137   K 3.9 4.4 4.6    104 105   CO2 31 28 27   BUN 29* 34* 29*   CREATININE 1.20* 1.00 1. 10*   MG 1.9 2.0 1.9       Recent Labs     11/04/22 0413   NTPROBNP 527*       Recent Labs     11/03/22 0524 11/04/22 0413 11/05/22  0554   GLUCOSE 95 93 101*        Microbiology:   No results for input(s): CULTURE in the last 72 hours.   ECHO: 11/01/22    TRANSTHORACIC ECHOCARDIOGRAM (TTE) COMPLETE (CONTRAST/BUBBLE/3D PRN) 11/02/2022 12:17 PM (Final)    Interpretation Summary    Left Ventricle: Normal left ventricular systolic function with a visually estimated EF of 60 - 65%. Left ventricle size is normal. Mildly increased wall thickness. Normal wall motion. Normal diastolic function. Left Atrium: Left atrium is dilated. Right Atrium: Right atrium is dilated. Pericardium: Trivial pericardial effusion present. No indication of cardiac tamponade. Evidence includes normal LV size, no septal bounce and no chamber collapse. Technical qualifiers: Color flow Doppler was performed and pulse wave and/or continuous wave Doppler was performed. Contrast used: Definity. RVSP=38mmHg    Signed by: Floyd Grant MD on 11/2/2022 12:17 PM    Assessment and Plan:  (Medical Decision Making)     Impression: Pt is a 67 yo female with a history of Pulm HTN, MARISA, COPD, chronic respiratory failure on 3L with exertion per records but not actually done by the patient, CAD, afib/prior PE/DVT on Eliquis, and HFpEF. Pt stopped taking lasix, using CPAP and O2 months ago. She developed worsening shortness of breath on 10/30 and presented to the ER on 11/1. She was admitted for acute on chronic diastolic CHF and we were consulted to assist.     Principal Problem:    Chronic diastolic congestive heart failure ([address]  Plan: appears euvolemic. On lasix iv bid. Active Problems:    Acute respiratory failure with hypoxia (HCC)  Plan: It seems that she is not hypoxic at rest on room air but given the conflicting data the patient is giving me I would recommend exercise oximetry on room air prior to discharge to assess for oxygen needs and adjust oxygen levels needed accordingly. Acute congestive heart failure (HCC)  Plan: lasix and spironolactone. COPD not exacerbated ([address]  Plan: no wheeze on exam currently. Cont inhalers. MARISA and COPD overlap syndrome ([address]  Plan: Needs to use Trelegy and intermittent albuterol.  States family cannot bring in home machine. Consult case management to make sure she has functional equipment at home. Bipap at night for now. Will see next on Monday if still in the hospital    Discussed with patient in detail. More than 50% of the time documented was spent in face-to-face contact with the patient and in the care of the patient on the floor/unit where the patient is located.     Sulma Russo MD

## 2022-11-05 NOTE — DISCHARGE SUMMARY
[facility] Cardiology Discharge Summary     Patient ID:  Kristina Maloney  [id number]  89 y.o.  1945    Admit date: 11/1/2022    Discharge date and time:  11/5/2022    Admitting Physician: Mariana Johnston DO     Discharge Physician: Dr. Gian Selby    Admission Diagnoses: Paroxysmal atrial fibrillation ([facility] [I48.0]  NSTEMI (non-ST elevated myocardial infarction) [facility]) [D33.3]  Chronic diastolic congestive heart failure (HCC) [I50.32]  Acute respiratory failure with hypoxia ([address] [J96.01]  Acute congestive heart failure, unspecified heart failure type ([facility] [I50.9]    Discharge Diagnoses:   Patient Active Problem List    Diagnosis    Acute respiratory failure with hypoxia (HCC)    Acute diastolic congestive heart failure (HCC)    Coronary artery disease involving native coronary artery of native heart with angina pectoris (HCC)    Chronic diastolic congestive heart failure (HCC)    Severe obesity (HCC)    Pulmonary hypertension (HCC)    Diastolic dysfunction    Intolerance of continuous positive airway pressure (CPAP) ventilation    Class 1 obesity in adult    MARISA and COPD overlap syndrome (HCC)    Obesity (BMI 30-39.9)    MARISA (obstructive sleep apnea)    Dyspnea on exertion    Essential hypertension    HLD (hyperlipidemia)    Atrial fibrillation ([address]    COPD exacerbation [facility])     Cardiology Procedures this admission:   ECHO  Consults: pulmonary/intensive care    Hospital Course: Patient presented is a 68yo WF with h/o Stage 3 severe COPD, pHTN from COPD and MARISA, mod CAD by [facility] 2/2022, [facility] who is being admitted for hypoxic resp failure. She has been having worsening MCCAULEY, SOB and sats in the 80s at home. Of note, she became tired of wearing oxygen and CPAP and stopped them some time ago. She also stopped her home lasix because \"I am lazy and did not want to use the bathroom\". She stays at home, taking care of her spouse.  Patient was admitted for acute on chronic respiratory failure with hypoxia likely multifactorial from DHF and COPD. Pt was started on IV diuresis with Lasix BID. Pt had an echocardiogram showing:   Left Ventricle Normal left ventricular systolic function with a visually estimated EF of 60 - 65%. Left ventricle size is normal. Mildly increased wall thickness. Normal wall motion. Normal diastolic function. Left Atrium Left atrium is dilated. Right Ventricle Right ventricle size is normal. Normal systolic function. Right Atrium Right atrium is dilated. Aortic Valve Calcified cusp. No regurgitation. No stenosis. Mitral Valve Valve structure is normal. Trace regurgitation. No stenosis noted. Tricuspid Valve Valve structure is normal. Mild to moderate regurgitation. No stenosis noted. Pulmonic Valve Valve structure is normal. No regurgitation. No stenosis noted. Aorta Normal sized aorta. IVC/Hepatic Veins IVC diameter is greater than 21 mm and decreases greater than 50% during inspiration; therefore the estimated right atrial pressure is intermediate (~8 mmHg). Pericardium Trivial pericardial effusion present. No indication of cardiac tamponade. Evidence includes normal LV size, no septal bounce and no chamber collapse. Pulmonary consulted for acute on chronic respiratory failure. Felt secondary to noncompliance. Stressed compliance. Pt had little response to IV diuresis and was changed to PO Lasix on 11-4. Pt only net negative ~935 . Felt non-compliance main issue and respiratory failure likely more CODP/obesity. On the morning of discharge, Pt was up feeling well without any complaints of CP, SOB, palpitations or LE swelling. Pt's labs were stable. Pt was seen and examined by Dr. Fern Donnelly and determined stable and ready for discharge. Pt was instructed on the importance of weighing self daily. If Pt gains more than 2 lbs overnight or 5 lbs in one week, Pt is instructed to call [facility] Cardiology at [phone].  For maximized medical therapy of congestive heart failure, patient will continue use of BB and ACE-I. The patient has been scheduled  follow up with [facility]iology -Pt was given lab slip for a BMP/Mag in one week. Follow up with pulmonary in 8 weeks. DISPOSITION: The patient is being discharged home in stable condition on a low saturated fat, low cholesterol and low salt diet. Pt is instructed to follow a fluid restricted diet with no more than 2 liters per day. Pt is instructed to advance activities as tolerated limited to fatigue or shortness of breath. Pt is instructed to call office or return to ER for immediate evaluation of any shortness of breath or chest pain. Discharge Exam: /63   Pulse 99   Temp 98.2 °F (36.8 °C) (Oral)   Resp 17   Ht 5' 6\" (1.676 m)   Wt 205 lb (93 kg)   SpO2 95%   BMI 33.09 kg/m²    Pt has been seen by Feliz: see his progress note for exam details.     Recent Results (from the past 24 hour(s))   Basic Metabolic Panel    Collection Time: 11/05/22  5:54 AM   Result Value Ref Range    Sodium 137 133 - 143 mmol/L    Potassium 4.6 3.5 - 5.1 mmol/L    Chloride 105 101 - 110 mmol/L    CO2 27 21 - 32 mmol/L    Anion Gap 5 2 - 11 mmol/L    Glucose 101 (H) 65 - 100 mg/dL    BUN 29 (H) 8 - 23 MG/DL    Creatinine 1.10 (H) 0.6 - 1.0 MG/DL    Est, Glom Filt Rate 52 (L) >60 ml/min/1.73m2    Calcium 8.5 8.3 - 10.4 MG/DL   Magnesium    Collection Time: 11/05/22  5:54 AM   Result Value Ref Range    Magnesium 1.9 1.8 - 2.4 mg/dL     Patient Instructions:   Current Discharge Medication List        CONTINUE these medications which have NOT CHANGED    Details   fluticasone-umeclidin-vilant (TRELEGY ELLIPTA) 100-62.5-25 MCG/INH AEPB 1 INHALATION DAILY, RINSE MOUTH AFTER USE  Qty: 60 each, Refills: 11    Comments: REFILLS REQUESTED      rOPINIRole (REQUIP) 0.5 MG tablet TAKE 1 TABLET BY MOUTH EVERY DAY AT NIGHT  Qty: 90 tablet, Refills: 3      spironolactone (ALDACTONE) 50 MG tablet TAKE 1 TABLET BY MOUTH EVERY DAY  Qty: 90 tablet, Refills: 3 OXYGEN 3 L/min by Nasal route      acetaminophen (TYLENOL) 325 MG tablet Take 325 mg by mouth every 6 hours as needed      apixaban (ELIQUIS) 5 MG TABS tablet Take 5 mg by mouth 2 times daily      colestipol (COLESTID) 1 g tablet Take 1 g by mouth 2 times daily      flecainide (TAMBOCOR) 100 MG tablet Take 100 mg by mouth 2 times daily      losartan (COZAAR) 50 MG tablet TAKE 1 TABLET BY MOUTH EVERY DAY      methylPREDNISolone (MEDROL DOSEPACK) 4 MG tablet As directed per dose pack      metoprolol succinate (TOPROL XL) 100 MG extended release tablet Take 100 mg by mouth 2 times daily      omeprazole (PRILOSEC) 40 MG delayed release capsule TAKE 1 CAPSULE BY MOUTH EVERY DAY      promethazine (PHENERGAN) 25 MG tablet Take 25 mg by mouth every 6 hours as needed             Signed:  NORA Silva  11/5/2022  1:19 PM

## 2022-11-05 NOTE — CARE COORDINATION
Pt is for discharge home today with family. Referral called/sent to [facility] [place] for follow up home care as ordered. Per chart pt has home O2 in place with [employer]. She also has a CPAP and rollator. No additional CM orders received or supportive care needs expressed at this time. 11/05/22 1459   Social/Functional History   Lives With Spouse   Type of [address] One level   Bathroom Shower/Tub Tub/Shower unit   [address] Help From Family   ADL Assistance Independent   Ambulation Assistance Independent   Active  Yes   Mode of Transportation Car   Occupation Retired   Services At/After Discharge   Transition of [address] ([address] Home Health;Discharge [address] Discharge Home Health;DME  (Referral to [facility] [place]. Pt has home O2 in place from [address] [place] [address]. Pt also has a CPAP and a rollator for home use.)   [address] Provided? No   Mode of Transport at Discharge Other (see comment)  (family)   Confirm Follow Up Transport Family   Condition of Participation: Discharge Planning   The Plan for Transition of Care is related to the following treatment goals: Pt will need home health care services to return to her functional baseline. The Patient and/or Patient Representative was provided with a Choice of Provider? Patient   The Patient and/Or Patient Representative agree with the Discharge Plan? Yes   Freedom of Choice list was provided with basic dialogue that supports the patient's individualized plan of care/goals, treatment preferences, and shares the quality data associated with the providers?   Yes

## 2022-11-05 NOTE — PROGRESS NOTES
[facility] CARDIOLOGY PROGRESS NOTE           11/5/2022 7:37 AM    Admit Date: 11/1/2022    Admit Diagnosis: Paroxysmal atrial fibrillation ([facility]) [I48.0]  NSTEMI (non-ST elevated myocardial infarction) (HCC) [B19.8]  Chronic diastolic congestive heart failure (HCC) [I50.32]  Acute respiratory failure with hypoxia ([facility]) [J96.01]  Acute congestive heart failure, unspecified heart failure type (HCC) [I50.9]      Subjective:   No complaints this AM, no chest pain or shortness of breath    Interval History: (History of pertinent interval events obtained from nursing staff)    ROS:  GEN:  No fever or chills  Cardiovascular:  As noted above  Pulmonary:  As noted above  Neuro:  No new focal motor or sensory loss      Objective:     Vitals:    11/04/22 2334 11/05/22 0130 11/05/22 0425 11/05/22 0500   BP: 95/68   93/69   Pulse: 95   99   Resp: 20 24 15 20   Temp: 98.2 °F (36.8 °C)   98.1 °F (36.7 °C)   TempSrc: Oral   Axillary   SpO2: 98%   97%   Weight:    200 lb 8 oz (90.9 kg)   Height:           Physical Exam:  General- NAD  Neck- supple, no JVD  CV- irreg irreg no MRG  Lung- clear bilaterally  Abd- soft, nontender, nondistended  Ext- no edema bilaterally.   Skin- warm and dry    Current Facility-Administered Medications   Medication Dose Route Frequency    bismuth subsalicylate (PEPTO BISMOL) 262 MG/15ML suspension 30 mL  30 mL Oral Q6H PRN    apixaban (ELIQUIS) tablet 5 mg  5 mg Oral BID    flecainide (TAMBOCOR) tablet 100 mg  100 mg Oral BID    losartan (COZAAR) tablet 50 mg  50 mg Oral Daily    metoprolol succinate (TOPROL XL) extended release tablet 100 mg  100 mg Oral BID    pantoprazole (PROTONIX) tablet 40 mg  40 mg Oral QAM AC    spironolactone (ALDACTONE) tablet 50 mg  50 mg Oral Daily    sodium chloride flush 0.9 % injection 5-40 mL  5-40 mL IntraVENous 2 times per day    sodium chloride flush 0.9 % injection 5-40 mL  5-40 mL IntraVENous PRN    0.9 % sodium chloride infusion   IntraVENous PRN ondansetron (ZOFRAN-ODT) disintegrating tablet 4 mg  4 mg Oral Q8H PRN    Or    ondansetron (ZOFRAN) injection 4 mg  4 mg IntraVENous Q6H PRN    polyethylene glycol (GLYCOLAX) packet 17 g  17 g Oral Daily PRN    acetaminophen (TYLENOL) tablet 650 mg  650 mg Oral Q6H PRN    Or    acetaminophen (TYLENOL) suppository 650 mg  650 mg Rectal Q6H PRN    potassium chloride (KLOR-CON M) extended release tablet 20 mEq  20 mEq Oral BID WC    mometasone-formoterol (DULERA) 200-5 MCG/ACT inhaler 2 puff  2 puff Inhalation BID    tiotropium (SPIRIVA RESPIMAT) 2.5 MCG/ACT inhaler 2 puff  2 puff Inhalation Daily     Data Review:   Recent Results (from the past 24 hour(s))   Basic Metabolic Panel    Collection Time: 11/05/22  5:54 AM   Result Value Ref Range    Sodium 137 133 - 143 mmol/L    Potassium 4.6 3.5 - 5.1 mmol/L    Chloride 105 101 - 110 mmol/L    CO2 27 21 - 32 mmol/L    Anion Gap 5 2 - 11 mmol/L    Glucose 101 (H) 65 - 100 mg/dL    BUN 29 (H) 8 - 23 MG/DL    Creatinine 1.10 (H) 0.6 - 1.0 MG/DL    Est, Glom Filt Rate 52 (L) >60 ml/min/1.73m2    Calcium 8.5 8.3 - 10.4 MG/DL   Magnesium    Collection Time: 11/05/22  5:54 AM   Result Value Ref Range    Magnesium 1.9 1.8 - 2.4 mg/dL       EKG:  (EKG has been independently visualized by me with interpretation below)  Assessment:     Principal Problem:    Chronic diastolic congestive heart failure (HCC)  Active Problems:    Acute respiratory failure with hypoxia (HCC)    Acute congestive heart failure (HCC)    Coronary artery disease involving native coronary artery of native heart with angina pectoris (HCC)    Dyspnea on exertion    Essential hypertension    COPD exacerbation (HCC)    Peripheral vascular disease (HCC)    Atrial fibrillation (HCC)    MARISA and COPD overlap syndrome ([address]  Resolved Problems:    * No resolved hospital problems.  *    Plan:   Acute on Chronic Hypoxic Resp Failure:  likely multi-factorial from DHF and COPD, resulted from not wearing CPAP or home oxygen and not taking home lasix. Does NOT appear to be a MI. Pulmonary following   Acute COPD Exac: COVID neg, per pulmonary  Acute on Chronic DHF: IV diuresis, echo with normal EF and normal diastolic function, respiratory failure likely more 2/2 COPD and obesity. CAD/Pos trop: Demand ischemia. Trop now reduced. On NOAC, Echo with normal EF, no WMA. PAF: remain on BB, Ic for now, eliquis. Back in AF, Continue therapy. MARISA:  needs back on CPAP      David Feliz MD  Cardiology/Electrophysiology

## 2022-11-05 NOTE — DISCHARGE INSTRUCTIONS
Pt was instructed on the importance of weighing self daily. If Pt gains more than 2 lbs overnight or 5 lbs in one week, Pt is instructed to call [facility] [address] Cardiology at [phone]. For maximized medical therapy of congestive heart failure, patient will continue use of BB and ACE-I. The patient has been scheduled  follow up with [facility] [address] Cardiology -Pt was given lab slip for a BMP/Mag in one week. Follow up with pulmonary in 8 weeks. DISPOSITION: The patient is being discharged home in stable condition on a low saturated fat, low cholesterol and low salt diet. Pt is instructed to follow a fluid restricted diet with no more than 2 liters per day. Pt is instructed to advance activities as tolerated limited to fatigue or shortness of breath. Pt is instructed to call office or return to ER for immediate evaluation of any shortness of breath or chest pain.

## 2022-11-07 ENCOUNTER — TELEPHONE (OUTPATIENT)
Dept: FAMILY MEDICINE CLINIC | Facility: CLINIC | Age: 77
End: 2022-11-07

## 2022-11-07 ENCOUNTER — TELEPHONE (OUTPATIENT)
Dept: CARDIOLOGY CLINIC | Age: 77
End: 2022-11-07

## 2022-11-07 RX ORDER — TIZANIDINE 4 MG/1
4 TABLET ORAL 3 TIMES DAILY PRN
Qty: 21 TABLET | Refills: 0 | Status: SHIPPED | OUTPATIENT
Start: 2022-11-07 | End: 2022-12-01

## 2022-11-07 RX ORDER — LOSARTAN POTASSIUM 50 MG/1
TABLET ORAL
Qty: 90 TABLET | Refills: 0 | Status: SHIPPED | OUTPATIENT
Start: 2022-11-07 | End: 2022-11-22 | Stop reason: SDUPTHER

## 2022-11-07 NOTE — TELEPHONE ENCOUNTER
Began lasix 40 mg qd on 10/31/22. Lasix was not on list of current meds when D/C'd from [facility] after 11/1/22-11/5/22 hospitalization in [facility] for PAF, chronic diastolic CHF, acute CHF, and acute respiratory failure. Is taking spironolactone 50 mg every other day. Feeling okay with no SOB and no peripheral edema. Scheduled to see Dr. Nicki Mixon on 11/11/22. Patient asks if she should take lasix 40 mg qd.

## 2022-11-07 NOTE — TELEPHONE ENCOUNTER
In reading through the discharge summary it looks like they did for her to stay on Lasix by mouth, she may want to call the cardiology office to confirm that as they did want her to call them and schedule a follow-up soon with them and to have labs done in 1 week.   Would probably stay with just Tylenol Extra Strength for now regarding her back pain as it would be safe with some of her other medication she currently is taking

## 2022-11-07 NOTE — TELEPHONE ENCOUNTER
Patient stated she has been taking tylenol and it is \"not doing a bit of good, patient can not stand pain. \" Patient does have f/u with cardiology on Friday.

## 2022-11-07 NOTE — TELEPHONE ENCOUNTER
Sent in prescription for:     Requested Prescriptions     Signed Prescriptions Disp Refills    tiZANidine (ZANAFLEX) 4 MG tablet 21 tablet 0     Sig: Take 1 tablet by mouth 3 times daily as needed (back pain)     Authorizing Provider: Oswaldo Luna         Which is a muscle relaxant helps with pain and hopefully that will give her more relief and she can continue taking Tylenol as directed

## 2022-11-07 NOTE — TELEPHONE ENCOUNTER
Patient has been in the hospital at [facility]. with fluid on her lungs. She is asking if she should be taking her lasix.

## 2022-11-07 NOTE — TELEPHONE ENCOUNTER
[facility] Transitions Initial Follow Up Call    Outreach made within 2 business days of discharge: Yes    Patient: Dalia Archibald Patient : 1945   MRN: [id number]  Reason for Admission: AFIB and Fluid on Lungs Discharge Date: 22       Spoke with: Mrs. Jhonathan Cornell    Discharge department/facility: [address] [place] [address] Interactive Patient Contact:  Was patient able to fill all prescriptions: Yes  Was patient instructed to bring all medications to the follow-up visit: Yes  Is patient taking all medications as directed in the discharge summary?  Yes  Does patient understand their discharge instructions: Yes  Does patient have questions or concerns that need addressed prior to 7-14 day follow up office visit: no    Scheduled appointment with PCP within 7-14 days    Follow Up  Future Appointments   Date Time Provider Tiera Wang   2022 11:45 AM Gary Chandler MD [facility] AMB   2022 11:00 AM Toby العلي MD [facility]PA [facility] AMB   2022  9:45 AM Albina Carlos MD [facility] AMB   2023 10:30 AM PP NURSE ONLY PPS [facility] AMB   2023 11:10 AM Chetan Del Rosario MD PPS [facility] AMB       Stephanie Smith LPN Tea colored urine and blood tinge in Veronica bag. Patient doesn't have nasogastric only peg tube.  Received patient with peg, rectal tube, and primafit, and trach-collar.    Giving all oral meds via peg.

## 2022-11-07 NOTE — TELEPHONE ENCOUNTER
Patient stated she was discharged from hospital and is having pain in lower back and is requesting a medication be sent to [facility] in [place]. Patient also stated that one sheet says she needs to be taking lasix and one says do not. Patient has not been taking lasix. Can you review while PCP is out so I can inform patient.

## 2022-11-08 ENCOUNTER — TELEPHONE (OUTPATIENT)
Dept: CARDIOLOGY CLINIC | Age: 77
End: 2022-11-08

## 2022-11-08 ENCOUNTER — HOME CARE VISIT (OUTPATIENT)
Dept: SCHEDULING | Facility: HOME HEALTH | Age: 77
End: 2022-11-08
Payer: MEDICARE

## 2022-11-08 VITALS
HEART RATE: 76 BPM | SYSTOLIC BLOOD PRESSURE: 120 MMHG | TEMPERATURE: 98 F | DIASTOLIC BLOOD PRESSURE: 63 MMHG | RESPIRATION RATE: 18 BRPM | OXYGEN SATURATION: 97 %

## 2022-11-08 PROCEDURE — 1090000001 HH PPS REVENUE CREDIT

## 2022-11-08 PROCEDURE — G0299 HHS/HOSPICE OF RN EA 15 MIN: HCPCS

## 2022-11-08 PROCEDURE — 400013 HH SOC

## 2022-11-08 PROCEDURE — 0221000100 HH NO PAY CLAIM PROCEDURE

## 2022-11-08 PROCEDURE — 1090000002 HH PPS REVENUE DEBIT

## 2022-11-08 ASSESSMENT — ENCOUNTER SYMPTOMS
STOOL DESCRIPTION: LOOSE
DYSPNEA ACTIVITY LEVEL: AFTER AMBULATING 10 - 20 FT
PAIN LOCATION - PAIN QUALITY: ACHY

## 2022-11-08 NOTE — TELEPHONE ENCOUNTER
[facility] nurse, Dm, asks if patient should take lasix 40 mg qd, not listed on [facility] D/C summary.

## 2022-11-08 NOTE — TELEPHONE ENCOUNTER
Hospital stay 11-01-22 till 11-05-22 with diagnosis of CHF,Afib and NSTEMI. Spoke with pt. Instructed to stay compliant with all meds. Reviewed discharge med list with pt. Stated taking all meds as prescribed and has started back using her CPAP machine. Instructed to weigh daily if gains 2 lbs over night or 5 lbs in a week to please call. If has any shortness of breath or chest pain please call. Follow low salt,low fat low cholesterol diet. Call if any redness or oozing from cath site. Keep appointment on 11-11-22 with Dr. Rika Bowers  in [facility] office.   Pt voiced understanding of all instructions and agreed to keep appointment./wc

## 2022-11-08 NOTE — TELEPHONE ENCOUNTER
Advised [facility] [employer]  nurse, [facility], of Dr. Sophie Velez response. Virgen verbalized understanding, and states she will call patient with instructions. [facility]  nurse will draw BNP and magnesium on 11/9/22 or 11/10/22. BMP and magnesium orders are available in Epic.

## 2022-11-08 NOTE — TELEPHONE ENCOUNTER
Please have patient get BNP and magnesium prior to office visit 11/11.   Can address diuretic use at that visit

## 2022-11-08 NOTE — TELEPHONE ENCOUNTER
Terrance Awan with [facility] called for verbal orders for home care visits to occur twice a week for 3 weeks and then once a week for 4 weeks.

## 2022-11-09 PROCEDURE — 1090000001 HH PPS REVENUE CREDIT

## 2022-11-09 PROCEDURE — 1090000002 HH PPS REVENUE DEBIT

## 2022-11-10 ENCOUNTER — HOME CARE VISIT (OUTPATIENT)
Dept: SCHEDULING | Facility: HOME HEALTH | Age: 77
End: 2022-11-10
Payer: MEDICARE

## 2022-11-10 ENCOUNTER — HOSPITAL ENCOUNTER (OUTPATIENT)
Dept: LAB | Age: 77
Discharge: HOME | End: 2022-11-13
Payer: MEDICARE

## 2022-11-10 ENCOUNTER — TELEPHONE (OUTPATIENT)
Dept: CARDIOLOGY CLINIC | Age: 77
End: 2022-11-10

## 2022-11-10 VITALS
HEART RATE: 79 BPM | TEMPERATURE: 97.6 F | SYSTOLIC BLOOD PRESSURE: 130 MMHG | DIASTOLIC BLOOD PRESSURE: 70 MMHG | RESPIRATION RATE: 18 BRPM | OXYGEN SATURATION: 99 %

## 2022-11-10 LAB
MAGNESIUM SERPL-MCNC: 2 MG/DL (ref 1.8–2.4)
NT PRO BNP: 1116 PG/ML

## 2022-11-10 PROCEDURE — 1090000002 HH PPS REVENUE DEBIT

## 2022-11-10 PROCEDURE — 1090000001 HH PPS REVENUE CREDIT

## 2022-11-10 PROCEDURE — G0151 HHCP-SERV OF PT,EA 15 MIN: HCPCS

## 2022-11-10 PROCEDURE — 83880 ASSAY OF NATRIURETIC PEPTIDE: CPT

## 2022-11-10 PROCEDURE — G0299 HHS/HOSPICE OF RN EA 15 MIN: HCPCS

## 2022-11-10 PROCEDURE — 83735 ASSAY OF MAGNESIUM: CPT

## 2022-11-10 ASSESSMENT — ENCOUNTER SYMPTOMS
PAIN LOCATION - PAIN QUALITY: ACHE
DYSPNEA ACTIVITY LEVEL: AFTER AMBULATING MORE THAN 20 FT

## 2022-11-10 NOTE — TELEPHONE ENCOUNTER
Ida from [address] [place] [address] would like verbal orders for once a week for one week. And three times a week for two weeks and twice a week for two weeks. Please call and advise.

## 2022-11-11 ENCOUNTER — OFFICE VISIT (OUTPATIENT)
Dept: CARDIOLOGY CLINIC | Age: 77
End: 2022-11-11
Payer: MEDICARE

## 2022-11-11 VITALS
SYSTOLIC BLOOD PRESSURE: 124 MMHG | WEIGHT: 203 LBS | HEART RATE: 76 BPM | HEIGHT: 66 IN | BODY MASS INDEX: 32.62 KG/M2 | DIASTOLIC BLOOD PRESSURE: 82 MMHG

## 2022-11-11 DIAGNOSIS — I50.32 CHRONIC DIASTOLIC CONGESTIVE HEART FAILURE (HCC): ICD-10-CM

## 2022-11-11 DIAGNOSIS — I83.028 VARICOSE VEINS OF LEFT LOWER EXTREMITY WITH ULCER OTHER PART OF LOWER LEG (CODE) (HCC): ICD-10-CM

## 2022-11-11 DIAGNOSIS — G47.33 OSA (OBSTRUCTIVE SLEEP APNEA): ICD-10-CM

## 2022-11-11 DIAGNOSIS — J44.9 OSA AND COPD OVERLAP SYNDROME (HCC): ICD-10-CM

## 2022-11-11 DIAGNOSIS — G47.33 OSA AND COPD OVERLAP SYNDROME (HCC): ICD-10-CM

## 2022-11-11 DIAGNOSIS — I27.20 PULMONARY HYPERTENSION (HCC): ICD-10-CM

## 2022-11-11 DIAGNOSIS — I48.0 PAROXYSMAL ATRIAL FIBRILLATION (HCC): ICD-10-CM

## 2022-11-11 DIAGNOSIS — I25.119 CORONARY ARTERY DISEASE INVOLVING NATIVE CORONARY ARTERY OF NATIVE HEART WITH ANGINA PECTORIS (HCC): Primary | ICD-10-CM

## 2022-11-11 DIAGNOSIS — J43.2 CENTRILOBULAR EMPHYSEMA (HCC): Chronic | ICD-10-CM

## 2022-11-11 DIAGNOSIS — I10 ESSENTIAL HYPERTENSION: ICD-10-CM

## 2022-11-11 DIAGNOSIS — E66.01 SEVERE OBESITY (HCC): ICD-10-CM

## 2022-11-11 DIAGNOSIS — R06.09 DYSPNEA ON EXERTION: ICD-10-CM

## 2022-11-11 DIAGNOSIS — E78.00 PURE HYPERCHOLESTEROLEMIA: ICD-10-CM

## 2022-11-11 PROCEDURE — 1090000002 HH PPS REVENUE DEBIT

## 2022-11-11 PROCEDURE — 3074F SYST BP LT 130 MM HG: CPT | Performed by: INTERNAL MEDICINE

## 2022-11-11 PROCEDURE — 1111F DSCHRG MED/CURRENT MED MERGE: CPT | Performed by: INTERNAL MEDICINE

## 2022-11-11 PROCEDURE — 1036F TOBACCO NON-USER: CPT | Performed by: INTERNAL MEDICINE

## 2022-11-11 PROCEDURE — 3078F DIAST BP <80 MM HG: CPT | Performed by: INTERNAL MEDICINE

## 2022-11-11 PROCEDURE — 1123F ACP DISCUSS/DSCN MKR DOCD: CPT | Performed by: INTERNAL MEDICINE

## 2022-11-11 PROCEDURE — G8400 PT W/DXA NO RESULTS DOC: HCPCS | Performed by: INTERNAL MEDICINE

## 2022-11-11 PROCEDURE — G8417 CALC BMI ABV UP PARAM F/U: HCPCS | Performed by: INTERNAL MEDICINE

## 2022-11-11 PROCEDURE — 99214 OFFICE O/P EST MOD 30 MIN: CPT | Performed by: INTERNAL MEDICINE

## 2022-11-11 PROCEDURE — G8484 FLU IMMUNIZE NO ADMIN: HCPCS | Performed by: INTERNAL MEDICINE

## 2022-11-11 PROCEDURE — 3023F SPIROM DOC REV: CPT | Performed by: INTERNAL MEDICINE

## 2022-11-11 PROCEDURE — 1090F PRES/ABSN URINE INCON ASSESS: CPT | Performed by: INTERNAL MEDICINE

## 2022-11-11 PROCEDURE — G8427 DOCREV CUR MEDS BY ELIG CLIN: HCPCS | Performed by: INTERNAL MEDICINE

## 2022-11-11 PROCEDURE — 1090000001 HH PPS REVENUE CREDIT

## 2022-11-11 NOTE — PROGRESS NOTES
[address], [address] [place] [address]      22      NAME:  Amos Ellis  : 1945  MRN: [id number]      SUBJECTIVE:   Amos Ellis is a 68 y.o. female seen for a follow up visit regarding the following:     Chief Complaint   Patient presents with    Atrial Fibrillation       HPI:   Patient with history of severe dyspnea and chest pressure with exertion. Patient has a strong family history of premature CAD and MI. She has HTN, Chol, and long history of tobacco abuse. She also has a history of DVTs and PE many years ago. This was thought to be due to severe varicosities. She had cardiac catheterization 2014 with normal coronaries. She was seen in office 2014 with atrial fibrillation with RVR that spontaneously converted. She continues to struggle with chronic dyspnea and LE edema. This is secondary to obesity and venous stasis. Recently had COVID-19 vaccine and reported AF/RVR after injection. Patient now status post right and left heart catheterization 2022. She had mild to moderate pulmonary hypertension at 50 mmHg. 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 EDP was 5 mmHg. proBNP was mildly elevated. Patient was readmitted 2022 with acute on chronic respiratory failure. Patient been noncompliant with CPAP, oxygen and diuretics. She responded to IV diuretics and treatment for underlying COPD/obesity hypoventilation syndrome. Past Medical History, Past Surgical History, Family history, Social History, and Medications were all reviewed with the patient today and updated as necessary.      Current Outpatient Medications   Medication Sig Dispense Refill    losartan (COZAAR) 50 MG tablet TAKE 1 TABLET BY MOUTH EVERY DAY 90 tablet 0    tiZANidine (ZANAFLEX) 4 MG tablet Take 1 tablet by mouth 3 times daily as needed (back pain) (Patient taking differently: Take 4 mg by mouth 3 times daily as needed (for moderate back pain)) 21 tablet 0    fluticasone-umeclidin-vilant (TRELEGY ELLIPTA) 100-62.5-25 MCG/INH AEPB 1 INHALATION DAILY, RINSE MOUTH AFTER USE 60 each 11    rOPINIRole (REQUIP) 0.5 MG tablet TAKE 1 TABLET BY MOUTH EVERY DAY AT NIGHT 90 tablet 3    spironolactone (ALDACTONE) 50 MG tablet TAKE 1 TABLET BY MOUTH EVERY DAY 90 tablet 3    OXYGEN Inhale 3 L/min into the lungs at bedtime via nasal cannula      acetaminophen (TYLENOL) 325 MG tablet Take 1,000 mg by mouth every 6 hours as needed for Pain      apixaban (ELIQUIS) 5 MG TABS tablet Take 5 mg by mouth 2 times daily      colestipol (COLESTID) 1 g tablet Take 1 g by mouth 2 times daily      flecainide (TAMBOCOR) 100 MG tablet Take 100 mg by mouth 2 times daily      methylPREDNISolone (MEDROL DOSEPACK) 4 MG tablet As directed per dose pack      metoprolol succinate (TOPROL XL) 100 MG extended release tablet Take 100 mg by mouth 2 times daily      omeprazole (PRILOSEC) 40 MG delayed release capsule TAKE 1 CAPSULE BY MOUTH EVERY DAY      promethazine (PHENERGAN) 25 MG tablet Take 25 mg by mouth every 6 hours as needed       No current facility-administered medications for this visit.      Patient Active Problem List    Diagnosis Date Noted    Acute respiratory failure with hypoxia ([facility] 11/01/2022     Priority: Medium    Acute congestive heart failure ([address] 11/01/2022     Priority: Medium    Coronary artery disease involving native coronary artery of native heart with angina pectoris ([address] 11/01/2022     Priority: Medium    Chronic diastolic congestive heart failure ([address] 11/01/2022     Priority: Medium    Chest pain 01/13/2022     Priority: Low     Added automatically from request for surgery [id number]        Venous insufficiency 01/28/2021     Priority: Low    Abscess of leg, right 07/02/2020     Priority: Low    Peripheral vascular disease ([address] 07/02/2020     Priority: Low    Cellulitis of lower extremity 06/24/2020     Priority: Low    Stasis ulcer ([address] 06/24/2020 Packs/day: 3.00     Types: Cigarettes     Quit date: 1998     Years since quittin.2    Smokeless tobacco: Never   Substance Use Topics    Alcohol use: No       Review Of Symptoms    ROS     Physical Exam  Blood pressure 124/82, pulse 76, height 5' 6\" (1.676 m), weight 203 lb (92.1 kg). Physical Exam      Medical problems, medical history and test results were reviewed with the patient today.       Lab Results   Component Value Date    CHOL 206 (H) 2022    CHOL 182 2020     Lab Results   Component Value Date    TRIG 110 2022    TRIG 155 (H) 2020     Lab Results   Component Value Date    HDL 34 (L) 2022    HDL 38 (L) 2020     Lab Results   Component Value Date    LDLCALC 150 (H) 2022    LDLCALC 113 (H) 2020     Lab Results   Component Value Date    LABVLDL 22 2022    LABVLDL 31 2020     Lab Results   Component Value Date    CHOLHDLRATIO 6.1 2022        Lab Results   Component Value Date    LABA1C 5.2 2020     Lab Results   Component Value Date     2020        Lab Results   Component Value Date     2022    K 4.6 2022     2022    CO2 27 2022    BUN 29 (H) 2022    CREATININE 1.10 (H) 2022    GLUCOSE 101 (H) 2022    CALCIUM 8.5 2022    PROT 7.7 2022    PROT 7.6 2022    LABALBU 3.7 2022    LABALBU 3.7 2022    BILITOT 0.5 2022    BILITOT 0.6 2022    ALKPHOS 108 2022    ALKPHOS 109 2022    AST 31 2022    AST 32 2022    ALT 53 2022    ALT 52 2022    LABGLOM 52 (L) 2022    GFRAA >60 2022    AGRATIO 2.0 12/10/2020    GLOB 4.0 2022    GLOB 3.9 2022       Lab Results   Component Value Date/Time     2022 05:54 AM    K 4.6 2022 05:54 AM     2022 05:54 AM    CO2 27 2022 05:54 AM    BUN 29 2022 05:54 AM    CREATININE 1.10 2022 05:54 AM GLUCOSE 101 11/05/2022 05:54 AM    CALCIUM 8.5 11/05/2022 05:54 AM        Lab Results   Component Value Date    WBC 5.4 11/02/2022    HGB 15.2 11/02/2022    HCT 45.9 11/02/2022    MCV 98.9 11/02/2022     (L) 11/02/2022             ASSESSMENT:    Blanca Davila was seen today for hypertension. Diagnoses and all orders for this visit:    Paroxysmal atrial fibrillation ([address] - Remains in SR on Flecaine 100mg BID and Eliquis. More episodes of AF/RVR. Coronary artery disease -moderate by cardiac catheterization 02/2022. Patient has been intolerant of statin therapy in the past.  Will monitor for now. We will readdress statin therapy on return. Essential hypertension with goal blood pressure less than 130/85 - Only elevated in AM and likely due to severe MARISA that is not treated. Continue metoprolol, losartan    Pure hypercholesterolemia - Not on therapy and managed per PCP. Dyspnea on exertion - Patient unfortunately has gained weight. This is primarily due to morbid obesity, MARISA, Deconditioning. .    Pulmonary Hypertension  - 03/2022 - PASP 50mmHg at [address] Echo 11/2022 - RVSP 38mmHg    MARISA - Unable to tolerate CPAP and now off of therapy. Has moderate/severe PHTN. Has been wearing O2 at night. Venous stasis - This is multifactorial. Keep feet elevated and wear support stockings. Morbid Obesity - Unchanged and continues to gain weight. This is a significant contributor to PHTN/dyspnea.       Chronic Obstructive Pulmonary Disease - This is stable       Problem List Items Addressed This Visit          Circulatory    Coronary artery disease involving native coronary artery of native heart with angina pectoris (HCC) - Primary    Chronic diastolic congestive heart failure (HCC)    Essential hypertension    Pulmonary hypertension (HCC)    Atrial fibrillation (HCC)       Respiratory    Centrilobular emphysema (HCC) (Chronic)    MARISA and COPD overlap syndrome (HCC)    MARISA (obstructive sleep apnea) Other    Dyspnea on exertion    HLD (hyperlipidemia)    Severe obesity (HCC)     Other Visit Diagnoses       Varicose veins of left lower extremity with ulcer other part of lower leg (CODE) ([facility]                There are no discontinued medications. Patient has been instructed and agrees to call our office with any issues or other concerns related to their cardiac condition(s) and/or complaint(s). Return in about 3 months (around 2/11/2023).        Arcadio Herrera MD  11/11/2022

## 2022-11-12 PROCEDURE — 1090000001 HH PPS REVENUE CREDIT

## 2022-11-12 PROCEDURE — 1090000002 HH PPS REVENUE DEBIT

## 2022-11-13 VITALS
OXYGEN SATURATION: 92 % | BODY MASS INDEX: 32.6 KG/M2 | HEART RATE: 83 BPM | SYSTOLIC BLOOD PRESSURE: 112 MMHG | WEIGHT: 202 LBS | DIASTOLIC BLOOD PRESSURE: 58 MMHG | TEMPERATURE: 97.4 F | RESPIRATION RATE: 18 BRPM

## 2022-11-13 PROCEDURE — 1090000001 HH PPS REVENUE CREDIT

## 2022-11-13 PROCEDURE — 1090000002 HH PPS REVENUE DEBIT

## 2022-11-13 ASSESSMENT — ENCOUNTER SYMPTOMS: PAIN LOCATION - PAIN QUALITY: STABBING

## 2022-11-14 ENCOUNTER — TELEPHONE (OUTPATIENT)
Dept: CARDIOLOGY CLINIC | Age: 77
End: 2022-11-14

## 2022-11-14 ENCOUNTER — HOME CARE VISIT (OUTPATIENT)
Dept: SCHEDULING | Facility: HOME HEALTH | Age: 77
End: 2022-11-14
Payer: MEDICARE

## 2022-11-14 VITALS
BODY MASS INDEX: 32.15 KG/M2 | WEIGHT: 199.2 LBS | SYSTOLIC BLOOD PRESSURE: 116 MMHG | TEMPERATURE: 97.3 F | RESPIRATION RATE: 18 BRPM | OXYGEN SATURATION: 100 % | HEART RATE: 82 BPM | DIASTOLIC BLOOD PRESSURE: 68 MMHG

## 2022-11-14 PROCEDURE — 1090000002 HH PPS REVENUE DEBIT

## 2022-11-14 PROCEDURE — G0299 HHS/HOSPICE OF RN EA 15 MIN: HCPCS

## 2022-11-14 PROCEDURE — 1090000001 HH PPS REVENUE CREDIT

## 2022-11-14 ASSESSMENT — ENCOUNTER SYMPTOMS
PAIN LOCATION - PAIN QUALITY: SORE, ACHING
DYSPNEA ACTIVITY LEVEL: AFTER AMBULATING MORE THAN 20 FT

## 2022-11-14 NOTE — TELEPHONE ENCOUNTER
James Childers from [facility] called to be sure Dr. Jeremi Solano saw that her BMP was elevated at last check. She is not having any symptoms at this time but it was high and she did not see any notations about the level in the notes from Friday's ov. Please call and advise if there are any additional instructions.

## 2022-11-15 ENCOUNTER — HOME CARE VISIT (OUTPATIENT)
Dept: SCHEDULING | Facility: HOME HEALTH | Age: 77
End: 2022-11-15
Payer: MEDICARE

## 2022-11-15 VITALS
DIASTOLIC BLOOD PRESSURE: 70 MMHG | TEMPERATURE: 97.8 F | RESPIRATION RATE: 17 BRPM | SYSTOLIC BLOOD PRESSURE: 110 MMHG | HEART RATE: 88 BPM | OXYGEN SATURATION: 100 %

## 2022-11-15 PROCEDURE — 1090000002 HH PPS REVENUE DEBIT

## 2022-11-15 PROCEDURE — G0157 HHC PT ASSISTANT EA 15: HCPCS

## 2022-11-15 PROCEDURE — 1090000001 HH PPS REVENUE CREDIT

## 2022-11-15 ASSESSMENT — ENCOUNTER SYMPTOMS: PAIN LOCATION - PAIN QUALITY: ACHING

## 2022-11-15 NOTE — TELEPHONE ENCOUNTER
Brain Natriuretic Peptide  Order: [id number]  Status: Final result    Visible to patient: No (not released)    Next appt:  [facility] ([address] [place] [address])    0 Result Notes  Component Ref Range & Units 11/10/22 1825 11/4/22 0413 11/1/22 1413   NT Pro-BNP <450 PG/ML 1,116 High   527 High   699 High     Resulting [facility], [address], [address]              Specimen Collected: 11/10/22 18:25 EST Last Resulted: 11/10/22 21:43 EST

## 2022-11-15 NOTE — TELEPHONE ENCOUNTER
As long as patient is asymptomatic we do not need to treat this. I just recently saw the patient she has normal echocardiogram.  Patient really needs to focus on diet and weight loss as this will significantly improve her proBNP.

## 2022-11-16 PROCEDURE — 1090000002 HH PPS REVENUE DEBIT

## 2022-11-16 PROCEDURE — 1090000001 HH PPS REVENUE CREDIT

## 2022-11-17 ENCOUNTER — TELEPHONE (OUTPATIENT)
Dept: PULMONOLOGY | Age: 77
End: 2022-11-17

## 2022-11-17 ENCOUNTER — HOME CARE VISIT (OUTPATIENT)
Dept: SCHEDULING | Facility: HOME HEALTH | Age: 77
End: 2022-11-17
Payer: MEDICARE

## 2022-11-17 PROCEDURE — 1090000002 HH PPS REVENUE DEBIT

## 2022-11-17 PROCEDURE — 1090000001 HH PPS REVENUE CREDIT

## 2022-11-17 PROCEDURE — G0299 HHS/HOSPICE OF RN EA 15 MIN: HCPCS

## 2022-11-17 NOTE — TELEPHONE ENCOUNTER
Patients BNP is elevated  to 1116 11/10/22     [facility] is reporting this     [phone]

## 2022-11-18 ENCOUNTER — HOME CARE VISIT (OUTPATIENT)
Dept: SCHEDULING | Facility: HOME HEALTH | Age: 77
End: 2022-11-18
Payer: MEDICARE

## 2022-11-18 VITALS
SYSTOLIC BLOOD PRESSURE: 118 MMHG | OXYGEN SATURATION: 99 % | BODY MASS INDEX: 32.47 KG/M2 | TEMPERATURE: 97.5 F | HEART RATE: 112 BPM | WEIGHT: 201.2 LBS | DIASTOLIC BLOOD PRESSURE: 76 MMHG | RESPIRATION RATE: 16 BRPM

## 2022-11-18 VITALS
RESPIRATION RATE: 16 BRPM | OXYGEN SATURATION: 95 % | SYSTOLIC BLOOD PRESSURE: 138 MMHG | TEMPERATURE: 97.8 F | DIASTOLIC BLOOD PRESSURE: 72 MMHG | HEART RATE: 70 BPM

## 2022-11-18 PROCEDURE — 1090000002 HH PPS REVENUE DEBIT

## 2022-11-18 PROCEDURE — 1090000001 HH PPS REVENUE CREDIT

## 2022-11-18 PROCEDURE — G0157 HHC PT ASSISTANT EA 15: HCPCS

## 2022-11-18 ASSESSMENT — ENCOUNTER SYMPTOMS: PAIN LOCATION - PAIN QUALITY: ACHES

## 2022-11-19 PROCEDURE — 1090000001 HH PPS REVENUE CREDIT

## 2022-11-19 PROCEDURE — 1090000002 HH PPS REVENUE DEBIT

## 2022-11-20 PROCEDURE — 1090000002 HH PPS REVENUE DEBIT

## 2022-11-20 PROCEDURE — 1090000001 HH PPS REVENUE CREDIT

## 2022-11-21 ENCOUNTER — TELEPHONE (OUTPATIENT)
Dept: SLEEP MEDICINE | Age: 77
End: 2022-11-21

## 2022-11-21 DIAGNOSIS — G47.33 OBSTRUCTIVE SLEEP APNEA (ADULT) (PEDIATRIC): Primary | ICD-10-CM

## 2022-11-21 PROCEDURE — 1090000002 HH PPS REVENUE DEBIT

## 2022-11-21 PROCEDURE — 1090000001 HH PPS REVENUE CREDIT

## 2022-11-21 NOTE — TELEPHONE ENCOUNTER
Order put in [address] [place] [address] and faxed to [address] [address] in [place].

## 2022-11-21 NOTE — TELEPHONE ENCOUNTER
Patient was seen on 2/3/22. She needs cpap supplies.   Water is getting into her tubing     [facility]-[phone]

## 2022-11-22 ENCOUNTER — HOME CARE VISIT (OUTPATIENT)
Dept: SCHEDULING | Facility: HOME HEALTH | Age: 77
End: 2022-11-22
Payer: MEDICARE

## 2022-11-22 ENCOUNTER — OFFICE VISIT (OUTPATIENT)
Dept: FAMILY MEDICINE CLINIC | Facility: CLINIC | Age: 77
End: 2022-11-22
Payer: MEDICARE

## 2022-11-22 VITALS
TEMPERATURE: 98.1 F | WEIGHT: 206.1 LBS | RESPIRATION RATE: 16 BRPM | HEIGHT: 66 IN | OXYGEN SATURATION: 94 % | DIASTOLIC BLOOD PRESSURE: 56 MMHG | SYSTOLIC BLOOD PRESSURE: 126 MMHG | HEART RATE: 106 BPM | BODY MASS INDEX: 33.12 KG/M2

## 2022-11-22 DIAGNOSIS — I50.22 CHRONIC SYSTOLIC (CONGESTIVE) HEART FAILURE (HCC): ICD-10-CM

## 2022-11-22 DIAGNOSIS — K21.9 GASTROESOPHAGEAL REFLUX DISEASE WITHOUT ESOPHAGITIS: ICD-10-CM

## 2022-11-22 DIAGNOSIS — Z00.00 MEDICARE ANNUAL WELLNESS VISIT, SUBSEQUENT: Primary | ICD-10-CM

## 2022-11-22 DIAGNOSIS — M54.50 ACUTE BILATERAL LOW BACK PAIN WITHOUT SCIATICA: ICD-10-CM

## 2022-11-22 DIAGNOSIS — R35.0 FREQUENCY OF URINATION: ICD-10-CM

## 2022-11-22 DIAGNOSIS — I10 ESSENTIAL HYPERTENSION: ICD-10-CM

## 2022-11-22 LAB
BILIRUBIN, URINE, POC: NEGATIVE
BLOOD URINE, POC: NORMAL
GLUCOSE URINE, POC: NEGATIVE
KETONES, URINE, POC: NORMAL
LEUKOCYTE ESTERASE, URINE, POC: NEGATIVE
NITRITE, URINE, POC: NORMAL
PH, URINE, POC: 5 (ref 4.6–8)
PROTEIN,URINE, POC: NORMAL
SPECIFIC GRAVITY, URINE, POC: 1.03 (ref 1–1.03)
URINALYSIS CLARITY, POC: NORMAL
URINALYSIS COLOR, POC: NORMAL
UROBILINOGEN, POC: NORMAL

## 2022-11-22 PROCEDURE — G0439 PPPS, SUBSEQ VISIT: HCPCS | Performed by: FAMILY MEDICINE

## 2022-11-22 PROCEDURE — 3078F DIAST BP <80 MM HG: CPT | Performed by: FAMILY MEDICINE

## 2022-11-22 PROCEDURE — G8484 FLU IMMUNIZE NO ADMIN: HCPCS | Performed by: FAMILY MEDICINE

## 2022-11-22 PROCEDURE — 81003 URINALYSIS AUTO W/O SCOPE: CPT | Performed by: FAMILY MEDICINE

## 2022-11-22 PROCEDURE — 1090000001 HH PPS REVENUE CREDIT

## 2022-11-22 PROCEDURE — 1090000002 HH PPS REVENUE DEBIT

## 2022-11-22 PROCEDURE — 3074F SYST BP LT 130 MM HG: CPT | Performed by: FAMILY MEDICINE

## 2022-11-22 PROCEDURE — 1123F ACP DISCUSS/DSCN MKR DOCD: CPT | Performed by: FAMILY MEDICINE

## 2022-11-22 RX ORDER — LOSARTAN POTASSIUM 50 MG/1
TABLET ORAL
Qty: 90 TABLET | Refills: 3 | Status: SHIPPED | OUTPATIENT
Start: 2022-11-22

## 2022-11-22 RX ORDER — TRAMADOL HYDROCHLORIDE 50 MG/1
50 TABLET ORAL EVERY 6 HOURS PRN
Qty: 28 TABLET | Refills: 0 | Status: SHIPPED | OUTPATIENT
Start: 2022-11-22 | End: 2022-11-29

## 2022-11-22 RX ORDER — FLECAINIDE ACETATE 100 MG/1
100 TABLET ORAL 2 TIMES DAILY
Qty: 60 TABLET | Refills: 5 | Status: CANCELLED | OUTPATIENT
Start: 2022-11-22

## 2022-11-22 RX ORDER — OMEPRAZOLE 40 MG/1
40 CAPSULE, DELAYED RELEASE ORAL DAILY
Qty: 90 CAPSULE | Refills: 3 | Status: SHIPPED | OUTPATIENT
Start: 2022-11-22

## 2022-11-22 SDOH — ECONOMIC STABILITY: FOOD INSECURITY: WITHIN THE PAST 12 MONTHS, THE FOOD YOU BOUGHT JUST DIDN'T LAST AND YOU DIDN'T HAVE MONEY TO GET MORE.: NEVER TRUE

## 2022-11-22 SDOH — ECONOMIC STABILITY: FOOD INSECURITY: WITHIN THE PAST 12 MONTHS, YOU WORRIED THAT YOUR FOOD WOULD RUN OUT BEFORE YOU GOT MONEY TO BUY MORE.: NEVER TRUE

## 2022-11-22 ASSESSMENT — LIFESTYLE VARIABLES
HOW MANY STANDARD DRINKS CONTAINING ALCOHOL DO YOU HAVE ON A TYPICAL DAY: PATIENT DOES NOT DRINK
HOW OFTEN DO YOU HAVE A DRINK CONTAINING ALCOHOL: NEVER

## 2022-11-22 ASSESSMENT — PATIENT HEALTH QUESTIONNAIRE - PHQ9
SUM OF ALL RESPONSES TO PHQ QUESTIONS 1-9: 0
1. LITTLE INTEREST OR PLEASURE IN DOING THINGS: 0
SUM OF ALL RESPONSES TO PHQ9 QUESTIONS 1 & 2: 0
2. FEELING DOWN, DEPRESSED OR HOPELESS: 0

## 2022-11-22 ASSESSMENT — SOCIAL DETERMINANTS OF HEALTH (SDOH): HOW HARD IS IT FOR YOU TO PAY FOR THE VERY BASICS LIKE FOOD, HOUSING, MEDICAL CARE, AND HEATING?: NOT HARD AT ALL

## 2022-11-22 NOTE — TELEPHONE ENCOUNTER
Last seen: 7/21/22  Hx: PAH, MARISA w/ CPAP, a.Fib, COPD    Hosp: 11/1 - 11/5 a. Fib, NSTEMI, CHF, acute resp fail    [place] RN reporting elevated BNP; cardiology made aware 11/14 & asymptomatic. No further input needed at this time. F/u appt w/ PCP today.

## 2022-11-22 NOTE — PROGRESS NOTES
Medicare Annual Wellness Visit    Albin Smart is here for Medicare AWV (Back pain, only way patient can get relief is if she is laying down flat, pain makes patient sick to her stomach, started two days before she came home from inpatient stay at hospital, lower back, Patient does have severe back pain     Assessment & Plan   Medicare annual wellness visit, subsequent  Acute bilateral low back pain without sciatica  -     XR LUMBAR SPINE (2-3 VIEWS); Future  -     traMADol (ULTRAM) 50 MG tablet; Take 1 tablet by mouth every 6 hours as needed for Pain for up to 7 days. Intended supply: 7 days. Take lowest dose possible to manage pain, Disp-28 tablet, R-0Normal  Frequency of urination  -     AMB POC URINALYSIS DIP STICK AUTO W/O MICRO  Essential hypertension  -     losartan (COZAAR) 50 MG tablet; TAKE 1 TABLET BY MOUTH EVERY DAY, Disp-90 tablet, [id number]  Chronic systolic (congestive) heart failure  Gastroesophageal reflux disease without esophagitis  -     omeprazole (PRILOSEC) 40 MG delayed release capsule; Take 1 capsule by mouth daily TAKE 1 CAPSULE BY MOUTH EVERY DAY, Disp-90 capsule, R-3Normal        Plan:  No change in medication  Short term tramadol dose for back pain. Recheck in 4 weeks; consider PT at that time. Recommendations for Preventive Services Due: see orders and patient instructions/AVS.  Recommended screening schedule for the next 5-10 years is provided to the patient in written form: see Patient Instructions/AVS.     Return in about 4 weeks (around 12/20/2022). Subjective       66yo WF with h/o Stage 3 severe COPD, pHTN from COPD and MARISA, mod CAD by [facility] 7/7828, diastolic HF. She was in the hospital 3 weeks ago for fluid over load 2/2 to non-compliance with diuretics. Feeling better since her release except for back pain that started when she was in the hospital.  No known incident. Low back back; does not radiate; worse with any movement.         She has seen her cardiologist last week. She sees her pulmonolist (Dr. Jacey Payne) in January. Patient's complete Health Risk Assessment and screening values have been reviewed and are found in Flowsheets. The following problems were reviewed today and where indicated follow up appointments were made and/or referrals ordered.     Positive Risk Factor Screenings with Interventions:    Fall Risk:  Do you feel unsteady or are you worried about falling? : (!) yes  2 or more falls in past year?: no  Fall with injury in past year?: no   Fall Risk Interventions:    Home safety tips provided             General Health and ACP:  General  In general, how would you say your health is?: (!) Poor  In the past 7 days, have you experienced any of the following: New or Increased Pain, New or Increased Fatigue, Loneliness, Social Isolation, Stress or Anger?: (!) Yes  Select all that apply: (!) New or Increased Pain  Do you get the social and emotional support that you need?: (!) No  Do you have a Living Will?: (!) No    Advance Directives       Power of  Living Will ACP-Advance Directive ACP-Power of     Not on File Not on File Not on File Not on File          General Health Risk Interventions:  Poor self-assessment of health status: has follow up    Health Habits/Nutrition:  Physical Activity: Inactive    Days of Exercise per Week: 0 days    Minutes of Exercise per Session: 10 min     Have you lost any weight without trying in the past 3 months?: (!) Yes  Body mass index: (!) 33.26  Have you seen the dentist within the past year?: Appointment is scheduled  Health Habits/Nutrition Interventions:  Inadequate physical activity:  patient is not ready to increase his/her physical activity level at this time    Hearing/Vision:  Do you or your family notice any trouble with your hearing that hasn't been managed with hearing aids?: No  Do you have difficulty driving, watching TV, or doing any of your daily activities because of your eyesight?: (!) Yes  Have you had an eye exam within the past year?: Yes  No results found. Hearing/Vision Interventions:  Hearing concerns:  patient declines any further evaluation/treatment for hearing issues     ADLs:  In the past 7 days, did you need help from others to perform any of the following everyday activities: Eating, dressing, grooming, bathing, toileting, or walking/balance?: No  In the past 7 days, did you need help from others to take care of any of the following: Laundry, housekeeping, banking/finances, shopping, telephone use, food preparation, transportation, or taking medications?: (!) Yes  Select all that apply: [employer], Laundry, Shopping, Food Preparation  ADL Interventions:  Already has home health coming out. Objective   Vitals:    11/22/22 1116   BP: (!) 126/56   Pulse: (!) 106   Resp: 16   Temp: 98.1 °F (36.7 °C)   TempSrc: Temporal   SpO2: 94%   Weight: 206 lb 1.6 oz (93.5 kg)   Height: 5' 6\" (1.676 m)      Body mass index is 33.27 kg/m². Physical Exam  Vitals and nursing note reviewed. Constitutional:       General: She is not in acute distress. Appearance: She is obese. She is not ill-appearing or toxic-appearing. Cardiovascular:      Rate and Rhythm: Normal rate and regular rhythm. Pulmonary:      Effort: Pulmonary effort is normal.      Breath sounds: Normal breath sounds. Musculoskeletal:      Lumbar back: Tenderness present. No bony tenderness. Neurological:      General: No focal deficit present. Mental Status: She is alert and oriented to person, place, and time. Psychiatric:         Mood and Affect: Mood normal.         Behavior: Behavior normal.         Thought Content: Thought content normal.         Judgment: Judgment normal.            Allergies   Allergen Reactions    Latex Other (See Comments)    Codeine Other (See Comments)     Headache    Shellfish-Derived Products Hives     Prior to Visit Medications    Medication Sig Taking? Authorizing Provider   losartan (COZAAR) 50 MG tablet TAKE 1 TABLET BY MOUTH EVERY DAY Yes Wm Tamez MD   omeprazole (PRILOSEC) 40 MG delayed release capsule Take 1 capsule by mouth daily TAKE 1 CAPSULE BY MOUTH EVERY DAY Yes Wm Tamez MD   traMADol (ULTRAM) 50 MG tablet Take 1 tablet by mouth every 6 hours as needed for Pain for up to 7 days. Intended supply: 7 days.  Take lowest dose possible to manage pain Yes Wm Tamez MD   tiZANidine (ZANAFLEX) 4 MG tablet Take 1 tablet by mouth 3 times daily as needed (back pain)  Patient taking differently: Take 4 mg by mouth 3 times daily as needed (for moderate back pain) Yes Louis Husain MD   fluticasone-umeclidin-vilant (TRELEGY ELLIPTA) 100-62.5-25 MCG/INH AEPB 1 INHALATION DAILY, RINSE MOUTH AFTER USE Yes Quintin Espinosa MD   rOPINIRole (REQUIP) 0.5 MG tablet TAKE 1 TABLET BY MOUTH EVERY DAY AT NIGHT Yes Wm Tamez MD   spironolactone (ALDACTONE) 50 MG tablet TAKE 1 TABLET BY MOUTH EVERY DAY Yes Quintin Espinosa MD   OXYGEN Inhale 3 L/min into the lungs at bedtime via nasal cannula Yes Ar Automatic Reconciliation   acetaminophen (TYLENOL) 325 MG tablet Take 1,000 mg by mouth every 6 hours as needed for Pain Yes Ar Automatic Reconciliation   apixaban (ELIQUIS) 5 MG TABS tablet Take 5 mg by mouth 2 times daily Yes Ar Automatic Reconciliation   colestipol (COLESTID) 1 g tablet Take 1 g by mouth 2 times daily Yes Ar Automatic Reconciliation   flecainide (TAMBOCOR) 100 MG tablet Take 100 mg by mouth 2 times daily Yes Ar Automatic Reconciliation   metoprolol succinate (TOPROL XL) 100 MG extended release tablet Take 100 mg by mouth 2 times daily Yes Ar Automatic Reconciliation       CareTeam (Including outside providers/suppliers regularly involved in providing care):   Patient Care Team:  Wm Tamez MD as PCP - General  Wm Tamez MD as PCP - [facility] Empaneled Provider  Angy Cavazos MD as Physician  Quintin Espinosa MD as Physician  REJI Villalobos - CNP as Nurse Practitioner     Reviewed and updated this visit:  Tobacco  Allergies  Meds  Med Hx  Surg Hx  Soc Hx  Fam Hx

## 2022-11-22 NOTE — PATIENT INSTRUCTIONS
Personalized Preventive Plan for Arleen Macdonald - 11/22/2022  Medicare offers a range of preventive health benefits. Some of the tests and screenings are paid in full while other may be subject to a deductible, co-insurance, and/or copay. Some of these benefits include a comprehensive review of your medical history including lifestyle, illnesses that may run in your family, and various assessments and screenings as appropriate. After reviewing your medical record and screening and assessments performed today your provider may have ordered immunizations, labs, imaging, and/or referrals for you. A list of these orders (if applicable) as well as your Preventive Care list are included within your After Visit Summary for your review. Other Preventive Recommendations:    A preventive eye exam performed by an eye specialist is recommended every 1-2 years to screen for glaucoma; cataracts, macular degeneration, and other eye disorders. A preventive dental visit is recommended every 6 months. Try to get at least 150 minutes of exercise per week or 10,000 steps per day on a pedometer . Order or download the FREE \"Exercise & Physical Activity: Your Everyday Guide\" from [employer]. Call 9-561.618.5489 or search [employer] online. You need 5165-4477 mg of calcium and 8698-6261 IU of vitamin D per day. It is possible to meet your calcium requirement with diet alone, but a vitamin D supplement is usually necessary to meet this goal.  When exposed to the sun, use a sunscreen that protects against both UVA and UVB radiation with an SPF of 30 or greater. Reapply every 2 to 3 hours or after sweating, drying off with a towel, or swimming. Always wear a seat belt when traveling in a car. Always wear a helmet when riding a bicycle or motorcycle.

## 2022-11-23 ENCOUNTER — HOME CARE VISIT (OUTPATIENT)
Dept: SCHEDULING | Facility: HOME HEALTH | Age: 77
End: 2022-11-23
Payer: MEDICARE

## 2022-11-23 PROCEDURE — 1090000002 HH PPS REVENUE DEBIT

## 2022-11-23 PROCEDURE — 1090000001 HH PPS REVENUE CREDIT

## 2022-11-24 ENCOUNTER — HOME CARE VISIT (OUTPATIENT)
Dept: SCHEDULING | Facility: HOME HEALTH | Age: 77
End: 2022-11-24
Payer: MEDICARE

## 2022-11-24 PROCEDURE — 1090000001 HH PPS REVENUE CREDIT

## 2022-11-24 PROCEDURE — 1090000002 HH PPS REVENUE DEBIT

## 2022-11-25 ENCOUNTER — HOME CARE VISIT (OUTPATIENT)
Dept: SCHEDULING | Facility: HOME HEALTH | Age: 77
End: 2022-11-25
Payer: MEDICARE

## 2022-11-25 PROCEDURE — G0157 HHC PT ASSISTANT EA 15: HCPCS

## 2022-11-25 PROCEDURE — 1090000002 HH PPS REVENUE DEBIT

## 2022-11-25 PROCEDURE — 1090000001 HH PPS REVENUE CREDIT

## 2022-11-26 PROCEDURE — 1090000001 HH PPS REVENUE CREDIT

## 2022-11-26 PROCEDURE — 1090000002 HH PPS REVENUE DEBIT

## 2022-11-27 VITALS
DIASTOLIC BLOOD PRESSURE: 74 MMHG | SYSTOLIC BLOOD PRESSURE: 110 MMHG | HEART RATE: 97 BPM | TEMPERATURE: 97.6 F | OXYGEN SATURATION: 94 % | RESPIRATION RATE: 17 BRPM

## 2022-11-27 PROCEDURE — 1090000001 HH PPS REVENUE CREDIT

## 2022-11-27 PROCEDURE — 1090000002 HH PPS REVENUE DEBIT

## 2022-11-27 ASSESSMENT — ENCOUNTER SYMPTOMS: PAIN LOCATION - PAIN QUALITY: ACHING

## 2022-11-27 NOTE — CASE COMMUNICATION
Pt returned cll and stated she did not want a visit on 11-23-22;M. D. notified 12-27-22 at 5:17 p.m. of missed visit;  notified.

## 2022-11-27 NOTE — CASE COMMUNICATION
ADD. Kathrine Balling Kathrine Balling Tramadol HCL 50 mg take one tabet by every 6 hours as needed for pain for up to 7 days take lowest dose possible.   Dr June Mora 11-22-22

## 2022-11-28 PROCEDURE — 1090000002 HH PPS REVENUE DEBIT

## 2022-11-28 PROCEDURE — 1090000001 HH PPS REVENUE CREDIT

## 2022-11-29 ENCOUNTER — OFFICE VISIT (OUTPATIENT)
Dept: ORTHOPEDIC SURGERY | Age: 77
End: 2022-11-29

## 2022-11-29 ENCOUNTER — HOME CARE VISIT (OUTPATIENT)
Dept: HOME HEALTH SERVICES | Facility: HOME HEALTH | Age: 77
End: 2022-11-29
Payer: MEDICARE

## 2022-11-29 DIAGNOSIS — M17.12 PRIMARY OSTEOARTHRITIS OF LEFT KNEE: Primary | ICD-10-CM

## 2022-11-29 PROCEDURE — 1090000001 HH PPS REVENUE CREDIT

## 2022-11-29 PROCEDURE — 1090000002 HH PPS REVENUE DEBIT

## 2022-11-29 NOTE — PROGRESS NOTES
Viscosupplementation Follow-up    [unfilled]     Allergies   Allergen Reactions    Latex Other (See Comments)    Codeine Other (See Comments)     Headache    Shellfish-Derived Products Hives        Current Outpatient Medications on File Prior to Visit   Medication Sig Dispense Refill    losartan (COZAAR) 50 MG tablet TAKE 1 TABLET BY MOUTH EVERY DAY 90 tablet 3    omeprazole (PRILOSEC) 40 MG delayed release capsule Take 1 capsule by mouth daily TAKE 1 CAPSULE BY MOUTH EVERY DAY 90 capsule 3    traMADol (ULTRAM) 50 MG tablet Take 1 tablet by mouth every 6 hours as needed for Pain for up to 7 days. Intended supply: 7 days. Take lowest dose possible to manage pain 28 tablet 0    tiZANidine (ZANAFLEX) 4 MG tablet Take 1 tablet by mouth 3 times daily as needed (back pain) (Patient taking differently: Take 4 mg by mouth 3 times daily as needed (for moderate back pain)) 21 tablet 0    fluticasone-umeclidin-vilant (TRELEGY ELLIPTA) 100-62.5-25 MCG/INH AEPB 1 INHALATION DAILY, RINSE MOUTH AFTER USE 60 each 11    rOPINIRole (REQUIP) 0.5 MG tablet TAKE 1 TABLET BY MOUTH EVERY DAY AT NIGHT 90 tablet 3    spironolactone (ALDACTONE) 50 MG tablet TAKE 1 TABLET BY MOUTH EVERY DAY 90 tablet 3    OXYGEN Inhale 3 L/min into the lungs at bedtime via nasal cannula      acetaminophen (TYLENOL) 325 MG tablet Take 1,000 mg by mouth every 6 hours as needed for Pain      apixaban (ELIQUIS) 5 MG TABS tablet Take 5 mg by mouth 2 times daily      colestipol (COLESTID) 1 g tablet Take 1 g by mouth 2 times daily      flecainide (TAMBOCOR) 100 MG tablet Take 100 mg by mouth 2 times daily      metoprolol succinate (TOPROL XL) 100 MG extended release tablet Take 100 mg by mouth 2 times daily       No current facility-administered medications on file prior to visit. CC: left knee pain    History: Patient returns today for osteoarthritis of left knee.  We have been treating this conservatively with modification of activities, knee exercise program and Viscosupplementaion. They have noted improvement in symptoms of knee pain with viscosupplementation. It has been approximately 4 months since the last series of injections. They would like to have reinjection of left knee as it did provide good relief and they are beginning to have recurrence of their symptoms. Disposition: The patient has osteoarthritis of the left knee. I have recommended repeat Viscosupplementation. This will be scheduled and arranged. Keep scheduled appointment for injection at 6 months.

## 2022-11-30 ENCOUNTER — TELEPHONE (OUTPATIENT)
Dept: CARDIOLOGY CLINIC | Age: 77
End: 2022-11-30

## 2022-11-30 ENCOUNTER — HOME CARE VISIT (OUTPATIENT)
Dept: SCHEDULING | Facility: HOME HEALTH | Age: 77
End: 2022-11-30
Payer: MEDICARE

## 2022-11-30 VITALS
DIASTOLIC BLOOD PRESSURE: 68 MMHG | SYSTOLIC BLOOD PRESSURE: 118 MMHG | RESPIRATION RATE: 18 BRPM | OXYGEN SATURATION: 97 % | TEMPERATURE: 98 F | HEART RATE: 102 BPM

## 2022-11-30 PROCEDURE — 1090000001 HH PPS REVENUE CREDIT

## 2022-11-30 PROCEDURE — 1090000002 HH PPS REVENUE DEBIT

## 2022-11-30 PROCEDURE — G0299 HHS/HOSPICE OF RN EA 15 MIN: HCPCS

## 2022-11-30 NOTE — CASE COMMUNICATION
P states she has a JUNIOR Daly today and a dentist appt. States she does nt want a PT visit today;discussed at last visit that Pt is I with HEP to date and she feels she can continue on her own; and M.D. notified of missed visit 11-29-22 at 7:08 p.m.

## 2022-11-30 NOTE — TELEPHONE ENCOUNTER
Pt.says since she has been home from the hospital but when she walks just a short distance her legs get really weak. She is in Afib and HR is anywhere 39 to 137. -130/80-90 most days. Please advise. Les Pimple losartan (COZAAR) 50 MG tablet TAKE 1 TABLET BY MOUTH EVERY DAY 90 tablet 3     omeprazole (PRILOSEC) 40 MG delayed release capsule Take 1 capsule by mouth daily TAKE 1 CAPSULE BY MOUTH EVERY DAY 90 capsule 3    traMADol (ULTRAM) 50 MG tablet Take 1 tablet by mouth every 6 hours as needed for Pain for up to 7 days. Intended supply: 7 days.  Take lowest dose possible to manage pain 28 tablet 0    tiZANidine (ZANAFLEX) 4 MG tablet Take 1 tablet by mouth 3 times daily as needed (back pain) (Patient taking differently: Take 4 mg by mouth 3 times daily as needed (for moderate back pain)) 21 tablet 0    fluticasone-umeclidin-vilant (TRELEGY ELLIPTA) 100-62.5-25 MCG/INH AEPB 1 INHALATION DAILY, RINSE MOUTH AFTER USE 60 each 11    rOPINIRole (REQUIP) 0.5 MG tablet TAKE 1 TABLET BY MOUTH EVERY DAY AT NIGHT 90 tablet 3    spironolactone (ALDACTONE) 50 MG tablet TAKE 1 TABLET BY MOUTH EVERY DAY 90 tablet 3    OXYGEN Inhale 3 L/min into the lungs at bedtime via nasal cannula        acetaminophen (TYLENOL) 325 MG tablet Take 1,000 mg by mouth every 6 hours as needed for Pain        apixaban (ELIQUIS) 5 MG TABS tablet Take 5 mg by mouth 2 times daily        colestipol (COLESTID) 1 g tablet Take 1 g by mouth 2 times daily        flecainide (TAMBOCOR) 100 MG tablet Take 100 mg by mouth 2 times daily        metoprolol succinate (TOPROL XL) 100 MG extended release tablet Take 100 mg by mouth 2 times daily

## 2022-12-01 ENCOUNTER — OFFICE VISIT (OUTPATIENT)
Dept: CARDIOLOGY CLINIC | Age: 77
End: 2022-12-01

## 2022-12-01 VITALS
SYSTOLIC BLOOD PRESSURE: 126 MMHG | DIASTOLIC BLOOD PRESSURE: 70 MMHG | WEIGHT: 210 LBS | BODY MASS INDEX: 33.75 KG/M2 | HEIGHT: 66 IN | HEART RATE: 105 BPM

## 2022-12-01 DIAGNOSIS — J43.2 CENTRILOBULAR EMPHYSEMA (HCC): Chronic | ICD-10-CM

## 2022-12-01 DIAGNOSIS — J44.9 OSA AND COPD OVERLAP SYNDROME (HCC): ICD-10-CM

## 2022-12-01 DIAGNOSIS — I27.20 PULMONARY HYPERTENSION (HCC): ICD-10-CM

## 2022-12-01 DIAGNOSIS — G47.33 OSA AND COPD OVERLAP SYNDROME (HCC): ICD-10-CM

## 2022-12-01 DIAGNOSIS — I48.0 PAROXYSMAL ATRIAL FIBRILLATION (HCC): Primary | ICD-10-CM

## 2022-12-01 DIAGNOSIS — I87.8 VENOUS STASIS: ICD-10-CM

## 2022-12-01 DIAGNOSIS — E78.00 PURE HYPERCHOLESTEROLEMIA: ICD-10-CM

## 2022-12-01 DIAGNOSIS — I10 ESSENTIAL HYPERTENSION: ICD-10-CM

## 2022-12-01 DIAGNOSIS — I25.119 CORONARY ARTERY DISEASE INVOLVING NATIVE CORONARY ARTERY OF NATIVE HEART WITH ANGINA PECTORIS (HCC): ICD-10-CM

## 2022-12-01 DIAGNOSIS — E66.01 SEVERE OBESITY (HCC): ICD-10-CM

## 2022-12-01 PROCEDURE — 1090000001 HH PPS REVENUE CREDIT

## 2022-12-01 PROCEDURE — 1090000002 HH PPS REVENUE DEBIT

## 2022-12-01 RX ORDER — DILTIAZEM HYDROCHLORIDE 300 MG/1
300 CAPSULE, COATED, EXTENDED RELEASE ORAL DAILY
Qty: 90 CAPSULE | Refills: 3 | Status: SHIPPED | OUTPATIENT
Start: 2022-12-01

## 2022-12-01 RX ORDER — TRAMADOL HYDROCHLORIDE 50 MG/1
50 TABLET ORAL EVERY 6 HOURS PRN
COMMUNITY

## 2022-12-01 RX ORDER — AMIODARONE HYDROCHLORIDE 200 MG/1
200 TABLET ORAL DAILY
Qty: 90 TABLET | Refills: 3 | Status: SHIPPED | OUTPATIENT
Start: 2022-12-01

## 2022-12-01 ASSESSMENT — ENCOUNTER SYMPTOMS
BACK PAIN: 1
ABDOMINAL PAIN: 0
COUGH: 0
SHORTNESS OF BREATH: 1

## 2022-12-01 NOTE — PROGRESS NOTES
[address], [facility] [address]      22      NAME:  Susan Maxwell  : 1945  MRN: [id number]      SUBJECTIVE:   Susan Maxwell is a 68 y.o. female seen for a follow up visit regarding the following:     Chief Complaint   Patient presents with    Atrial Fibrillation     Pt states both legs are weak        HPI:   Patient with history of severe dyspnea and chest pressure with exertion. Patient has a strong family history of premature CAD and MI. She has HTN, Chol, and long history of tobacco abuse. She also has a history of DVTs and PE many years ago. This was thought to be due to severe varicosities. She had cardiac catheterization 2014 with normal coronaries. She was seen in office 2014 with atrial fibrillation with RVR that spontaneously converted. She continues to struggle with chronic dyspnea and LE edema. This is secondary to obesity and venous stasis. Recently had COVID-19 vaccine and reported AF/RVR after injection. Patient now status post right and left heart catheterization 2022. She had mild to moderate pulmonary hypertension at 50 mmHg. 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 EDP was 5 mmHg. proBNP was mildly elevated. Patient was readmitted 2022 with acute on chronic respiratory failure. Patient been noncompliant with CPAP, oxygen and diuretics. She responded to IV diuretics and treatment for underlying COPD/obesity hypoventilation syndrome. Patient presents today with persistent atrial fibrillation with associated weakness. She feels that she is more fatigued with lower extremity weakness since metoprolol was increased to 100 mg twice daily. Past Medical History, Past Surgical History, Family history, Social History, and Medications were all reviewed with the patient today and updated as necessary.      Current Outpatient Medications   Medication Sig Dispense Refill    traMADol (ULTRAM) 50 MG tablet Take 50 mg by mouth every 6 hours as needed for Pain. amiodarone (CORDARONE) 200 MG tablet Take 1 tablet by mouth daily 90 tablet 3    dilTIAZem (CARDIZEM CD) 300 MG extended release capsule Take 1 capsule by mouth daily 90 capsule 3    losartan (COZAAR) 50 MG tablet TAKE 1 TABLET BY MOUTH EVERY DAY 90 tablet 3    omeprazole (PRILOSEC) 40 MG delayed release capsule Take 1 capsule by mouth daily TAKE 1 CAPSULE BY MOUTH EVERY DAY 90 capsule 3    fluticasone-umeclidin-vilant (TRELEGY ELLIPTA) 100-62.5-25 MCG/INH AEPB 1 INHALATION DAILY, RINSE MOUTH AFTER USE 60 each 11    rOPINIRole (REQUIP) 0.5 MG tablet TAKE 1 TABLET BY MOUTH EVERY DAY AT NIGHT 90 tablet 3    spironolactone (ALDACTONE) 50 MG tablet TAKE 1 TABLET BY MOUTH EVERY DAY 90 tablet 3    OXYGEN Inhale 3 L/min into the lungs at bedtime via nasal cannula      acetaminophen (TYLENOL) 325 MG tablet Take 1,000 mg by mouth every 6 hours as needed for Pain      apixaban (ELIQUIS) 5 MG TABS tablet Take 5 mg by mouth 2 times daily      colestipol (COLESTID) 1 g tablet Take 1 g by mouth 2 times daily       No current facility-administered medications for this visit.      Patient Active Problem List    Diagnosis Date Noted    Chronic systolic (congestive) heart failure 11/22/2022     Priority: Medium    Acute respiratory failure with hypoxia ([facility] [address] 11/01/2022     Priority: Medium    Acute congestive heart failure ([address] 11/01/2022     Priority: Medium    Coronary artery disease involving native coronary artery of native heart with angina pectoris ([address] 11/01/2022     Priority: Medium    Chronic diastolic congestive heart failure ([address] 11/01/2022     Priority: Medium    Chest pain 01/13/2022     Priority: Low     Added automatically from request for surgery [id number]        Venous insufficiency 01/28/2021     Priority: Low    Abscess of leg, right 07/02/2020     Priority: Low    Peripheral vascular disease ([facility] 07/02/2020     Priority: Low    Cellulitis of lower extremity 06/24/2020     Priority: Low    Stasis ulcer ([address] 06/24/2020     Priority: Low    Open wound of left lower extremity 06/24/2020     Priority: Low    Severe obesity ([address] 06/15/2020     Priority: Low    Cellulitis of left lower extremity 06/15/2020     Priority: Low    Venous stasis 09/26/2019     Priority: Low    Calf ulcer, left, limited to breakdown of skin ([address] 09/13/2019     Priority: Low     Last Assessment & Plan:   Since she is unable to adequately doff and don compression stockings and   is already on high-dose diuretics, we will supplement her compression   therapy with Velcro compression wraps. She will follow-up in 6 months   time, sooner if she does not respond.         Nocturnal hypoxia 08/13/2019     Priority: Low    Pulmonary hypertension ([address] 07/02/2019     Priority: Low    Diastolic dysfunction 70/10/3237     Priority: Low    Intolerance of continuous positive airway pressure (CPAP) ventilation 07/02/2019     Priority: Low    Class 1 obesity in adult 08/30/2018     Priority: Low    RLS (restless legs syndrome) 09/25/2017     Priority: Low    MARISA and COPD overlap syndrome ([address] 09/25/2017     Priority: Low    Obesity (BMI 30-39.9) 09/25/2017     Priority: Low    MARISA (obstructive sleep apnea) 09/25/2017     Priority: Low    Dyspnea on exertion 05/12/2016     Priority: Low     08/2014:  Normal coronaries        Essential hypertension 01/21/2016     Priority: Low    HLD (hyperlipidemia) 01/21/2016     Priority: Low    Atrial fibrillation ([address] 01/21/2016     Priority: Low    Pulmonary embolus ([address] 01/21/2016     Priority: Low    Centrilobular emphysema ([address] 09/12/2014     Priority: Low      Family History   Problem Relation Age of Onset    Stroke Mother     Heart Disease Mother     Cancer Father         prostate    Stroke Father     Coronary Art Dis Father         premature    Heart Disease Brother     Heart Attack Brother     Cancer Sister     Cancer Sister         breast    Breast Cancer Sister 70     Social History     Tobacco Use    Smoking status: Former     Packs/day: 3.00     Years: 20.00     Pack years: 60.00     Types: Cigarettes     Quit date: 1998     Years since quittin.2    Smokeless tobacco: Never   Substance Use Topics    Alcohol use: No       Review Of Symptoms    Review of Systems   Constitutional: Positive for malaise/fatigue. Negative for fever. HENT:  Negative for nosebleeds. Cardiovascular:  Positive for palpitations. Negative for chest pain and dyspnea on exertion. Respiratory:  Positive for shortness of breath. Negative for cough. Musculoskeletal:  Positive for back pain and muscle weakness. Negative for muscle cramps and myalgias. Gastrointestinal:  Negative for abdominal pain. Neurological:  Negative for dizziness. Physical Exam  Blood pressure 126/70, pulse (!) 105, height 5' 6\" (1.676 m), weight 210 lb (95.3 kg). Physical Exam  HENT:      Head: Normocephalic and atraumatic. Eyes:      Extraocular Movements: Extraocular movements intact. Cardiovascular:      Rate and Rhythm: Normal rate and regular rhythm. Heart sounds: No murmur heard. Pulmonary:      Effort: Pulmonary effort is normal.      Breath sounds: Normal breath sounds. Abdominal:      Palpations: Abdomen is soft. Musculoskeletal:         General: Normal range of motion. Skin:     General: Skin is warm and dry. Neurological:      General: No focal deficit present. Mental Status: She is oriented to person, place, and time. Medical problems, medical history and test results were reviewed with the patient today.       Lab Results   Component Value Date    CHOL 206 (H) 2022    CHOL 182 2020     Lab Results   Component Value Date    TRIG 110 2022    TRIG 155 (H) 2020     Lab Results   Component Value Date    HDL 34 (L) 2022    HDL 38 (L) 2020     Lab Results   Component Value Date    LDLCALC 150 (H) 2022    [address] [employer] 113 (H) 02/17/2020     Lab Results   Component Value Date    LABVLDL 22 11/02/2022    LABVLDL 31 02/17/2020     Lab Results   Component Value Date    CHOLHDLRATIO 6.1 11/02/2022        Lab Results   Component Value Date    LABA1C 5.2 08/17/2020     Lab Results   Component Value Date     08/17/2020        Lab Results   Component Value Date     11/05/2022    K 4.6 11/05/2022     11/05/2022    CO2 27 11/05/2022    BUN 29 (H) 11/05/2022    CREATININE 1.10 (H) 11/05/2022    GLUCOSE 101 (H) 11/05/2022    CALCIUM 8.5 11/05/2022    PROT 7.7 11/02/2022    PROT 7.6 11/02/2022    LABALBU 3.7 11/02/2022    LABALBU 3.7 11/02/2022    BILITOT 0.5 11/02/2022    BILITOT 0.6 11/02/2022    ALKPHOS 108 11/02/2022    ALKPHOS 109 11/02/2022    AST 31 11/02/2022    AST 32 11/02/2022    ALT 53 11/02/2022    ALT 52 11/02/2022    LABGLOM 52 (L) 11/05/2022    GFRAA >60 02/07/2022    AGRATIO 2.0 12/10/2020    GLOB 4.0 11/02/2022    GLOB 3.9 11/02/2022       Lab Results   Component Value Date/Time     11/05/2022 05:54 AM    K 4.6 11/05/2022 05:54 AM     11/05/2022 05:54 AM    CO2 27 11/05/2022 05:54 AM    BUN 29 11/05/2022 05:54 AM    CREATININE 1.10 11/05/2022 05:54 AM    GLUCOSE 101 11/05/2022 05:54 AM    CALCIUM 8.5 11/05/2022 05:54 AM        Lab Results   Component Value Date    WBC 5.4 11/02/2022    HGB 15.2 11/02/2022    HCT 45.9 11/02/2022    MCV 98.9 11/02/2022     (L) 11/02/2022             ASSESSMENT:    Stew Faith was seen today for hypertension. Diagnoses and all orders for this visit:    Paroxysmal atrial fibrillation ([address] - Remains in SR on Flecaine 100mg BID and Eliquis. More episodes of AF/RVR. ECG confirms persistent atrial fibrillation. This appears to have been occurring now for several days. We will discontinue flecainide and metoprolol. Start amiodarone for 100 mg twice daily x1 week then 200 mg twice daily. Start diltiazem 300 mg daily for rate control.   We will plan cardioversion as patient is compliant with Eliquis 5 mg twice daily. Patient will then need referral to electrophysiology as I suspect she will transition to more persistent atrial fibrillation. She struggles with medication tolerance and may benefit from biventricular pacing and AV node ablation. Coronary artery disease -moderate by cardiac catheterization 02/2022. Patient has been intolerant of statin therapy in the past.  Will monitor for now. Patient has struggled with cost of Repatha. Essential hypertension with goal blood pressure less than 130/85 - Only elevated in AM and likely due to severe MARISA that is not treated. Continue metoprolol, losartan    Pure hypercholesterolemia - Not on therapy and managed per PCP. Dyspnea on exertion - Patient unfortunately has gained weight. This is primarily due to morbid obesity, MARISA, Deconditioning. .    Pulmonary Hypertension  - 03/2022 - PASP 50mmHg at [address] Echo 11/2022 - RVSP 38mmHg    MARISA - Unable to tolerate CPAP and now off of therapy. Has moderate/severe PHTN. Has been wearing O2 at night. Venous stasis - This is multifactorial. Keep feet elevated and wear support stockings. Morbid Obesity - Unchanged and continues to gain weight. This is a significant contributor to PHTN/dyspnea.       Chronic Obstructive Pulmonary Disease - This is stable       Problem List Items Addressed This Visit          Circulatory    Venous stasis (Chronic)    Coronary artery disease involving native coronary artery of native heart with angina pectoris (HCC)    Relevant Medications    amiodarone (CORDARONE) 200 MG tablet    dilTIAZem (CARDIZEM CD) 300 MG extended release capsule    Other Relevant Orders    EKG 12 lead (Completed)    Essential hypertension    Pulmonary hypertension (HCC)    Atrial fibrillation ([facility] - Primary    Relevant Medications    dilTIAZem (CARDIZEM CD) 300 MG extended release capsule    Other Relevant Orders    EKG 12 lead (Completed) Cardioversion external       Respiratory    Centrilobular emphysema (HCC) (Chronic)    MARISA and COPD overlap syndrome (HCC) (Chronic)       Other    Severe obesity (HCC) (Chronic)    HLD (hyperlipidemia)    Relevant Medications    amiodarone (CORDARONE) 200 MG tablet    dilTIAZem (CARDIZEM CD) 300 MG extended release capsule     Medications Discontinued During This Encounter   Medication Reason    tiZANidine (ZANAFLEX) 4 MG tablet     flecainide (TAMBOCOR) 100 MG tablet Therapy completed    metoprolol succinate (TOPROL XL) 100 MG extended release tablet Therapy completed             Patient has been instructed and agrees to call our office with any issues or other concerns related to their cardiac condition(s) and/or complaint(s). Return for Return after hospital procedure.        Wesley Brown MD  12/1/2022

## 2022-12-02 ENCOUNTER — TELEPHONE (OUTPATIENT)
Dept: CARDIOLOGY CLINIC | Age: 77
End: 2022-12-02

## 2022-12-02 NOTE — TELEPHONE ENCOUNTER
Came in and saw Ghazal Jain yesterday. EKG confirmed AF/RVR Flecainide and Metoprolol dc'd and Amiodarone 100 mg twice daily x1 week then 200 mg twice daily started along w/ diltiazem 300 mg daily for rate control. Scheduled for CVERSION  12/5. She calls  back today c/o HR of 44-51 and reports her whole body feels jittery. She feels much worse w//this lower HR and current meds. She can never tell if heart out of rhythm. What does she need to do?

## 2022-12-02 NOTE — PROGRESS NOTES
Called to pre assess patient, she states she thinks she is in NSR. Does not want to go to office today for EKG. Will call office at some point or come Monday. Office number provided.

## 2022-12-02 NOTE — TELEPHONE ENCOUNTER
Pt states Dr. Juanita Haney has taken her off of  metoprolol and flecainide yesterday, and prescribed her  Amiodarne and Diltiazem. She is in afib. And HR 51-44. Has been feeling very jittery and legs been very weak. SOB has COPD.  Denies CP

## 2022-12-05 ENCOUNTER — HOSPITAL ENCOUNTER (OUTPATIENT)
Dept: CARDIAC CATH/INVASIVE PROCEDURES | Age: 77
Discharge: HOME | End: 2022-12-05
Attending: INTERNAL MEDICINE | Admitting: INTERNAL MEDICINE
Payer: MEDICARE

## 2022-12-05 VITALS
BODY MASS INDEX: 33.75 KG/M2 | OXYGEN SATURATION: 97 % | HEIGHT: 66 IN | TEMPERATURE: 97.9 F | SYSTOLIC BLOOD PRESSURE: 131 MMHG | HEART RATE: 65 BPM | DIASTOLIC BLOOD PRESSURE: 78 MMHG | WEIGHT: 210 LBS

## 2022-12-05 DIAGNOSIS — I48.0 PAROXYSMAL ATRIAL FIBRILLATION (HCC): ICD-10-CM

## 2022-12-05 LAB
ANION GAP SERPL CALC-SCNC: 4 MMOL/L (ref 2–11)
BUN SERPL-MCNC: 15 MG/DL (ref 8–23)
CALCIUM SERPL-MCNC: 7.5 MG/DL (ref 8.3–10.4)
CHLORIDE SERPL-SCNC: 110 MMOL/L (ref 101–110)
CO2 SERPL-SCNC: 24 MMOL/L (ref 21–32)
CREAT SERPL-MCNC: 1 MG/DL (ref 0.6–1)
ECHO BSA: 2.11 M2
EKG ATRIAL RATE: 234 BPM
EKG ATRIAL RATE: 66 BPM
EKG DIAGNOSIS: NORMAL
EKG DIAGNOSIS: NORMAL
EKG P AXIS: 49 DEGREES
EKG P-R INTERVAL: 166 MS
EKG Q-T INTERVAL: 392 MS
EKG Q-T INTERVAL: 478 MS
EKG QRS DURATION: 82 MS
EKG QRS DURATION: 84 MS
EKG QTC CALCULATION (BAZETT): 501 MS
EKG QTC CALCULATION (BAZETT): 535 MS
EKG R AXIS: -10 DEGREES
EKG R AXIS: -6 DEGREES
EKG T AXIS: 11 DEGREES
EKG T AXIS: 2 DEGREES
EKG VENTRICULAR RATE: 112 BPM
EKG VENTRICULAR RATE: 66 BPM
ERYTHROCYTE [DISTWIDTH] IN BLOOD BY AUTOMATED COUNT: 15 % (ref 11.9–14.6)
GLUCOSE SERPL-MCNC: 93 MG/DL (ref 65–100)
HCT VFR BLD AUTO: 39.3 % (ref 35.8–46.3)
HGB BLD-MCNC: 12.6 G/DL (ref 11.7–15.4)
MAGNESIUM SERPL-MCNC: 1.4 MG/DL (ref 1.8–2.4)
MCH RBC QN AUTO: 33.1 PG (ref 26.1–32.9)
MCHC RBC AUTO-ENTMCNC: 32.1 G/DL (ref 31.4–35)
MCV RBC AUTO: 103.1 FL (ref 82–102)
NRBC # BLD: 0 K/UL (ref 0–0.2)
PLATELET # BLD AUTO: 130 K/UL (ref 150–450)
PMV BLD AUTO: 10.9 FL (ref 9.4–12.3)
POTASSIUM SERPL-SCNC: 5.2 MMOL/L (ref 3.5–5.1)
RBC # BLD AUTO: 3.81 M/UL (ref 4.05–5.2)
SODIUM SERPL-SCNC: 138 MMOL/L (ref 133–143)
WBC # BLD AUTO: 4.9 K/UL (ref 4.3–11.1)

## 2022-12-05 PROCEDURE — 6360000002 HC RX W HCPCS: Performed by: INTERNAL MEDICINE

## 2022-12-05 PROCEDURE — 80048 BASIC METABOLIC PNL TOTAL CA: CPT

## 2022-12-05 PROCEDURE — 99152 MOD SED SAME PHYS/QHP 5/>YRS: CPT | Performed by: INTERNAL MEDICINE

## 2022-12-05 PROCEDURE — 93005 ELECTROCARDIOGRAM TRACING: CPT | Performed by: INTERNAL MEDICINE

## 2022-12-05 PROCEDURE — 99152 MOD SED SAME PHYS/QHP 5/>YRS: CPT

## 2022-12-05 PROCEDURE — 83735 ASSAY OF MAGNESIUM: CPT

## 2022-12-05 PROCEDURE — 92960 CARDIOVERSION ELECTRIC EXT: CPT | Performed by: INTERNAL MEDICINE

## 2022-12-05 PROCEDURE — 85027 COMPLETE CBC AUTOMATED: CPT

## 2022-12-05 PROCEDURE — 92960 CARDIOVERSION ELECTRIC EXT: CPT

## 2022-12-05 RX ORDER — MIDAZOLAM HYDROCHLORIDE 2 MG/2ML
INJECTION, SOLUTION INTRAMUSCULAR; INTRAVENOUS
Status: COMPLETED | OUTPATIENT
Start: 2022-12-05 | End: 2022-12-05

## 2022-12-05 RX ORDER — METOPROLOL SUCCINATE 100 MG/1
100 TABLET, EXTENDED RELEASE ORAL EVERY 12 HOURS
Qty: 1 TABLET | Refills: 0
Start: 2022-12-05

## 2022-12-05 RX ORDER — FENTANYL CITRATE 50 UG/ML
INJECTION, SOLUTION INTRAMUSCULAR; INTRAVENOUS
Status: COMPLETED | OUTPATIENT
Start: 2022-12-05 | End: 2022-12-05

## 2022-12-05 RX ORDER — FLECAINIDE ACETATE 100 MG/1
100 TABLET ORAL 2 TIMES DAILY
Qty: 180 TABLET | Refills: 3
Start: 2022-12-05

## 2022-12-05 RX ORDER — 0.9 % SODIUM CHLORIDE 0.9 %
1000 INTRAVENOUS SOLUTION INTRAVENOUS ONCE
Status: DISCONTINUED | OUTPATIENT
Start: 2022-12-05 | End: 2022-12-05 | Stop reason: HOSPADM

## 2022-12-05 RX ADMIN — FENTANYL CITRATE 50 MCG: 50 INJECTION, SOLUTION INTRAMUSCULAR; INTRAVENOUS at 12:04

## 2022-12-05 RX ADMIN — MIDAZOLAM HYDROCHLORIDE 2 MG: 1 INJECTION, SOLUTION INTRAMUSCULAR; INTRAVENOUS at 12:04

## 2022-12-05 RX ADMIN — MIDAZOLAM HYDROCHLORIDE 1 MG: 1 INJECTION, SOLUTION INTRAMUSCULAR; INTRAVENOUS at 12:14

## 2022-12-05 RX ADMIN — MIDAZOLAM HYDROCHLORIDE 2 MG: 1 INJECTION, SOLUTION INTRAMUSCULAR; INTRAVENOUS at 12:10

## 2022-12-05 RX ADMIN — FENTANYL CITRATE 25 MCG: 50 INJECTION, SOLUTION INTRAMUSCULAR; INTRAVENOUS at 12:10

## 2022-12-05 NOTE — PROGRESS NOTES
TRANSFER - IN REPORT:    Verbal report received from [facility] RN(name) on Shannon Mccann  being received from [facility](unit) for routine progression of patient care      Report consisted of patients Situation, Background, Assessment and   Recommendations(SBAR). Information from the following report(s) Nurse Handoff Report was reviewed with the receiving nurse. Opportunity for questions and clarification was provided.       Assessment completed upon patients arrival to unit and care assume

## 2022-12-05 NOTE — DISCHARGE INSTRUCTIONS
Electrical Cardioversion: What to Expect at [facility]     Electrical cardioversion is a treatment for an abnormal heartbeat, such as atrial fibrillation, supraventricular tachycardia, or ventricular tachycardia (VT). Your doctor used a brief electrical shock to reset your heart's rhythm. After the procedure, you may have redness, like a sunburn, where the patches were. The medicines you got to make you sleepy may make you feel drowsy for the rest of the day. You may feel soreness or discomfort in your chest wall for a few days. Your doctor may have you take medicines to help the heart beat normally and to prevent blood clots. This care sheet gives you a general idea about how long it will take for you to recover. But each person recovers at a different pace. Follow the steps below to feel better as quickly as possible. How can you care for yourself at home? Medicines    If the doctor gave you a sedative: For 24 hours, don't do anything that requires attention to detail. It takes time for the medicine's effects to completely wear off. For your safety, do not drive or operate any machinery that could be dangerous. Wait until the medicine wears off and you can think clearly and react easily. Be safe with medicines. Take your medicines exactly as prescribed. Call your doctor if you think you are having a problem with your medicine. You may take one or more of the following medicines:  Rate-control medicines to slow the heart rate. Rhythm-control medicines that help the heart keep a normal rhythm. Blood thinners, also called anticoagulants, which help prevent blood clots. You will get more details on the specific medicines your doctor prescribes. Be sure you know how to take your medicines safely.      Do not take any vitamins, over-the-counter medicines, or herbal products without talking to your doctor first.   Exercise    Talk to your doctor about what type and level of exercise are safe for you. When you exercise, watch for signs that your heart is working too hard. You are pushing too hard if you cannot talk while you are exercising. If you become short of breath or dizzy or have chest pain, sit down and rest right away. Check your pulse regularly. Place two fingers on the artery at the palm side of your wrist in line with your thumb. If your heartbeat seems uneven or fast, talk to your doctor. Heart-healthy lifestyle    Do not smoke. If you need help quitting, talk to your doctor about stop-smoking programs and medicines. These can increase your chances of quitting for good. Eat heart-healthy foods. Limit sodium, alcohol, and sugar. Stay at a healthy weight. Lose weight if you need to. Manage other health problems. If you think you may have a problem with alcohol or drug use, talk to your doctor. Follow-up care is a key part of your treatment and safety. Be sure to make and go to all appointments, and call your doctor if you are having problems. It's also a good idea to know your test results and keep a list of the medicines you take. When should you call for help? Call 911 anytime you think you may need emergency care. For example, call if:    You passed out (lost consciousness). You have symptoms of a heart attack. These may include:  Chest pain or pressure, or a strange feeling in the chest.  Sweating. Shortness of breath. Nausea or vomiting. Pain, pressure, or a strange feeling in the back, neck, jaw, or upper belly or in one or both shoulders or arms. Lightheadedness or sudden weakness. A fast or irregular heartbeat. After calling 911, the  may tell you to chew 1 adult-strength or 2 to 4 low-dose aspirin. Wait for an ambulance. Do not try to drive yourself. You have symptoms of a stroke. These may include:  Sudden numbness, tingling, weakness, or loss of movement in your face, arm, or leg, especially on only one side of your body.   Sudden vision changes. Sudden trouble speaking. Sudden confusion or trouble understanding simple statements. Sudden problems with walking or balance. A sudden, severe headache that is different from past headaches. Call your doctor now or seek immediate medical care if:    You feel dizzy or lightheaded, or you feel like you may faint. You have a fast or irregular heartbeat. Watch closely for any changes in your health, and be sure to contact your doctor if you have any problems. Where can you learn more? Go to https://HeartFlow.[employer]UNAT. org and sign in to your [employer] account. Enter A617 in the [employer] box to learn more about \"Electrical Cardioversion: What to Expect at Home. \"     If you do not have an account, please click on the \"Sign Up Now\" link. Current as of: January 10, 2022               Content Version: 13.4  © 1191-8030 [employer]wise, Incorporated. Care instructions adapted under license by [facility] ([facility]). If you have questions about a medical condition or this instruction, always ask your healthcare professional. [place] [address] any warranty or liability for your use of this information. New Patient Appointment with Dr Marcela Garcia on Tuesday, December 13 at [address] in the [place] office.

## 2022-12-05 NOTE — TELEPHONE ENCOUNTER
This has resolved. Uncertain whether patient was having erroneous values or not. But she presented for cardioversion today and A. fib with RVR.

## 2022-12-05 NOTE — TELEPHONE ENCOUNTER
Dawson De Paz MD  You; Ashlee Boss MD 3 days ago     MN  I did not see this until 9 PM.  I called the patient. She states her heart rate was 50. Told her to stop diltiazem over the weekend and to decrease amiodarone to 200 mg once a day but to only take if her heart rate was above 60. She plans to come in for cardioversion on Monday. Unclear if she has converted currently or is in slow A. fib. Regardless she needs an EKG. I spoke again to pt.she said she has stopped the Diltiazem and Amiodarone. She is scheduled for CVERSION this am.She is taking her meds to the hospital this am w/her.

## 2022-12-07 ENCOUNTER — HOME CARE VISIT (OUTPATIENT)
Dept: SCHEDULING | Facility: HOME HEALTH | Age: 77
End: 2022-12-07
Payer: MEDICARE

## 2022-12-07 VITALS
BODY MASS INDEX: 34.22 KG/M2 | RESPIRATION RATE: 18 BRPM | DIASTOLIC BLOOD PRESSURE: 66 MMHG | SYSTOLIC BLOOD PRESSURE: 138 MMHG | WEIGHT: 212 LBS | TEMPERATURE: 97.2 F | HEART RATE: 65 BPM | OXYGEN SATURATION: 96 %

## 2022-12-07 PROCEDURE — G0299 HHS/HOSPICE OF RN EA 15 MIN: HCPCS

## 2022-12-07 PROCEDURE — G0151 HHCP-SERV OF PT,EA 15 MIN: HCPCS

## 2022-12-08 ENCOUNTER — TELEPHONE (OUTPATIENT)
Dept: PULMONOLOGY | Age: 77
End: 2022-12-08

## 2022-12-08 VITALS
HEART RATE: 60 BPM | DIASTOLIC BLOOD PRESSURE: 75 MMHG | RESPIRATION RATE: 16 BRPM | SYSTOLIC BLOOD PRESSURE: 140 MMHG | TEMPERATURE: 97 F | OXYGEN SATURATION: 92 %

## 2022-12-08 DIAGNOSIS — R06.02 SOB (SHORTNESS OF BREATH): Primary | ICD-10-CM

## 2022-12-08 NOTE — TELEPHONE ENCOUNTER
Patient is having trouble breathing, she is wondering if she may have fluid again in her lungs. She is having to wear her oxygen 24/7 she is using 3L .

## 2022-12-09 NOTE — TELEPHONE ENCOUNTER
Pt c/o low SAT, increased SOB. Feels like she may have fluid around her lungs again. Would like an office appt. Scheduled for appt Monday with CXR prior. CXR ordered. Reviewed ER precautions.

## 2022-12-12 ENCOUNTER — HOSPITAL ENCOUNTER (OUTPATIENT)
Dept: GENERAL RADIOLOGY | Age: 77
Discharge: HOME | End: 2022-12-15
Payer: MEDICARE

## 2022-12-12 ENCOUNTER — OFFICE VISIT (OUTPATIENT)
Dept: PULMONOLOGY | Age: 77
End: 2022-12-12
Payer: MEDICARE

## 2022-12-12 VITALS
BODY MASS INDEX: 33.75 KG/M2 | DIASTOLIC BLOOD PRESSURE: 90 MMHG | HEART RATE: 102 BPM | WEIGHT: 210 LBS | HEIGHT: 66 IN | TEMPERATURE: 98 F | RESPIRATION RATE: 14 BRPM | OXYGEN SATURATION: 81 % | SYSTOLIC BLOOD PRESSURE: 166 MMHG

## 2022-12-12 DIAGNOSIS — J44.1 CHRONIC OBSTRUCTIVE PULMONARY DISEASE WITH ACUTE EXACERBATION (HCC): ICD-10-CM

## 2022-12-12 DIAGNOSIS — I27.20 PULMONARY HYPERTENSION (HCC): Primary | ICD-10-CM

## 2022-12-12 DIAGNOSIS — G47.33 OSA (OBSTRUCTIVE SLEEP APNEA): ICD-10-CM

## 2022-12-12 DIAGNOSIS — R06.02 SOB (SHORTNESS OF BREATH): ICD-10-CM

## 2022-12-12 PROCEDURE — 3023F SPIROM DOC REV: CPT | Performed by: INTERNAL MEDICINE

## 2022-12-12 PROCEDURE — 99214 OFFICE O/P EST MOD 30 MIN: CPT | Performed by: INTERNAL MEDICINE

## 2022-12-12 PROCEDURE — 1090F PRES/ABSN URINE INCON ASSESS: CPT | Performed by: INTERNAL MEDICINE

## 2022-12-12 PROCEDURE — 71046 X-RAY EXAM CHEST 2 VIEWS: CPT

## 2022-12-12 PROCEDURE — 3078F DIAST BP <80 MM HG: CPT | Performed by: INTERNAL MEDICINE

## 2022-12-12 PROCEDURE — G8427 DOCREV CUR MEDS BY ELIG CLIN: HCPCS | Performed by: INTERNAL MEDICINE

## 2022-12-12 PROCEDURE — G8417 CALC BMI ABV UP PARAM F/U: HCPCS | Performed by: INTERNAL MEDICINE

## 2022-12-12 PROCEDURE — G8484 FLU IMMUNIZE NO ADMIN: HCPCS | Performed by: INTERNAL MEDICINE

## 2022-12-12 PROCEDURE — 1123F ACP DISCUSS/DSCN MKR DOCD: CPT | Performed by: INTERNAL MEDICINE

## 2022-12-12 PROCEDURE — 3074F SYST BP LT 130 MM HG: CPT | Performed by: INTERNAL MEDICINE

## 2022-12-12 PROCEDURE — 1036F TOBACCO NON-USER: CPT | Performed by: INTERNAL MEDICINE

## 2022-12-12 PROCEDURE — G8400 PT W/DXA NO RESULTS DOC: HCPCS | Performed by: INTERNAL MEDICINE

## 2022-12-12 RX ORDER — PREDNISONE 10 MG/1
TABLET ORAL
Qty: 30 TABLET | Refills: 0 | Status: SHIPPED | OUTPATIENT
Start: 2022-12-12

## 2022-12-12 NOTE — PROGRESS NOTES
[facility]: FOLLOW-UP Patient Office Visit Note  [address] [place]. [address], [address] [facility]  [phone]    Patient Name:  Amos Ellis  YOB: 1945            Date of Service:  12/12/2022    Chief Complaint   Patient presents with    Breathing Problem         History of Present Illness: Amos Ellis Is a 68 y.o. female with PMH of MARISA now on CPAP (3lpm), obesity, DVT of RLE (1971), PE (4809), diastolic dysfunction, atrial fibrillation  on eliquis, 90-pack-year smoking history with GOLD stage III COPD,  who is followed today for pulmonary hypertension with functional class 2 symptoms of exertional dyspnea and dyspnea bending over. The patient's last visit was July 21, 2022. She had previously been on oxygen 3 L at bedtime but more recently has been placed on oxygen during the day. She has had problems with atrial fibrillation and she had been on flecainide but this was changed to amiodarone. Apparently she had trouble tolerating the amiodarone. Because of these recent problems she has been started on oxygen with a portable tank during the day is here as a work in appointment. .  S  She does note when ambulating her O2 sats dropped into the 80s. If she is not using her oxygen. The patient does note that there is no significant cough but she does have some wheezing. There is dyspnea on exertion with minimal exertion. She currently is using Trelegy and albuterol. She does not use a nebulizer but apparently has 1 neighbor is using. Past Medical History:   Diagnosis Date    Arrhythmia     palpitations.      Arthritis     rt shoulder    Atrial fibrillation ([address] 01/21/2016    takes eliquis    Cancer ([address]     skin ca on nose    Chest pain     Chronic obstructive pulmonary disease ([address]     COPD exacerbation ([address] 9/25/2017    Dyspnea     Essential hypertension 1/21/2016    GERD (gastroesophageal reflux disease)     managed by meds    HLD (hyperlipidemia) 1/21/2016    Hypertension     managed by meds    Menopause     Morbid obesity (HCC)     Nausea & vomiting     Pulmonary embolus ([address] 1/21/2016    Sleep apnea     no cpap    Thromboembolus (HCC) 4 month ago. right leg    Venous embolism and thrombosis        Patient Active Problem List   Diagnosis    Cellulitis of lower extremity    Dyspnea on exertion    Stasis ulcer (HCC)    Essential hypertension    RLS (restless legs syndrome)    Abscess of leg, right    Pulmonary hypertension (HCC)    Centrilobular emphysema (HCC)    HLD (hyperlipidemia)    Venous stasis    Venous insufficiency    Nocturnal hypoxia    Severe obesity (HCC)    Calf ulcer, left, limited to breakdown of skin (HCC)    Diastolic dysfunction    Peripheral vascular disease (HCC)    Chest pain    Atrial fibrillation (HCC)    Intolerance of continuous positive airway pressure (CPAP) ventilation    MARISA and COPD overlap syndrome (HCC)    Cellulitis of left lower extremity    Obesity (BMI 30-39. 9)    Class 1 obesity in adult    Open wound of left lower extremity    Pulmonary embolus (HCC)    MARISA (obstructive sleep apnea)    Acute respiratory failure with hypoxia (HCC)    Acute congestive heart failure (HCC)    Coronary artery disease involving native coronary artery of native heart with angina pectoris (HCC)    Chronic diastolic congestive heart failure (HCC)    Chronic systolic (congestive) heart failure         Past Surgical History:   Procedure Laterality Date    APPENDECTOMY      CHOLECYSTECTOMY      ORTHOPEDIC SURGERY      bilateral feet,     ROTATOR CUFF REPAIR Right     ROTATOR CUFF REPAIR Left     RADHA AND BSO (CERVIX REMOVED)      AGE 28    UROLOGICAL SURGERY      bladder tack       Social History     Socioeconomic History    Marital status:      Spouse name: Not on file    Number of children: Not on file    Years of education: Not on file    Highest education level: Not on file   Occupational History    Not on file Tobacco Use    Smoking status: Former     Packs/day: 3.00     Years: 20.00     Pack years: 60.00     Types: Cigarettes     Quit date: 1998     Years since quittin.3    Smokeless tobacco: Never   Substance and Sexual Activity    Alcohol use: No    Drug use: No    Sexual activity: Not on file   Other Topics Concern    Not on file   Social History Narrative    Not on file     Social Determinants of Health     Financial Resource Strain: Low Risk     Difficulty of Paying Living Expenses: Not hard at all   Food Insecurity: No Food Insecurity    Worried About Running Out of Food in the Last Year: Never true    Ran Out of Food in the Last Year: Never true   Transportation Needs: Not on file   Physical Activity: Inactive    Days of Exercise per Week: 0 days    Minutes of Exercise per Session: 10 min   Stress: Not on file   Social Connections: Not on file   Intimate Partner Violence: Not on file   Housing Stability: Not on file       Family History   Problem Relation Age of Onset    Stroke Mother     Heart Disease Mother     Cancer Father         prostate    Stroke Father     Coronary Art Dis Father         premature    Heart Disease Brother     Heart Attack Brother     Cancer Sister     Cancer Sister         breast    Breast Cancer Sister 70       Allergies   Allergen Reactions    Latex Other (See Comments)    Codeine Other (See Comments)     Headache    Shellfish-Derived Products Hives           Review of Systems    OBJECTIVE:  Physical Exam:  Vitals:    22 1056   BP: (!) 166/90   Pulse: (!) 102   Resp: 14   Temp: 98 °F (36.7 °C)   SpO2: (!) 81%        GENERAL APPEARANCE:   The patient is normal weight and in no respiratory distress. HEENT:   PERRL. Conjunctivae unremarkable. Nasal mucosa is without epistaxis, exudate, or polyps. Gums and dentition are unremarkable. There is no oropharyngeal narrowing. TMs are clear. NECK/LYMPHATIC:   Symmetrical with no elevation of jugular venous pulsation. Trachea midline. No thyroid enlargement. No cervical adenopathy. LUNGS:   Normal respiratory effort with symmetrical lung expansion. Breath sounds . Are decreased     HEART:   There is a regular rate and rhythm. No murmur, rub, or gallop. There is no edema in the lower extremities. ABDOMEN:   Soft and non-tender. No hepatosplenomegaly. Bowel sounds are normal.       NEURO:   The patient is alert and oriented to person, place, and time. Memory appears intact and mood is normal.  No gross sensorimotor deficits are present. Current Outpatient Medications   Medication Instructions    acetaminophen (TYLENOL) 1,000 mg, Oral, EVERY 6 HOURS PRN    apixaban (ELIQUIS) 5 mg, Oral, 2 TIMES DAILY    colestipol (COLESTID) 1 g, Oral, 2 TIMES DAILY    flecainide (TAMBOCOR) 100 mg, Oral, 2 TIMES DAILY    fluticasone-umeclidin-vilant (TRELEGY ELLIPTA) 100-62.5-25 MCG/INH AEPB 1 INHALATION DAILY, RINSE MOUTH AFTER USE    losartan (COZAAR) 50 MG tablet TAKE 1 TABLET BY MOUTH EVERY DAY    metoprolol succinate (TOPROL XL) 100 mg, Oral, EVERY 12 HOURS    omeprazole (PRILOSEC) 40 mg, Oral, DAILY, TAKE 1 CAPSULE BY MOUTH EVERY DAY    OXYGEN 3 L/min, Inhalation, Nightly, via nasal cannula    predniSONE (DELTASONE) 10 MG tablet Take 4 tabs x 3 days then 3 tabs x 3 days then 2 tabs x 3 days then 1 tab x 3 days then stop. rOPINIRole (REQUIP) 0.5 MG tablet TAKE 1 TABLET BY MOUTH EVERY DAY AT NIGHT    spironolactone (ALDACTONE) 50 MG tablet TAKE 1 TABLET BY MOUTH EVERY DAY    traMADol (ULTRAM) 50 mg, Oral, EVERY 6 HOURS PRN         DIAGNOSTIC TESTS:    CXR:  cardiolmegaly-no acute changes        XR CHEST (2 VW) 12/12/2022    Narrative  EXAMINATION: XR CHEST (2 VW) 12/12/2022 9:48 AM    ACCESSION NUMBER: [id number]    COMPARISON: Chest radiograph dated 11/1/2022    INDICATION: Shortness of breath    TECHNIQUE: PA and lateral views of the chest were obtained.     FINDINGS:  Support Devices:  *  None    Cardiac Silhouette: Cardiac silhouette is mildly enlarged, unchanged. Mediastinum: Normal mediastinal contours. Aortic arch calcifications. Lungs: No airspace consolidation. No pneumothorax or sizable pleural effusion. Upper Abdomen: Normal    Miscellaneous: No fracture or suspicious osseous lesion. Impression  Stable cardiomegaly without airspace consolidation. CT WITHOUT CONTRAST:    No results found for this or any previous visit from the past 365 days. CT WITH CONTRAST:    No results found for this or any previous visit from the past 365 days. CT HIGH RES:    No results found for this or any previous visit from the past 365 days. CT PE PROTOCOL:    No results found for this or any previous visit from the past 365 days. LDCT SCREENING:    No results found for this or any previous visit from the past 365 days. PET SCAN:    No results found for this or any previous visit from the past 365 days. Spirometry:      No flowsheet data found. Exercise oximetry: Sat 6 on room air dropping to 83 roon room air with ambulation then coming up to 92 on 3 L with ambulation        ASSESSMENT:   Diagnosis Orders   1. Pulmonary hypertension ([address] group 3       2. MARISA (obstructive sleep apnea)   continue CPAP       3.  Chronic obstructive pulmonary disease with acute exacerbation (HCC)            PLAN:  Tapering dose of prednisone starting at 40 mg  O2 3 L with ambulation, contact aero care regarding evaluation for portable   appointment with Dr. Matthew Keene for next month she will keep  Patient has follow-up with cardiology next week    Orders Placed This Encounter   Procedures    DME - DURABLE MEDICAL EQUIPMENT     Initiate O2 3 LPM w/ continuous flow via Nasal Canula    Resting Room Air Sat: 96%    Room Air Sat while ambulatin%    Ambulatory Sat on 3 LPM 92  %    Duration: Lifetime    (X)  Stationary oxygen concentrator  (X)  Portable oxygen system to use when away from stationary unit  (X) Contents for portable system    DME - DURABLE MEDICAL EQUIPMENT     Please evaluate and provide patient for a Portable Concentrator. Patient must maintain above 90%         Orders Placed This Encounter   Medications    predniSONE (DELTASONE) 10 MG tablet     Sig: Take 4 tabs x 3 days then 3 tabs x 3 days then 2 tabs x 3 days then 1 tab x 3 days then stop.      Dispense:  30 tablet     Refill:  0           Electronically signed by  Zaida Woodson MD

## 2022-12-13 ENCOUNTER — OFFICE VISIT (OUTPATIENT)
Dept: CARDIOLOGY CLINIC | Age: 77
End: 2022-12-13
Payer: MEDICARE

## 2022-12-13 VITALS
HEART RATE: 71 BPM | WEIGHT: 210 LBS | DIASTOLIC BLOOD PRESSURE: 88 MMHG | SYSTOLIC BLOOD PRESSURE: 128 MMHG | BODY MASS INDEX: 33.75 KG/M2 | HEIGHT: 66 IN

## 2022-12-13 DIAGNOSIS — E66.9 OBESITY (BMI 30-39.9): ICD-10-CM

## 2022-12-13 DIAGNOSIS — E66.01 SEVERE OBESITY (HCC): Chronic | ICD-10-CM

## 2022-12-13 DIAGNOSIS — I87.8 VENOUS STASIS: Chronic | ICD-10-CM

## 2022-12-13 DIAGNOSIS — I50.32 CHRONIC DIASTOLIC CONGESTIVE HEART FAILURE (HCC): ICD-10-CM

## 2022-12-13 DIAGNOSIS — I48.19 PERSISTENT ATRIAL FIBRILLATION (HCC): ICD-10-CM

## 2022-12-13 DIAGNOSIS — I48.0 PAROXYSMAL ATRIAL FIBRILLATION (HCC): Primary | ICD-10-CM

## 2022-12-13 DIAGNOSIS — I25.119 CORONARY ARTERY DISEASE INVOLVING NATIVE CORONARY ARTERY OF NATIVE HEART WITH ANGINA PECTORIS (HCC): ICD-10-CM

## 2022-12-13 DIAGNOSIS — I10 ESSENTIAL HYPERTENSION: ICD-10-CM

## 2022-12-13 DIAGNOSIS — I73.9 PERIPHERAL VASCULAR DISEASE (HCC): ICD-10-CM

## 2022-12-13 DIAGNOSIS — Z09 HOSPITAL DISCHARGE FOLLOW-UP: ICD-10-CM

## 2022-12-13 DIAGNOSIS — I27.20 PULMONARY HYPERTENSION (HCC): ICD-10-CM

## 2022-12-13 DIAGNOSIS — G47.33 OSA AND COPD OVERLAP SYNDROME (HCC): Chronic | ICD-10-CM

## 2022-12-13 DIAGNOSIS — J44.9 OSA AND COPD OVERLAP SYNDROME (HCC): Chronic | ICD-10-CM

## 2022-12-13 PROCEDURE — 1090F PRES/ABSN URINE INCON ASSESS: CPT | Performed by: INTERNAL MEDICINE

## 2022-12-13 PROCEDURE — G8484 FLU IMMUNIZE NO ADMIN: HCPCS | Performed by: INTERNAL MEDICINE

## 2022-12-13 PROCEDURE — G8427 DOCREV CUR MEDS BY ELIG CLIN: HCPCS | Performed by: INTERNAL MEDICINE

## 2022-12-13 PROCEDURE — 93000 ELECTROCARDIOGRAM COMPLETE: CPT | Performed by: INTERNAL MEDICINE

## 2022-12-13 PROCEDURE — 99215 OFFICE O/P EST HI 40 MIN: CPT | Performed by: INTERNAL MEDICINE

## 2022-12-13 PROCEDURE — G8400 PT W/DXA NO RESULTS DOC: HCPCS | Performed by: INTERNAL MEDICINE

## 2022-12-13 PROCEDURE — 3023F SPIROM DOC REV: CPT | Performed by: INTERNAL MEDICINE

## 2022-12-13 PROCEDURE — 3078F DIAST BP <80 MM HG: CPT | Performed by: INTERNAL MEDICINE

## 2022-12-13 PROCEDURE — 1123F ACP DISCUSS/DSCN MKR DOCD: CPT | Performed by: INTERNAL MEDICINE

## 2022-12-13 PROCEDURE — 1111F DSCHRG MED/CURRENT MED MERGE: CPT | Performed by: INTERNAL MEDICINE

## 2022-12-13 PROCEDURE — 1036F TOBACCO NON-USER: CPT | Performed by: INTERNAL MEDICINE

## 2022-12-13 PROCEDURE — 3074F SYST BP LT 130 MM HG: CPT | Performed by: INTERNAL MEDICINE

## 2022-12-13 PROCEDURE — G8417 CALC BMI ABV UP PARAM F/U: HCPCS | Performed by: INTERNAL MEDICINE

## 2022-12-13 ASSESSMENT — ENCOUNTER SYMPTOMS
GASTROINTESTINAL NEGATIVE: 1
EYES NEGATIVE: 1
SHORTNESS OF BREATH: 1
ALLERGIC/IMMUNOLOGIC NEGATIVE: 1

## 2022-12-13 NOTE — PROGRESS NOTES
[facility] CARDIOLOGY  [address] [place] [address], [address] [facility], [address] [place] [address]  PHONE: [phone]        22      NAME:  Amos Ellis  : 1945  MRN: [id number]     Referring Cardiologist: Kamryn Monk MD    Reason for Consultation: Atrial fibrillation    ASSESSMENT and PLAN:  Stew Faith was seen today for follow-up from hospital and atrial fibrillation. Diagnoses and all orders for this visit:    Persistent atrial fibrillation (HCC)    Venous stasis    Coronary artery disease involving native coronary artery of native heart with angina pectoris (HCC)    Chronic diastolic congestive heart failure (HCC)    Essential hypertension    Pulmonary hypertension (HCC)    Peripheral vascular disease (HCC)    MARISA and COPD overlap syndrome (HCC)    Obesity (BMI 30-39. 9)    Severe obesity ([address]    68year old female with severe stage III COPD with recurrent hospitalizations for respiratory failure who comes in to further discuss her AF diagnosis. She recently underwent DCCV. In terms of treatment, pace/ablate is reasonable to consider but her lung history makes her a high risk for any anesthesia procedure. For now, will have her wear a monitor to further assess her AF burden/rates/etc and further consider pace/ablate in follow up. Acute on Chronic Hypoxic Resp Failure:  likely multi-factorial from DHF and COPD, resulted from not wearing CPAP or home oxygen. Continue medicines including steroids and lasix. Acute COPD Exac: COVID neg. Acute on Chronic DHF: IV diuresis, echo with normal EF and normal diastolic function, respiratory failure likely more 2/2 COPD and obesity. Continue lasix. CAD/Pos trop: Demand ischemia. Trop now reduced. On NOAC, Echo with normal EF, no WMA. Persistent AF: remain on BB, Ic for now, eliquis. Now in NSR. Plan for monitor. Considering pace/ablate.    MARISA:  needs back on CPAP    Patient has been instructed and agrees to call our office with any issues or other concerns related to their cardiac condition(s) and/or complaint(s). No follow-up provider specified. Thank you for allowing me to participate in the electrophysiologic care of Ms. Rylee Huston. Please contact me if any questions or concerns were to arise. Sravanthi Toro MD, MS  Clinical Cardiac Electrophysiology  [facility] Cardiology  12/13/22  11:13 AM    ===================================================================  Chief Complant:    Chief Complaint   Patient presents with    Follow-Up from Hospital    Atrial Fibrillation        Consultation is requested by Monik Lawson MD for evaluation of Follow-Up from Hospital and Atrial Fibrillation      History: Rosio Doe is a most pleasant 68 y.o. female with a past medical and cardiac history significant for stage 3 severe COPD, pHTN from COPD and MARISA, mod CAD by [facility] 2/2022, [facility] who was recently admitted for hypoxic resp failure in 11/2022. She had been having worsening MCCAULEY, SOB and sats in the 80s at home. Of note, she became tired of wearing oxygen and CPAP and stopped them some time ago. She also stopped her home lasix because \"I am lazy and did not want to use the bathroom\". She stays at home, taking care of her spouse. She has no CP, no pressure. Some more LE edema and orthopnea. She has been compliant with NOAC and other meds. Given IV lasix in ER, but still with sats in the 80s on walking. She comes in after her recent hospitalization. She was seen by Dr. Spring Gaytan who performed a cardioversion in early December. She is in NSR today. She reports increased HR at night sometimes. She continues to complain of chronic dyspnea and it appears she has pulmonary edema. She does use home O2 at 3L. She does get hypoxic with exercise as noted below by documentation in the pulmonary office yesterday.      Exercise oximetry: Sat 6 on room air dropping to 83 roon room air with ambulation then coming up to 92 on 3L with ambulation    Cardiac PMH: (Old records have been reviewed and summarized below)    EKG:  (EKG has been independently visualized by me with interpretation below): NSR, PACs, normal axis, no ischemia. ECHO: 11/2022    Left Ventricle: Normal left ventricular systolic function with a visually estimated EF of 60 - 65%. Left ventricle size is normal. Mildly increased wall thickness. Normal wall motion. Normal diastolic function. Left Atrium: Left atrium is dilated. Right Atrium: Right atrium is dilated. Pericardium: Trivial pericardial effusion present. No indication of cardiac tamponade. Evidence includes normal LV size, no septal bounce and no chamber collapse. Technical qualifiers: Color flow Doppler was performed and pulse wave and/or continuous wave Doppler was performed. Contrast used: Definity. Previous Heart Catheterization: 2/2022    Stress Test: n/a     DEVICE INTERROGATION: n/a     Past Medical History, Past Surgical History, Family history, Social History, and Medications were all reviewed with the patient today and updated as necessary. Current Outpatient Medications   Medication Sig Dispense Refill    predniSONE (DELTASONE) 10 MG tablet Take 4 tabs x 3 days then 3 tabs x 3 days then 2 tabs x 3 days then 1 tab x 3 days then stop. 30 tablet 0    flecainide (TAMBOCOR) 100 MG tablet Take 1 tablet by mouth 2 times daily 180 tablet 3    metoprolol succinate (TOPROL XL) 100 MG extended release tablet Take 1 tablet by mouth in the morning and 1 tablet in the evening.  (Patient taking differently: Take 0.5 tablets by mouth in the morning and 0.5 tablets in the evening.) 1 tablet 0    losartan (COZAAR) 50 MG tablet TAKE 1 TABLET BY MOUTH EVERY DAY 90 tablet 3    omeprazole (PRILOSEC) 40 MG delayed release capsule Take 1 capsule by mouth daily TAKE 1 CAPSULE BY MOUTH EVERY DAY 90 capsule 3    fluticasone-umeclidin-vilant (TRELEGY ELLIPTA) 100-62.5-25 MCG/INH AEPB 1 INHALATION DAILY, RINSE MOUTH AFTER USE 60 each 11    rOPINIRole (REQUIP) 0.5 MG tablet TAKE 1 TABLET BY MOUTH EVERY DAY AT NIGHT 90 tablet 3    spironolactone (ALDACTONE) 50 MG tablet TAKE 1 TABLET BY MOUTH EVERY DAY 90 tablet 3    OXYGEN Inhale 3 L/min into the lungs at bedtime via nasal cannula      acetaminophen (TYLENOL) 325 MG tablet Take 1,000 mg by mouth every 6 hours as needed for Pain      apixaban (ELIQUIS) 5 MG TABS tablet Take 5 mg by mouth 2 times daily      colestipol (COLESTID) 1 g tablet Take 1 g by mouth 2 times daily       No current facility-administered medications for this visit. Allergies   Allergen Reactions    Latex Other (See Comments)    Codeine Other (See Comments)     Headache    Shellfish-Derived Products Hives       Past Medical History:   Diagnosis Date    Arrhythmia     palpitations. Arthritis     rt shoulder    Atrial fibrillation ([address] 01/21/2016    takes eliquis    Cancer ([address]     skin ca on nose    Chest pain     Chronic obstructive pulmonary disease ([address]     COPD exacerbation ([address] 9/25/2017    Dyspnea     Essential hypertension 1/21/2016    GERD (gastroesophageal reflux disease)     managed by meds    HLD (hyperlipidemia) 1/21/2016    Hypertension     managed by meds    Menopause     Morbid obesity ([address]     Nausea & vomiting     Pulmonary embolus ([address] 1/21/2016    Sleep apnea     no cpap    Thromboembolus ([address] 4 month ago.      right leg    Venous embolism and thrombosis      Past Surgical History:   Procedure Laterality Date    APPENDECTOMY      CHOLECYSTECTOMY      ORTHOPEDIC SURGERY      bilateral feet,     ROTATOR CUFF REPAIR Right     ROTATOR CUFF REPAIR Left     RADHA AND BSO (CERVIX REMOVED)      AGE 28    UROLOGICAL SURGERY      bladder tack     Family History   Problem Relation Age of Onset    Stroke Mother     Heart Disease Mother     Cancer Father         prostate    Stroke Father     Coronary Art Dis Father         premature    Heart Disease Brother     Heart Attack Brother Cancer Sister     Cancer Sister         breast    Breast Cancer Sister 70     Social History     Tobacco Use    Smoking status: Former     Packs/day: 3.00     Years: 20.00     Pack years: 60.00     Types: Cigarettes     Quit date: 1998     Years since quittin.3    Smokeless tobacco: Never   Substance Use Topics    Alcohol use: No       ROS:  A comprehensive review of systems was performed with the pertinent positives and negatives as noted in the HPI in addition to:  Review of Systems   Constitutional:  Positive for fatigue. HENT: Negative. Eyes: Negative. Respiratory:  Positive for shortness of breath. Cardiovascular: Negative. Gastrointestinal: Negative. Endocrine: Negative. Genitourinary: Negative. Musculoskeletal: Negative. Skin: Negative. Allergic/Immunologic: Negative. Neurological: Negative. Hematological: Negative. Psychiatric/Behavioral: Negative. All other systems reviewed and are negative. PHYSICAL EXAM:   /88   Pulse 71   Ht 5' 6\" (1.676 m)   Wt 210 lb (95.3 kg)   BMI 33.89 kg/m²      Wt Readings from Last 3 Encounters:   22 210 lb (95.3 kg)   22 210 lb (95.3 kg)   22 212 lb (96.2 kg)     BP Readings from Last 3 Encounters:   22 128/88   22 (!) 166/90   22 (!) 140/75       Gen: Well appearing, well developed, no acute distress  Eyes: Pupils equal, round. Extraocular movements are intact  ENT: Oropharynx clear, no oral lesions, normal dentition  CV: S1S2, regular rate and rhythm, no murmurs, rubs or gallops, normal JVD, no carotid bruits, normal distal pulses, no KARISHMA  Pulm: Prolonged respiratory phases, distant lung sounds. GI: Soft, NT, ND, +BS  Neuro: Alert and oriented, nonfocal  Psych: Appropriate affect  Skin: Normal color and skin turgor  MSK: Normal muscle bulk and tone    Medical problems and test results were reviewed with the patient today.      No results found for any visits on 12/13/22.

## 2022-12-14 ENCOUNTER — HOME CARE VISIT (OUTPATIENT)
Dept: SCHEDULING | Facility: HOME HEALTH | Age: 77
End: 2022-12-14
Payer: MEDICARE

## 2022-12-14 PROCEDURE — G0299 HHS/HOSPICE OF RN EA 15 MIN: HCPCS

## 2022-12-15 ENCOUNTER — TELEPHONE (OUTPATIENT)
Dept: CARDIOLOGY CLINIC | Age: 77
End: 2022-12-15

## 2022-12-15 VITALS
OXYGEN SATURATION: 98 % | RESPIRATION RATE: 16 BRPM | TEMPERATURE: 97.8 F | DIASTOLIC BLOOD PRESSURE: 78 MMHG | SYSTOLIC BLOOD PRESSURE: 132 MMHG | HEART RATE: 60 BPM

## 2022-12-15 NOTE — TELEPHONE ENCOUNTER
Called randa as no report noted on [employer] website. States 1st documentation of afib. Pt has known PAF. Tx with flecainide, metoprolol, eliquis. Awaiting strip for rate.

## 2022-12-15 NOTE — TELEPHONE ENCOUNTER
Pt stats she got a call late last night (from the [facility] heart monitor people). They asked was she ok and she states she told them she was sleeping, then Pt ask what ws going on. Pt states they wouldn't tell her but she had to contact her doctor to find out.

## 2022-12-19 ENCOUNTER — CARE COORDINATION (OUTPATIENT)
Dept: CARE COORDINATION | Facility: CLINIC | Age: 77
End: 2022-12-19

## 2022-12-20 ENCOUNTER — TELEPHONE (OUTPATIENT)
Dept: CARDIOLOGY CLINIC | Age: 77
End: 2022-12-20

## 2022-12-20 ENCOUNTER — HOME CARE VISIT (OUTPATIENT)
Dept: SCHEDULING | Facility: HOME HEALTH | Age: 77
End: 2022-12-20
Payer: MEDICARE

## 2022-12-20 PROCEDURE — G0299 HHS/HOSPICE OF RN EA 15 MIN: HCPCS

## 2022-12-20 NOTE — TELEPHONE ENCOUNTER
Home Health Nurse  Christen Duron  [phone]    Asking to continue Home Health until January and reevaluate at that time. Patient has had med changes .

## 2022-12-21 ENCOUNTER — OFFICE VISIT (OUTPATIENT)
Dept: FAMILY MEDICINE CLINIC | Facility: CLINIC | Age: 77
End: 2022-12-21
Payer: MEDICARE

## 2022-12-21 VITALS
TEMPERATURE: 97.4 F | HEART RATE: 97 BPM | OXYGEN SATURATION: 94 % | WEIGHT: 197.4 LBS | RESPIRATION RATE: 18 BRPM | SYSTOLIC BLOOD PRESSURE: 120 MMHG | BODY MASS INDEX: 31.86 KG/M2 | DIASTOLIC BLOOD PRESSURE: 84 MMHG

## 2022-12-21 VITALS
HEART RATE: 105 BPM | WEIGHT: 198.3 LBS | RESPIRATION RATE: 18 BRPM | OXYGEN SATURATION: 95 % | DIASTOLIC BLOOD PRESSURE: 76 MMHG | HEIGHT: 66 IN | TEMPERATURE: 98.7 F | SYSTOLIC BLOOD PRESSURE: 126 MMHG | BODY MASS INDEX: 31.87 KG/M2

## 2022-12-21 DIAGNOSIS — M54.42 LOW BACK PAIN WITH BILATERAL SCIATICA, UNSPECIFIED BACK PAIN LATERALITY, UNSPECIFIED CHRONICITY: Primary | ICD-10-CM

## 2022-12-21 DIAGNOSIS — M54.41 LOW BACK PAIN WITH BILATERAL SCIATICA, UNSPECIFIED BACK PAIN LATERALITY, UNSPECIFIED CHRONICITY: Primary | ICD-10-CM

## 2022-12-21 DIAGNOSIS — E66.9 OBESITY (BMI 30-39.9): ICD-10-CM

## 2022-12-21 PROBLEM — I87.2 VENOUS INSUFFICIENCY: Status: RESOLVED | Noted: 2021-01-28 | Resolved: 2022-12-21

## 2022-12-21 PROBLEM — G47.33 OSA (OBSTRUCTIVE SLEEP APNEA): Status: RESOLVED | Noted: 2017-09-25 | Resolved: 2022-12-21

## 2022-12-21 PROBLEM — J96.01 ACUTE RESPIRATORY FAILURE WITH HYPOXIA (HCC): Status: RESOLVED | Noted: 2022-11-01 | Resolved: 2022-12-21

## 2022-12-21 PROBLEM — E66.01 SEVERE OBESITY (HCC): Chronic | Status: RESOLVED | Noted: 2020-06-15 | Resolved: 2022-12-21

## 2022-12-21 PROBLEM — E66.811 CLASS 1 OBESITY IN ADULT: Status: RESOLVED | Noted: 2018-08-30 | Resolved: 2022-12-21

## 2022-12-21 PROBLEM — L97.909 STASIS ULCER (HCC): Status: RESOLVED | Noted: 2020-06-24 | Resolved: 2022-12-21

## 2022-12-21 PROBLEM — I83.009 STASIS ULCER (HCC): Status: RESOLVED | Noted: 2020-06-24 | Resolved: 2022-12-21

## 2022-12-21 PROBLEM — R07.9 CHEST PAIN: Status: RESOLVED | Noted: 2022-01-13 | Resolved: 2022-12-21

## 2022-12-21 PROCEDURE — 99213 OFFICE O/P EST LOW 20 MIN: CPT | Performed by: FAMILY MEDICINE

## 2022-12-21 PROCEDURE — 1123F ACP DISCUSS/DSCN MKR DOCD: CPT | Performed by: FAMILY MEDICINE

## 2022-12-21 PROCEDURE — G8417 CALC BMI ABV UP PARAM F/U: HCPCS | Performed by: FAMILY MEDICINE

## 2022-12-21 PROCEDURE — G8400 PT W/DXA NO RESULTS DOC: HCPCS | Performed by: FAMILY MEDICINE

## 2022-12-21 PROCEDURE — G8427 DOCREV CUR MEDS BY ELIG CLIN: HCPCS | Performed by: FAMILY MEDICINE

## 2022-12-21 PROCEDURE — 1036F TOBACCO NON-USER: CPT | Performed by: FAMILY MEDICINE

## 2022-12-21 PROCEDURE — 3074F SYST BP LT 130 MM HG: CPT | Performed by: FAMILY MEDICINE

## 2022-12-21 PROCEDURE — 3078F DIAST BP <80 MM HG: CPT | Performed by: FAMILY MEDICINE

## 2022-12-21 PROCEDURE — G8484 FLU IMMUNIZE NO ADMIN: HCPCS | Performed by: FAMILY MEDICINE

## 2022-12-21 PROCEDURE — 1090F PRES/ABSN URINE INCON ASSESS: CPT | Performed by: FAMILY MEDICINE

## 2022-12-21 ASSESSMENT — PATIENT HEALTH QUESTIONNAIRE - PHQ9
1. LITTLE INTEREST OR PLEASURE IN DOING THINGS: 0
SUM OF ALL RESPONSES TO PHQ QUESTIONS 1-9: 0
2. FEELING DOWN, DEPRESSED OR HOPELESS: 0
SUM OF ALL RESPONSES TO PHQ QUESTIONS 1-9: 0
SUM OF ALL RESPONSES TO PHQ9 QUESTIONS 1 & 2: 0
SUM OF ALL RESPONSES TO PHQ QUESTIONS 1-9: 0
SUM OF ALL RESPONSES TO PHQ QUESTIONS 1-9: 0

## 2022-12-21 ASSESSMENT — ENCOUNTER SYMPTOMS: DYSPNEA ACTIVITY LEVEL: AFTER AMBULATING 10 - 20 FT

## 2022-12-21 NOTE — TELEPHONE ENCOUNTER
[facility] AND [facility] with home health gave verbal orders for continuation of home health.

## 2022-12-21 NOTE — PROGRESS NOTES
Angela Daniel (:  1945) is a 68 y.o. female,Established patient, here for evaluation of the following chief complaint(s):  1 Month Follow-Up back pain         ASSESSMENT/PLAN:  1. Low back pain with bilateral sciatica, unspecified back pain laterality, unspecified chronicity  2. Obesity (BMI 30-39. 9)  Plan:  Encouraged her to continue stretches for her low back. She will follow-up with cardiology for her A. fib. We already have a scheduled visit in 2 months. Subjective   SUBJECTIVE/OBJECTIVE:  66yo WF with h/o Stage 3 severe COPD, pHTN from COPD and MARISA, mod CAD by [facility] 948, diastolic HF. Here for recheck of her back pain. Prev Note:  22  She was in the hospital 3 weeks ago for fluid over load 2/2 to non-compliance with diuretics. Felt better since her release except for back pain that started when she was in the hospital.  No known incident. Low back back; does not radiate; worse with any movement. We gave her short term dose of tramadol and stretches. In the interim; she had a a-fib exacerbation; they considered ablation but unfortunately She is not a candidate because of her COPD.     she reports her back feels much better. Has not needed tramadol recently. She did do some stretches a few times. Review of Systems   Constitutional:  Negative for activity change, appetite change, chills, fatigue and fever. Respiratory: Negative. Cardiovascular: Negative. Musculoskeletal:  Positive for back pain. Objective   Physical Exam  Vitals and nursing note reviewed. Constitutional:       General: She is not in acute distress. Appearance: She is not ill-appearing or toxic-appearing. Cardiovascular:      Rate and Rhythm: Tachycardia present. Rhythm irregular. Pulmonary:      Effort: Pulmonary effort is normal.      Breath sounds: Normal breath sounds. Neurological:      Mental Status: She is alert.    Psychiatric:         Mood and Affect: Mood normal.         Behavior: Behavior normal.         Thought Content: Thought content normal.         Judgment: Judgment normal.        Vitals:    12/21/22 1616   BP: 126/76   Pulse: (!) 105   Resp: 18   Temp: 98.7 °F (37.1 °C)   SpO2: 95%       On this date 12/21/2022 I have spent 30 minutes reviewing previous notes, test results and face to face with the patient discussing the diagnosis and importance of compliance with the treatment plan as well as documenting on the day of the visit. An electronic signature was used to authenticate this note.     --Toby العلي MD, MD

## 2022-12-27 PROBLEM — M54.41 LOW BACK PAIN WITH BILATERAL SCIATICA: Status: ACTIVE | Noted: 2022-12-27

## 2022-12-27 PROBLEM — M54.42 LOW BACK PAIN WITH BILATERAL SCIATICA: Status: ACTIVE | Noted: 2022-12-27

## 2022-12-27 ASSESSMENT — ENCOUNTER SYMPTOMS
RESPIRATORY NEGATIVE: 1
BACK PAIN: 1

## 2022-12-29 ENCOUNTER — CARE COORDINATION (OUTPATIENT)
Dept: CARE COORDINATION | Facility: CLINIC | Age: 77
End: 2022-12-29

## 2022-12-29 NOTE — CARE COORDINATION
NUPUR SANTOS spoke with Kyle Montenegro RN, Terry, regarding O2 status. Per RN, pt is doing well-no O2 needs. NUPUR SANTOS will not e added to care team at this time.

## 2022-12-30 ENCOUNTER — TELEPHONE (OUTPATIENT)
Dept: FAMILY MEDICINE CLINIC | Facility: CLINIC | Age: 77
End: 2022-12-30

## 2022-12-30 DIAGNOSIS — L30.4 INTERTRIGO: Primary | ICD-10-CM

## 2022-12-30 RX ORDER — NYSTATIN 100000 U/G
CREAM TOPICAL
Qty: 100 G | Refills: 0 | Status: SHIPPED | OUTPATIENT
Start: 2022-12-30

## 2022-12-30 NOTE — TELEPHONE ENCOUNTER
[facility] Nurse called stating patient has a yeast rash on her waist under her skin fold,and is requesting that Dr Jenna Evans call in a cream for this to [facility] in [place]

## 2023-01-04 ENCOUNTER — HOME CARE VISIT (OUTPATIENT)
Dept: SCHEDULING | Facility: HOME HEALTH | Age: 78
End: 2023-01-04

## 2023-01-04 PROCEDURE — G0299 HHS/HOSPICE OF RN EA 15 MIN: HCPCS

## 2023-01-05 ENCOUNTER — TELEPHONE (OUTPATIENT)
Dept: CARDIOLOGY CLINIC | Age: 78
End: 2023-01-05

## 2023-01-05 VITALS
TEMPERATURE: 97.4 F | HEART RATE: 96 BPM | DIASTOLIC BLOOD PRESSURE: 64 MMHG | RESPIRATION RATE: 18 BRPM | OXYGEN SATURATION: 98 % | BODY MASS INDEX: 32.6 KG/M2 | SYSTOLIC BLOOD PRESSURE: 118 MMHG | WEIGHT: 202 LBS

## 2023-01-05 ASSESSMENT — ENCOUNTER SYMPTOMS: PAIN LOCATION - PAIN QUALITY: GNAWING

## 2023-01-05 NOTE — TELEPHONE ENCOUNTER
----- Message from Huong Ledbetter MD sent at 1/5/2023 10:18 AM EST -----  High burden of atrial fibrillation with periods of RVR.     ----- Message -----  From: Huong Ledbetter MD  Sent: 1/4/2023   6:16 PM EST  To: Huong Ledbetter MD

## 2023-01-05 NOTE — TELEPHONE ENCOUNTER
Spoke to pt made her aware per Dr Chrissy Beth High burden of atrial fibrillation with periods of RVR.         Pt verbalized understanding and stated is her follow up timing okay with these results or does Dr Chrissy Beth want her to be seen sooner

## 2023-01-06 NOTE — TELEPHONE ENCOUNTER
Spoke to pt made her aware that Dr Shelby Hagan would like her to be seen in 4-6 weeks.  Unable to find an appt in the time frame requested will consult Dr Shelby Hagan as pt has follow up with Dr Fabio Varghese on 2/13/23

## 2023-01-13 ENCOUNTER — HOME CARE VISIT (OUTPATIENT)
Dept: SCHEDULING | Facility: HOME HEALTH | Age: 78
End: 2023-01-13

## 2023-01-17 ASSESSMENT — ENCOUNTER SYMPTOMS: DYSPNEA ACTIVITY LEVEL: AFTER AMBULATING MORE THAN 20 FT

## 2023-01-18 ENCOUNTER — HOME CARE VISIT (OUTPATIENT)
Dept: SCHEDULING | Facility: HOME HEALTH | Age: 78
End: 2023-01-18

## 2023-01-18 VITALS
SYSTOLIC BLOOD PRESSURE: 118 MMHG | TEMPERATURE: 97.3 F | OXYGEN SATURATION: 97 % | HEART RATE: 101 BPM | DIASTOLIC BLOOD PRESSURE: 68 MMHG | RESPIRATION RATE: 18 BRPM | BODY MASS INDEX: 31.96 KG/M2 | WEIGHT: 198 LBS

## 2023-01-18 PROCEDURE — G0299 HHS/HOSPICE OF RN EA 15 MIN: HCPCS

## 2023-01-18 ASSESSMENT — ENCOUNTER SYMPTOMS
CONSTIPATION: 0
COUGH: 0
VOMITING: 0
CHEST TIGHTNESS: 0
SHORTNESS OF BREATH: 1
NAUSEA: 0
APNEA: 0
ABDOMINAL PAIN: 0
DIARRHEA: 0
WHEEZING: 1

## 2023-01-18 NOTE — PROGRESS NOTES
Dr. Annika Lainez MD  [address] [place]. [address], [address] [facility]  [phone]    Patient Name:  Minda De  YOB: 1945  Office Visit: 1/19/2023    ASSESSMENT AND PLAN:  (Medical Decision Making)  Minda De is a 68 y.o. 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 She continues to have Functional Class 1-2 symptoms of dyspnea, but she reports hypoxemia has been improved recently. 1. Pulmonary hypertension ([address]):  Group 3 due to MARISA/COPD. She is willing to participate in pulmonary rehab and a referral to [facility] has been placed. Tolerating CPAP well and continuing on Trelegy. We will repeat ambulating oximetry at next visit. 2. Stage 3 severe COPD by GOLD classification ([address]):  Continue Trelegy ellipta inhalers with prn xopenex. Repeat complete pulmonary function testing    3. Exercise hypoxemia: requires 4lpm with exertion. 6 minute walk distance improving. She continues to benefit from exertional oxygen and required it during her 6-minute walk today. 4.  Atrial fibrillation:  since decrease in her metoprolol her HR seems higher with exertion. This could of course be provoked by bronchodilators or hypoxemia. Nithya Bobby MD  Electronically signed    Clinical time for encounter was 41 minutes. _____________________________________________________________________________________________________________________    Reason for Visit:  Follow-up (Pulmonary Hypertension)      History of Present Illness:      Minda De Is a 68 y.o. female with PMH of MARISA now on CPAP (3lpm), obesity, DVT of RLE (1971), PE (6405), diastolic dysfunction, atrial fibrillation  on eliquis, 90-pack-year smoking history with GOLD stage III COPD,  who is followed today for pulmonary hypertension with functional class 2 symptoms of exertional dyspnea and dyspnea bending over. She has continued on the Trelegy ellipta inhaler and request samples today. She has been taking spironolactone 50mg . She thinks she may have taken \"medications for weight loss that were bad for my heart\" in the past but details are unclear. Mrs. Yayo Paulino had a hospitalization in November 2022 after she stopped using her CPAP and oxygen. In late October she started to have shortness of breath with worsening hypoxemia and wheezing. She was diuresed. Her atrial fibrillation medications have recently been adjusted and her metoprolol has been decreased to 50 mg daily in addition to her daily flecainide. She remains on spironolactone but does not require Lasix. Her weight is down and lower extremity edema is very well controlled today. Today on her 6-minute walk test she desaturated to 82% on room air with ambulation and her tachycardia quickly cinthia to 123 bpm.  She is extremely compliant with her CPAP. She has oxygen for use with exertion but has not been using it. She requests samples of Symbicort and needs refills on her inhalers. Tobacco Use: Medium Risk    Smoking Tobacco Use: Former    Smokeless Tobacco Use: Never    Passive Exposure: Not on file       Review of Systems:  Review of Systems   Constitutional:  Positive for fatigue. Negative for chills, fever and unexpected weight change. Respiratory:  Positive for shortness of breath and wheezing. Negative for apnea, cough and chest tightness. Cardiovascular:  Positive for leg swelling. Negative for chest pain and palpitations. Gastrointestinal:  Negative for abdominal pain, constipation, diarrhea, nausea and vomiting. Neurological:  Negative for dizziness, tremors, seizures, weakness and headaches.       Physical exam:    Vitals:    01/19/23 1032   BP: 122/72   Pulse: 91   Resp: 16   Temp: 96.8 °F (36 °C)   SpO2: 97%   Weight: 196 lb (88.9 kg)   Height: 5' 6\" (1.676 m)     Weight Metrics 1/19/2023 1/18/2023 1/4/2023 12/21/2022 12/20/2022 12/13/2022 12/12/2022   Weight 196 lb 198 lb 202 lb 198 lb 4.8 oz 197 lb 6.4 oz 210 lb 210 lb   BMI (Calculated) 31.7 kg/m2 0 kg/m2 0 kg/m2 32.1 kg/m2 0 kg/m2 34 kg/m2 34 kg/m2       Body mass index is 31.64 kg/m². General Appearance: The patient is pleasant and in no respiratory distress. HEENT: PERRLA. Conjunctivae unremarkable. Nasal mucosa is without epistaxis, exudate, or polyps. Gums and dentition are unremarkable. Neck/Lymphatic:  Symmetrical with no elevation of jugular venous pulsation. Trachea midline. No thyroid enlargement. No cervical adenopathy. Lungs:  Normal respiratory effort with symmetrical lung expansion. Breath sounds with few crackles in right base. Heart: Irregular  Abdomen:  Soft and non-tender. No hepatosplenomegaly. Bowel sounds are normal.    Extremity: Trace left ankle edema, clubbing or cyanosis  Neuro: The patient is alert and oriented to person, place, and time. Memory appears intact and mood is normal.  No gross sensorimotor deficits are present. DIAGNOSTIC TESTS:                                                                                                             Spirometry- GOLD 3 COPD   Date   12/15/2020        FVC 1.75 (58%)        FEV1 1.11 (49%)        FEV1/FVC    0.63                                                     No flowsheet data found.     6MWT  (2/4/2021) 7/21/2022 1/19/23   distance   228 249 m (71% predicted) 240m    Start;end O2 sat   96; 87% 94% 90% on 2 L 93% RA; 82% RA--> needed 4lpm   Max JIMMY dyspnea   5; 10 4; 10 6;10   COMMENTS   desaturated to 82% walking on room air            Right Heart Cath  2/7/22   RA     RV     PA  50/12 (27)   PCWP  5   CO  4.33 (TD)   PVR  5.08WU   DPG  7, LVEDP normal     Weight-215lb   Isolated mild precapillary PH     TTE  12/29/20 9/28/21   EF  EF 91-20%; + diastolic dysfunction  41-52%; grade 2 diastolic dysfunction   RV  Normal size/systolic function Normal size/systolic function   Peak TRV  3.9     RVSP  69  62mmHg   COMMENTS  Severely dilated LA, mild dilation RA. Suggestive of severe PH  Mild MV regurg         Chest CT 3/24/21:  Images through the lungs demonstrate no evidence of pulmonary nodule or mass. No effusions are identified. High-resolution imaging demonstrates no evidence of peripheral  reticulation, honeycombing, or bronchiectasis to suggest interstitial  lung disease. There is a small Bochdalek hernia posterior right lung base. Normal vasculature is demonstrated allowing for noncontrast examination. No   evidence of aortic aneurysmal dilatation is seen. The heart and mediastinum are grossly unremarkable. There is an incidental small  10 mm right paratracheal lymph node. Images chest wall structures as well as visualized portions of the upper abdomen   are unremarkable in appearance. There are cholecystectomy clips      There are no acute osseous abnormalities. CTAP from 2018 and CT coronaries from 2008 without parenchymal lung disease present. V/Q Scan 3/26/21:   IMPRESSION: Low probability for PE. COPD.      Lab Diagnostics:  HIV neg 2/4/21  HCV neg 2/4/21  NELSON pending  BNP pending              AMBULATING OXIMETRY: 1/19/2023  O2 sat  HR     Room air at Rest   93% 96 bpm     Room air ambulating 82 % 123 bpm   (recovery) Ambulating on 4Lpm 98 % 110 bpm         Labs:   Lab Results   Component Value Date/Time     12/05/2022 10:54 AM    K 5.2 12/05/2022 10:54 AM     12/05/2022 10:54 AM    CO2 24 12/05/2022 10:54 AM    BUN 15 12/05/2022 10:54 AM    CREATININE 1.00 12/05/2022 10:54 AM    GLUCOSE 93 12/05/2022 10:54 AM    CALCIUM 7.5 12/05/2022 10:54 AM      Lab Results   Component Value Date    WBC 4.9 12/05/2022    HGB 12.6 12/05/2022    HCT 39.3 12/05/2022    .1 (H) 12/05/2022     (L) 12/05/2022     No results found for: [facility]  Lab Results   Component Value Date/Time    ALKPHOS 108 11/02/2022 05:45 AM    ALKPHOS 109 11/02/2022 05:45 AM    ALKPHOS 91 12/10/2020 02:25 PM    ALT 53 11/02/2022 05:45 AM    ALT 52 11/02/2022 05:45 AM    AST 31 11/02/2022 05:45 AM    AST 32 11/02/2022 05:45 AM    PROT 7.7 11/02/2022 05:45 AM    PROT 7.6 11/02/2022 05:45 AM    BILITOT 0.5 11/02/2022 05:45 AM    BILITOT 0.6 11/02/2022 05:45 AM    BILIDIR 0.1 11/02/2022 05:45 AM    LABALBU 3.7 11/02/2022 05:45 AM    LABALBU 3.7 11/02/2022 05:45 AM     Lab Results   Component Value Date     (H) 01/13/2022     No results for input(s): PHAPOC, TUZ0OPTN, DT4OTDF, MIX4ASB, BE in the last 72 hours.

## 2023-01-19 ENCOUNTER — NURSE ONLY (OUTPATIENT)
Dept: PULMONOLOGY | Age: 78
End: 2023-01-19
Payer: MEDICARE

## 2023-01-19 ENCOUNTER — OFFICE VISIT (OUTPATIENT)
Dept: PULMONOLOGY | Age: 78
End: 2023-01-19
Payer: MEDICARE

## 2023-01-19 VITALS
HEART RATE: 91 BPM | HEIGHT: 66 IN | RESPIRATION RATE: 16 BRPM | BODY MASS INDEX: 31.5 KG/M2 | WEIGHT: 196 LBS | TEMPERATURE: 96.8 F | OXYGEN SATURATION: 97 % | SYSTOLIC BLOOD PRESSURE: 122 MMHG | DIASTOLIC BLOOD PRESSURE: 72 MMHG

## 2023-01-19 DIAGNOSIS — I50.9 ACUTE CONGESTIVE HEART FAILURE, UNSPECIFIED HEART FAILURE TYPE (HCC): Primary | ICD-10-CM

## 2023-01-19 DIAGNOSIS — I27.20 PULMONARY HYPERTENSION (HCC): ICD-10-CM

## 2023-01-19 DIAGNOSIS — J44.9 OSA AND COPD OVERLAP SYNDROME (HCC): ICD-10-CM

## 2023-01-19 DIAGNOSIS — G47.33 OSA AND COPD OVERLAP SYNDROME (HCC): ICD-10-CM

## 2023-01-19 DIAGNOSIS — R09.02 EXERCISE HYPOXEMIA: ICD-10-CM

## 2023-01-19 DIAGNOSIS — J44.9 STAGE 3 SEVERE COPD BY GOLD CLASSIFICATION (HCC): Primary | ICD-10-CM

## 2023-01-19 PROCEDURE — G8400 PT W/DXA NO RESULTS DOC: HCPCS | Performed by: INTERNAL MEDICINE

## 2023-01-19 PROCEDURE — 3074F SYST BP LT 130 MM HG: CPT | Performed by: INTERNAL MEDICINE

## 2023-01-19 PROCEDURE — 99215 OFFICE O/P EST HI 40 MIN: CPT | Performed by: INTERNAL MEDICINE

## 2023-01-19 PROCEDURE — G8484 FLU IMMUNIZE NO ADMIN: HCPCS | Performed by: INTERNAL MEDICINE

## 2023-01-19 PROCEDURE — 1036F TOBACCO NON-USER: CPT | Performed by: INTERNAL MEDICINE

## 2023-01-19 PROCEDURE — G8427 DOCREV CUR MEDS BY ELIG CLIN: HCPCS | Performed by: INTERNAL MEDICINE

## 2023-01-19 PROCEDURE — 1090F PRES/ABSN URINE INCON ASSESS: CPT | Performed by: INTERNAL MEDICINE

## 2023-01-19 PROCEDURE — 3078F DIAST BP <80 MM HG: CPT | Performed by: INTERNAL MEDICINE

## 2023-01-19 PROCEDURE — 1123F ACP DISCUSS/DSCN MKR DOCD: CPT | Performed by: INTERNAL MEDICINE

## 2023-01-19 PROCEDURE — 94618 PULMONARY STRESS TESTING: CPT | Performed by: INTERNAL MEDICINE

## 2023-01-19 PROCEDURE — G8417 CALC BMI ABV UP PARAM F/U: HCPCS | Performed by: INTERNAL MEDICINE

## 2023-01-19 PROCEDURE — 3023F SPIROM DOC REV: CPT | Performed by: INTERNAL MEDICINE

## 2023-01-19 RX ORDER — FLUTICASONE FUROATE, UMECLIDINIUM BROMIDE AND VILANTEROL TRIFENATATE 100; 62.5; 25 UG/1; UG/1; UG/1
1 POWDER RESPIRATORY (INHALATION) DAILY
Qty: 1 EACH | Refills: 11 | Status: SHIPPED | OUTPATIENT
Start: 2023-01-19

## 2023-01-19 RX ORDER — ALBUTEROL SULFATE 90 UG/1
2 AEROSOL, METERED RESPIRATORY (INHALATION) 4 TIMES DAILY PRN
Qty: 18 G | Refills: 5 | Status: SHIPPED | OUTPATIENT
Start: 2023-01-19

## 2023-01-19 RX ORDER — LEVALBUTEROL INHALATION SOLUTION 1.25 MG/3ML
1.25 SOLUTION RESPIRATORY (INHALATION) EVERY 6 HOURS PRN
Qty: 810 ML | Refills: 3 | Status: SHIPPED | OUTPATIENT
Start: 2023-01-19 | End: 2023-04-19

## 2023-01-19 NOTE — PATIENT INSTRUCTIONS
________________________________________________    **If your inhalers for COPD are asthma are expensive, do not buy them. Instead, please find out know what inhalers for COPD or asthma are \"preferred\" on your insurance drug formulary by calling the member services phone number on the back of the insurance card. The cost of the medication is dramatically different depending on this. Once the preferred inhalers are known, please call us back with a list of the preferred options. Call [phone] and ask to speak with a nurse. We will choose the best option available for you and prescribe. **  ________________________________________________

## 2023-01-19 NOTE — PROGRESS NOTES
[address] , [facility], [address] [facility]  [phone]    6 Minute Walk Test      Patient's Name Deborah Rivera   Age 68 y.o. Gender female   Height 5'6\"   Weight 196 lbs   Ordering Provider  Cami Teran     Medications taken before test are up to date:  Yes    Supplemental oxygen during the test:  Yes, titrated O2 up to 4 lpm      Base End    Time 1015 1021 Am / Pm   Blood Pressure 122/72 138/86 mmHg   Heart Rate  96 76 bpm   Dyspnea  6 10 Zan Scale   Fatigue 4 10 Zan Scale   O2 Saturation  93 82 RA / O2       Stopped or paused before 6 minutes? Yes    Other symptoms at end of exercise: SOB     Number of laps: __4___ x 60 meters = ___240___ meters + final partial lap:____0__ meters =  ____240___ meters    Total distance walked in 6 minutes: 240  Meters  Tech comments: SpO2 monitored throughout 6 min walk. Resting RA SpO2 93%, ambulating SpO2 decreased to 82% on RA, O2 NC titrated up to 4 lpm for SpO2 91% on O2 during exertion, HR increased to 123 bpm, walk completed without adverse affect and patient returned to room. Resting SpO2 on 4 lpm 98%. Dr Cami Teran notified. Patient reports that she has a portable tank in home. Dr Cami Teran notified of O2 requirement during testing.      Test Performed by: Consuelo Ayers RRT/RCP

## 2023-01-23 ENCOUNTER — TELEPHONE (OUTPATIENT)
Dept: FAMILY MEDICINE CLINIC | Facility: CLINIC | Age: 78
End: 2023-01-23

## 2023-01-23 DIAGNOSIS — B37.0 ORAL THRUSH: Primary | ICD-10-CM

## 2023-01-23 NOTE — TELEPHONE ENCOUNTER
----- Message from [facility] sent at 1/23/2023 11:30 AM EST -----  Subject: Message to Provider    QUESTIONS  Information for Provider? Patient is wanting an antibiotic called in for   sinus infection  ---------------------------------------------------------------------------  --------------  Herve GAMING  [phone]; OK to leave message on voicemail  ---------------------------------------------------------------------------  --------------  SCRIPT ANSWERS  Relationship to Patient?  Self

## 2023-01-23 NOTE — TELEPHONE ENCOUNTER
Patient called back stating she missed a call but didn't have a voicemail. Patients voicemail is full. 2nd attempt to call her. When calling back patient stated that this always turns to pneumonia and she was told to get antibiotic ASAP when she feels symptoms coming on.

## 2023-01-23 NOTE — TELEPHONE ENCOUNTER
No.  Most sinus infections are viral in nature. We really try to minimize antibiotic use because of the side effects. Also, if we use abx too much then they won't work when we really need them. She should use OTC cold and sinus medication like tylenol cold/ sinus. If she is having fever, shortness of breath, or its been going on for 3 weeks then we should see in the office.

## 2023-01-25 ENCOUNTER — HOME CARE VISIT (OUTPATIENT)
Dept: SCHEDULING | Facility: HOME HEALTH | Age: 78
End: 2023-01-25

## 2023-01-25 VITALS
TEMPERATURE: 97.2 F | WEIGHT: 196 LBS | DIASTOLIC BLOOD PRESSURE: 60 MMHG | HEART RATE: 99 BPM | OXYGEN SATURATION: 93 % | RESPIRATION RATE: 18 BRPM | SYSTOLIC BLOOD PRESSURE: 112 MMHG | BODY MASS INDEX: 31.64 KG/M2

## 2023-01-25 PROCEDURE — G0299 HHS/HOSPICE OF RN EA 15 MIN: HCPCS

## 2023-01-25 ASSESSMENT — ENCOUNTER SYMPTOMS
SPUTUM CONSISTENCY: THICK
COUGH: 1
COUGH CHARACTERISTICS: PRODUCTIVE
DYSPNEA ACTIVITY LEVEL: AFTER AMBULATING MORE THAN 20 FT
SPUTUM PRODUCTION: 1
SPUTUM AMOUNT: MODERATE
SPUTUM COLOR: YELLOW

## 2023-01-25 NOTE — TELEPHONE ENCOUNTER
[facility] nurse called stating that pt has called stating that pt has yeast in her mouth due to not washing her mouth out after using the Trelegy Inhaler.  Also states that both legs are swollen and a little blue tint due to not wearing compression hose

## 2023-01-27 DIAGNOSIS — J44.9 STAGE 3 SEVERE COPD BY GOLD CLASSIFICATION (HCC): Primary | ICD-10-CM

## 2023-01-27 RX ORDER — LEVALBUTEROL HYDROCHLORIDE 1.25 MG/3ML
1.25 SOLUTION RESPIRATORY (INHALATION) EVERY 6 HOURS PRN
Qty: 360 ML | Refills: 3 | Status: SHIPPED | OUTPATIENT
Start: 2023-01-27

## 2023-01-27 NOTE — PROGRESS NOTES
Carroll Abdi,  I ordered a new nebulizer machine for Mrs. Orr. Please place in goscripts. Also let patient know that the xopenex was sent to [facility] in [place] for her to try goodrx for a better chahal.   Cheryl Godoy MD

## 2023-01-30 RX ORDER — FLECAINIDE ACETATE 100 MG/1
100 TABLET ORAL 2 TIMES DAILY
Qty: 180 TABLET | Refills: 3 | Status: SHIPPED | OUTPATIENT
Start: 2023-01-30

## 2023-01-30 NOTE — TELEPHONE ENCOUNTER
Pt needs refill on Flecainide 100 mg sent to [facility] in [place]. Please send 90-day refill. Please call pt with any questions. Thank you.

## 2023-01-31 ENCOUNTER — TELEPHONE (OUTPATIENT)
Dept: PULMONOLOGY | Age: 78
End: 2023-01-31

## 2023-01-31 ENCOUNTER — OFFICE VISIT (OUTPATIENT)
Dept: ORTHOPEDIC SURGERY | Age: 78
End: 2023-01-31

## 2023-01-31 DIAGNOSIS — M17.12 PRIMARY OSTEOARTHRITIS OF LEFT KNEE: Primary | ICD-10-CM

## 2023-01-31 NOTE — PROGRESS NOTES
Name: Dalia Archibald  YOB: 1945  Gender: female  MRN: [id number]    Allergies   Allergen Reactions    Latex Other (See Comments)    Codeine Other (See Comments)     Headache    Shellfish-Derived Products Hives       CC:  left knee pain, Osteoarthritis    PROCEDURE:  HA injection(s). All questions answered. Procedure Note: The patient was placed in upright position with both knees hanging freely from exam table. The left knee was prepped in sterile fashion using alcohol wipe(s). Using an infrapatellar approach, 4cc of Monovisc was injected freely. The needle was then removed, pressure hemostatis achieved, injection site was cleansed with alcohol wipe and dressed with band aid. The patient tolerated the procedure without complication. The patient  will follow up as scheduled.     NORA Hogue  01/31/23

## 2023-02-01 ENCOUNTER — HOME CARE VISIT (OUTPATIENT)
Dept: SCHEDULING | Facility: HOME HEALTH | Age: 78
End: 2023-02-01

## 2023-02-01 VITALS
TEMPERATURE: 97.5 F | HEART RATE: 94 BPM | BODY MASS INDEX: 32.44 KG/M2 | WEIGHT: 201 LBS | SYSTOLIC BLOOD PRESSURE: 118 MMHG | RESPIRATION RATE: 18 BRPM | OXYGEN SATURATION: 95 % | DIASTOLIC BLOOD PRESSURE: 66 MMHG

## 2023-02-01 PROCEDURE — G0299 HHS/HOSPICE OF RN EA 15 MIN: HCPCS

## 2023-02-01 ASSESSMENT — ENCOUNTER SYMPTOMS
DYSPNEA ACTIVITY LEVEL: AFTER AMBULATING 10 - 20 FT
STOOL DESCRIPTION: SMALL

## 2023-02-02 DIAGNOSIS — J44.9 STAGE 3 SEVERE COPD BY GOLD CLASSIFICATION (HCC): ICD-10-CM

## 2023-02-02 NOTE — TELEPHONE ENCOUNTER
Order is in [address] [facility] and new Rx for the levalbuterol put in to go to [facility] in [place].

## 2023-02-06 RX ORDER — LEVALBUTEROL INHALATION SOLUTION 1.25 MG/3ML
1.25 SOLUTION RESPIRATORY (INHALATION) EVERY 6 HOURS PRN
Qty: 810 ML | Refills: 3 | Status: SHIPPED | OUTPATIENT
Start: 2023-02-06 | End: 2023-05-07

## 2023-02-14 ENCOUNTER — HOME CARE VISIT (OUTPATIENT)
Dept: SCHEDULING | Facility: HOME HEALTH | Age: 78
End: 2023-02-14
Payer: MEDICARE

## 2023-02-14 ENCOUNTER — TELEPHONE (OUTPATIENT)
Dept: PULMONOLOGY | Age: 78
End: 2023-02-14

## 2023-02-14 VITALS
OXYGEN SATURATION: 98 % | HEART RATE: 64 BPM | WEIGHT: 207 LBS | DIASTOLIC BLOOD PRESSURE: 74 MMHG | BODY MASS INDEX: 33.41 KG/M2 | TEMPERATURE: 97.3 F | RESPIRATION RATE: 16 BRPM | SYSTOLIC BLOOD PRESSURE: 94 MMHG

## 2023-02-14 PROCEDURE — G0299 HHS/HOSPICE OF RN EA 15 MIN: HCPCS

## 2023-02-14 ASSESSMENT — ENCOUNTER SYMPTOMS
STOOL DESCRIPTION: LOOSE
DYSPNEA ACTIVITY LEVEL: AFTER AMBULATING LESS THAN 10 FT

## 2023-02-14 NOTE — TELEPHONE ENCOUNTER
Noted [facility] accepted nebulizer 2/6/2023. LOV 1/19/23 Dr Shaka Ortiz 3PH due to emphysema and MARISA who has a history of exertional hypoxemia, diastolic dysfunction, DVT/PE, atrial fibrillation on eliquis, needed 4 lpm with exertion at this appointment. I have spoken with Jaci and order has been received. Per Jaci, nebulizer was delivered today. I have spoken with patient. She reports that she ate a piece of ham yesterday. She reports that she is more SOB despite O2 in place. She reports that her SpO2 has been greater than 90% at all times. She reports that she has gained 5 lb since yesterday and she has made self decision to be evaluated in ED. She voices that her  is not feeling well either and has also decided on ED evaluation. She will update this office once she has been seen. I have left Desi Roach RN a message to notify of patient decision.

## 2023-02-14 NOTE — TELEPHONE ENCOUNTER
Patient needs the order sent for the nebulizer. She has got the solutions for it . Lori Carreon with [facility] home health care says that the patient has had a 5 Ib weight gain in 1 day . Increase shortness of breathe.  Been wearing oxygen 24 hrs for 1 day     [phone]    Lori Carreon

## 2023-02-15 ENCOUNTER — APPOINTMENT (OUTPATIENT)
Dept: GENERAL RADIOLOGY | Age: 78
End: 2023-02-15

## 2023-02-15 ENCOUNTER — HOSPITAL ENCOUNTER (EMERGENCY)
Age: 78
Discharge: HOME | End: 2023-02-15
Attending: STUDENT IN AN ORGANIZED HEALTH CARE EDUCATION/TRAINING PROGRAM

## 2023-02-15 VITALS
BODY MASS INDEX: 34.49 KG/M2 | OXYGEN SATURATION: 100 % | TEMPERATURE: 97.5 F | DIASTOLIC BLOOD PRESSURE: 74 MMHG | RESPIRATION RATE: 20 BRPM | SYSTOLIC BLOOD PRESSURE: 138 MMHG | WEIGHT: 207 LBS | HEIGHT: 65 IN | HEART RATE: 100 BPM

## 2023-02-15 DIAGNOSIS — I50.9 ACUTE ON CHRONIC CONGESTIVE HEART FAILURE, UNSPECIFIED HEART FAILURE TYPE (HCC): Primary | ICD-10-CM

## 2023-02-15 LAB
ALBUMIN SERPL-MCNC: 3.6 G/DL (ref 3.2–4.6)
ALBUMIN/GLOB SERPL: 1.1 (ref 0.4–1.6)
ALP SERPL-CCNC: 92 U/L (ref 50–136)
ALT SERPL-CCNC: 87 U/L (ref 12–65)
ANION GAP SERPL CALC-SCNC: 6 MMOL/L (ref 2–11)
APPEARANCE UR: NORMAL
AST SERPL-CCNC: 69 U/L (ref 15–37)
BASOPHILS # BLD: 0 K/UL (ref 0–0.2)
BASOPHILS NFR BLD: 1 % (ref 0–2)
BILIRUB SERPL-MCNC: 0.5 MG/DL (ref 0.2–1.1)
BILIRUB UR QL: NEGATIVE
BUN SERPL-MCNC: 23 MG/DL (ref 8–23)
CALCIUM SERPL-MCNC: 8 MG/DL (ref 8.3–10.4)
CHLORIDE SERPL-SCNC: 107 MMOL/L (ref 101–110)
CO2 SERPL-SCNC: 28 MMOL/L (ref 21–32)
COLOR UR: NORMAL
CREAT SERPL-MCNC: 1.2 MG/DL (ref 0.6–1)
DIFFERENTIAL METHOD BLD: ABNORMAL
EKG DIAGNOSIS: NORMAL
EKG Q-T INTERVAL: 392 MS
EKG QRS DURATION: 92 MS
EKG QTC CALCULATION (BAZETT): 508 MS
EKG R AXIS: 109 DEGREES
EKG T AXIS: 3 DEGREES
EKG VENTRICULAR RATE: 101 BPM
EOSINOPHIL # BLD: 0.1 K/UL (ref 0–0.8)
EOSINOPHIL NFR BLD: 2 % (ref 0.5–7.8)
ERYTHROCYTE [DISTWIDTH] IN BLOOD BY AUTOMATED COUNT: 14.7 % (ref 11.9–14.6)
FLUAV RNA SPEC QL NAA+PROBE: NOT DETECTED
FLUBV RNA SPEC QL NAA+PROBE: NOT DETECTED
GLOBULIN SER CALC-MCNC: 3.3 G/DL (ref 2.8–4.5)
GLUCOSE SERPL-MCNC: 115 MG/DL (ref 65–100)
GLUCOSE UR STRIP.AUTO-MCNC: NEGATIVE MG/DL
HCT VFR BLD AUTO: 37.1 % (ref 35.8–46.3)
HGB BLD-MCNC: 12.4 G/DL (ref 11.7–15.4)
HGB UR QL STRIP: NEGATIVE
IMM GRANULOCYTES # BLD AUTO: 0.1 K/UL (ref 0–0.5)
IMM GRANULOCYTES NFR BLD AUTO: 2 % (ref 0–5)
KETONES UR QL STRIP.AUTO: NEGATIVE MG/DL
LEUKOCYTE ESTERASE UR QL STRIP.AUTO: NEGATIVE
LYMPHOCYTES # BLD: 1.7 K/UL (ref 0.5–4.6)
LYMPHOCYTES NFR BLD: 21 % (ref 13–44)
MCH RBC QN AUTO: 33.9 PG (ref 26.1–32.9)
MCHC RBC AUTO-ENTMCNC: 33.4 G/DL (ref 31.4–35)
MCV RBC AUTO: 101.4 FL (ref 82–102)
MONOCYTES # BLD: 0.6 K/UL (ref 0.1–1.3)
MONOCYTES NFR BLD: 8 % (ref 4–12)
NEUTS SEG # BLD: 5.5 K/UL (ref 1.7–8.2)
NEUTS SEG NFR BLD: 66 % (ref 43–78)
NITRITE UR QL STRIP.AUTO: NEGATIVE
NRBC # BLD: 0 K/UL (ref 0–0.2)
NT PRO BNP: 3438 PG/ML
PH UR STRIP: 6 (ref 5–9)
PLATELET # BLD AUTO: 154 K/UL (ref 150–450)
PMV BLD AUTO: 10.4 FL (ref 9.4–12.3)
POTASSIUM SERPL-SCNC: 4.2 MMOL/L (ref 3.5–5.1)
PROT SERPL-MCNC: 6.9 G/DL (ref 6.3–8.2)
PROT UR STRIP-MCNC: NEGATIVE MG/DL
RBC # BLD AUTO: 3.66 M/UL (ref 4.05–5.2)
SARS-COV-2 RDRP RESP QL NAA+PROBE: NOT DETECTED
SODIUM SERPL-SCNC: 141 MMOL/L (ref 133–143)
SOURCE: NORMAL
SP GR UR REFRACTOMETRY: 1.01 (ref 1–1.02)
TROPONIN I SERPL HS-MCNC: 10.9 PG/ML (ref 0–14)
UROBILINOGEN UR QL STRIP.AUTO: 0.2 EU/DL (ref 0.2–1)
WBC # BLD AUTO: 8.1 K/UL (ref 4.3–11.1)

## 2023-02-15 PROCEDURE — 81003 URINALYSIS AUTO W/O SCOPE: CPT

## 2023-02-15 PROCEDURE — 6360000002 HC RX W HCPCS: Performed by: STUDENT IN AN ORGANIZED HEALTH CARE EDUCATION/TRAINING PROGRAM

## 2023-02-15 PROCEDURE — 94760 N-INVAS EAR/PLS OXIMETRY 1: CPT

## 2023-02-15 PROCEDURE — 6370000000 HC RX 637 (ALT 250 FOR IP): Performed by: STUDENT IN AN ORGANIZED HEALTH CARE EDUCATION/TRAINING PROGRAM

## 2023-02-15 PROCEDURE — 96374 THER/PROPH/DIAG INJ IV PUSH: CPT

## 2023-02-15 PROCEDURE — 80053 COMPREHEN METABOLIC PANEL: CPT

## 2023-02-15 PROCEDURE — 83880 ASSAY OF NATRIURETIC PEPTIDE: CPT

## 2023-02-15 PROCEDURE — 99285 EMERGENCY DEPT VISIT HI MDM: CPT

## 2023-02-15 PROCEDURE — 93005 ELECTROCARDIOGRAM TRACING: CPT

## 2023-02-15 PROCEDURE — 94640 AIRWAY INHALATION TREATMENT: CPT

## 2023-02-15 PROCEDURE — 71046 X-RAY EXAM CHEST 2 VIEWS: CPT

## 2023-02-15 PROCEDURE — 85025 COMPLETE CBC W/AUTO DIFF WBC: CPT

## 2023-02-15 PROCEDURE — 87502 INFLUENZA DNA AMP PROBE: CPT

## 2023-02-15 PROCEDURE — 84484 ASSAY OF TROPONIN QUANT: CPT

## 2023-02-15 PROCEDURE — 2700000000 HC OXYGEN THERAPY PER DAY

## 2023-02-15 PROCEDURE — 87635 SARS-COV-2 COVID-19 AMP PRB: CPT

## 2023-02-15 RX ORDER — FUROSEMIDE 20 MG/1
20 TABLET ORAL DAILY
Qty: 5 TABLET | Refills: 0 | Status: SHIPPED | OUTPATIENT
Start: 2023-02-15 | End: 2023-02-20

## 2023-02-15 RX ORDER — IPRATROPIUM BROMIDE AND ALBUTEROL SULFATE 2.5; .5 MG/3ML; MG/3ML
1 SOLUTION RESPIRATORY (INHALATION)
Status: COMPLETED | OUTPATIENT
Start: 2023-02-15 | End: 2023-02-15

## 2023-02-15 RX ORDER — FUROSEMIDE 10 MG/ML
40 INJECTION INTRAMUSCULAR; INTRAVENOUS ONCE
Status: COMPLETED | OUTPATIENT
Start: 2023-02-15 | End: 2023-02-15

## 2023-02-15 RX ORDER — POTASSIUM CHLORIDE 20 MEQ/1
20 TABLET, EXTENDED RELEASE ORAL 2 TIMES DAILY
Qty: 10 TABLET | Refills: 0 | Status: SHIPPED | OUTPATIENT
Start: 2023-02-15 | End: 2023-02-20

## 2023-02-15 RX ADMIN — FUROSEMIDE 40 MG: 10 INJECTION, SOLUTION INTRAMUSCULAR; INTRAVENOUS at 15:25

## 2023-02-15 RX ADMIN — IPRATROPIUM BROMIDE AND ALBUTEROL SULFATE 1 AMPULE: .5; 3 SOLUTION RESPIRATORY (INHALATION) at 15:28

## 2023-02-15 ASSESSMENT — ENCOUNTER SYMPTOMS
EYE DISCHARGE: 0
NAUSEA: 0
COUGH: 0
WHEEZING: 0
ABDOMINAL DISTENTION: 0
BACK PAIN: 0
EYE PAIN: 0
EYE REDNESS: 0
ANAL BLEEDING: 0
CHEST TIGHTNESS: 0
SORE THROAT: 0
APNEA: 0
EYE ITCHING: 0
DIARRHEA: 0
VOMITING: 0
SHORTNESS OF BREATH: 1
ABDOMINAL PAIN: 0
RHINORRHEA: 0
COLOR CHANGE: 0

## 2023-02-15 ASSESSMENT — LIFESTYLE VARIABLES
HOW OFTEN DO YOU HAVE A DRINK CONTAINING ALCOHOL: NEVER
HOW MANY STANDARD DRINKS CONTAINING ALCOHOL DO YOU HAVE ON A TYPICAL DAY: PATIENT DOES NOT DRINK

## 2023-02-15 NOTE — ED PROVIDER NOTES
Emergency Department Provider Note                   PCP:                Ion Bonner MD, MD               Age: 68 y.o. Sex: female       ICD-10-CM    1. Acute on chronic congestive heart failure, unspecified heart failure type [facility])  I50.9           DISPOSITION Decision To Discharge 02/15/2023 04:43:25 PM        Medical Decision Making  This is a 75-year-old female patient with a known history of A-fib, heart failure presenting with reported exertional dyspnea has been progressive over several days. She arrives with her  who presents with cough and shortness of breath related to fever. Patient chest x-ray is stable, she does not appear in any acute distress on initial assessment. She is on oxygen at home at night. She has been using this during the day and arrives with oxygen in place currently. EKG is reassuring, heart rate of 101, no ischemia, A-fib. Patient's BNP significantly elevated. Discussed plan for Lasix in this department. Patient has received Lasix 40 IV and began to diurese quite well. She feels better. Long discussion with patient regarding option for admission versus dispo home. She would like to go home and feels comfortable to do so. She is established with Dr. Skyler Elizondo cardiology and has an appointment tomorrow. She is also scheduled to see her primary care provider later this week. She will be advised to keep these appointments. She has a pulse oximeter at home to use and will return if symptoms worsen. Amount and/or Complexity of Data Reviewed  External Data Reviewed: labs, ECG and notes. Labs: ordered. Risk  Prescription drug management. Decision regarding hospitalization. ED Course as of 02/15/23 1643   Wed Feb 15, 2023   1201 EKG interpretation: Atrial fibrillation, rate of 101, rightward axis, no obvious ischemia. [BR]   7059 Reassessed, feels well at rest.  Reportedly hypoxic in triage requiring nasal cannula oxygen. She does use nasal cannula oxygen at home, specifically at night. She reports history of fluid overload requiring Lasix administration in the past.  Discussed plan for Lasix in this department and reassessment to determine plan for dispo versus admission. Patient voiced understanding agreement. On reassessment, there is some wheezing present as well. Will attempt 1 DuoNeb treatment. [BR]      ED Course User Index  [BR] Zain Silverman, DO        Orders Placed This Encounter   Procedures    COVID-19, Rapid    Influenza A/B, Molecular    XR CHEST (2 VW)    Comprehensive Metabolic Panel    CBC with Auto Differential    Urinalysis w rflx microscopic    Brain Natriuretic Peptide    Troponin    EKG 12 Lead        Medications   furosemide (LASIX) injection 40 mg (40 mg IntraVENous Given 2/15/23 1525)   ipratropium-albuterol (DUONEB) nebulizer solution 1 ampule (1 ampule Inhalation Given 2/15/23 1528)       New Prescriptions    FUROSEMIDE (LASIX) 20 MG TABLET    Take 1 tablet by mouth daily for 5 days    POTASSIUM CHLORIDE (KLOR-CON M) 20 MEQ EXTENDED RELEASE TABLET    Take 1 tablet by mouth 2 times daily for 5 days        Viraj Rolon is a 68 y.o. female who presents to the Emergency Department with chief complaint of    Chief Complaint   Patient presents with    Shortness of Breath      Initially evaluated by midlevel provider as part of RME process, transfer of care upon initial assessment to me for further evaluation. 44-year-old female patient presenting this department with reports of worsening shortness of breath exertional dyspnea and concern for A-fib. Patient reports history of A-fib and states she has been in and out of A-fib for quite some time. She noted increased heart rate with sudden drops in her heart rate down to the 50s.   She states she gets more short of breath when she exerts herself, this improves with rest.  She denies chest pain pressure or tightness, reports no cough or congestion and denies fever or chills. There is no associated nausea or vomiting. Patient does use oxygen at night. The history is provided by the patient. No  was used. Review of Systems   Constitutional:  Positive for fatigue. Negative for activity change, appetite change, chills and fever. HENT:  Negative for congestion, ear discharge, ear pain, rhinorrhea and sore throat. Eyes:  Negative for pain, discharge, redness, itching and visual disturbance. Respiratory:  Positive for shortness of breath. Negative for apnea, cough, chest tightness and wheezing. Cardiovascular:  Negative for chest pain, palpitations and leg swelling. Gastrointestinal:  Negative for abdominal distention, abdominal pain, anal bleeding, diarrhea, nausea and vomiting. Genitourinary:  Negative for difficulty urinating, dysuria, flank pain, frequency, hematuria, pelvic pain, vaginal bleeding and vaginal discharge. Musculoskeletal:  Negative for arthralgias, back pain, myalgias, neck pain and neck stiffness. Skin:  Negative for color change, pallor, rash and wound. Neurological:  Negative for dizziness, tremors, facial asymmetry, weakness, light-headedness, numbness and headaches. Psychiatric/Behavioral:  Negative for agitation, behavioral problems, confusion, self-injury and suicidal ideas. The patient is not nervous/anxious. All other systems reviewed and are negative. Past Medical History:   Diagnosis Date    Arrhythmia     palpitations.      Arthritis     rt shoulder    Atrial fibrillation ([address] 01/21/2016    takes eliquis    Cancer ([address]     skin ca on nose    Chest pain     Chronic obstructive pulmonary disease ([address]     COPD exacerbation ([address] 9/25/2017    Dyspnea     Essential hypertension 1/21/2016    GERD (gastroesophageal reflux disease)     managed by meds    HLD (hyperlipidemia) 1/21/2016    Hypertension     managed by meds    Menopause     Morbid obesity ([address]     Nausea & vomiting Pulmonary embolus ([address] 2016    Sleep apnea     no cpap    Thromboembolus (HCC) 4 month ago.      right leg    Venous embolism and thrombosis         Past Surgical History:   Procedure Laterality Date    APPENDECTOMY      CHOLECYSTECTOMY      ORTHOPEDIC SURGERY      bilateral feet,     ROTATOR CUFF REPAIR Right     ROTATOR CUFF REPAIR Left     RADHA AND BSO (CERVIX REMOVED)      AGE 28    UROLOGICAL SURGERY      bladder tack        Family History   Problem Relation Age of Onset    Stroke Mother     Heart Disease Mother     Cancer Father         prostate    Stroke Father     Coronary Art Dis Father         premature    Heart Disease Brother     Heart Attack Brother     Cancer Sister     Cancer Sister         breast    Breast Cancer Sister 70        Social History     Socioeconomic History    Marital status:    Tobacco Use    Smoking status: Former     Packs/day: 3.00     Years: 20.00     Pack years: 60.00     Types: Cigarettes     Quit date: 1998     Years since quittin.4    Smokeless tobacco: Never   Vaping Use    Vaping Use: Never used   Substance and Sexual Activity    Alcohol use: No    Drug use: No     Social Determinants of Health     Financial Resource Strain: Low Risk     Difficulty of Paying Living Expenses: Not hard at all   Food Insecurity: No Food Insecurity    Worried About Running Out of Food in the Last Year: Never true    Ran Out of Food in the Last Year: Never true   Physical Activity: Inactive    Days of Exercise per Week: 0 days    Minutes of Exercise per Session: 10 min         Latex, Codeine, and Shellfish-derived products     Previous Medications    ACETAMINOPHEN (TYLENOL) 325 MG TABLET    Take 1,000 mg by mouth every 6 hours as needed for Pain    ALBUTEROL SULFATE HFA (VENTOLIN HFA) 108 (90 BASE) MCG/ACT INHALER    Inhale 2 puffs into the lungs 4 times daily as needed for Wheezing    APIXABAN (ELIQUIS) 5 MG TABS TABLET    Take 5 mg by mouth 2 times daily    COLESTIPOL (COLESTID) 1 G TABLET    Take 1 g by mouth 2 times daily as needed (diarrhea)    FLECAINIDE (TAMBOCOR) 100 MG TABLET    Take 1 tablet by mouth 2 times daily    FLUTICASONE-UMECLIDIN-VILANT (TRELEGY ELLIPTA) 100-62.5-25 MCG/ACT AEPB INHALER    Inhale 1 puff into the lungs daily    FLUTICASONE-UMECLIDIN-VILANT (TRELEGY ELLIPTA) 100-62.5-25 MCG/INH AEPB    1 INHALATION DAILY, RINSE MOUTH AFTER USE    LEVALBUTEROL (XOPENEX) 1.25 MG/3ML NEBULIZER SOLUTION    Take 3 mLs by nebulization every 6 hours as needed for Wheezing or Shortness of Breath    LOSARTAN (COZAAR) 50 MG TABLET    TAKE 1 TABLET BY MOUTH EVERY DAY    METOPROLOL SUCCINATE (TOPROL XL) 100 MG EXTENDED RELEASE TABLET    Take 1 tablet by mouth in the morning and 1 tablet in the evening. METOPROLOL SUCCINATE (TOPROL XL) 50 MG EXTENDED RELEASE TABLET    Take 50 mg by mouth in the morning and at bedtime. NYSTATIN (MYCOSTATIN) 259653 UNIT/GM CREAM    Apply topically 2 times daily. OMEPRAZOLE (PRILOSEC) 40 MG DELAYED RELEASE CAPSULE    Take 1 capsule by mouth daily TAKE 1 CAPSULE BY MOUTH EVERY DAY    OXYGEN    Inhale 3 L/min into the lungs at bedtime via nasal cannula    ROPINIROLE (REQUIP) 0.5 MG TABLET    TAKE 1 TABLET BY MOUTH EVERY DAY AT NIGHT    SPIRONOLACTONE (ALDACTONE) 50 MG TABLET    TAKE 1 TABLET BY MOUTH EVERY DAY    XOPENEX 1.25 MG/3ML NEBULIZER SOLUTION    Take 3 mLs by nebulization every 6 hours as needed for Wheezing or Shortness of Breath Apply goodrx        Vitals signs and nursing note reviewed. Patient Vitals for the past 4 hrs:   SpO2   02/15/23 1528 100 %          Physical Exam  Vitals and nursing note reviewed. Constitutional:       General: She is not in acute distress. Appearance: Normal appearance. She is normal weight. She is not ill-appearing or toxic-appearing. Comments: Generally well-appearing, alert and oriented x4. No acute distress, speaks in clear, fluid sentences.    HENT:      Head: Normocephalic and atraumatic. Right Ear: External ear normal.      Left Ear: External ear normal.      Nose: Nose normal.      Mouth/Throat:      Mouth: Mucous membranes are moist.   Eyes:      General: No scleral icterus. Right eye: No discharge. Left eye: No discharge. Extraocular Movements: Extraocular movements intact. Cardiovascular:      Rate and Rhythm: Normal rate and regular rhythm. Pulses: Normal pulses. Heart sounds: Normal heart sounds. Pulmonary:      Effort: Pulmonary effort is normal. No tachypnea, bradypnea, accessory muscle usage, prolonged expiration or respiratory distress. Breath sounds: Normal breath sounds and air entry. No stridor. No decreased breath sounds, wheezing, rhonchi or rales. Comments: Clear to auscultation throughout. No focal findings. Abdominal:      General: Abdomen is flat. There is no distension. Palpations: There is no mass. Tenderness: There is no abdominal tenderness. There is no right CVA tenderness, left CVA tenderness, guarding or rebound. Negative signs include Vazquez's sign and McBurney's sign. Hernia: No hernia is present. Musculoskeletal:         General: No swelling, tenderness or deformity. Normal range of motion. Cervical back: Normal range of motion. Skin:     General: Skin is warm. Capillary Refill: Capillary refill takes less than 2 seconds. Neurological:      General: No focal deficit present. Mental Status: She is alert.    Psychiatric:         Mood and Affect: Mood normal.        Procedures    Results for orders placed or performed during the hospital encounter of 02/15/23   COVID-19, Rapid    Specimen: Nasopharyngeal   Result Value Ref Range    Source NASAL      SARS-CoV-2, Rapid Not detected NOTD     Influenza A/B, Molecular    Specimen: Not Specified   Result Value Ref Range    Influenza A, DA Not detected NOTD      Influenza B, DA Not detected NOTD     XR CHEST (2 VW)    Narrative CHEST X-RAY, 2 views 2/15/2023    History: Shortness of breath    Technique: PA and lateral views of the chest.     Comparison: Chest x-ray 12/12/2022    Findings: The cardiac silhouette is moderately enlarged although stable. The lungs are  expanded without evidence for pneumothorax. No evolving consolidation, or  evidence of pleural effusion is seen. The bony thorax demonstrates no acute changes. The upper abdomen is  unremarkable in appearance. Impression    1. Stable cardiomegaly without evolving acute changes evident by plain film. This report was made using voice transcription. Despite my best efforts to avoid  any, transcription errors may persist. If there is any question about the  accuracy of the report or need for clarification, then please call [phone], or text me through [email or web address] for clarification or correction.         Comprehensive Metabolic Panel   Result Value Ref Range    Sodium 141 133 - 143 mmol/L    Potassium 4.2 3.5 - 5.1 mmol/L    Chloride 107 101 - 110 mmol/L    CO2 28 21 - 32 mmol/L    Anion Gap 6 2 - 11 mmol/L    Glucose 115 (H) 65 - 100 mg/dL    BUN 23 8 - 23 MG/DL    Creatinine 1.20 (H) 0.6 - 1.0 MG/DL    Est, Glom Filt Rate 47 (L) >60 ml/min/1.73m2    Calcium 8.0 (L) 8.3 - 10.4 MG/DL    Total Bilirubin 0.5 0.2 - 1.1 MG/DL    ALT 87 (H) 12 - 65 U/L    AST 69 (H) 15 - 37 U/L    Alk Phosphatase 92 50 - 136 U/L    Total Protein 6.9 6.3 - 8.2 g/dL    Albumin 3.6 3.2 - 4.6 g/dL    Globulin 3.3 2.8 - 4.5 g/dL    Albumin/Globulin Ratio 1.1 0.4 - 1.6     CBC with Auto Differential   Result Value Ref Range    WBC 8.1 4.3 - 11.1 K/uL    RBC 3.66 (L) 4.05 - 5.2 M/uL    Hemoglobin 12.4 11.7 - 15.4 g/dL    Hematocrit 37.1 35.8 - 46.3 %    .4 82 - 102 FL    MCH 33.9 (H) 26.1 - 32.9 PG    MCHC 33.4 31.4 - 35.0 g/dL    RDW 14.7 (H) 11.9 - 14.6 %    Platelets 904 135 - 511 K/uL    MPV 10.4 9.4 - 12.3 FL    nRBC 0.00 0.0 - 0.2 K/uL    Differential Type AUTOMATED      Seg Neutrophils 66 43 - 78 %    Lymphocytes 21 13 - 44 %    Monocytes 8 4.0 - 12.0 %    Eosinophils % 2 0.5 - 7.8 %    Basophils 1 0.0 - 2.0 %    Immature Granulocytes 2 0.0 - 5.0 %    Segs Absolute 5.5 1.7 - 8.2 K/UL    Absolute Lymph # 1.7 0.5 - 4.6 K/UL    Absolute Mono # 0.6 0.1 - 1.3 K/UL    Absolute Eos # 0.1 0.0 - 0.8 K/UL    Basophils Absolute 0.0 0.0 - 0.2 K/UL    Absolute Immature Granulocyte 0.1 0.0 - 0.5 K/UL   Urinalysis w rflx microscopic   Result Value Ref Range    Color, UA YELLOW/STRAW      Appearance CLOUDY      Specific [place], UA 1.012 1.001 - 1.023      pH, Urine 6.0 5.0 - 9.0      Protein, UA Negative NEG mg/dL    Glucose, UA Negative mg/dL    Ketones, Urine Negative NEG mg/dL    Bilirubin Urine Negative NEG      Blood, Urine Negative NEG      Urobilinogen, Urine 0.2 0.2 - 1.0 EU/dL    Nitrite, Urine Negative NEG      Leukocyte Esterase, Urine Negative NEG     Brain Natriuretic Peptide   Result Value Ref Range    NT Pro-BNP 3,438 (H) <450 PG/ML   Troponin   Result Value Ref Range    Troponin, High Sensitivity 10.9 0 - 14 pg/mL   EKG 12 Lead   Result Value Ref Range    Ventricular Rate 101 BPM    QRS Duration 92 ms    Q-T Interval 392 ms    QTc Calculation (Bazett) 508 ms    R Axis 109 degrees    T Axis 3 degrees    Diagnosis       Atrial fibrillation with rapid ventricular response        XR CHEST (2 VW)   Final Result   1. Stable cardiomegaly without evolving acute changes evident by plain film. This report was made using voice transcription. Despite my best efforts to avoid   any, transcription errors may persist. If there is any question about the   accuracy of the report or need for clarification, then please call [phone], or text me through [employer] for clarification or correction. Voice dictation software was used during the making of this note. This software is not perfect and grammatical and other typographical errors may be present. This note has not been completely proofread for errors.         [address] Doctor Braulio Mitchell [facility],   02/15/23 8272

## 2023-02-15 NOTE — ED NOTES
Chief Complaint: Shortness of breath, 5 pound weight gain overnight, rapid heart rate     HPI: 77-year-old female who is oxygen dependent at night with history of pulmonary hypertension, hyperlipidemia, diastolic dysfunction [facility] emergency department today with chief complaint of increased shortness of breath beginning yesterday and elevated heart rate. Patient states she has gained 5 pounds in the course of 24 hours. Denies upper respiratory symptoms     Vital Signs   Patient Vitals for the past 4 hrs:   Temp Pulse Resp BP SpO2   02/15/23 1114 97.5 °F (36.4 °C) 100 20 (!) 149/71 100 %        Past Medical History:   Diagnosis Date    Arrhythmia     palpitations. Arthritis     rt shoulder    Atrial fibrillation ([address] 01/21/2016    takes eliquis    Cancer ([address]     skin ca on nose    Chest pain     Chronic obstructive pulmonary disease ([address]     COPD exacerbation ([address] 9/25/2017    Dyspnea     Essential hypertension 1/21/2016    GERD (gastroesophageal reflux disease)     managed by meds    HLD (hyperlipidemia) 1/21/2016    Hypertension     managed by meds    Menopause     Morbid obesity ([address]     Nausea & vomiting     Pulmonary embolus ([address] 1/21/2016    Sleep apnea     no cpap    Thromboembolus ([address] 4 month ago.      right leg    Venous embolism and thrombosis         Past Surgical History:   Procedure Laterality Date    APPENDECTOMY      CHOLECYSTECTOMY      ORTHOPEDIC SURGERY      bilateral feet,     ROTATOR CUFF REPAIR Right     ROTATOR CUFF REPAIR Left     RADHA AND BSO (CERVIX REMOVED)      AGE 28    UROLOGICAL SURGERY      bladder tack        Family History   Problem Relation Age of Onset    Stroke Mother     Heart Disease Mother     Cancer Father         prostate    Stroke Father     Coronary Art Dis Father         premature    Heart Disease Brother     Heart Attack Brother     Cancer Sister     Cancer Sister         breast    Breast Cancer Sister 70        Social History     Socioeconomic History Marital status:    Tobacco Use    Smoking status: Former     Packs/day: 3.00     Years: 20.00     Pack years: 60.00     Types: Cigarettes     Quit date: 1998     Years since quittin.4    Smokeless tobacco: Never   Vaping Use    Vaping Use: Never used   Substance and Sexual Activity    Alcohol use: No    Drug use: No     Social Determinants of Health     Financial Resource Strain: Low Risk     Difficulty of Paying Living Expenses: Not hard at all   Food Insecurity: No Food Insecurity    Worried About Running Out of Food in the Last Year: Never true    Ran Out of Food in the Last Year: Never true   Physical Activity: Inactive    Days of Exercise per Week: 0 days    Minutes of Exercise per Session: 10 min        Allergies: Latex, Codeine, and Shellfish-derived products    Previous Medications    ACETAMINOPHEN (TYLENOL) 325 MG TABLET    Take 1,000 mg by mouth every 6 hours as needed for Pain    ALBUTEROL SULFATE HFA (VENTOLIN HFA) 108 (90 BASE) MCG/ACT INHALER    Inhale 2 puffs into the lungs 4 times daily as needed for Wheezing    APIXABAN (ELIQUIS) 5 MG TABS TABLET    Take 5 mg by mouth 2 times daily    COLESTIPOL (COLESTID) 1 G TABLET    Take 1 g by mouth 2 times daily as needed (diarrhea)    FLECAINIDE (TAMBOCOR) 100 MG TABLET    Take 1 tablet by mouth 2 times daily    FLUTICASONE-UMECLIDIN-VILANT (TRELEGY ELLIPTA) 100-62.5-25 MCG/ACT AEPB INHALER    Inhale 1 puff into the lungs daily    FLUTICASONE-UMECLIDIN-VILANT (TRELEGY ELLIPTA) 100-62.5-25 MCG/INH AEPB    1 INHALATION DAILY, RINSE MOUTH AFTER USE    LEVALBUTEROL (XOPENEX) 1.25 MG/3ML NEBULIZER SOLUTION    Take 3 mLs by nebulization every 6 hours as needed for Wheezing or Shortness of Breath    LOSARTAN (COZAAR) 50 MG TABLET    TAKE 1 TABLET BY MOUTH EVERY DAY    METOPROLOL SUCCINATE (TOPROL XL) 100 MG EXTENDED RELEASE TABLET    Take 1 tablet by mouth in the morning and 1 tablet in the evening.     METOPROLOL SUCCINATE (TOPROL XL) 50 MG EXTENDED RELEASE TABLET    Take 50 mg by mouth in the morning and at bedtime. NYSTATIN (MYCOSTATIN) 600552 UNIT/GM CREAM    Apply topically 2 times daily. OMEPRAZOLE (PRILOSEC) 40 MG DELAYED RELEASE CAPSULE    Take 1 capsule by mouth daily TAKE 1 CAPSULE BY MOUTH EVERY DAY    OXYGEN    Inhale 3 L/min into the lungs at bedtime via nasal cannula    ROPINIROLE (REQUIP) 0.5 MG TABLET    TAKE 1 TABLET BY MOUTH EVERY DAY AT NIGHT    SPIRONOLACTONE (ALDACTONE) 50 MG TABLET    TAKE 1 TABLET BY MOUTH EVERY DAY    XOPENEX 1.25 MG/3ML NEBULIZER SOLUTION    Take 3 mLs by nebulization every 6 hours as needed for Wheezing or Shortness of Breath Apply goodrx          Brief PE:  GEN: No distress. CV: As per triage vitals,  PULM: Breathing comfortably  ABD: No distention  NEURO: Awake, Alert  Lower extremity 1+ edema mid calf distally  Impression:     Plan: We will obtain lab work, EKG, handoff to provider and back    Patient evaluated initially in triage. Rapid Medical Evaluation was conducted and necessary orders have been placed. I have performed a medical screening exam.  Care will now be transferred to the provider in the back of the emergency department.   NORA Geller 11:33 AM       NORA Geller  02/15/23 0338

## 2023-02-15 NOTE — ED TRIAGE NOTES
Pt arrives via pov ambulatory with oxygen intact. States \"i've been in afib for a few months and I do take medications\". Pt reports HR dropped to 50s but does get to 131. Pt reports increased SOB yesterday. Denies any chest discomfort. Pt reports 5lb increased in weight over one night. Denies any new swelling.

## 2023-02-15 NOTE — DISCHARGE INSTRUCTIONS
Take the medication prescribed as directed. While taking Lasix, you should be on a potassium supplement as discussed. Keep your follow-up appointment with both your primary care provider and Dr. Kevyn Tejada as discussed. Return for worsening symptoms, concerns or questions.

## 2023-02-16 ENCOUNTER — TELEPHONE (OUTPATIENT)
Dept: CARDIOLOGY CLINIC | Age: 78
End: 2023-02-16
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
## 2023-02-22 ENCOUNTER — OFFICE VISIT (OUTPATIENT)
Dept: FAMILY MEDICINE CLINIC | Facility: CLINIC | Age: 78
End: 2023-02-22
Payer: MEDICARE

## 2023-02-22 VITALS
WEIGHT: 195 LBS | RESPIRATION RATE: 18 BRPM | BODY MASS INDEX: 32.49 KG/M2 | TEMPERATURE: 97.8 F | HEART RATE: 63 BPM | SYSTOLIC BLOOD PRESSURE: 137 MMHG | HEIGHT: 65 IN | OXYGEN SATURATION: 92 % | DIASTOLIC BLOOD PRESSURE: 53 MMHG

## 2023-02-22 DIAGNOSIS — G25.81 RLS (RESTLESS LEGS SYNDROME): ICD-10-CM

## 2023-02-22 DIAGNOSIS — J20.9 ACUTE BRONCHITIS WITH COPD (HCC): Primary | ICD-10-CM

## 2023-02-22 DIAGNOSIS — N18.31 STAGE 3A CHRONIC KIDNEY DISEASE (HCC): ICD-10-CM

## 2023-02-22 DIAGNOSIS — J44.0 ACUTE BRONCHITIS WITH COPD (HCC): Primary | ICD-10-CM

## 2023-02-22 DIAGNOSIS — I10 ESSENTIAL HYPERTENSION: ICD-10-CM

## 2023-02-22 DIAGNOSIS — L30.4 INTERTRIGO: ICD-10-CM

## 2023-02-22 PROBLEM — N18.30 CHRONIC RENAL DISEASE, STAGE III (HCC): Status: ACTIVE | Noted: 2023-02-22

## 2023-02-22 PROCEDURE — 1090F PRES/ABSN URINE INCON ASSESS: CPT | Performed by: FAMILY MEDICINE

## 2023-02-22 PROCEDURE — 99214 OFFICE O/P EST MOD 30 MIN: CPT | Performed by: FAMILY MEDICINE

## 2023-02-22 PROCEDURE — 3075F SYST BP GE 130 - 139MM HG: CPT | Performed by: FAMILY MEDICINE

## 2023-02-22 PROCEDURE — 3023F SPIROM DOC REV: CPT | Performed by: FAMILY MEDICINE

## 2023-02-22 PROCEDURE — 1123F ACP DISCUSS/DSCN MKR DOCD: CPT | Performed by: FAMILY MEDICINE

## 2023-02-22 PROCEDURE — 1036F TOBACCO NON-USER: CPT | Performed by: FAMILY MEDICINE

## 2023-02-22 PROCEDURE — G8400 PT W/DXA NO RESULTS DOC: HCPCS | Performed by: FAMILY MEDICINE

## 2023-02-22 PROCEDURE — 3078F DIAST BP <80 MM HG: CPT | Performed by: FAMILY MEDICINE

## 2023-02-22 PROCEDURE — G8427 DOCREV CUR MEDS BY ELIG CLIN: HCPCS | Performed by: FAMILY MEDICINE

## 2023-02-22 PROCEDURE — G8417 CALC BMI ABV UP PARAM F/U: HCPCS | Performed by: FAMILY MEDICINE

## 2023-02-22 PROCEDURE — G8484 FLU IMMUNIZE NO ADMIN: HCPCS | Performed by: FAMILY MEDICINE

## 2023-02-22 RX ORDER — PREDNISONE 20 MG/1
TABLET ORAL
Qty: 19 TABLET | Refills: 0 | Status: SHIPPED | OUTPATIENT
Start: 2023-02-22

## 2023-02-22 RX ORDER — ROPINIROLE 0.5 MG/1
TABLET, FILM COATED ORAL
Qty: 90 TABLET | Refills: 3 | Status: SHIPPED | OUTPATIENT
Start: 2023-02-22

## 2023-02-22 RX ORDER — LEVOFLOXACIN 500 MG/1
500 TABLET, FILM COATED ORAL DAILY
Qty: 7 TABLET | Refills: 0 | Status: SHIPPED | OUTPATIENT
Start: 2023-02-22 | End: 2023-03-01

## 2023-02-22 RX ORDER — NYSTATIN 100000 U/G
CREAM TOPICAL
Qty: 100 G | Refills: 0 | Status: SHIPPED | OUTPATIENT
Start: 2023-02-22

## 2023-02-22 SDOH — ECONOMIC STABILITY: INCOME INSECURITY: HOW HARD IS IT FOR YOU TO PAY FOR THE VERY BASICS LIKE FOOD, HOUSING, MEDICAL CARE, AND HEATING?: NOT HARD AT ALL

## 2023-02-22 SDOH — ECONOMIC STABILITY: HOUSING INSECURITY
IN THE LAST 12 MONTHS, WAS THERE A TIME WHEN YOU DID NOT HAVE A STEADY PLACE TO SLEEP OR SLEPT IN A SHELTER (INCLUDING NOW)?: NO

## 2023-02-22 SDOH — ECONOMIC STABILITY: FOOD INSECURITY: WITHIN THE PAST 12 MONTHS, THE FOOD YOU BOUGHT JUST DIDN'T LAST AND YOU DIDN'T HAVE MONEY TO GET MORE.: NEVER TRUE

## 2023-02-22 SDOH — ECONOMIC STABILITY: FOOD INSECURITY: WITHIN THE PAST 12 MONTHS, YOU WORRIED THAT YOUR FOOD WOULD RUN OUT BEFORE YOU GOT MONEY TO BUY MORE.: NEVER TRUE

## 2023-02-22 ASSESSMENT — PATIENT HEALTH QUESTIONNAIRE - PHQ9
1. LITTLE INTEREST OR PLEASURE IN DOING THINGS: 0
SUM OF ALL RESPONSES TO PHQ QUESTIONS 1-9: 0
2. FEELING DOWN, DEPRESSED OR HOPELESS: 0
SUM OF ALL RESPONSES TO PHQ QUESTIONS 1-9: 0
SUM OF ALL RESPONSES TO PHQ QUESTIONS 1-9: 0
SUM OF ALL RESPONSES TO PHQ9 QUESTIONS 1 & 2: 0
SUM OF ALL RESPONSES TO PHQ QUESTIONS 1-9: 0

## 2023-02-22 ASSESSMENT — ENCOUNTER SYMPTOMS
CHEST TIGHTNESS: 1
WHEEZING: 1
SHORTNESS OF BREATH: 1
GASTROINTESTINAL NEGATIVE: 1
COUGH: 1

## 2023-02-22 NOTE — PROGRESS NOTES
Saúl Soliz (:  1945) is a 68 y.o. female,Established patient, here for evaluation of the following chief complaint(s):  Cough (Cold for two days /chills / had low grade fever yesterday was also seen in er last Wednesday. Started having sx a week and a half ago. Patients O2 has been running 85-93 but patient has O2 in car and at home needbe. )         ASSESSMENT/PLAN:  1. Acute bronchitis with COPD ([facility]  -     predniSONE (DELTASONE) 20 MG tablet; Take 60 mg for 3 days, 40 mg for 3 days and 20 mg for 4 days. , Disp-19 tablet, R-0Normal  -     levoFLOXacin (LEVAQUIN) 500 MG tablet; Take 1 tablet by mouth daily for 7 days, Disp-7 tablet, R-0Normal  2. Intertrigo  -     nystatin (MYCOSTATIN) 361643 UNIT/GM cream; Apply topically 2 times daily. , Disp-100 g, R-0, Normal  3. RLS (restless legs syndrome)  -     rOPINIRole (REQUIP) 0.5 MG tablet; TAKE 1 TABLET BY MOUTH EVERY DAY AT NIGHT, Disp-90 tablet, R-3Normal  4. Essential hypertension  5. Stage 3a chronic kidney disease ([facility]    Treatment for COPD exacerbation as above. If no improvement after 7-10 days, let s know. Otherwise, follow up in 3 months. Subjective   SUBJECTIVE/OBJECTIVE:  Hospitals in [place]  Hospital follow up; acute on chronic CHF. Now with cough, productive of thick mucous. COPD  Patient complains of chest tightness, cough productive of yellow, green, and brown sputum in moderate amounts, wheezing, and sinus pressure. Symptoms began 3 days ago. Patient uses 1 pillows at night. Respiratory history: frequent episodes of bronchitis, COPD, and pneumonia. Review of Systems   Constitutional:  Negative for activity change, fatigue and fever. Respiratory:  Positive for cough, chest tightness, shortness of breath and wheezing. Cardiovascular: Negative. Gastrointestinal: Negative. Neurological:  Negative for headaches. Objective   Physical Exam  Vitals and nursing note reviewed.    Constitutional:       General: She is not in acute distress. Appearance: She is ill-appearing. She is not toxic-appearing. Cardiovascular:      Rate and Rhythm: Normal rate and regular rhythm. Pulmonary:      Effort: Pulmonary effort is normal. No respiratory distress. Breath sounds: Wheezing and rhonchi present. Psychiatric:         Mood and Affect: Mood normal.         Behavior: Behavior normal.         Thought Content: Thought content normal.         Judgment: Judgment normal.        Vitals:    02/22/23 1224   BP: (!) 137/53   Pulse: 63   Resp: 18   Temp: 97.8 °F (36.6 °C)   SpO2: 92%       On this date 2/22/2023 I have spent 30 minutes reviewing previous notes, test results and face to face with the patient discussing the diagnosis and importance of compliance with the treatment plan as well as documenting on the day of the visit. An electronic signature was used to authenticate this note.     --Siddharth Pulido MD

## 2023-03-03 ENCOUNTER — HOME CARE VISIT (OUTPATIENT)
Dept: SCHEDULING | Facility: HOME HEALTH | Age: 78
End: 2023-03-03
Payer: MEDICARE

## 2023-03-03 VITALS
HEART RATE: 96 BPM | RESPIRATION RATE: 18 BRPM | WEIGHT: 189 LBS | SYSTOLIC BLOOD PRESSURE: 122 MMHG | TEMPERATURE: 97.6 F | DIASTOLIC BLOOD PRESSURE: 62 MMHG | BODY MASS INDEX: 31.45 KG/M2 | OXYGEN SATURATION: 95 %

## 2023-03-03 PROCEDURE — G0299 HHS/HOSPICE OF RN EA 15 MIN: HCPCS

## 2023-03-06 ASSESSMENT — ENCOUNTER SYMPTOMS
SPUTUM CONSISTENCY: THICK
SPUTUM AMOUNT: MODERATE
SPUTUM PRODUCTION: 1
COUGH: 1
SPUTUM COLOR: TAN
DYSPNEA ACTIVITY LEVEL: AFTER AMBULATING 10 - 20 FT
COUGH CHARACTERISTICS: PRODUCTIVE

## 2023-03-08 PROCEDURE — 1090000002 HH PPS REVENUE DEBIT

## 2023-03-08 PROCEDURE — 1090000001 HH PPS REVENUE CREDIT

## 2023-03-09 ENCOUNTER — OFFICE VISIT (OUTPATIENT)
Dept: FAMILY MEDICINE CLINIC | Facility: CLINIC | Age: 78
End: 2023-03-09

## 2023-03-09 VITALS
BODY MASS INDEX: 32.59 KG/M2 | WEIGHT: 195.6 LBS | HEIGHT: 65 IN | SYSTOLIC BLOOD PRESSURE: 138 MMHG | TEMPERATURE: 96.6 F | DIASTOLIC BLOOD PRESSURE: 68 MMHG | RESPIRATION RATE: 16 BRPM

## 2023-03-09 DIAGNOSIS — M54.50 ACUTE MIDLINE LOW BACK PAIN WITHOUT SCIATICA: Primary | ICD-10-CM

## 2023-03-09 LAB
BILIRUBIN, URINE, POC: NEGATIVE
BLOOD URINE, POC: NEGATIVE
GLUCOSE URINE, POC: NEGATIVE
KETONES, URINE, POC: NEGATIVE
LEUKOCYTE ESTERASE, URINE, POC: NORMAL
NITRITE, URINE, POC: NEGATIVE
PH, URINE, POC: 5 (ref 4.6–8)
PROTEIN,URINE, POC: NORMAL
SPECIFIC GRAVITY, URINE, POC: 1.02 (ref 1–1.03)
URINALYSIS CLARITY, POC: CLEAR
URINALYSIS COLOR, POC: NORMAL
UROBILINOGEN, POC: NORMAL

## 2023-03-09 PROCEDURE — 1090000002 HH PPS REVENUE DEBIT

## 2023-03-09 PROCEDURE — 1090000001 HH PPS REVENUE CREDIT

## 2023-03-09 SDOH — ECONOMIC STABILITY: FOOD INSECURITY: WITHIN THE PAST 12 MONTHS, THE FOOD YOU BOUGHT JUST DIDN'T LAST AND YOU DIDN'T HAVE MONEY TO GET MORE.: NEVER TRUE

## 2023-03-09 SDOH — ECONOMIC STABILITY: FOOD INSECURITY: WITHIN THE PAST 12 MONTHS, YOU WORRIED THAT YOUR FOOD WOULD RUN OUT BEFORE YOU GOT MONEY TO BUY MORE.: NEVER TRUE

## 2023-03-09 SDOH — ECONOMIC STABILITY: INCOME INSECURITY: HOW HARD IS IT FOR YOU TO PAY FOR THE VERY BASICS LIKE FOOD, HOUSING, MEDICAL CARE, AND HEATING?: NOT HARD AT ALL

## 2023-03-09 ASSESSMENT — PATIENT HEALTH QUESTIONNAIRE - PHQ9
SUM OF ALL RESPONSES TO PHQ QUESTIONS 1-9: 0
SUM OF ALL RESPONSES TO PHQ QUESTIONS 1-9: 0
SUM OF ALL RESPONSES TO PHQ9 QUESTIONS 1 & 2: 0
SUM OF ALL RESPONSES TO PHQ QUESTIONS 1-9: 0
1. LITTLE INTEREST OR PLEASURE IN DOING THINGS: 0
SUM OF ALL RESPONSES TO PHQ QUESTIONS 1-9: 0
2. FEELING DOWN, DEPRESSED OR HOPELESS: 0

## 2023-03-09 ASSESSMENT — ANXIETY QUESTIONNAIRES
5. BEING SO RESTLESS THAT IT IS HARD TO SIT STILL: 0
3. WORRYING TOO MUCH ABOUT DIFFERENT THINGS: 0
1. FEELING NERVOUS, ANXIOUS, OR ON EDGE: 0
2. NOT BEING ABLE TO STOP OR CONTROL WORRYING: 0
4. TROUBLE RELAXING: 0
IF YOU CHECKED OFF ANY PROBLEMS ON THIS QUESTIONNAIRE, HOW DIFFICULT HAVE THESE PROBLEMS MADE IT FOR YOU TO DO YOUR WORK, TAKE CARE OF THINGS AT HOME, OR GET ALONG WITH OTHER PEOPLE: NOT DIFFICULT AT ALL
6. BECOMING EASILY ANNOYED OR IRRITABLE: 0

## 2023-03-09 ASSESSMENT — ENCOUNTER SYMPTOMS
SHORTNESS OF BREATH: 0
BACK PAIN: 1

## 2023-03-09 NOTE — PATIENT INSTRUCTIONS
*You had an xray today.  We have reviewed the images today.  All of our x-rays, however, are sent out to be read by radiologist.  We will let you know if there is any change noted to the report.  *Recommend alternating ice (for 10 min) and heat (for 30 min), 3-4 times a day as able/needed.  *Continue with Tylenol as needed.
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
## 2023-03-10 PROCEDURE — 1090000002 HH PPS REVENUE DEBIT

## 2023-03-10 PROCEDURE — 1090000001 HH PPS REVENUE CREDIT

## 2023-03-11 PROCEDURE — 1090000001 HH PPS REVENUE CREDIT

## 2023-03-11 PROCEDURE — 1090000002 HH PPS REVENUE DEBIT

## 2023-03-12 PROCEDURE — 1090000001 HH PPS REVENUE CREDIT

## 2023-03-12 PROCEDURE — 1090000002 HH PPS REVENUE DEBIT

## 2023-03-13 PROCEDURE — 1090000002 HH PPS REVENUE DEBIT

## 2023-03-13 PROCEDURE — 1090000001 HH PPS REVENUE CREDIT

## 2023-03-14 PROCEDURE — 1090000002 HH PPS REVENUE DEBIT

## 2023-03-14 PROCEDURE — 1090000001 HH PPS REVENUE CREDIT

## 2023-03-15 PROCEDURE — 1090000001 HH PPS REVENUE CREDIT

## 2023-03-15 PROCEDURE — 1090000002 HH PPS REVENUE DEBIT

## 2023-03-16 ENCOUNTER — HOME CARE VISIT (OUTPATIENT)
Dept: SCHEDULING | Facility: HOME HEALTH | Age: 78
End: 2023-03-16
Payer: MEDICARE

## 2023-03-16 ENCOUNTER — TELEPHONE (OUTPATIENT)
Dept: CARDIOLOGY CLINIC | Age: 78
End: 2023-03-16

## 2023-03-16 VITALS
WEIGHT: 196 LBS | BODY MASS INDEX: 32.62 KG/M2 | TEMPERATURE: 98 F | HEART RATE: 104 BPM | DIASTOLIC BLOOD PRESSURE: 84 MMHG | OXYGEN SATURATION: 92 % | RESPIRATION RATE: 18 BRPM | SYSTOLIC BLOOD PRESSURE: 132 MMHG

## 2023-03-16 DIAGNOSIS — R06.02 SOB (SHORTNESS OF BREATH): ICD-10-CM

## 2023-03-16 DIAGNOSIS — I50.9 ACUTE CONGESTIVE HEART FAILURE (HCC): ICD-10-CM

## 2023-03-16 DIAGNOSIS — Z79.899 LONG-TERM USE OF HIGH-RISK MEDICATION: ICD-10-CM

## 2023-03-16 DIAGNOSIS — I50.32 CHRONIC DIASTOLIC CONGESTIVE HEART FAILURE (HCC): Primary | ICD-10-CM

## 2023-03-16 PROCEDURE — 1090000001 HH PPS REVENUE CREDIT

## 2023-03-16 PROCEDURE — G0299 HHS/HOSPICE OF RN EA 15 MIN: HCPCS

## 2023-03-16 PROCEDURE — 1090000002 HH PPS REVENUE DEBIT

## 2023-03-16 PROCEDURE — 400014 HH F/U

## 2023-03-16 RX ORDER — FUROSEMIDE 40 MG/1
40 TABLET ORAL 2 TIMES DAILY
Qty: 60 TABLET | Refills: 3 | Status: SHIPPED | OUTPATIENT
Start: 2023-03-16

## 2023-03-16 ASSESSMENT — ENCOUNTER SYMPTOMS
DYSPNEA ACTIVITY LEVEL: AFTER AMBULATING MORE THAN 20 FT
DIARRHEA: 1

## 2023-03-16 NOTE — TELEPHONE ENCOUNTER
Have patient start furosemide 40 mg twice daily. Please have patient check BMP, magnesium and BNP on Monday. Continue daily weights and report progress on Monday.

## 2023-03-16 NOTE — TELEPHONE ENCOUNTER
Henry w/Home [facility] reports pt.has gaine a pound a day since Monday 02 sat 92 % wt.today 196,,sodium intake consistent. BLE edema let 2+ and right 1 +,lungs clear. Off Lasix and on Aldactone 50mg qday. Lasix worked better for her not sure why it was stopped ut was stopped in [facility] per pt. Please advise. Abner Vazquez

## 2023-03-16 NOTE — TELEPHONE ENCOUNTER
[address] notified of MD response and v/u. She took verbal to draw labs Monday and to report back Monday. Lasix escribed as below  Requested Prescriptions     Signed Prescriptions Disp Refills    furosemide (LASIX) 40 MG tablet 60 tablet 3     Sig: Take 1 tablet by mouth 2 times daily     Authorizing Provider: Dinah Baldwin     Ordering User: Etta Bedolla

## 2023-03-16 NOTE — TELEPHONE ENCOUNTER
Has gained about a pound a day in this week.  Lasix was Dcd and she says it workd better than Spironalactone   Lungs are clear   Please call

## 2023-03-17 PROCEDURE — 1090000002 HH PPS REVENUE DEBIT

## 2023-03-17 PROCEDURE — 1090000001 HH PPS REVENUE CREDIT

## 2023-03-18 PROCEDURE — 1090000002 HH PPS REVENUE DEBIT

## 2023-03-18 PROCEDURE — 1090000001 HH PPS REVENUE CREDIT

## 2023-03-19 PROCEDURE — 1090000001 HH PPS REVENUE CREDIT

## 2023-03-19 PROCEDURE — 1090000002 HH PPS REVENUE DEBIT

## 2023-03-20 ENCOUNTER — HOSPITAL ENCOUNTER (OUTPATIENT)
Dept: LAB | Age: 78
Discharge: HOME | End: 2023-03-23

## 2023-03-20 ENCOUNTER — HOME CARE VISIT (OUTPATIENT)
Dept: SCHEDULING | Facility: HOME HEALTH | Age: 78
End: 2023-03-20
Payer: MEDICARE

## 2023-03-20 VITALS
SYSTOLIC BLOOD PRESSURE: 118 MMHG | HEART RATE: 71 BPM | WEIGHT: 198.2 LBS | OXYGEN SATURATION: 93 % | RESPIRATION RATE: 18 BRPM | BODY MASS INDEX: 32.98 KG/M2 | TEMPERATURE: 98.1 F | DIASTOLIC BLOOD PRESSURE: 60 MMHG

## 2023-03-20 LAB
ANION GAP SERPL CALC-SCNC: 6 MMOL/L (ref 2–11)
BUN SERPL-MCNC: 31 MG/DL (ref 8–23)
CALCIUM SERPL-MCNC: 6.6 MG/DL (ref 8.3–10.4)
CHLORIDE SERPL-SCNC: 106 MMOL/L (ref 101–110)
CO2 SERPL-SCNC: 29 MMOL/L (ref 21–32)
CREAT SERPL-MCNC: 1.4 MG/DL (ref 0.6–1)
GLUCOSE SERPL-MCNC: 93 MG/DL (ref 65–100)
POTASSIUM SERPL-SCNC: 3.6 MMOL/L (ref 3.5–5.1)
SODIUM SERPL-SCNC: 141 MMOL/L (ref 133–143)

## 2023-03-20 PROCEDURE — 83735 ASSAY OF MAGNESIUM: CPT

## 2023-03-20 PROCEDURE — G0299 HHS/HOSPICE OF RN EA 15 MIN: HCPCS

## 2023-03-20 PROCEDURE — 1090000001 HH PPS REVENUE CREDIT

## 2023-03-20 PROCEDURE — 80048 BASIC METABOLIC PNL TOTAL CA: CPT

## 2023-03-20 PROCEDURE — 1090000002 HH PPS REVENUE DEBIT

## 2023-03-21 ENCOUNTER — TELEPHONE (OUTPATIENT)
Dept: CARDIOLOGY CLINIC | Age: 78
End: 2023-03-21

## 2023-03-21 DIAGNOSIS — Z79.899 LONG-TERM USE OF HIGH-RISK MEDICATION: ICD-10-CM

## 2023-03-21 DIAGNOSIS — I25.119 CORONARY ARTERY DISEASE INVOLVING NATIVE CORONARY ARTERY OF NATIVE HEART WITH ANGINA PECTORIS (HCC): ICD-10-CM

## 2023-03-21 DIAGNOSIS — I10 ESSENTIAL HYPERTENSION: ICD-10-CM

## 2023-03-21 DIAGNOSIS — I50.9 ACUTE CONGESTIVE HEART FAILURE (HCC): Primary | ICD-10-CM

## 2023-03-21 LAB — MAGNESIUM SERPL-MCNC: 0.8 MG/DL (ref 1.8–2.4)

## 2023-03-21 PROCEDURE — 1090000001 HH PPS REVENUE CREDIT

## 2023-03-21 PROCEDURE — 1090000002 HH PPS REVENUE DEBIT

## 2023-03-21 NOTE — TELEPHONE ENCOUNTER
Called 3/16 Lasix 40mg bid prescribed and told to call back w/an update this Monday. Home health nurse,Henry, calls back today. Saskiajori Nunez reports that that they dot run her BNP or Mg. She called the lab and they are sending to another lab. She lost 1 pound by Sunday then gained 2 pounds  on Monday. Wt.was 196 on the 16th yesterday was 199. 2. Wt. up 3 pounds. I called [facility] was told they can not send lab anywhere BNP will have to be redrawn. I gave verbal order for home health nurse ,Darinel Nunez, to redraw BNP. She v/u. Still waiting on BNP will send note to  to see if ok to continue Lasix for now. BNP will be drawn tomorrow.

## 2023-03-21 NOTE — TELEPHONE ENCOUNTER
Henry with [facility] was calling to give an update on this pt. She states that her BNP is elevated. Wants to also give daily weights from this past weekend. Please call. Thank you.

## 2023-03-22 PROCEDURE — 1090000001 HH PPS REVENUE CREDIT

## 2023-03-22 PROCEDURE — 1090000002 HH PPS REVENUE DEBIT

## 2023-03-22 NOTE — TELEPHONE ENCOUNTER
Patient may continue furosemide 40 mg twice daily as prescribed. Patient should have BMP and magnesium drawn in 7 days.

## 2023-03-22 NOTE — TELEPHONE ENCOUNTER
[address] [place] [address] notified of MD response. Will wait on BNP results.

## 2023-03-23 ENCOUNTER — HOME CARE VISIT (OUTPATIENT)
Dept: HOME HEALTH SERVICES | Facility: HOME HEALTH | Age: 78
End: 2023-03-23
Payer: MEDICARE

## 2023-03-23 ENCOUNTER — HOME CARE VISIT (OUTPATIENT)
Dept: SCHEDULING | Facility: HOME HEALTH | Age: 78
End: 2023-03-23
Payer: MEDICARE

## 2023-03-23 PROCEDURE — 1090000001 HH PPS REVENUE CREDIT

## 2023-03-23 PROCEDURE — 1090000002 HH PPS REVENUE DEBIT

## 2023-03-23 PROCEDURE — G0299 HHS/HOSPICE OF RN EA 15 MIN: HCPCS

## 2023-03-24 ENCOUNTER — TELEPHONE (OUTPATIENT)
Dept: CARDIOLOGY CLINIC | Age: 78
End: 2023-03-24

## 2023-03-24 ENCOUNTER — HOME CARE VISIT (OUTPATIENT)
Dept: SCHEDULING | Facility: HOME HEALTH | Age: 78
End: 2023-03-24
Payer: MEDICARE

## 2023-03-24 PROCEDURE — G0299 HHS/HOSPICE OF RN EA 15 MIN: HCPCS

## 2023-03-24 PROCEDURE — 1090000002 HH PPS REVENUE DEBIT

## 2023-03-24 PROCEDURE — 1090000001 HH PPS REVENUE CREDIT

## 2023-03-24 NOTE — TELEPHONE ENCOUNTER
Misbah Hussein from [facility] is requesting another order for abs as the pt's veins rolled. Would like the order today so they can send another nurse out ASAP.

## 2023-03-24 NOTE — TELEPHONE ENCOUNTER
I spoke w/Henry today as I do not see BNP results. Braulio Cazares reports another nurse went out yesterday and could not get pt.blood. She is going out today to redraw.

## 2023-03-24 NOTE — TELEPHONE ENCOUNTER
Farhat Cannon and she states she needs verbal order to send nurse back to the house to redraw labs. Verbal order given.

## 2023-03-25 PROCEDURE — 1090000002 HH PPS REVENUE DEBIT

## 2023-03-25 PROCEDURE — 1090000001 HH PPS REVENUE CREDIT

## 2023-03-26 PROCEDURE — 1090000001 HH PPS REVENUE CREDIT

## 2023-03-26 PROCEDURE — 1090000002 HH PPS REVENUE DEBIT

## 2023-03-27 ENCOUNTER — HOME CARE VISIT (OUTPATIENT)
Dept: SCHEDULING | Facility: HOME HEALTH | Age: 78
End: 2023-03-27
Payer: MEDICARE

## 2023-03-27 ENCOUNTER — HOSPITAL ENCOUNTER (OUTPATIENT)
Dept: LAB | Age: 78
Discharge: HOME | End: 2023-03-30
Payer: MEDICARE

## 2023-03-27 VITALS
OXYGEN SATURATION: 97 % | BODY MASS INDEX: 33.28 KG/M2 | HEART RATE: 68 BPM | TEMPERATURE: 97.5 F | RESPIRATION RATE: 16 BRPM | WEIGHT: 200 LBS | SYSTOLIC BLOOD PRESSURE: 100 MMHG | DIASTOLIC BLOOD PRESSURE: 66 MMHG

## 2023-03-27 VITALS
RESPIRATION RATE: 18 BRPM | WEIGHT: 199.2 LBS | DIASTOLIC BLOOD PRESSURE: 64 MMHG | TEMPERATURE: 98.3 F | SYSTOLIC BLOOD PRESSURE: 122 MMHG | OXYGEN SATURATION: 95 % | BODY MASS INDEX: 33.15 KG/M2 | HEART RATE: 71 BPM

## 2023-03-27 LAB
ANION GAP SERPL CALC-SCNC: 2 MMOL/L (ref 2–11)
BUN SERPL-MCNC: 26 MG/DL (ref 8–23)
CALCIUM SERPL-MCNC: 6.8 MG/DL (ref 8.3–10.4)
CHLORIDE SERPL-SCNC: 108 MMOL/L (ref 101–110)
CO2 SERPL-SCNC: 29 MMOL/L (ref 21–32)
CREAT SERPL-MCNC: 1.1 MG/DL (ref 0.6–1)
GLUCOSE SERPL-MCNC: 92 MG/DL (ref 65–100)
MAGNESIUM SERPL-MCNC: 1 MG/DL (ref 1.8–2.4)
NT PRO BNP: 905 PG/ML
POTASSIUM SERPL-SCNC: 3.8 MMOL/L (ref 3.5–5.1)
SODIUM SERPL-SCNC: 139 MMOL/L (ref 133–143)

## 2023-03-27 PROCEDURE — G0299 HHS/HOSPICE OF RN EA 15 MIN: HCPCS

## 2023-03-27 PROCEDURE — 83880 ASSAY OF NATRIURETIC PEPTIDE: CPT

## 2023-03-27 PROCEDURE — 80048 BASIC METABOLIC PNL TOTAL CA: CPT

## 2023-03-27 PROCEDURE — 83735 ASSAY OF MAGNESIUM: CPT

## 2023-03-27 RX ORDER — MAGNESIUM OXIDE 400 MG/1
400 TABLET ORAL 2 TIMES DAILY
Qty: 60 TABLET | Refills: 11 | Status: SHIPPED | OUTPATIENT
Start: 2023-03-27

## 2023-03-27 ASSESSMENT — ENCOUNTER SYMPTOMS: DYSPNEA ACTIVITY LEVEL: AFTER AMBULATING 10 - 20 FT

## 2023-03-27 NOTE — TELEPHONE ENCOUNTER
----- Message from Rosio Odonnell MD sent at 3/27/2023  4:27 PM EDT -----  Patient with critically low magnesium. Please have patient's start magnesium oxide 400 mg twice daily. Please arrange for patient to go to infusion center and get 4 g of magnesium tomorrow. Recheck magnesium and CMP on Thursday.

## 2023-03-27 NOTE — TELEPHONE ENCOUNTER
I called pt.and son lvm on machines w/MD response and that Mag-ox needs to be picked up at pharmacy. I also notified home health [facility]. I escribed med as below. I will check w/the infusion Center tomorrow.   Requested Prescriptions     Signed Prescriptions Disp Refills    magnesium oxide (MAG-OX) 400 MG tablet 60 tablet 11     Sig: Take 1 tablet by mouth in the morning and at bedtime     Authorizing Provider: Carlos Alvarez     Ordering User: SADIA LARA

## 2023-03-28 ENCOUNTER — HOSPITAL ENCOUNTER (OUTPATIENT)
Dept: INFUSION THERAPY | Age: 78
Discharge: HOME | End: 2023-03-28
Payer: MEDICARE

## 2023-03-28 VITALS
DIASTOLIC BLOOD PRESSURE: 54 MMHG | TEMPERATURE: 97.4 F | HEART RATE: 78 BPM | OXYGEN SATURATION: 93 % | SYSTOLIC BLOOD PRESSURE: 129 MMHG | RESPIRATION RATE: 16 BRPM

## 2023-03-28 VITALS
SYSTOLIC BLOOD PRESSURE: 124 MMHG | DIASTOLIC BLOOD PRESSURE: 72 MMHG | HEART RATE: 78 BPM | OXYGEN SATURATION: 97 % | TEMPERATURE: 97.9 F | RESPIRATION RATE: 17 BRPM

## 2023-03-28 PROCEDURE — 96365 THER/PROPH/DIAG IV INF INIT: CPT

## 2023-03-28 PROCEDURE — 96366 THER/PROPH/DIAG IV INF ADDON: CPT

## 2023-03-28 PROCEDURE — 6360000002 HC RX W HCPCS: Performed by: INTERNAL MEDICINE

## 2023-03-28 RX ORDER — MAGNESIUM SULFATE IN WATER 40 MG/ML
4000 INJECTION, SOLUTION INTRAVENOUS ONCE
Status: COMPLETED | OUTPATIENT
Start: 2023-03-28 | End: 2023-03-28

## 2023-03-28 RX ADMIN — MAGNESIUM SULFATE HEPTAHYDRATE 4000 MG: 40 INJECTION, SOLUTION INTRAVENOUS at 15:45

## 2023-03-28 ASSESSMENT — ENCOUNTER SYMPTOMS
DYSPNEA ACTIVITY LEVEL: AFTER AMBULATING 10 - 20 FT
STOOL DESCRIPTION: FORMED
PAIN LOCATION - PAIN QUALITY: SORE

## 2023-03-28 NOTE — TELEPHONE ENCOUNTER
I faxed order for 4 grams Magnesium Sulfate /100  ml iv x 1 to the [facility] [phone]. I was told by the [facility] they will call pt.to schedule her Mg.infusion. Pt.notified of med called in,need for IV Mg.infusion and labs this Thursday. She v/u and will wait to hear from the [facility].

## 2023-03-28 NOTE — PROGRESS NOTES
Transfer of care received from [facility] . Arrived to the [facility]. Magnesium 4 Grams completed. Patient tolerated without difficulty. Any issues or concerns during appointment: None. No further Infusion appointments noted. Patient instructed to call provider with temperature of 100.4 or greater or nausea/vomiting/ diarrhea or pain not controlled by medications  Discharged ambulatory to home. Patient reports that labs will be drawn by home health nurse tomorrow.

## 2023-03-30 ENCOUNTER — HOME CARE VISIT (OUTPATIENT)
Dept: SCHEDULING | Facility: HOME HEALTH | Age: 78
End: 2023-03-30
Payer: MEDICARE

## 2023-03-30 ENCOUNTER — HOSPITAL ENCOUNTER (OUTPATIENT)
Dept: LAB | Age: 78
Discharge: HOME | End: 2023-03-30
Payer: MEDICARE

## 2023-03-30 PROCEDURE — 83735 ASSAY OF MAGNESIUM: CPT

## 2023-03-30 PROCEDURE — G0299 HHS/HOSPICE OF RN EA 15 MIN: HCPCS

## 2023-03-30 PROCEDURE — 80048 BASIC METABOLIC PNL TOTAL CA: CPT

## 2023-03-31 ENCOUNTER — TELEPHONE (OUTPATIENT)
Dept: CARDIOLOGY CLINIC | Age: 78
End: 2023-03-31

## 2023-03-31 ENCOUNTER — HOSPITAL ENCOUNTER (OUTPATIENT)
Age: 78
Discharge: HOME | End: 2023-04-03

## 2023-03-31 ENCOUNTER — HOME CARE VISIT (OUTPATIENT)
Dept: SCHEDULING | Facility: HOME HEALTH | Age: 78
End: 2023-03-31
Payer: MEDICARE

## 2023-03-31 LAB
ANION GAP SERPL CALC-SCNC: 2 MMOL/L (ref 2–11)
BUN SERPL-MCNC: 25 MG/DL (ref 8–23)
CALCIUM SERPL-MCNC: 8 MG/DL (ref 8.3–10.4)
CHLORIDE SERPL-SCNC: 108 MMOL/L (ref 101–110)
CO2 SERPL-SCNC: 29 MMOL/L (ref 21–32)
CREAT SERPL-MCNC: 1.09 MG/DL (ref 0.6–1)
GLUCOSE SERPL-MCNC: 102 MG/DL (ref 65–100)
MAGNESIUM SERPL-MCNC: 1.8 MG/DL (ref 1.8–2.4)
POTASSIUM SERPL-SCNC: 4.8 MMOL/L (ref 3.5–5.1)
SODIUM SERPL-SCNC: 139 MMOL/L (ref 133–143)

## 2023-03-31 PROCEDURE — G0299 HHS/HOSPICE OF RN EA 15 MIN: HCPCS

## 2023-03-31 PROCEDURE — 83735 ASSAY OF MAGNESIUM: CPT

## 2023-03-31 PROCEDURE — 80048 BASIC METABOLIC PNL TOTAL CA: CPT

## 2023-03-31 NOTE — TELEPHONE ENCOUNTER
Nurse Johnson Helton called reporting that the blood was left unspun from yesterday and is needing a new order.

## 2023-04-01 ENCOUNTER — TELEPHONE (OUTPATIENT)
Dept: CARDIOLOGY | Age: 78
End: 2023-04-01

## 2023-04-01 VITALS
HEART RATE: 62 BPM | DIASTOLIC BLOOD PRESSURE: 74 MMHG | SYSTOLIC BLOOD PRESSURE: 132 MMHG | WEIGHT: 198.2 LBS | OXYGEN SATURATION: 96 % | RESPIRATION RATE: 18 BRPM | TEMPERATURE: 98.2 F | BODY MASS INDEX: 32.98 KG/M2

## 2023-04-01 ASSESSMENT — ENCOUNTER SYMPTOMS
DIARRHEA: 1
DYSPNEA ACTIVITY LEVEL: AFTER AMBULATING 10 - 20 FT

## 2023-04-01 NOTE — TELEPHONE ENCOUNTER
0001 -- spoke with Pts Ayush Kenney. States Pt with recent critical Mag level (1.0 on 3/27/23). She received an infusion and PO supplement. Had labs recollected but difficulty with draw/labs processing. Anyhow -- labs were recollected and resulted tonight. Mag is now w/i range at 1.8. Instructed Carmen to have Pt continue PO supplement until next provider appt. V/u.       Eloisa Wright, NP-C

## 2023-04-05 ENCOUNTER — HOME CARE VISIT (OUTPATIENT)
Dept: SCHEDULING | Facility: HOME HEALTH | Age: 78
End: 2023-04-05
Payer: MEDICARE

## 2023-04-05 VITALS
WEIGHT: 195.6 LBS | HEART RATE: 66 BPM | SYSTOLIC BLOOD PRESSURE: 118 MMHG | BODY MASS INDEX: 32.55 KG/M2 | DIASTOLIC BLOOD PRESSURE: 70 MMHG | OXYGEN SATURATION: 92 % | TEMPERATURE: 97.3 F | RESPIRATION RATE: 18 BRPM

## 2023-04-05 VITALS
HEART RATE: 72 BPM | SYSTOLIC BLOOD PRESSURE: 122 MMHG | OXYGEN SATURATION: 100 % | TEMPERATURE: 99.7 F | DIASTOLIC BLOOD PRESSURE: 72 MMHG | RESPIRATION RATE: 16 BRPM

## 2023-04-05 PROCEDURE — G0299 HHS/HOSPICE OF RN EA 15 MIN: HCPCS

## 2023-04-05 ASSESSMENT — ENCOUNTER SYMPTOMS: DYSPNEA ACTIVITY LEVEL: AFTER AMBULATING 10 - 20 FT

## 2023-04-20 ENCOUNTER — HOME CARE VISIT (OUTPATIENT)
Dept: SCHEDULING | Facility: HOME HEALTH | Age: 78
End: 2023-04-20
Payer: MEDICARE

## 2023-04-20 PROCEDURE — G0299 HHS/HOSPICE OF RN EA 15 MIN: HCPCS

## 2023-04-21 VITALS
TEMPERATURE: 97.6 F | SYSTOLIC BLOOD PRESSURE: 120 MMHG | RESPIRATION RATE: 18 BRPM | HEART RATE: 58 BPM | DIASTOLIC BLOOD PRESSURE: 68 MMHG | OXYGEN SATURATION: 97 %

## 2023-04-21 ASSESSMENT — ENCOUNTER SYMPTOMS: DYSPNEA ACTIVITY LEVEL: AFTER AMBULATING 10 - 20 FT

## 2023-04-27 ENCOUNTER — HOME CARE VISIT (OUTPATIENT)
Dept: SCHEDULING | Facility: HOME HEALTH | Age: 78
End: 2023-04-27
Payer: MEDICARE

## 2023-04-27 PROCEDURE — G0299 HHS/HOSPICE OF RN EA 15 MIN: HCPCS

## 2023-04-30 VITALS
SYSTOLIC BLOOD PRESSURE: 126 MMHG | RESPIRATION RATE: 18 BRPM | DIASTOLIC BLOOD PRESSURE: 64 MMHG | WEIGHT: 199 LBS | OXYGEN SATURATION: 97 % | BODY MASS INDEX: 33.12 KG/M2 | HEART RATE: 65 BPM | TEMPERATURE: 97.5 F

## 2023-04-30 ASSESSMENT — ENCOUNTER SYMPTOMS: DYSPNEA ACTIVITY LEVEL: AFTER AMBULATING 10 - 20 FT

## 2023-05-01 ENCOUNTER — OFFICE VISIT (OUTPATIENT)
Dept: ORTHOPEDIC SURGERY | Age: 78
End: 2023-05-01
Payer: MEDICARE

## 2023-05-01 VITALS — WEIGHT: 199 LBS | BODY MASS INDEX: 33.15 KG/M2 | HEIGHT: 65 IN

## 2023-05-01 DIAGNOSIS — M17.12 PRIMARY OSTEOARTHRITIS OF LEFT KNEE: Primary | ICD-10-CM

## 2023-05-01 PROCEDURE — 99213 OFFICE O/P EST LOW 20 MIN: CPT | Performed by: PHYSICIAN ASSISTANT

## 2023-05-01 PROCEDURE — G8417 CALC BMI ABV UP PARAM F/U: HCPCS | Performed by: PHYSICIAN ASSISTANT

## 2023-05-01 PROCEDURE — 1090F PRES/ABSN URINE INCON ASSESS: CPT | Performed by: PHYSICIAN ASSISTANT

## 2023-05-01 PROCEDURE — 20610 DRAIN/INJ JOINT/BURSA W/O US: CPT | Performed by: PHYSICIAN ASSISTANT

## 2023-05-01 PROCEDURE — G8427 DOCREV CUR MEDS BY ELIG CLIN: HCPCS | Performed by: PHYSICIAN ASSISTANT

## 2023-05-01 PROCEDURE — G8400 PT W/DXA NO RESULTS DOC: HCPCS | Performed by: PHYSICIAN ASSISTANT

## 2023-05-01 PROCEDURE — 1123F ACP DISCUSS/DSCN MKR DOCD: CPT | Performed by: PHYSICIAN ASSISTANT

## 2023-05-01 PROCEDURE — 1036F TOBACCO NON-USER: CPT | Performed by: PHYSICIAN ASSISTANT

## 2023-05-01 RX ORDER — METHYLPREDNISOLONE ACETATE 40 MG/ML
80 INJECTION, SUSPENSION INTRA-ARTICULAR; INTRALESIONAL; INTRAMUSCULAR; SOFT TISSUE ONCE
Status: COMPLETED | OUTPATIENT
Start: 2023-05-01 | End: 2023-05-01

## 2023-05-01 RX ADMIN — METHYLPREDNISOLONE ACETATE 80 MG: 40 INJECTION, SUSPENSION INTRA-ARTICULAR; INTRALESIONAL; INTRAMUSCULAR; SOFT TISSUE at 13:45

## 2023-05-01 NOTE — PROGRESS NOTES
Viscosupplementation Follow-up    [unfilled]     Allergies   Allergen Reactions    Latex Other (See Comments) and Dermatitis     Other reaction(s): Rash-Allergy    Codeine Other (See Comments)     Headache  Other reaction(s): Other- (not listed) - Allergy  Headache    Shellfish Allergy Hives    Shellfish-Derived Products Hives        Current Outpatient Medications on File Prior to Visit   Medication Sig Dispense Refill    acetaminophen (TYLENOL) 500 MG tablet Take 1,000 mg by mouth every 6 hours as needed for Pain.      magnesium oxide (MAG-OX) 400 MG tablet Take 1 tablet by mouth in the morning and at bedtime 60 tablet 11    furosemide (LASIX) 40 MG tablet Take 1 tablet by mouth 2 times daily 60 tablet 3    nystatin (MYCOSTATIN) 118022 UNIT/GM cream Apply topically 2 times daily. (Patient taking differently: Apply 1 Dose topically 2 times daily as needed (yeast rash). Apply topically 2 times daily in skin folds) 100 g 0    rOPINIRole (REQUIP) 0.5 MG tablet TAKE 1 TABLET BY MOUTH EVERY DAY AT NIGHT 90 tablet 3    levalbuterol (XOPENEX) 1.25 MG/3ML nebulizer solution Take 3 mLs by nebulization every 6 hours as needed for Wheezing or Shortness of Breath 810 mL 3    flecainide (TAMBOCOR) 100 MG tablet Take 1 tablet by mouth 2 times daily 180 tablet 3    fluticasone-umeclidin-vilant (TRELEGY ELLIPTA) 100-62.5-25 MCG/ACT AEPB inhaler Inhale 1 puff into the lungs daily (Patient taking differently: Inhale 1 puff into the lungs daily rinse mouth after use) 1 each 11    albuterol sulfate HFA (VENTOLIN HFA) 108 (90 Base) MCG/ACT inhaler Inhale 2 puffs into the lungs 4 times daily as needed for Wheezing 18 g 5    metoprolol succinate (TOPROL XL) 50 MG extended release tablet Take 50 mg by mouth in the morning and at bedtime.       losartan (COZAAR) 50 MG tablet TAKE 1 TABLET BY MOUTH EVERY DAY 90 tablet 3    omeprazole (PRILOSEC) 40 MG delayed release capsule Take 1 capsule by mouth daily TAKE 1 CAPSULE BY MOUTH EVERY DAY 90

## 2023-05-04 ENCOUNTER — HOME CARE VISIT (OUTPATIENT)
Dept: SCHEDULING | Facility: HOME HEALTH | Age: 78
End: 2023-05-04
Payer: MEDICARE

## 2023-05-04 PROCEDURE — G0299 HHS/HOSPICE OF RN EA 15 MIN: HCPCS

## 2023-05-10 VITALS
TEMPERATURE: 97.9 F | RESPIRATION RATE: 17 BRPM | HEART RATE: 74 BPM | SYSTOLIC BLOOD PRESSURE: 124 MMHG | OXYGEN SATURATION: 97 % | DIASTOLIC BLOOD PRESSURE: 72 MMHG

## 2023-05-10 ASSESSMENT — ENCOUNTER SYMPTOMS
STOOL DESCRIPTION: FORMED
DYSPNEA ACTIVITY LEVEL: AFTER AMBULATING 10 - 20 FT

## 2023-06-28 DIAGNOSIS — J44.9 STAGE 3 SEVERE COPD BY GOLD CLASSIFICATION (HCC): ICD-10-CM

## 2023-07-03 RX ORDER — ALBUTEROL SULFATE 90 UG/1
2 AEROSOL, METERED RESPIRATORY (INHALATION) 4 TIMES DAILY PRN
Qty: 1 EACH | Refills: 11 | Status: SHIPPED | OUTPATIENT
Start: 2023-07-03

## 2023-07-07 ENCOUNTER — OFFICE VISIT (OUTPATIENT)
Age: 78
End: 2023-07-07

## 2023-07-07 VITALS
WEIGHT: 205 LBS | BODY MASS INDEX: 34.16 KG/M2 | HEART RATE: 64 BPM | DIASTOLIC BLOOD PRESSURE: 60 MMHG | HEIGHT: 65 IN | SYSTOLIC BLOOD PRESSURE: 112 MMHG

## 2023-07-07 DIAGNOSIS — I10 ESSENTIAL HYPERTENSION: ICD-10-CM

## 2023-07-07 DIAGNOSIS — J43.2 CENTRILOBULAR EMPHYSEMA (HCC): Chronic | ICD-10-CM

## 2023-07-07 DIAGNOSIS — I25.119 CORONARY ARTERY DISEASE INVOLVING NATIVE CORONARY ARTERY OF NATIVE HEART WITH ANGINA PECTORIS (HCC): ICD-10-CM

## 2023-07-07 DIAGNOSIS — G47.33 OSA AND COPD OVERLAP SYNDROME (HCC): Chronic | ICD-10-CM

## 2023-07-07 DIAGNOSIS — E66.9 OBESITY (BMI 30-39.9): ICD-10-CM

## 2023-07-07 DIAGNOSIS — I48.19 PERSISTENT ATRIAL FIBRILLATION (HCC): Primary | ICD-10-CM

## 2023-07-07 DIAGNOSIS — I27.20 PULMONARY HYPERTENSION (HCC): ICD-10-CM

## 2023-07-07 DIAGNOSIS — I87.8 VENOUS STASIS: Chronic | ICD-10-CM

## 2023-07-07 DIAGNOSIS — E78.00 PURE HYPERCHOLESTEROLEMIA: ICD-10-CM

## 2023-07-07 DIAGNOSIS — J44.9 OSA AND COPD OVERLAP SYNDROME (HCC): Chronic | ICD-10-CM

## 2023-07-07 ASSESSMENT — ENCOUNTER SYMPTOMS
ABDOMINAL PAIN: 0
SHORTNESS OF BREATH: 1
BACK PAIN: 1
COUGH: 0

## 2023-07-07 NOTE — PROGRESS NOTES
[facility]  [address] [address]      23      NAME:  Harvey Donnelly  : 1945  MRN: [id number]      SUBJECTIVE:   Harvey Donnelly is a 68 y.o. female seen for a follow up visit regarding the following:     Chief Complaint   Patient presents with    Atrial Fibrillation       HPI:   Patient with history of severe dyspnea and chest pressure with exertion. Patient has a strong family history of premature CAD and MI. She has HTN, Chol, and long history of tobacco abuse. She also has a history of DVTs and PE many years ago. This was thought to be due to severe varicosities. She had cardiac catheterization 2014 with normal coronaries. She was seen in office 2014 with atrial fibrillation with RVR that spontaneously converted. She continues to struggle with chronic dyspnea and LE edema. This is secondary to obesity and venous stasis. Recently had COVID-19 vaccine and reported AF/RVR after injection. Patient now status post right and left heart catheterization 2022. She had mild to moderate pulmonary hypertension at 50 mmHg. 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 EDP was 5 mmHg. proBNP was mildly elevated. Patient was readmitted 2022 with acute on chronic respiratory failure. Patient been noncompliant with CPAP, oxygen and diuretics. She responded to IV diuretics and treatment for underlying COPD/obesity hypoventilation syndrome. Remains in SR. Past Medical History, Past Surgical History, Family history, Social History, and Medications were all reviewed with the patient today and updated as necessary. Current Outpatient Medications   Medication Sig Dispense Refill    albuterol sulfate HFA (PROVENTIL;VENTOLIN;PROAIR) 108 (90 Base) MCG/ACT inhaler INHALE 2 PUFFS INTO THE LUNGS 4 TIMES DAILY AS NEEDED FOR WHEEZING.  1 each 11    acetaminophen (TYLENOL) 500 MG tablet Take 2 tablets by mouth every 6 hours as needed

## 2023-07-11 DIAGNOSIS — I48.19 PERSISTENT ATRIAL FIBRILLATION (HCC): ICD-10-CM

## 2023-07-11 DIAGNOSIS — I10 ESSENTIAL HYPERTENSION: ICD-10-CM

## 2023-07-11 LAB
ANION GAP SERPL CALC-SCNC: 5 MMOL/L (ref 2–11)
BUN SERPL-MCNC: 21 MG/DL (ref 8–23)
CALCIUM SERPL-MCNC: 8.6 MG/DL (ref 8.3–10.4)
CHLORIDE SERPL-SCNC: 107 MMOL/L (ref 101–110)
CO2 SERPL-SCNC: 27 MMOL/L (ref 21–32)
CREAT SERPL-MCNC: 1.1 MG/DL (ref 0.6–1)
ERYTHROCYTE [DISTWIDTH] IN BLOOD BY AUTOMATED COUNT: 13.2 % (ref 11.9–14.6)
GLUCOSE SERPL-MCNC: 79 MG/DL (ref 65–100)
HCT VFR BLD AUTO: 35.2 % (ref 35.8–46.3)
HGB BLD-MCNC: 11.5 G/DL (ref 11.7–15.4)
MAGNESIUM SERPL-MCNC: 1.9 MG/DL (ref 1.8–2.4)
MCH RBC QN AUTO: 34.3 PG (ref 26.1–32.9)
MCHC RBC AUTO-ENTMCNC: 32.7 G/DL (ref 31.4–35)
MCV RBC AUTO: 105.1 FL (ref 82–102)
NRBC # BLD: 0 K/UL (ref 0–0.2)
PLATELET # BLD AUTO: 148 K/UL (ref 150–450)
PMV BLD AUTO: 10.7 FL (ref 9.4–12.3)
POTASSIUM SERPL-SCNC: 4.2 MMOL/L (ref 3.5–5.1)
RBC # BLD AUTO: 3.35 M/UL (ref 4.05–5.2)
SODIUM SERPL-SCNC: 139 MMOL/L (ref 133–143)
WBC # BLD AUTO: 8 K/UL (ref 4.3–11.1)

## 2023-07-17 RX ORDER — SPIRONOLACTONE 50 MG/1
TABLET, FILM COATED ORAL
Qty: 90 TABLET | Refills: 3 | Status: SHIPPED | OUTPATIENT
Start: 2023-07-17

## 2023-07-18 ENCOUNTER — TELEPHONE (OUTPATIENT)
Age: 78
End: 2023-07-18

## 2023-07-18 NOTE — TELEPHONE ENCOUNTER
Basic Metabolic Panel  Order: [id number]  Status: Final result     Visible to patient: Yes (not seen)     Next appt: 08/14/2023 at 09:00 AM in Orthopedic Surgery [facility])     Dx: Essential hypertension; Persistent at. ..     0 Result Notes             Component Ref Range & Units 7/11/23 1200 3/31/23 2145 3/27/23 1145 3/20/23 1610 2/15/23 1146 12/5/22 1054 11/5/22 0554   Sodium 133 - 143 mmol/L 139  139  139  141  141  138  137    Potassium 3.5 - 5.1 mmol/L 4.2  4.8  3.8  3.6  4.2  5.2 High   4.6    Chloride 101 - 110 mmol/L 107  108  108  106  107  110  105    CO2 21 - 32 mmol/L 27  29  29  29  28  24  27    Anion Gap 2 - 11 mmol/L 5  2  2  6  6  4  5    Glucose 65 - 100 mg/dL 79  102 High   92  93  115 High   93  101 High     BUN 8 - 23 MG/DL 21  25 High   26 High   31 High   23  15  29 High     Creatinine 0.6 - 1.0 MG/DL 1.10 High   1.09 High   1.10 High   1.40 High   1.20 High   1.00  1.10 High     Est, Glom Filt Rate >60 ml/min/1.73m2 52 Low   52 Low  CM  52 Low  CM  39 Low  CM  47 Low  CM  58 Low  CM  52 Low  CM    Comment:        Magnesium  Order: [id number]  Status: Final result     Visible to patient: Yes (not seen)     Next appt: 08/14/2023 at 09:00 AM in Orthopedic Surgery [facility])     Dx: Essential hypertension; Persistent at. ..     0 Result Notes             Component Ref Range & Units 7/11/23 1200 3/31/23 2145 3/27/23 1145 3/20/23 1610 12/5/22 1054 11/10/22 1825 11/5/22 0554   Magnesium 1.8 - 2.4 mg/dL 1.9  1.8  1.0 Low   0.8 Low   1.4 Low   2.0  1.9    Resulting [address], [place] [address] [facility], [place] [address], [place] [address], [place] [address], [place] [address] [facility]      CBC  Order: [id number]  Status: Final result     Visible to patient: Yes (not seen)     Next appt: 08/14/2023 at 09:00 AM in Orthopedic Surgery [facility])     Dx: Essential hypertension;

## 2023-08-04 ENCOUNTER — TELEPHONE (OUTPATIENT)
Dept: ORTHOPEDIC SURGERY | Age: 78
End: 2023-08-04

## 2023-08-04 NOTE — TELEPHONE ENCOUNTER
Call pt  please  her 8/14  appt  says  recheck  she called and  mentions  getting  a gel and  also she  seems to have  a new issue  with  her  hip

## 2023-08-04 NOTE — TELEPHONE ENCOUNTER
Patient will contact us if pain gets better due to only one week of hip pain no fall or injury.  Prev sciatica issues will access at gel injection visit if worsened

## 2023-08-14 ENCOUNTER — OFFICE VISIT (OUTPATIENT)
Dept: ORTHOPEDIC SURGERY | Age: 78
End: 2023-08-14
Payer: MEDICARE

## 2023-08-14 DIAGNOSIS — M17.12 PRIMARY OSTEOARTHRITIS OF LEFT KNEE: Primary | ICD-10-CM

## 2023-08-14 DIAGNOSIS — M25.552 LEFT HIP PAIN: ICD-10-CM

## 2023-08-14 PROCEDURE — 1036F TOBACCO NON-USER: CPT | Performed by: PHYSICIAN ASSISTANT

## 2023-08-14 PROCEDURE — 99214 OFFICE O/P EST MOD 30 MIN: CPT | Performed by: PHYSICIAN ASSISTANT

## 2023-08-14 PROCEDURE — G8400 PT W/DXA NO RESULTS DOC: HCPCS | Performed by: PHYSICIAN ASSISTANT

## 2023-08-14 PROCEDURE — 1123F ACP DISCUSS/DSCN MKR DOCD: CPT | Performed by: PHYSICIAN ASSISTANT

## 2023-08-14 PROCEDURE — 20610 DRAIN/INJ JOINT/BURSA W/O US: CPT | Performed by: PHYSICIAN ASSISTANT

## 2023-08-14 PROCEDURE — G8417 CALC BMI ABV UP PARAM F/U: HCPCS | Performed by: PHYSICIAN ASSISTANT

## 2023-08-14 PROCEDURE — G8427 DOCREV CUR MEDS BY ELIG CLIN: HCPCS | Performed by: PHYSICIAN ASSISTANT

## 2023-08-14 PROCEDURE — 1090F PRES/ABSN URINE INCON ASSESS: CPT | Performed by: PHYSICIAN ASSISTANT

## 2023-08-14 RX ORDER — METHYLPREDNISOLONE 4 MG/1
TABLET ORAL
Qty: 1 KIT | Refills: 0 | Status: SHIPPED | OUTPATIENT
Start: 2023-08-14

## 2023-08-14 NOTE — PATIENT INSTRUCTIONS
Patient Education        Hip Bursitis: Care Instructions  Overview     Bursitis is inflammation of the bursa. A bursa is a small sac of fluid that cushions a joint and helps it move easily. A bursa sits between a bone in the hip and the muscles and tendons in the thigh and buttock. Injury or overuse of the hip can cause bursitis. Activities that can lead to bursitis include twisting and rapid joint movement. Bursitis can cause hip pain. Bursitis usually gets better if you avoid the activity that caused it. If pain lasts or gets worse despite home treatment, your doctor may draw fluid from the bursa through a needle. This may relieve your pain and help your doctor know if you have an infection. If so, your doctor will prescribe antibiotics. If you have inflammation only, you may get a corticosteroid shot to reduce swelling and pain. Sometimes surgery is needed to drain or remove the bursa. Follow-up care is a key part of your treatment and safety. Be sure to make and go to all appointments, and call your doctor if you are having problems. It's also a good idea to know your test results and keep a list of the medicines you take. How can you care for yourself at home? Put ice or a cold pack on your hip for 10 to 20 minutes at a time. Put a thin cloth between the ice and your skin. After 3 days of using ice, you may use heat on your hip. You can use a hot water bottle, a heating pad set on low, or a warm, moist towel. Rest your hip. Stop any activities that cause pain. Switch to activities that do not stress your hip. Take your medicines exactly as prescribed. Call your doctor if you think you are having a problem with your medicine. Ask your doctor if you can take an over-the-counter pain medicine, such as acetaminophen (Tylenol), ibuprofen (Advil, Motrin), or naproxen (Aleve). Be safe with medicines. Read and follow all instructions on the label.   To prevent stiffness, gently move the hip joint as much as you

## 2023-08-14 NOTE — PROGRESS NOTES
Name: Lauren Dey  YOB: 1945  Gender: female  MRN: [id number]    CC:   Chief Complaint   Patient presents with    Injections     Monovisc left knee     Hip Pain     Left hip pain          HPI: Lauren Dey is a 66 y.o. female here for evaluation of right hip pain as well as monovisc injection of the right knee  There was not an acute injury  Regarding the hip pain, it has been present for 1-2 weeks and is becoming worse. The pain is primarily posterior buttock area  she describes the pain as throbbing and a constant ache that is intermittently sharp with activity  The pain is worse with inactivity, rising after sitting, and at night, often waking them from sleep  The pain does not radiate down the leg. she denies numbness and tingling down the leg. Treatment so far has been activity modification, Tylenol, heat, and voltaren gel with little relief. Review of Systems  As per HPI. Pertinent positives and negatives are addressed with the patient, particularly those related to musculoskeletal concerns. Non-orthopaedic concerns were referred back to the primary care physician. Allergies   Allergen Reactions    Latex Other (See Comments) and Dermatitis     Other reaction(s): Rash-Allergy    Codeine Other (See Comments)     Headache  Other reaction(s): Other- (not listed) - Allergy  Headache    Shellfish Allergy Hives    Shellfish-Derived Products Hives                    PHYSICAL EXAMINATION:   The patient is alert and oriented, in no distress. There were no vitals filed for this visit. The cervical, thoracic and lumbar spine are without compromising deformity. The upper extremities demonstrate reasonable tone, strength and function bilaterally. The lower extremities are as described below. Circulation is normal with palpable pedal pulses bilaterally and no edema. Skin of the leg is normal with no chronic stasis disease bilaterally.   Sensation is intact to light

## 2023-08-22 ENCOUNTER — OFFICE VISIT (OUTPATIENT)
Dept: FAMILY MEDICINE CLINIC | Facility: CLINIC | Age: 78
End: 2023-08-22
Payer: MEDICARE

## 2023-08-22 VITALS
HEIGHT: 65 IN | HEART RATE: 62 BPM | OXYGEN SATURATION: 94 % | SYSTOLIC BLOOD PRESSURE: 124 MMHG | BODY MASS INDEX: 33.69 KG/M2 | WEIGHT: 202.2 LBS | DIASTOLIC BLOOD PRESSURE: 44 MMHG | RESPIRATION RATE: 16 BRPM | TEMPERATURE: 97.3 F

## 2023-08-22 DIAGNOSIS — R73.09 ELEVATED GLUCOSE: ICD-10-CM

## 2023-08-22 DIAGNOSIS — R79.0 LOW MAGNESIUM LEVEL: ICD-10-CM

## 2023-08-22 DIAGNOSIS — N18.31 STAGE 3A CHRONIC KIDNEY DISEASE (HCC): ICD-10-CM

## 2023-08-22 DIAGNOSIS — I10 ESSENTIAL HYPERTENSION: Primary | ICD-10-CM

## 2023-08-22 DIAGNOSIS — M17.12 OSTEOARTHRITIS OF LEFT KNEE, UNSPECIFIED OSTEOARTHRITIS TYPE: ICD-10-CM

## 2023-08-22 DIAGNOSIS — I50.22 CHRONIC SYSTOLIC (CONGESTIVE) HEART FAILURE (HCC): ICD-10-CM

## 2023-08-22 DIAGNOSIS — J44.9 CHRONIC OBSTRUCTIVE PULMONARY DISEASE, UNSPECIFIED COPD TYPE (HCC): ICD-10-CM

## 2023-08-22 DIAGNOSIS — K21.9 GASTROESOPHAGEAL REFLUX DISEASE WITHOUT ESOPHAGITIS: ICD-10-CM

## 2023-08-22 PROBLEM — L97.221 CALF ULCER, LEFT, LIMITED TO BREAKDOWN OF SKIN (HCC): Status: RESOLVED | Noted: 2019-09-13 | Resolved: 2023-08-22

## 2023-08-22 PROCEDURE — G8400 PT W/DXA NO RESULTS DOC: HCPCS | Performed by: FAMILY MEDICINE

## 2023-08-22 PROCEDURE — 3074F SYST BP LT 130 MM HG: CPT | Performed by: FAMILY MEDICINE

## 2023-08-22 PROCEDURE — 3023F SPIROM DOC REV: CPT | Performed by: FAMILY MEDICINE

## 2023-08-22 PROCEDURE — 1036F TOBACCO NON-USER: CPT | Performed by: FAMILY MEDICINE

## 2023-08-22 PROCEDURE — 1090F PRES/ABSN URINE INCON ASSESS: CPT | Performed by: FAMILY MEDICINE

## 2023-08-22 PROCEDURE — G8427 DOCREV CUR MEDS BY ELIG CLIN: HCPCS | Performed by: FAMILY MEDICINE

## 2023-08-22 PROCEDURE — G8417 CALC BMI ABV UP PARAM F/U: HCPCS | Performed by: FAMILY MEDICINE

## 2023-08-22 PROCEDURE — 99214 OFFICE O/P EST MOD 30 MIN: CPT | Performed by: FAMILY MEDICINE

## 2023-08-22 PROCEDURE — 1123F ACP DISCUSS/DSCN MKR DOCD: CPT | Performed by: FAMILY MEDICINE

## 2023-08-22 PROCEDURE — 3078F DIAST BP <80 MM HG: CPT | Performed by: FAMILY MEDICINE

## 2023-08-22 RX ORDER — LOSARTAN POTASSIUM 50 MG/1
TABLET ORAL
Qty: 90 TABLET | Refills: 3 | Status: SHIPPED | OUTPATIENT
Start: 2023-08-22

## 2023-08-22 RX ORDER — OMEPRAZOLE 40 MG/1
40 CAPSULE, DELAYED RELEASE ORAL DAILY
Qty: 90 CAPSULE | Refills: 3 | Status: SHIPPED | OUTPATIENT
Start: 2023-08-22

## 2023-08-22 RX ORDER — METOPROLOL SUCCINATE 50 MG/1
50 TABLET, EXTENDED RELEASE ORAL 2 TIMES DAILY
Qty: 180 TABLET | Refills: 3 | Status: SHIPPED | OUTPATIENT
Start: 2023-08-22

## 2023-08-22 ASSESSMENT — ENCOUNTER SYMPTOMS: SHORTNESS OF BREATH: 1

## 2023-08-22 NOTE — PROGRESS NOTES
Subjective:      Patient ID: Kye Gillette is a 66 y.o. female. HPI  The patient comes in today as a new patient to get established. Overall she has been doing fairly well, is followed closely by cardiology and takes medication for both blood pressure and lower extremity edema. Her biggest concern is she has had some persistent arthritis symptoms requiring injections both cortisone and gel injections in her left knee. She has had some left hip pain and used Voltaren gel for that but not use it on her knee yet. Does have some shortness of breath but has a history of COPD and is followed by pulmonary as well as cardiology    Past Medical History:   Diagnosis Date    Aortic atherosclerosis ([address])     noted on lumbar spine film 3/9/2023    Arrhythmia     palpitations. Arthritis     rt shoulder    Atrial fibrillation ([address]) 01/21/2016    takes eliquis    Cancer ([address])     skin ca on nose    Chest pain     Chronic obstructive pulmonary disease ([address])     COPD exacerbation ([address]) 9/25/2017    Dyspnea     Essential hypertension 1/21/2016    GERD (gastroesophageal reflux disease)     managed by meds    HLD (hyperlipidemia) 1/21/2016    Hypertension     managed by meds    Menopause     Morbid obesity ([address])     Nausea & vomiting     Pulmonary embolus ([address]) 1/21/2016    Sleep apnea     no cpap    Thromboembolus ([address]) 4 month ago. right leg    Venous embolism and thrombosis        Allergies   Allergen Reactions    Latex Other (See Comments) and Dermatitis     Other reaction(s): Rash-Allergy    Codeine Other (See Comments)     Headache  Other reaction(s):  Other- (not listed) - Allergy  Headache    Shellfish Allergy Hives    Shellfish-Derived Products Hives       Current Outpatient Medications   Medication Sig Dispense Refill    spironolactone (ALDACTONE) 50 MG tablet TAKE 1 TABLET BY MOUTH EVERY DAY 90 tablet 3    albuterol sulfate HFA (PROVENTIL;VENTOLIN;PROAIR) 108 (90 Base) MCG/ACT inhaler INHALE 2 PUFFS INTO

## 2023-08-30 NOTE — PROGRESS NOTES
Patient Name:  Ileene Blizzard                               YOB: 1945  MRN: [id number]                                                Office Visit 8/31/2023    ASSESSMENT AND PLAN:  (Medical Decision Making)    1. Dyspnea on exertion  Due to combined Gold stage III COPD, HFpEF, chronic A-fib (now much better controlled), mild precapillary PH (likely due to longstanding MARISA variably treated). We are optimizing each of these concerns as best possible. 2. Pulmonary hypertension ([address])  Recommend she resume CPAP immediately. Emphasized the importance of this. She has a follow-up echo planned in January. She uses lasix 40mg bid and spironolactone 50mg daily. She agrees to engage in pulmonary rehab for improved conditioning. If TTE in Jan shows rising RVSP (last was 38mmHg), could consider monotherapy or dual oral therapy vasodilators. 3. Stage 3 severe COPD by GOLD classification ([address])  Continue triple inhaler therapy with either Trelegy or Breztri. As needed albuterol. Repeat spirometry next visit. 4. Exercise hypoxemia  She continues to desaturate with ambulation with sats dropping to 86% on room air. I recommend that she wear 4 L/min of supplemental oxygen when exerting yourself at pulmonary rehab. Will ask [employer] company to check battery and see if she can purchase an additional battery. No orders of the defined types were placed in this encounter. Follow-up and Dispositions    Return for With me or Letty Ortiz. Kendall Corrigan MD    Total time for encounter on day of encounter was 40 minutes. This time includes chart prep, review of tests/procedures, review of other provider's notes, documentation and counseling patient regarding disease process and medications.      ___________________________________________________________________         ______      REASON FOR VISIT:   Chief Complaint   Patient presents with    Follow-up     Pulmonary Hypertension

## 2023-08-31 ENCOUNTER — OFFICE VISIT (OUTPATIENT)
Dept: PULMONOLOGY | Age: 78
End: 2023-08-31
Payer: MEDICARE

## 2023-08-31 VITALS
TEMPERATURE: 98.1 F | HEIGHT: 65 IN | WEIGHT: 205 LBS | RESPIRATION RATE: 16 BRPM | HEART RATE: 87 BPM | DIASTOLIC BLOOD PRESSURE: 72 MMHG | SYSTOLIC BLOOD PRESSURE: 122 MMHG | BODY MASS INDEX: 34.16 KG/M2 | OXYGEN SATURATION: 99 %

## 2023-08-31 DIAGNOSIS — R06.09 DYSPNEA ON EXERTION: Primary | ICD-10-CM

## 2023-08-31 DIAGNOSIS — J44.9 STAGE 3 SEVERE COPD BY GOLD CLASSIFICATION (HCC): ICD-10-CM

## 2023-08-31 DIAGNOSIS — I27.20 PULMONARY HYPERTENSION (HCC): ICD-10-CM

## 2023-08-31 DIAGNOSIS — R09.02 EXERCISE HYPOXEMIA: ICD-10-CM

## 2023-08-31 PROCEDURE — 3023F SPIROM DOC REV: CPT | Performed by: INTERNAL MEDICINE

## 2023-08-31 PROCEDURE — 3074F SYST BP LT 130 MM HG: CPT | Performed by: INTERNAL MEDICINE

## 2023-08-31 PROCEDURE — G8427 DOCREV CUR MEDS BY ELIG CLIN: HCPCS | Performed by: INTERNAL MEDICINE

## 2023-08-31 PROCEDURE — 99215 OFFICE O/P EST HI 40 MIN: CPT | Performed by: INTERNAL MEDICINE

## 2023-08-31 PROCEDURE — 1036F TOBACCO NON-USER: CPT | Performed by: INTERNAL MEDICINE

## 2023-08-31 PROCEDURE — 1090F PRES/ABSN URINE INCON ASSESS: CPT | Performed by: INTERNAL MEDICINE

## 2023-08-31 PROCEDURE — G8400 PT W/DXA NO RESULTS DOC: HCPCS | Performed by: INTERNAL MEDICINE

## 2023-08-31 PROCEDURE — 3078F DIAST BP <80 MM HG: CPT | Performed by: INTERNAL MEDICINE

## 2023-08-31 PROCEDURE — G8417 CALC BMI ABV UP PARAM F/U: HCPCS | Performed by: INTERNAL MEDICINE

## 2023-08-31 PROCEDURE — 1123F ACP DISCUSS/DSCN MKR DOCD: CPT | Performed by: INTERNAL MEDICINE

## 2023-09-26 ENCOUNTER — NURSE ONLY (OUTPATIENT)
Dept: FAMILY MEDICINE CLINIC | Facility: CLINIC | Age: 78
End: 2023-09-26

## 2023-09-26 DIAGNOSIS — R79.0 LOW MAGNESIUM LEVEL: ICD-10-CM

## 2023-09-26 DIAGNOSIS — R73.09 ELEVATED GLUCOSE: ICD-10-CM

## 2023-09-26 DIAGNOSIS — I50.22 CHRONIC SYSTOLIC (CONGESTIVE) HEART FAILURE (HCC): ICD-10-CM

## 2023-09-26 DIAGNOSIS — I10 ESSENTIAL HYPERTENSION: ICD-10-CM

## 2023-09-26 LAB
ALBUMIN SERPL-MCNC: 3.7 G/DL (ref 3.2–4.6)
ALBUMIN/GLOB SERPL: 1.3 (ref 0.4–1.6)
ALP SERPL-CCNC: 78 U/L (ref 50–136)
ALT SERPL-CCNC: 21 U/L (ref 12–65)
ANION GAP SERPL CALC-SCNC: 7 MMOL/L (ref 2–11)
AST SERPL-CCNC: 21 U/L (ref 15–37)
BASOPHILS # BLD: 0 K/UL (ref 0–0.2)
BASOPHILS NFR BLD: 1 % (ref 0–2)
BILIRUB SERPL-MCNC: 0.4 MG/DL (ref 0.2–1.1)
BUN SERPL-MCNC: 22 MG/DL (ref 8–23)
CALCIUM SERPL-MCNC: 8.6 MG/DL (ref 8.3–10.4)
CHLORIDE SERPL-SCNC: 112 MMOL/L (ref 101–110)
CHOLEST SERPL-MCNC: 191 MG/DL
CO2 SERPL-SCNC: 26 MMOL/L (ref 21–32)
CREAT SERPL-MCNC: 1.1 MG/DL (ref 0.6–1)
DIFFERENTIAL METHOD BLD: ABNORMAL
EOSINOPHIL # BLD: 0.1 K/UL (ref 0–0.8)
EOSINOPHIL NFR BLD: 2 % (ref 0.5–7.8)
ERYTHROCYTE [DISTWIDTH] IN BLOOD BY AUTOMATED COUNT: 13.8 % (ref 11.9–14.6)
GLOBULIN SER CALC-MCNC: 2.9 G/DL (ref 2.8–4.5)
GLUCOSE SERPL-MCNC: 93 MG/DL (ref 65–100)
HCT VFR BLD AUTO: 35.9 % (ref 35.8–46.3)
HDLC SERPL-MCNC: 34 MG/DL (ref 40–60)
HDLC SERPL: 5.6
HGB BLD-MCNC: 11.9 G/DL (ref 11.7–15.4)
IMM GRANULOCYTES # BLD AUTO: 0.1 K/UL (ref 0–0.5)
IMM GRANULOCYTES NFR BLD AUTO: 1 % (ref 0–5)
LDLC SERPL CALC-MCNC: 140.2 MG/DL
LYMPHOCYTES # BLD: 2 K/UL (ref 0.5–4.6)
LYMPHOCYTES NFR BLD: 35 % (ref 13–44)
MAGNESIUM SERPL-MCNC: 2.2 MG/DL (ref 1.8–2.4)
MCH RBC QN AUTO: 33.4 PG (ref 26.1–32.9)
MCHC RBC AUTO-ENTMCNC: 33.1 G/DL (ref 31.4–35)
MCV RBC AUTO: 100.8 FL (ref 82–102)
MONOCYTES # BLD: 0.5 K/UL (ref 0.1–1.3)
MONOCYTES NFR BLD: 9 % (ref 4–12)
NEUTS SEG # BLD: 3 K/UL (ref 1.7–8.2)
NEUTS SEG NFR BLD: 52 % (ref 43–78)
NRBC # BLD: 0 K/UL (ref 0–0.2)
PLATELET # BLD AUTO: 148 K/UL (ref 150–450)
PMV BLD AUTO: 10.5 FL (ref 9.4–12.3)
POTASSIUM SERPL-SCNC: 4.5 MMOL/L (ref 3.5–5.1)
PROT SERPL-MCNC: 6.6 G/DL (ref 6.3–8.2)
RBC # BLD AUTO: 3.56 M/UL (ref 4.05–5.2)
SODIUM SERPL-SCNC: 145 MMOL/L (ref 133–143)
TRIGL SERPL-MCNC: 84 MG/DL (ref 35–150)
VLDLC SERPL CALC-MCNC: 16.8 MG/DL (ref 6–23)
WBC # BLD AUTO: 5.8 K/UL (ref 4.3–11.1)

## 2023-09-27 LAB
EST. AVERAGE GLUCOSE BLD GHB EST-MCNC: 103 MG/DL
HBA1C MFR BLD: 5.2 % (ref 4.8–5.6)

## 2023-09-29 NOTE — RESULT ENCOUNTER NOTE
The Cholesterol  191 with goal less than 200, Kidney function and liver function tests normal.  The glucose normal 93. The hemoglobin good 11.9 and HbA1c normal 5.2.   Magnesium and potassium normal

## 2023-10-03 ENCOUNTER — NURSE ONLY (OUTPATIENT)
Dept: FAMILY MEDICINE CLINIC | Facility: CLINIC | Age: 78
End: 2023-10-03
Payer: MEDICARE

## 2023-10-03 DIAGNOSIS — Z23 NEED FOR IMMUNIZATION AGAINST INFLUENZA: Primary | ICD-10-CM

## 2023-10-03 PROCEDURE — 90694 VACC AIIV4 NO PRSRV 0.5ML IM: CPT | Performed by: FAMILY MEDICINE

## 2023-10-03 PROCEDURE — G0008 ADMIN INFLUENZA VIRUS VAC: HCPCS | Performed by: FAMILY MEDICINE

## 2023-10-03 RX ORDER — SEMAGLUTIDE 1.34 MG/ML
0.5 INJECTION, SOLUTION SUBCUTANEOUS WEEKLY
Qty: 12 ADJUSTABLE DOSE PRE-FILLED PEN SYRINGE | Refills: 3 | Status: SHIPPED | OUTPATIENT
Start: 2023-10-03

## 2023-10-03 NOTE — TELEPHONE ENCOUNTER
Interested in losing weight .   Sent in prescription for:     Requested Prescriptions     Signed Prescriptions Disp Refills    Semaglutide,0.25 or 0.5MG/DOS, (OZEMPIC, 0.25 OR 0.5 MG/DOSE,) 2 MG/1.5ML SOPN 12 Adjustable Dose Pre-filled Pen Syringe 3     Sig: Inject 0.5 mg into the skin once a week 0.25 mg sq weekly x 2 weeks then 0.5 mg weekly     Authorizing Provider: Yolanda Rios

## 2023-12-19 PROBLEM — E11.9 TYPE 2 DIABETES MELLITUS (HCC): Status: ACTIVE | Noted: 2023-12-19

## 2023-12-29 RX ORDER — FUROSEMIDE 40 MG/1
40 TABLET ORAL 2 TIMES DAILY
Qty: 180 TABLET | Refills: 1 | OUTPATIENT
Start: 2023-12-29

## 2024-01-03 ENCOUNTER — OFFICE VISIT (OUTPATIENT)
Dept: ORTHOPEDIC SURGERY | Age: 79
End: 2024-01-03
Payer: MEDICARE

## 2024-01-03 VITALS — WEIGHT: 198 LBS | BODY MASS INDEX: 32.99 KG/M2 | HEIGHT: 65 IN

## 2024-01-03 DIAGNOSIS — M47.816 LUMBAR SPONDYLOSIS: Primary | ICD-10-CM

## 2024-01-03 PROCEDURE — G8417 CALC BMI ABV UP PARAM F/U: HCPCS | Performed by: PHYSICIAN ASSISTANT

## 2024-01-03 PROCEDURE — 1123F ACP DISCUSS/DSCN MKR DOCD: CPT | Performed by: PHYSICIAN ASSISTANT

## 2024-01-03 PROCEDURE — G8484 FLU IMMUNIZE NO ADMIN: HCPCS | Performed by: PHYSICIAN ASSISTANT

## 2024-01-03 PROCEDURE — 99204 OFFICE O/P NEW MOD 45 MIN: CPT | Performed by: PHYSICIAN ASSISTANT

## 2024-01-03 PROCEDURE — 1090F PRES/ABSN URINE INCON ASSESS: CPT | Performed by: PHYSICIAN ASSISTANT

## 2024-01-03 PROCEDURE — G8400 PT W/DXA NO RESULTS DOC: HCPCS | Performed by: PHYSICIAN ASSISTANT

## 2024-01-03 PROCEDURE — G8427 DOCREV CUR MEDS BY ELIG CLIN: HCPCS | Performed by: PHYSICIAN ASSISTANT

## 2024-01-03 PROCEDURE — 1036F TOBACCO NON-USER: CPT | Performed by: PHYSICIAN ASSISTANT

## 2024-01-03 NOTE — PROGRESS NOTES
LUNGS 4 TIMES DAILY AS NEEDED FOR WHEEZING. 1 each 11    acetaminophen (TYLENOL) 500 MG tablet Take 2 tablets by mouth every 6 hours as needed for Pain      magnesium oxide (MAG-OX) 400 MG tablet Take 1 tablet by mouth in the morning and at bedtime 60 tablet 11    nystatin (MYCOSTATIN) 008750 UNIT/GM cream Apply topically 2 times daily. (Patient taking differently: Apply 1 Dose topically 2 times daily as needed (yeast rash) Apply topically 2 times daily in skin folds) 100 g 0    rOPINIRole (REQUIP) 0.5 MG tablet TAKE 1 TABLET BY MOUTH EVERY DAY AT NIGHT 90 tablet 3    fluticasone-umeclidin-vilant (TRELEGY ELLIPTA) 100-62.5-25 MCG/ACT AEPB inhaler Inhale 1 puff into the lungs daily (Patient taking differently: Inhale 1 puff into the lungs daily rinse mouth after use) 1 each 11    OXYGEN Inhale 4 L/min into the lungs daily as needed for Shortness of Breath via nasal cannula during sleep      apixaban (ELIQUIS) 5 MG TABS tablet Take 1 tablet by mouth 2 times daily      colestipol (COLESTID) 1 g tablet Take 1 tablet by mouth 2 times daily as needed (diarrhea)       No current facility-administered medications for this visit.                   Review of Systems:  As per HPI.  Pertinent positives and negatives are addressed with the patient, particularly those related to musculoskeletal concerns.   Other non-emergent concerns were referred back to the primary care physician.      PHYSICAL EXAMINATION:     The patient appears their stated age and they are in no distress.  Ht 1.651 m (5' 5\")   Wt 89.8 kg (198 lb)   BMI 32.95 kg/m²         Neuro:   Reflexes  Knees: Normal reflexes  Ankles: Normal reflexes        Sensory    Sensation to light touch intact L3-S1      Motor    Hip Flexion: grossly normal    Knee Extension:  grossly normal    Ankle Dorsiflexion: grossly normal    Great Toe Exension:  grossly normal       Point tenderness: Minimal    Gait: No ataxia or appreciable limp              IMAGING:     MRI Result (most

## 2024-01-04 DIAGNOSIS — M54.41 LOW BACK PAIN WITH BILATERAL SCIATICA, UNSPECIFIED BACK PAIN LATERALITY, UNSPECIFIED CHRONICITY: Primary | ICD-10-CM

## 2024-01-04 DIAGNOSIS — M54.42 LOW BACK PAIN WITH BILATERAL SCIATICA, UNSPECIFIED BACK PAIN LATERALITY, UNSPECIFIED CHRONICITY: Primary | ICD-10-CM

## 2024-01-04 DIAGNOSIS — J44.9 STAGE 3 SEVERE COPD BY GOLD CLASSIFICATION (HCC): ICD-10-CM

## 2024-01-04 RX ORDER — TRAMADOL HYDROCHLORIDE 50 MG/1
50 TABLET ORAL EVERY 6 HOURS PRN
Qty: 60 TABLET | Refills: 1 | Status: SHIPPED | OUTPATIENT
Start: 2024-01-04 | End: 2024-02-03

## 2024-01-04 RX ORDER — FLUTICASONE FUROATE, UMECLIDINIUM BROMIDE AND VILANTEROL TRIFENATATE 100; 62.5; 25 UG/1; UG/1; UG/1
1 POWDER RESPIRATORY (INHALATION) DAILY
Qty: 1 EACH | Refills: 11 | Status: SHIPPED | OUTPATIENT
Start: 2024-01-04

## 2024-01-04 RX ORDER — FUROSEMIDE 40 MG/1
40 TABLET ORAL 2 TIMES DAILY
Qty: 180 TABLET | Refills: 1 | Status: SHIPPED | OUTPATIENT
Start: 2024-01-04

## 2024-01-04 NOTE — TELEPHONE ENCOUNTER
Medication Refill Request      Name of Medication : Furosemide       Strength of Medication: 40 mg       Directions: 2 times daily       30 day or 90 day supply: 90 day supply       Preferred Pharmacy: [facility] in [place]      Additional Information For Provider:      Medication Refill Request      Name of Medication : Trelegy Ellipta       Strength of Medication: 100-62.5-25       Directions: 1 puff in lungs daily       30 day or 90 day supply: 30 day supply        Preferred Pharmacy: [facility] in [place]      Additional Information For Provider:

## 2024-01-04 NOTE — TELEPHONE ENCOUNTER
Back pain has worse recently and waiting to get a ANNA Sent in prescription for:     Requested Prescriptions     Signed Prescriptions Disp Refills    traMADol (ULTRAM) 50 MG tablet 60 tablet 1     Sig: Take 1 tablet by mouth every 6 hours as needed for Pain for up to 30 days. Intended supply: 5 days. Take lowest dose possible to manage pain Max Daily Amount: 200 mg     Authorizing Provider: PABLO BRUCE

## 2024-01-10 ENCOUNTER — OFFICE VISIT (OUTPATIENT)
Dept: ORTHOPEDIC SURGERY | Age: 79
End: 2024-01-10
Payer: MEDICARE

## 2024-01-10 DIAGNOSIS — M47.816 LUMBAR SPONDYLOSIS: Primary | ICD-10-CM

## 2024-01-10 PROCEDURE — 64494 INJ PARAVERT F JNT L/S 2 LEV: CPT | Performed by: PHYSICAL MEDICINE & REHABILITATION

## 2024-01-10 PROCEDURE — 64493 INJ PARAVERT F JNT L/S 1 LEV: CPT | Performed by: PHYSICAL MEDICINE & REHABILITATION

## 2024-01-10 RX ORDER — TRIAMCINOLONE ACETONIDE 40 MG/ML
40 INJECTION, SUSPENSION INTRA-ARTICULAR; INTRAMUSCULAR ONCE
Status: COMPLETED | OUTPATIENT
Start: 2024-01-10 | End: 2024-01-10

## 2024-01-10 RX ADMIN — TRIAMCINOLONE ACETONIDE 40 MG: 40 INJECTION, SUSPENSION INTRA-ARTICULAR; INTRAMUSCULAR at 13:15

## 2024-01-10 NOTE — PROGRESS NOTES
Name: Rylee Orr  YOB: 1945  Gender: female  MRN: [id number]    Procedure: Bilateral  L4-L5 and L5-S1 facet joint injections     Precautions: Rylee Orr reportsprior sensitivity to steroid, local anesthetic, iodine, or shellfish. No omnipaque was used today.    The procedure was discussed at length with her and informed consent was signed and placed in the chart. She was placed in a prone position on the fluoroscopy table and the skin was prepped and draped in a routine sterile fashion. The areas to be injected were each anesthetized with 1% lidocaine.    Next, a 25-gauge 3.5 inch spinal needle was carefully advanced under fluoroscopic guidance to the rightL5 - S1 facet joint. Aspiration was negative. Once proper placement was confirmed, 1 ml of 0.25% Marcaine and  0.5 mL 40 mg/mL kenalog were injected through the spinal needle.     The above procedure was then repeated through the rightL4 - L5, and left L4 - L5, and leftL5 - S1 facet joints.      Fluoroscopic guidance was used intermittently over a 10-minute period to insure proper needle placement and her safety. A hard copy of the fluoroscopic images has been placed in her chart and is saved on the C-arm hard drive. She was monitored for 30 minutes after the procedure and discharged home in a stable fashion with a routine follow up.     Procedural Diagnosis:     ICD-10-CM    1. Lumbar spondylosis  M47.816 FL INJ LUMB/SAC FACET SINGLE LEVEL     XR INJ FACET LUMB SACRAL 2ND LVL     triamcinolone acetonide (KENALOG-40) injection 40 mg           FRANCE SHARP MD  01/10/24

## 2024-01-24 ENCOUNTER — OFFICE VISIT (OUTPATIENT)
Age: 79
End: 2024-01-24

## 2024-01-24 VITALS
BODY MASS INDEX: 33.32 KG/M2 | SYSTOLIC BLOOD PRESSURE: 112 MMHG | HEIGHT: 65 IN | HEART RATE: 105 BPM | WEIGHT: 200 LBS | DIASTOLIC BLOOD PRESSURE: 78 MMHG

## 2024-01-24 DIAGNOSIS — I87.8 VENOUS STASIS: Chronic | ICD-10-CM

## 2024-01-24 DIAGNOSIS — J44.9 OSA AND COPD OVERLAP SYNDROME (HCC): Chronic | ICD-10-CM

## 2024-01-24 DIAGNOSIS — E78.00 PURE HYPERCHOLESTEROLEMIA: ICD-10-CM

## 2024-01-24 DIAGNOSIS — I10 ESSENTIAL HYPERTENSION: ICD-10-CM

## 2024-01-24 DIAGNOSIS — G47.33 OSA AND COPD OVERLAP SYNDROME (HCC): Chronic | ICD-10-CM

## 2024-01-24 DIAGNOSIS — E11.00 TYPE 2 DIABETES MELLITUS WITH HYPEROSMOLARITY WITHOUT COMA, WITHOUT LONG-TERM CURRENT USE OF INSULIN (HCC): ICD-10-CM

## 2024-01-24 DIAGNOSIS — I48.19 PERSISTENT ATRIAL FIBRILLATION (HCC): ICD-10-CM

## 2024-01-24 DIAGNOSIS — I25.119 CORONARY ARTERY DISEASE INVOLVING NATIVE CORONARY ARTERY OF NATIVE HEART WITH ANGINA PECTORIS (HCC): Primary | ICD-10-CM

## 2024-01-24 DIAGNOSIS — E66.9 OBESITY (BMI 30-39.9): ICD-10-CM

## 2024-01-24 ASSESSMENT — ENCOUNTER SYMPTOMS
ABDOMINAL PAIN: 0
COUGH: 0
SHORTNESS OF BREATH: 1
BACK PAIN: 1

## 2024-01-24 NOTE — PROGRESS NOTES
[facility] CARDIOLOGY  [address] [place] [address], [address]  [place] [address]      24      NAME:  Rylee Orr  : 1945  MRN: [id number]      SUBJECTIVE:   Rylee Orr is a 78 y.o. female seen for a follow up visit regarding the following:     Chief Complaint   Patient presents with    Atrial Fibrillation       HPI:   78 y.o. male with history of severe dyspnea and chest pressure with exertion.  Patient has a strong family history of premature CAD and MI.  She has HTN, Chol, and long history of tobacco abuse.  She also has a history of DVTs and PE many years ago.  This was thought to be due to severe varicosities.    She had cardiac catheterization 2014 with normal coronaries.  She was seen in office 2014 with atrial fibrillation with RVR that spontaneously converted.  She continues to struggle with chronic dyspnea and LE edema.  This is secondary to obesity and venous stasis.   Patient status post right and left heart catheterization 2022.  She had mild to moderate pulmonary hypertension at 50 mmHg.  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  EDP was 5 mmHg.  proBNP was mildly elevated.  Patient was readmitted 2022 with acute on chronic respiratory failure.  Patient been noncompliant with CPAP, oxygen and diuretics.  She responded to IV diuretics and treatment for underlying COPD/obesity hypoventilation syndrome.   Patient now in persistent atrial fibrillation.              Past Medical History, Past Surgical History, Family history, Social History, and Medications were all reviewed with the patient today and updated as necessary.     Current Outpatient Medications   Medication Sig Dispense Refill    traMADol (ULTRAM) 50 MG tablet Take 1 tablet by mouth every 6 hours as needed for Pain for up to 30 days. Intended supply: 5 days. Take lowest dose possible to manage pain Max Daily Amount: 200 mg 60 tablet 1    furosemide (LASIX) 40 MG tablet Take 1 tablet by mouth 2 times daily

## 2024-01-31 ENCOUNTER — OFFICE VISIT (OUTPATIENT)
Dept: ORTHOPEDIC SURGERY | Age: 79
End: 2024-01-31
Payer: MEDICARE

## 2024-01-31 VITALS — BODY MASS INDEX: 33.32 KG/M2 | HEIGHT: 65 IN | WEIGHT: 200 LBS

## 2024-01-31 DIAGNOSIS — M47.816 LUMBAR SPONDYLOSIS: Primary | ICD-10-CM

## 2024-01-31 PROCEDURE — G8417 CALC BMI ABV UP PARAM F/U: HCPCS | Performed by: PHYSICIAN ASSISTANT

## 2024-01-31 PROCEDURE — 1036F TOBACCO NON-USER: CPT | Performed by: PHYSICIAN ASSISTANT

## 2024-01-31 PROCEDURE — 1123F ACP DISCUSS/DSCN MKR DOCD: CPT | Performed by: PHYSICIAN ASSISTANT

## 2024-01-31 PROCEDURE — G8484 FLU IMMUNIZE NO ADMIN: HCPCS | Performed by: PHYSICIAN ASSISTANT

## 2024-01-31 PROCEDURE — G8400 PT W/DXA NO RESULTS DOC: HCPCS | Performed by: PHYSICIAN ASSISTANT

## 2024-01-31 PROCEDURE — 99214 OFFICE O/P EST MOD 30 MIN: CPT | Performed by: PHYSICIAN ASSISTANT

## 2024-01-31 PROCEDURE — 1090F PRES/ABSN URINE INCON ASSESS: CPT | Performed by: PHYSICIAN ASSISTANT

## 2024-01-31 PROCEDURE — G8427 DOCREV CUR MEDS BY ELIG CLIN: HCPCS | Performed by: PHYSICIAN ASSISTANT

## 2024-01-31 RX ORDER — GABAPENTIN 300 MG/1
300 CAPSULE ORAL 3 TIMES DAILY
Qty: 90 CAPSULE | Refills: 2 | Status: SHIPPED | OUTPATIENT
Start: 2024-01-31 | End: 2024-04-30

## 2024-01-31 RX ORDER — SEMAGLUTIDE 0.68 MG/ML
INJECTION, SOLUTION SUBCUTANEOUS
COMMUNITY
Start: 2023-12-29

## 2024-01-31 NOTE — PROGRESS NOTES
24        Name: Rylee Orr  YOB: 1945  Gender: female  MRN: [id number]    CC: Follow-up       HPI: Rylee Orr is a 78 y.o. female who returns for Follow-up         Patient returns the office today for follow-up after injection.  She underwent a bilateral L4-5 and L5-S1 facet joint injection with Dr. Ramsey on 1/10/2024.  She had approximately 70 to 80% proving her pain following the injection for a couple of weeks, but then she had to go on a long road trip for a family member's .  Since that long car ride her back pain has been worse now than it was before.  Having difficulty getting up and walking.  She is on Eliquis.    History was obtained by patient      Meds/PSH/PMH/FH/SH: This information has been reviewed.      ALLERGIES:   Allergies   Allergen Reactions    Latex Other (See Comments) and Dermatitis     Other reaction(s): Rash-Allergy    Codeine Other (See Comments)     Headache  Other reaction(s): Other- (not listed) - Allergy  Headache    Shellfish Allergy Hives    Shellfish-Derived Products Hives              Physical Examination:            Imaging:         MRI Result (most recent):  No results found for this or any previous visit from the past 3650 days.            Assessment and Plan    Like she had partial improvement in her symptoms.  Unsure if this is because there is more than 1 issue at hand or if she had diminished results because of her aggravating car ride.  I recommend a lumbar MRI for further evaluation to rule out multilevel pathology.  Also will start on a course of gabapentin up to 3 times a day.  I will see the patient back after the MRI to review the results and plan intervention.

## 2024-02-08 ENCOUNTER — OFFICE VISIT (OUTPATIENT)
Dept: ORTHOPEDIC SURGERY | Age: 79
End: 2024-02-08
Payer: MEDICARE

## 2024-02-08 VITALS — BODY MASS INDEX: 33.32 KG/M2 | HEIGHT: 65 IN | WEIGHT: 200 LBS

## 2024-02-08 DIAGNOSIS — M48.061 SPINAL STENOSIS OF LUMBAR REGION WITHOUT NEUROGENIC CLAUDICATION: Primary | ICD-10-CM

## 2024-02-08 PROCEDURE — G8484 FLU IMMUNIZE NO ADMIN: HCPCS | Performed by: PHYSICIAN ASSISTANT

## 2024-02-08 PROCEDURE — G8400 PT W/DXA NO RESULTS DOC: HCPCS | Performed by: PHYSICIAN ASSISTANT

## 2024-02-08 PROCEDURE — 1123F ACP DISCUSS/DSCN MKR DOCD: CPT | Performed by: PHYSICIAN ASSISTANT

## 2024-02-08 PROCEDURE — 1036F TOBACCO NON-USER: CPT | Performed by: PHYSICIAN ASSISTANT

## 2024-02-08 PROCEDURE — G8417 CALC BMI ABV UP PARAM F/U: HCPCS | Performed by: PHYSICIAN ASSISTANT

## 2024-02-08 PROCEDURE — 99214 OFFICE O/P EST MOD 30 MIN: CPT | Performed by: PHYSICIAN ASSISTANT

## 2024-02-08 PROCEDURE — 1090F PRES/ABSN URINE INCON ASSESS: CPT | Performed by: PHYSICIAN ASSISTANT

## 2024-02-08 PROCEDURE — G8427 DOCREV CUR MEDS BY ELIG CLIN: HCPCS | Performed by: PHYSICIAN ASSISTANT

## 2024-02-08 NOTE — PROGRESS NOTES
02/08/24        Name: Rylee Orr  YOB: 1945  Gender: female  MRN: [id number]    CC: Follow-up (MRI Results )       HPI: Rylee Orr is a 78 y.o. female who returns for Follow-up (MRI Results )         Patient turns the office today for follow-up after lumbar MRI.  Previous to this she underwent facet injections.  She reports that her back pain is better than when I last saw her.  She has been taking the gabapentin twice a day, it makes her a little bit drowsy.  Otherwise her pain is somewhat improved.    History was obtained by patient      Meds/PSH/PMH/FH/SH: This information has been reviewed.      ALLERGIES:   Allergies   Allergen Reactions    Latex Other (See Comments) and Dermatitis     Other reaction(s): Rash-Allergy    Codeine Other (See Comments)     Headache  Other reaction(s): Other- (not listed) - Allergy  Headache    Shellfish Allergy Hives    Shellfish-Derived Products Hives              Physical Examination:            Imaging:         MRI Result (most recent):  MRI LUMBAR SPINE WO CONTRAST 02/01/2024    Narrative  History: Spondylosis without myelopathy or radiculopathy, lumbar region    Exam: MRI lumbar spine without contrast    Technique: Axial and sagittal T1 and T2-weighted sequences are available for  review.  A sagittal STIR sequence is also available for review.    COMPARISON: January 3, 2024 x-ray    FINDINGS: There is grade 1 spondylolisthesis at L5-S1, 0.4 cm. There is 25% loss  of vertebral body height at L3 with chronic features. Vertebral body marrow  signal is normal.  Intervertebral disc signal shows desiccation from T12-L5.  The facet articulations are aligned.    The L1-L2 level: There is mild central and right and left paracentral disc  protrusion. There is mild bilateral lateral recess effacement. There is no  significant foraminal narrowing. There is no central canal stenosis.    The L2-L3 level: There is moderate central and right and left

## 2024-02-15 ENCOUNTER — INITIAL CONSULT (OUTPATIENT)
Age: 79
End: 2024-02-15
Payer: MEDICARE

## 2024-02-15 VITALS
BODY MASS INDEX: 32.47 KG/M2 | SYSTOLIC BLOOD PRESSURE: 138 MMHG | WEIGHT: 202 LBS | HEART RATE: 98 BPM | DIASTOLIC BLOOD PRESSURE: 89 MMHG | HEIGHT: 66 IN

## 2024-02-15 DIAGNOSIS — I48.91 ATRIAL FIBRILLATION WITH TACHYCARDIC VENTRICULAR RATE (HCC): ICD-10-CM

## 2024-02-15 DIAGNOSIS — I25.119 CORONARY ARTERY DISEASE INVOLVING NATIVE CORONARY ARTERY OF NATIVE HEART WITH ANGINA PECTORIS (HCC): ICD-10-CM

## 2024-02-15 DIAGNOSIS — I48.19 PERSISTENT ATRIAL FIBRILLATION (HCC): Primary | ICD-10-CM

## 2024-02-15 PROCEDURE — G8400 PT W/DXA NO RESULTS DOC: HCPCS | Performed by: INTERNAL MEDICINE

## 2024-02-15 PROCEDURE — 1036F TOBACCO NON-USER: CPT | Performed by: INTERNAL MEDICINE

## 2024-02-15 PROCEDURE — G8417 CALC BMI ABV UP PARAM F/U: HCPCS | Performed by: INTERNAL MEDICINE

## 2024-02-15 PROCEDURE — 3075F SYST BP GE 130 - 139MM HG: CPT | Performed by: INTERNAL MEDICINE

## 2024-02-15 PROCEDURE — 93000 ELECTROCARDIOGRAM COMPLETE: CPT | Performed by: INTERNAL MEDICINE

## 2024-02-15 PROCEDURE — 1090F PRES/ABSN URINE INCON ASSESS: CPT | Performed by: INTERNAL MEDICINE

## 2024-02-15 PROCEDURE — 1123F ACP DISCUSS/DSCN MKR DOCD: CPT | Performed by: INTERNAL MEDICINE

## 2024-02-15 PROCEDURE — 99215 OFFICE O/P EST HI 40 MIN: CPT | Performed by: INTERNAL MEDICINE

## 2024-02-15 PROCEDURE — 3079F DIAST BP 80-89 MM HG: CPT | Performed by: INTERNAL MEDICINE

## 2024-02-15 PROCEDURE — G8484 FLU IMMUNIZE NO ADMIN: HCPCS | Performed by: INTERNAL MEDICINE

## 2024-02-15 PROCEDURE — G8427 DOCREV CUR MEDS BY ELIG CLIN: HCPCS | Performed by: INTERNAL MEDICINE

## 2024-02-15 NOTE — PROGRESS NOTES
100% on monitor, favor no A lead.   Device/ablation Company: [employer]  Procedure Date: TBD  Medications to hold, days to hold (OAC, AAD): Eliquis, 2 days.   Anesthesia: MAC     PM/CRT & AVN Ablation  I discussed in detail the potential benefits of PM/CRT therapy with AV esau ablation, including definitive rate control with possible improved mortality, prevention of SCD,  decreased hospitalization, beneficial cardiac remodeling, and prevention of disease progression. Additionally, I discussed with the patient the potential risks of PM/CRT implantation and subsequent AVN ablation, including the risk of bleeding, infection, venous occlusion, DVT/PE, pneumothorax, cardiac tamponade, perforation, need for urgent open heart surgery, device/lead failure or lead dislodgement with pacemaker dependence, inability to place an LV lead via the coronary sinus, inappropriate shock(s), heart attack, stroke, arrhythmia, radiation skin injury, kidney damage/failure, oversedation, respiratory arrest, and even death.  The patient understands these risks in the context of the potential benefits of the device implantation and subsequent ablation, and agrees to proceed.      DEVICE on OAC  The patient has persistent AF and is on oral anticoagulation therapy (OAC), and we reviewed the small increased risk of CVA with DFT testing, especially if OAC is discontinued. The patient is aware of the small increased  of significant hematoma and bleeding, but we reviewed the recent data suggesting that device implantation on OAC is safe and reasonable in these cases.    TOTAL TIME: 60 minutes, >50% during counseling and coordination of care    Patient has been instructed and agrees to call our office with any issues or other concerns related to their cardiac condition(s) and/or complaint(s).    No follow-up provider specified.    Thank you for allowing me to participate in the electrophysiologic care of Ms. Rylee Orr. Please contact me

## 2024-02-19 ENCOUNTER — OFFICE VISIT (OUTPATIENT)
Dept: ORTHOPEDIC SURGERY | Age: 79
End: 2024-02-19
Payer: MEDICARE

## 2024-02-19 DIAGNOSIS — M17.12 PRIMARY OSTEOARTHRITIS OF LEFT KNEE: Primary | ICD-10-CM

## 2024-02-19 DIAGNOSIS — M48.061 SPINAL STENOSIS OF LUMBAR REGION WITHOUT NEUROGENIC CLAUDICATION: ICD-10-CM

## 2024-02-19 DIAGNOSIS — M47.816 LUMBAR SPONDYLOSIS: ICD-10-CM

## 2024-02-19 PROCEDURE — 20610 DRAIN/INJ JOINT/BURSA W/O US: CPT | Performed by: PHYSICIAN ASSISTANT

## 2024-02-19 RX ORDER — PREDNISONE 10 MG/1
TABLET ORAL
Qty: 1 EACH | Refills: 0 | Status: SHIPPED | OUTPATIENT
Start: 2024-02-19

## 2024-02-19 NOTE — PROGRESS NOTES
Name: Rylee Orr  YOB: 1945  Gender: female  MRN: [id number]    Allergies   Allergen Reactions    Latex Other (See Comments) and Dermatitis     Other reaction(s): Rash-Allergy    Codeine Other (See Comments)     Headache  Other reaction(s): Other- (not listed) - Allergy  Headache    Shellfish Allergy Hives    Shellfish-Derived Products Hives       CC:  left knee pain, Osteoarthritis    PROCEDURE:  HA injection(s). All questions answered.     Procedure Note: The patient was placed in upright position with both knees hanging freely from exam table.  The left knee was prepped in sterile fashion using alcohol wipe(s).  Using an infrapatellar approach, 4cc of Monovisc was injected freely.  The needle was then removed, pressure hemostatis achieved, injection site was cleansed with alcohol wipe and dressed with band aid.    The patient tolerated the procedure without complication.  The patient  will follow up as scheduled.    NORA Vieira  02/19/24

## 2024-03-01 ENCOUNTER — OFFICE VISIT (OUTPATIENT)
Dept: FAMILY MEDICINE CLINIC | Facility: CLINIC | Age: 79
End: 2024-03-01

## 2024-03-01 VITALS
HEART RATE: 83 BPM | OXYGEN SATURATION: 97 % | TEMPERATURE: 97.6 F | HEIGHT: 66 IN | BODY MASS INDEX: 32.95 KG/M2 | WEIGHT: 205 LBS | RESPIRATION RATE: 16 BRPM | DIASTOLIC BLOOD PRESSURE: 72 MMHG | SYSTOLIC BLOOD PRESSURE: 128 MMHG

## 2024-03-01 DIAGNOSIS — I50.33 ACUTE ON CHRONIC DIASTOLIC (CONGESTIVE) HEART FAILURE (HCC): ICD-10-CM

## 2024-03-01 DIAGNOSIS — R04.0 EPISTAXIS: Primary | ICD-10-CM

## 2024-03-01 DIAGNOSIS — J44.9 STAGE 3 SEVERE COPD BY GOLD CLASSIFICATION (HCC): ICD-10-CM

## 2024-03-01 DIAGNOSIS — I73.9 PERIPHERAL VASCULAR DISEASE (HCC): ICD-10-CM

## 2024-03-01 DIAGNOSIS — N18.31 STAGE 3A CHRONIC KIDNEY DISEASE (HCC): ICD-10-CM

## 2024-03-01 DIAGNOSIS — I10 ESSENTIAL HYPERTENSION: ICD-10-CM

## 2024-03-01 DIAGNOSIS — I27.20 PULMONARY HYPERTENSION (HCC): ICD-10-CM

## 2024-03-01 DIAGNOSIS — G25.81 RLS (RESTLESS LEGS SYNDROME): ICD-10-CM

## 2024-03-01 LAB
ANION GAP SERPL CALC-SCNC: 3 MMOL/L (ref 2–11)
APTT PPP: 36.1 SEC (ref 23.3–37.4)
BASOPHILS # BLD: 0 K/UL (ref 0–0.2)
BASOPHILS NFR BLD: 1 % (ref 0–2)
BUN SERPL-MCNC: 22 MG/DL (ref 8–23)
CALCIUM SERPL-MCNC: 8.7 MG/DL (ref 8.3–10.4)
CHLORIDE SERPL-SCNC: 112 MMOL/L (ref 103–113)
CO2 SERPL-SCNC: 29 MMOL/L (ref 21–32)
CREAT SERPL-MCNC: 0.9 MG/DL (ref 0.6–1)
DIFFERENTIAL METHOD BLD: ABNORMAL
EOSINOPHIL # BLD: 0.1 K/UL (ref 0–0.8)
EOSINOPHIL NFR BLD: 2 % (ref 0.5–7.8)
ERYTHROCYTE [DISTWIDTH] IN BLOOD BY AUTOMATED COUNT: 15.2 % (ref 11.9–14.6)
GLUCOSE SERPL-MCNC: 93 MG/DL (ref 65–100)
HCT VFR BLD AUTO: 35.6 % (ref 35.8–46.3)
HGB BLD-MCNC: 11.4 G/DL (ref 11.7–15.4)
IMM GRANULOCYTES # BLD AUTO: 0.1 K/UL (ref 0–0.5)
IMM GRANULOCYTES NFR BLD AUTO: 2 % (ref 0–5)
INR PPP: 1.2
LYMPHOCYTES # BLD: 2.1 K/UL (ref 0.5–4.6)
LYMPHOCYTES NFR BLD: 38 % (ref 13–44)
MCH RBC QN AUTO: 33.9 PG (ref 26.1–32.9)
MCHC RBC AUTO-ENTMCNC: 32 G/DL (ref 31.4–35)
MCV RBC AUTO: 106 FL (ref 82–102)
MONOCYTES # BLD: 0.6 K/UL (ref 0.1–1.3)
MONOCYTES NFR BLD: 11 % (ref 4–12)
NEUTS SEG # BLD: 2.6 K/UL (ref 1.7–8.2)
NEUTS SEG NFR BLD: 46 % (ref 43–78)
NRBC # BLD: 0 K/UL (ref 0–0.2)
PLATELET # BLD AUTO: 144 K/UL (ref 150–450)
PMV BLD AUTO: 11 FL (ref 9.4–12.3)
POTASSIUM SERPL-SCNC: 5 MMOL/L (ref 3.5–5.1)
PROTHROMBIN TIME: 15.8 SEC (ref 11.3–14.9)
RBC # BLD AUTO: 3.36 M/UL (ref 4.05–5.2)
SODIUM SERPL-SCNC: 144 MMOL/L (ref 136–146)
WBC # BLD AUTO: 5.5 K/UL (ref 4.3–11.1)

## 2024-03-01 RX ORDER — ROPINIROLE 0.5 MG/1
TABLET, FILM COATED ORAL
Qty: 90 TABLET | Refills: 3 | Status: SHIPPED | OUTPATIENT
Start: 2024-03-01

## 2024-03-01 ASSESSMENT — PATIENT HEALTH QUESTIONNAIRE - PHQ9
SUM OF ALL RESPONSES TO PHQ9 QUESTIONS 1 & 2: 0
SUM OF ALL RESPONSES TO PHQ QUESTIONS 1-9: 0
2. FEELING DOWN, DEPRESSED OR HOPELESS: 0
1. LITTLE INTEREST OR PLEASURE IN DOING THINGS: 0

## 2024-03-01 ASSESSMENT — ENCOUNTER SYMPTOMS: SINUS PRESSURE: 0

## 2024-03-06 ENCOUNTER — TELEPHONE (OUTPATIENT)
Age: 79
End: 2024-03-06

## 2024-03-06 NOTE — TELEPHONE ENCOUNTER
----- Message from Kiel Toro MD sent at 3/5/2024  4:18 PM EST -----  100% atrial fibrillation. Rate control stable. RVR at times.   ----- Message -----  From: Kiel Toro MD  Sent: 3/5/2024   4:17 PM EST  To: Kiel Toro MD

## 2024-03-06 NOTE — TELEPHONE ENCOUNTER
Pt notified and verbalized understanding. She wants to know if you will proceed with Pacemaker at the end of the month?

## 2024-03-12 DIAGNOSIS — J44.9 STAGE 3 SEVERE COPD BY GOLD CLASSIFICATION (HCC): ICD-10-CM

## 2024-03-13 ENCOUNTER — TELEPHONE (OUTPATIENT)
Dept: PULMONOLOGY | Age: 79
End: 2024-03-13

## 2024-03-13 RX ORDER — FLUTICASONE FUROATE, UMECLIDINIUM BROMIDE AND VILANTEROL TRIFENATATE 100; 62.5; 25 UG/1; UG/1; UG/1
1 POWDER RESPIRATORY (INHALATION) DAILY
Qty: 3 EACH | Refills: 3 | Status: SHIPPED | OUTPATIENT
Start: 2024-03-13

## 2024-03-13 NOTE — TELEPHONE ENCOUNTER
Ask for this prescription to be sent to       [facility]     [phone]     It is cheaper for her . She ask for a call back so she can contact them and give her card #

## 2024-03-14 ENCOUNTER — TELEPHONE (OUTPATIENT)
Dept: FAMILY MEDICINE CLINIC | Facility: CLINIC | Age: 79
End: 2024-03-14

## 2024-03-14 DIAGNOSIS — K21.9 GASTROESOPHAGEAL REFLUX DISEASE WITHOUT ESOPHAGITIS: ICD-10-CM

## 2024-03-14 RX ORDER — OMEPRAZOLE 40 MG/1
40 CAPSULE, DELAYED RELEASE ORAL DAILY
Qty: 90 CAPSULE | Refills: 3 | Status: SHIPPED | OUTPATIENT
Start: 2024-03-14

## 2024-03-14 NOTE — TELEPHONE ENCOUNTER
Sent in prescription for:     Requested Prescriptions     Signed Prescriptions Disp Refills    omeprazole (PRILOSEC) 40 MG delayed release capsule 90 capsule 3     Sig: Take 1 capsule by mouth daily TAKE 1 CAPSULE BY MOUTH EVERY DAY     Authorizing Provider: PABLO BRUCE

## 2024-03-14 NOTE — TELEPHONE ENCOUNTER
Medication Refill Request      Name of Medication : Omeprazole      Strength of Medication: 40 mg      Directions: Take 1 capsule by mouth daily      30 day or 90 day supply: 90      Preferred Pharmacy: [facility]    Additional Information For Provider:Patient is changing pharmacies

## 2024-03-22 DIAGNOSIS — I48.19 PERSISTENT ATRIAL FIBRILLATION (HCC): ICD-10-CM

## 2024-03-22 LAB
ANION GAP SERPL CALC-SCNC: 6 MMOL/L (ref 2–11)
BUN SERPL-MCNC: 16 MG/DL (ref 8–23)
CALCIUM SERPL-MCNC: 8.7 MG/DL (ref 8.3–10.4)
CHLORIDE SERPL-SCNC: 107 MMOL/L (ref 103–113)
CO2 SERPL-SCNC: 28 MMOL/L (ref 21–32)
CREAT SERPL-MCNC: 1 MG/DL (ref 0.6–1)
ERYTHROCYTE [DISTWIDTH] IN BLOOD BY AUTOMATED COUNT: 14.9 % (ref 11.9–14.6)
GLUCOSE SERPL-MCNC: 109 MG/DL (ref 65–100)
HCT VFR BLD AUTO: 36.3 % (ref 35.8–46.3)
HGB BLD-MCNC: 11.8 G/DL (ref 11.7–15.4)
INR PPP: 1.4
MCH RBC QN AUTO: 34.4 PG (ref 26.1–32.9)
MCHC RBC AUTO-ENTMCNC: 32.5 G/DL (ref 31.4–35)
MCV RBC AUTO: 105.8 FL (ref 82–102)
NRBC # BLD: 0 K/UL (ref 0–0.2)
PLATELET # BLD AUTO: 166 K/UL (ref 150–450)
PMV BLD AUTO: 10.2 FL (ref 9.4–12.3)
POTASSIUM SERPL-SCNC: 4.3 MMOL/L (ref 3.5–5.1)
PROTHROMBIN TIME: 17.6 SEC (ref 11.3–14.9)
RBC # BLD AUTO: 3.43 M/UL (ref 4.05–5.2)
SODIUM SERPL-SCNC: 141 MMOL/L (ref 136–146)
WBC # BLD AUTO: 7.8 K/UL (ref 4.3–11.1)

## 2024-03-28 ENCOUNTER — ANESTHESIA EVENT (OUTPATIENT)
Dept: CARDIAC CATH/INVASIVE PROCEDURES | Age: 79
End: 2024-03-28
Payer: MEDICARE

## 2024-03-28 NOTE — PROGRESS NOTES
Patient pre-assessment complete for BiV Pacemaker/AV Node with Dr. Toro scheduled for 3/29/24 at 1030, arrival time 0830. Patient verified using . Patient instructed to bring a list of home medications on the day of procedure. NPO status reinforced. Patient instructed to follow EP Information Sheet given at appointment. Patient verbalizes understanding of all instructions & denies any questions at this time.

## 2024-03-29 ENCOUNTER — HOSPITAL ENCOUNTER (INPATIENT)
Age: 79
LOS: 1 days | Discharge: HOME HEALTH | DRG: 243 | End: 2024-04-02
Attending: INTERNAL MEDICINE | Admitting: INTERNAL MEDICINE
Payer: MEDICARE

## 2024-03-29 ENCOUNTER — ANESTHESIA (OUTPATIENT)
Dept: CARDIAC CATH/INVASIVE PROCEDURES | Age: 79
End: 2024-03-29
Payer: MEDICARE

## 2024-03-29 ENCOUNTER — APPOINTMENT (OUTPATIENT)
Dept: GENERAL RADIOLOGY | Age: 79
DRG: 243 | End: 2024-03-29
Attending: INTERNAL MEDICINE
Payer: MEDICARE

## 2024-03-29 DIAGNOSIS — I48.91 ATRIAL FIBRILLATION WITH TACHYCARDIC VENTRICULAR RATE (HCC): ICD-10-CM

## 2024-03-29 DIAGNOSIS — Z95.0 PACEMAKER: Primary | ICD-10-CM

## 2024-03-29 DIAGNOSIS — I95.9 HYPOTENSION, UNSPECIFIED HYPOTENSION TYPE: ICD-10-CM

## 2024-03-29 LAB
ECHO BSA: 2.07 M2
EKG DIAGNOSIS: NORMAL
EKG Q-T INTERVAL: 380 MS
EKG QRS DURATION: 82 MS
EKG QTC CALCULATION (BAZETT): 495 MS
EKG R AXIS: -26 DEGREES
EKG T AXIS: 4 DEGREES
EKG VENTRICULAR RATE: 102 BPM

## 2024-03-29 PROCEDURE — 6370000000 HC RX 637 (ALT 250 FOR IP): Performed by: INTERNAL MEDICINE

## 2024-03-29 PROCEDURE — 3700000001 HC ADD 15 MINUTES (ANESTHESIA): Performed by: INTERNAL MEDICINE

## 2024-03-29 PROCEDURE — 02HK3JZ INSERTION OF PACEMAKER LEAD INTO RIGHT VENTRICLE, PERCUTANEOUS APPROACH: ICD-10-PCS | Performed by: INTERNAL MEDICINE

## 2024-03-29 PROCEDURE — 93600 BUNDLE OF HIS RECORDING: CPT | Performed by: INTERNAL MEDICINE

## 2024-03-29 PROCEDURE — 93010 ELECTROCARDIOGRAM REPORT: CPT | Performed by: INTERNAL MEDICINE

## 2024-03-29 PROCEDURE — 2720000010 HC SURG SUPPLY STERILE: Performed by: INTERNAL MEDICINE

## 2024-03-29 PROCEDURE — 33207 INSERT HEART PM VENTRICULAR: CPT | Performed by: INTERNAL MEDICINE

## 2024-03-29 PROCEDURE — 6360000002 HC RX W HCPCS: Performed by: INTERNAL MEDICINE

## 2024-03-29 PROCEDURE — 0JH607Z INSERTION OF CARDIAC RESYNCHRONIZATION PACEMAKER PULSE GENERATOR INTO CHEST SUBCUTANEOUS TISSUE AND FASCIA, OPEN APPROACH: ICD-10-PCS | Performed by: INTERNAL MEDICINE

## 2024-03-29 PROCEDURE — 2580000003 HC RX 258: Performed by: INTERNAL MEDICINE

## 2024-03-29 PROCEDURE — 6360000002 HC RX W HCPCS

## 2024-03-29 PROCEDURE — 93005 ELECTROCARDIOGRAM TRACING: CPT | Performed by: INTERNAL MEDICINE

## 2024-03-29 PROCEDURE — 6370000000 HC RX 637 (ALT 250 FOR IP): Performed by: ANESTHESIOLOGY

## 2024-03-29 PROCEDURE — C1760 CLOSURE DEV, VASC: HCPCS | Performed by: INTERNAL MEDICINE

## 2024-03-29 PROCEDURE — A4217 STERILE WATER/SALINE, 500 ML: HCPCS | Performed by: INTERNAL MEDICINE

## 2024-03-29 PROCEDURE — 33225 L VENTRIC PACING LEAD ADD-ON: CPT | Performed by: INTERNAL MEDICINE

## 2024-03-29 PROCEDURE — 71045 X-RAY EXAM CHEST 1 VIEW: CPT

## 2024-03-29 PROCEDURE — 2580000003 HC RX 258

## 2024-03-29 PROCEDURE — C1894 INTRO/SHEATH, NON-LASER: HCPCS | Performed by: INTERNAL MEDICINE

## 2024-03-29 PROCEDURE — 93650 ICAR CATH ABLTJ AV NODE FUNC: CPT | Performed by: INTERNAL MEDICINE

## 2024-03-29 PROCEDURE — C1892 INTRO/SHEATH,FIXED,PEEL-AWAY: HCPCS | Performed by: INTERNAL MEDICINE

## 2024-03-29 PROCEDURE — C2621 PMKR, OTHER THAN SING/DUAL: HCPCS | Performed by: INTERNAL MEDICINE

## 2024-03-29 PROCEDURE — 3700000000 HC ANESTHESIA ATTENDED CARE: Performed by: INTERNAL MEDICINE

## 2024-03-29 PROCEDURE — 6370000000 HC RX 637 (ALT 250 FOR IP): Performed by: NURSE PRACTITIONER

## 2024-03-29 PROCEDURE — C1900 LEAD, CORONARY VENOUS: HCPCS | Performed by: INTERNAL MEDICINE

## 2024-03-29 PROCEDURE — 2500000003 HC RX 250 WO HCPCS: Performed by: INTERNAL MEDICINE

## 2024-03-29 PROCEDURE — 2709999900 HC NON-CHARGEABLE SUPPLY: Performed by: INTERNAL MEDICINE

## 2024-03-29 PROCEDURE — C1769 GUIDE WIRE: HCPCS | Performed by: INTERNAL MEDICINE

## 2024-03-29 PROCEDURE — 02H63JZ INSERTION OF PACEMAKER LEAD INTO RIGHT ATRIUM, PERCUTANEOUS APPROACH: ICD-10-PCS | Performed by: INTERNAL MEDICINE

## 2024-03-29 PROCEDURE — 2580000003 HC RX 258: Performed by: ANESTHESIOLOGY

## 2024-03-29 PROCEDURE — 02H43JZ INSERTION OF PACEMAKER LEAD INTO CORONARY VEIN, PERCUTANEOUS APPROACH: ICD-10-PCS | Performed by: INTERNAL MEDICINE

## 2024-03-29 PROCEDURE — C1732 CATH, EP, DIAG/ABL, 3D/VECT: HCPCS | Performed by: INTERNAL MEDICINE

## 2024-03-29 PROCEDURE — C1898 LEAD, PMKR, OTHER THAN TRANS: HCPCS | Performed by: INTERNAL MEDICINE

## 2024-03-29 PROCEDURE — 6360000004 HC RX CONTRAST MEDICATION: Performed by: INTERNAL MEDICINE

## 2024-03-29 DEVICE — PACE/SENSE LEAD
Type: IMPLANTABLE DEVICE | Status: FUNCTIONAL
Brand: ACUITY™ X4 STRAIGHT

## 2024-03-29 DEVICE — PACE/SENSE LEAD
Type: IMPLANTABLE DEVICE | Status: FUNCTIONAL
Brand: INGEVITY™+

## 2024-03-29 DEVICE — CARDIAC RESYNCHRONIZATION THERAPY PACEMAKER
Type: IMPLANTABLE DEVICE | Status: FUNCTIONAL
Brand: VISIONIST™ X4 CRT-P

## 2024-03-29 RX ORDER — SODIUM CHLORIDE 9 MG/ML
INJECTION, SOLUTION INTRAVENOUS PRN
Status: DISCONTINUED | OUTPATIENT
Start: 2024-03-29 | End: 2024-03-29 | Stop reason: HOSPADM

## 2024-03-29 RX ORDER — SODIUM CHLORIDE, SODIUM LACTATE, POTASSIUM CHLORIDE, CALCIUM CHLORIDE 600; 310; 30; 20 MG/100ML; MG/100ML; MG/100ML; MG/100ML
INJECTION, SOLUTION INTRAVENOUS CONTINUOUS
Status: DISCONTINUED | OUTPATIENT
Start: 2024-03-29 | End: 2024-03-30

## 2024-03-29 RX ORDER — FUROSEMIDE 40 MG/1
40 TABLET ORAL 2 TIMES DAILY
Status: DISCONTINUED | OUTPATIENT
Start: 2024-03-29 | End: 2024-04-02

## 2024-03-29 RX ORDER — LIDOCAINE HYDROCHLORIDE 10 MG/ML
1 INJECTION, SOLUTION INFILTRATION; PERINEURAL
Status: ACTIVE | OUTPATIENT
Start: 2024-03-29 | End: 2024-03-30

## 2024-03-29 RX ORDER — METOPROLOL SUCCINATE 100 MG/1
50 TABLET, EXTENDED RELEASE ORAL 2 TIMES DAILY
Status: DISCONTINUED | OUTPATIENT
Start: 2024-03-29 | End: 2024-04-02

## 2024-03-29 RX ORDER — MAGNESIUM OXIDE 400 MG/1
400 TABLET ORAL 2 TIMES DAILY
Status: DISCONTINUED | OUTPATIENT
Start: 2024-03-29 | End: 2024-03-29 | Stop reason: SDUPTHER

## 2024-03-29 RX ORDER — SODIUM CHLORIDE, SODIUM LACTATE, POTASSIUM CHLORIDE, CALCIUM CHLORIDE 600; 310; 30; 20 MG/100ML; MG/100ML; MG/100ML; MG/100ML
INJECTION, SOLUTION INTRAVENOUS CONTINUOUS PRN
Status: DISCONTINUED | OUTPATIENT
Start: 2024-03-29 | End: 2024-03-29 | Stop reason: SDUPTHER

## 2024-03-29 RX ORDER — IBUPROFEN 600 MG/1
1 TABLET ORAL PRN
Status: DISCONTINUED | OUTPATIENT
Start: 2024-03-29 | End: 2024-03-29 | Stop reason: HOSPADM

## 2024-03-29 RX ORDER — ROPINIROLE 0.5 MG/1
0.5 TABLET, FILM COATED ORAL NIGHTLY
Status: DISCONTINUED | OUTPATIENT
Start: 2024-03-29 | End: 2024-04-02 | Stop reason: HOSPADM

## 2024-03-29 RX ORDER — SODIUM CHLORIDE 0.9 % (FLUSH) 0.9 %
5-40 SYRINGE (ML) INJECTION PRN
Status: DISCONTINUED | OUTPATIENT
Start: 2024-03-29 | End: 2024-04-02 | Stop reason: HOSPADM

## 2024-03-29 RX ORDER — SODIUM CHLORIDE 0.9 % (FLUSH) 0.9 %
5-40 SYRINGE (ML) INJECTION EVERY 12 HOURS SCHEDULED
Status: DISCONTINUED | OUTPATIENT
Start: 2024-03-29 | End: 2024-03-29 | Stop reason: HOSPADM

## 2024-03-29 RX ORDER — PROCHLORPERAZINE EDISYLATE 5 MG/ML
5 INJECTION INTRAMUSCULAR; INTRAVENOUS
Status: DISCONTINUED | OUTPATIENT
Start: 2024-03-29 | End: 2024-03-29 | Stop reason: HOSPADM

## 2024-03-29 RX ORDER — MIDAZOLAM HYDROCHLORIDE 2 MG/2ML
2 INJECTION, SOLUTION INTRAMUSCULAR; INTRAVENOUS
Status: ACTIVE | OUTPATIENT
Start: 2024-03-29 | End: 2024-03-30

## 2024-03-29 RX ORDER — GABAPENTIN 100 MG/1
300 CAPSULE ORAL 3 TIMES DAILY
Status: DISCONTINUED | OUTPATIENT
Start: 2024-03-29 | End: 2024-04-02 | Stop reason: HOSPADM

## 2024-03-29 RX ORDER — CHOLESTYRAMINE LIGHT 4 G/5.7G
4 POWDER, FOR SUSPENSION ORAL DAILY PRN
Status: DISCONTINUED | OUTPATIENT
Start: 2024-03-29 | End: 2024-04-02 | Stop reason: HOSPADM

## 2024-03-29 RX ORDER — SODIUM CHLORIDE 0.9 % (FLUSH) 0.9 %
5-40 SYRINGE (ML) INJECTION EVERY 12 HOURS SCHEDULED
Status: DISCONTINUED | OUTPATIENT
Start: 2024-03-29 | End: 2024-04-02 | Stop reason: HOSPADM

## 2024-03-29 RX ORDER — SODIUM CHLORIDE 0.9 % (FLUSH) 0.9 %
5-40 SYRINGE (ML) INJECTION PRN
Status: DISCONTINUED | OUTPATIENT
Start: 2024-03-29 | End: 2024-03-29 | Stop reason: HOSPADM

## 2024-03-29 RX ORDER — FENTANYL CITRATE 50 UG/ML
INJECTION, SOLUTION INTRAMUSCULAR; INTRAVENOUS PRN
Status: DISCONTINUED | OUTPATIENT
Start: 2024-03-29 | End: 2024-03-29 | Stop reason: SDUPTHER

## 2024-03-29 RX ORDER — SPIRONOLACTONE 25 MG/1
50 TABLET ORAL DAILY
Status: DISCONTINUED | OUTPATIENT
Start: 2024-03-30 | End: 2024-04-02

## 2024-03-29 RX ORDER — PANTOPRAZOLE SODIUM 40 MG/1
40 TABLET, DELAYED RELEASE ORAL
Status: DISCONTINUED | OUTPATIENT
Start: 2024-03-30 | End: 2024-04-02 | Stop reason: HOSPADM

## 2024-03-29 RX ORDER — SODIUM CHLORIDE 9 MG/ML
INJECTION, SOLUTION INTRAVENOUS PRN
Status: DISCONTINUED | OUTPATIENT
Start: 2024-03-29 | End: 2024-04-02 | Stop reason: HOSPADM

## 2024-03-29 RX ORDER — DIPHENHYDRAMINE HYDROCHLORIDE 50 MG/ML
12.5 INJECTION INTRAMUSCULAR; INTRAVENOUS
Status: DISCONTINUED | OUTPATIENT
Start: 2024-03-29 | End: 2024-03-29 | Stop reason: HOSPADM

## 2024-03-29 RX ORDER — OXYCODONE HYDROCHLORIDE 5 MG/1
5 TABLET ORAL
Status: COMPLETED | OUTPATIENT
Start: 2024-03-29 | End: 2024-03-29

## 2024-03-29 RX ORDER — HYDROCODONE BITARTRATE AND ACETAMINOPHEN 5; 325 MG/1; MG/1
1 TABLET ORAL EVERY 4 HOURS PRN
Status: DISCONTINUED | OUTPATIENT
Start: 2024-03-29 | End: 2024-04-02 | Stop reason: HOSPADM

## 2024-03-29 RX ORDER — HYDROMORPHONE HYDROCHLORIDE 2 MG/ML
0.5 INJECTION, SOLUTION INTRAMUSCULAR; INTRAVENOUS; SUBCUTANEOUS EVERY 10 MIN PRN
Status: DISCONTINUED | OUTPATIENT
Start: 2024-03-29 | End: 2024-03-29 | Stop reason: HOSPADM

## 2024-03-29 RX ORDER — LANOLIN ALCOHOL/MO/W.PET/CERES
400 CREAM (GRAM) TOPICAL 2 TIMES DAILY
Status: DISCONTINUED | OUTPATIENT
Start: 2024-03-29 | End: 2024-04-02 | Stop reason: HOSPADM

## 2024-03-29 RX ORDER — DIPHENHYDRAMINE HYDROCHLORIDE 50 MG/ML
50 INJECTION INTRAMUSCULAR; INTRAVENOUS ONCE
Status: COMPLETED | OUTPATIENT
Start: 2024-03-29 | End: 2024-03-29

## 2024-03-29 RX ORDER — LOSARTAN POTASSIUM 50 MG/1
50 TABLET ORAL DAILY
Status: DISCONTINUED | OUTPATIENT
Start: 2024-03-30 | End: 2024-04-02

## 2024-03-29 RX ORDER — SODIUM CHLORIDE, SODIUM LACTATE, POTASSIUM CHLORIDE, CALCIUM CHLORIDE 600; 310; 30; 20 MG/100ML; MG/100ML; MG/100ML; MG/100ML
INJECTION, SOLUTION INTRAVENOUS CONTINUOUS
Status: DISCONTINUED | OUTPATIENT
Start: 2024-03-29 | End: 2024-03-29 | Stop reason: HOSPADM

## 2024-03-29 RX ORDER — PROPOFOL 10 MG/ML
INJECTION, EMULSION INTRAVENOUS CONTINUOUS PRN
Status: DISCONTINUED | OUTPATIENT
Start: 2024-03-29 | End: 2024-03-29 | Stop reason: SDUPTHER

## 2024-03-29 RX ORDER — DEXTROSE MONOHYDRATE 100 MG/ML
INJECTION, SOLUTION INTRAVENOUS CONTINUOUS PRN
Status: DISCONTINUED | OUTPATIENT
Start: 2024-03-29 | End: 2024-03-29 | Stop reason: HOSPADM

## 2024-03-29 RX ORDER — ALBUTEROL SULFATE 90 UG/1
2 AEROSOL, METERED RESPIRATORY (INHALATION) EVERY 4 HOURS PRN
Status: DISCONTINUED | OUTPATIENT
Start: 2024-03-29 | End: 2024-04-02 | Stop reason: HOSPADM

## 2024-03-29 RX ORDER — PHENYLEPHRINE HYDROCHLORIDE 10 MG/ML
INJECTION INTRAVENOUS PRN
Status: DISCONTINUED | OUTPATIENT
Start: 2024-03-29 | End: 2024-03-29 | Stop reason: SDUPTHER

## 2024-03-29 RX ORDER — NALOXONE HYDROCHLORIDE 0.4 MG/ML
INJECTION, SOLUTION INTRAMUSCULAR; INTRAVENOUS; SUBCUTANEOUS PRN
Status: DISCONTINUED | OUTPATIENT
Start: 2024-03-29 | End: 2024-03-29 | Stop reason: HOSPADM

## 2024-03-29 RX ADMIN — SODIUM CHLORIDE, SODIUM LACTATE, POTASSIUM CHLORIDE, AND CALCIUM CHLORIDE: 600; 310; 30; 20 INJECTION, SOLUTION INTRAVENOUS at 12:30

## 2024-03-29 RX ADMIN — GABAPENTIN 300 MG: 100 CAPSULE ORAL at 21:03

## 2024-03-29 RX ADMIN — Medication 2000 MG: at 12:52

## 2024-03-29 RX ADMIN — HYDROCODONE BITARTRATE AND ACETAMINOPHEN 1 TABLET: 5; 325 TABLET ORAL at 21:03

## 2024-03-29 RX ADMIN — ROPINIROLE HYDROCHLORIDE 0.5 MG: 0.5 TABLET, FILM COATED ORAL at 21:03

## 2024-03-29 RX ADMIN — PROPOFOL 140 MCG/KG/MIN: 10 INJECTION, EMULSION INTRAVENOUS at 12:39

## 2024-03-29 RX ADMIN — METOPROLOL SUCCINATE 50 MG: 100 TABLET, EXTENDED RELEASE ORAL at 21:03

## 2024-03-29 RX ADMIN — PHENYLEPHRINE HYDROCHLORIDE 200 MCG: 10 INJECTION INTRAVENOUS at 13:05

## 2024-03-29 RX ADMIN — FUROSEMIDE 40 MG: 40 TABLET ORAL at 21:03

## 2024-03-29 RX ADMIN — MAGNESIUM GLUCONATE 500 MG ORAL TABLET 400 MG: 500 TABLET ORAL at 21:03

## 2024-03-29 RX ADMIN — HYDROCORTISONE SODIUM SUCCINATE 100 MG: 100 INJECTION, POWDER, FOR SOLUTION INTRAMUSCULAR; INTRAVENOUS at 10:43

## 2024-03-29 RX ADMIN — OXYCODONE HYDROCHLORIDE 5 MG: 5 TABLET ORAL at 15:40

## 2024-03-29 RX ADMIN — SODIUM CHLORIDE, PRESERVATIVE FREE 20 MG: 5 INJECTION INTRAVENOUS at 10:43

## 2024-03-29 RX ADMIN — FENTANYL CITRATE 25 MCG: 50 INJECTION, SOLUTION INTRAMUSCULAR; INTRAVENOUS at 12:49

## 2024-03-29 RX ADMIN — SODIUM CHLORIDE, PRESERVATIVE FREE 10 ML: 5 INJECTION INTRAVENOUS at 21:05

## 2024-03-29 RX ADMIN — DIPHENHYDRAMINE HYDROCHLORIDE 50 MG: 50 INJECTION, SOLUTION INTRAMUSCULAR; INTRAVENOUS at 10:43

## 2024-03-29 RX ADMIN — PHENYLEPHRINE HYDROCHLORIDE 100 MCG: 10 INJECTION INTRAVENOUS at 13:00

## 2024-03-29 ASSESSMENT — PAIN DESCRIPTION - LOCATION
LOCATION: CHEST
LOCATION: SHOULDER

## 2024-03-29 ASSESSMENT — PAIN DESCRIPTION - ORIENTATION
ORIENTATION: LEFT
ORIENTATION: LEFT

## 2024-03-29 ASSESSMENT — PAIN DESCRIPTION - DESCRIPTORS
DESCRIPTORS: ACHING;SORE
DESCRIPTORS: ACHING

## 2024-03-29 ASSESSMENT — PAIN SCALES - GENERAL
PAINLEVEL_OUTOF10: 6
PAINLEVEL_OUTOF10: 6

## 2024-03-29 ASSESSMENT — ENCOUNTER SYMPTOMS: SHORTNESS OF BREATH: 1

## 2024-03-29 ASSESSMENT — COPD QUESTIONNAIRES: CAT_SEVERITY: MODERATE

## 2024-03-29 NOTE — H&P
systolic function with a visually estimated EF of 55 - 60%. Left ventricle size is normal. Normal wall thickness. Normal wall motion. Normal diastolic function.   Left Atrium Left atrium is mildly dilated.   Right Ventricle Right ventricle is dilated. Reduced systolic function.   Right Atrium Right atrium is dilated.   Aortic Valve Valve structure is normal. No regurgitation. No stenosis.   Mitral Valve Valve structure is normal. Mild regurgitation. No stenosis noted.   Tricuspid Valve Valve structure is normal. Moderate regurgitation. No stenosis noted. Severely elevated RVSP. The estimated RVSP is 57 mmHg.   Pulmonic Valve Valve structure is normal. Mild regurgitation. No stenosis noted.   Aorta Normal sized aortic root.   Pericardium No pericardial effusion.      ECHO: 11/2022    Left Ventricle: Normal left ventricular systolic function with a visually estimated EF of 60 - 65%. Left ventricle size is normal. Mildly increased wall thickness. Normal wall motion. Normal diastolic function.    Left Atrium: Left atrium is dilated.    Right Atrium: Right atrium is dilated.    Pericardium: Trivial pericardial effusion present. No indication of cardiac tamponade. Evidence includes normal LV size, no septal bounce and no chamber collapse.    Technical qualifiers: Color flow Doppler was performed and pulse wave and/or continuous wave Doppler was performed.    Contrast used: Definity.     Previous Heart Catheterization: 2/2022    Stress Test: n/a      DEVICE INTERROGATION: n/a      Past Medical History, Past Surgical History, Family history, Social History, and Medications were all reviewed with the patient today and updated as necessary.      Current Facility-Administered Medications          Current Outpatient Medications   Medication Sig Dispense Refill    OZEMPIC, 0.25 OR 0.5 MG/DOSE, 2 MG/3ML SOPN INJECT 0.25 UNDER THE SKIN WEEKLY FOR 2 WEEKS, THEN 0.5MG WEEKLY        gabapentin (NEURONTIN) 300 MG capsule Take 1        Current packs/day: 0.00       Average packs/day: 3.0 packs/day for 20.0 years (60.0 ttl pk-yrs)       Types: Cigarettes       Start date: 1978       Quit date: 1998       Years since quittin.4       Passive exposure: Past    Smokeless tobacco: Never   Substance Use Topics    Alcohol use: No         ROS:  A comprehensive review of systems was performed with the pertinent positives and negatives as noted in the HPI in addition to:  Review of Systems   Constitutional:  Positive for fatigue.   HENT: Negative.     Eyes: Negative.    Respiratory:  Positive for shortness of breath.    Cardiovascular: Negative.    Gastrointestinal: Negative.    Endocrine: Negative.    Genitourinary: Negative.    Musculoskeletal: Negative.    Skin: Negative.    Allergic/Immunologic: Negative.    Neurological: Negative.    Hematological: Negative.    Psychiatric/Behavioral: Negative.     All other systems reviewed and are negative.     PHYSICAL EXAM:   /89   Pulse 98   Ht 1.676 m (5' 6\")   Wt 91.6 kg (202 lb)   BMI 32.60 kg/m²           Wt Readings from Last 3 Encounters:   02/15/24 91.6 kg (202 lb)   24 90.7 kg (200 lb)   24 90.7 kg (200 lb)          BP Readings from Last 3 Encounters:   02/15/24 138/89   24 112/78   24 118/70      Gen: Well appearing, well developed, no acute distress  Eyes: Pupils equal, round. Extraocular movements are intact  ENT: Oropharynx clear, no oral lesions, normal dentition  CV: S1S2, IRIR, no murmurs, rubs or gallops, normal JVD, no carotid bruits, normal distal pulses, no KARISHMA  Pulm: Prolonged respiratory phases, distant lung sounds.   GI: Soft, NT, ND, +BS  Neuro: Alert and oriented, nonfocal  Psych: Appropriate affect  Skin: Normal color and skin turgor  MSK: Normal muscle bulk and tone     Medical problems and test results were reviewed with the patient today.      No results found for any visits on 02/15/24.

## 2024-03-29 NOTE — PROGRESS NOTES
Patient received to CPRU room # 9  Ambulatory from [facility]. Patient scheduled for PPM AV node today with Dr Toro. Procedure reviewed & questions answered, voiced good understanding consent obtained & placed on chart. All medications and medical history reviewed. Will prep patient per orders. Patient & family updated on plan of care.      The patient has a fraility score of 4-VULNERABLE, based on ability to perform ADLs by self.

## 2024-03-29 NOTE — PLAN OF CARE
4 Eyes Skin Assessment     NAME:  Rylee Orr  YOB: 1945  MEDICAL RECORD NUMBER:  [id number]    The patient is being assessed for  Admission    I agree that at least one RN has performed a thorough Head to Toe Skin Assessment on the patient. ALL assessment sites listed below have been assessed.      Areas assessed by both nurses:    Sacrum. Buttock, Coccyx, Ischium and Legs. Feet and Heels        Does the Patient have a Wound? No noted wound(s)       Macario Prevention initiated by RN: No  Wound Care Orders initiated by RN: No    Pressure Injury (Stage 3,4, Unstageable, DTI, NWPT, and Complex wounds) if present, place Wound referral order by RN under : No    New Ostomies, if present place, Ostomy referral order under : No     Nurse 1 eSignature: Electronically signed by FRANCE JULIAN RN on 3/29/24 at 7:07 PM EDT    **SHARE this note so that the co-signing nurse can place an eSignature**    Nurse 2 eSignature: {Esignature:[id number]}

## 2024-03-29 NOTE — PROCEDURES
: Kiel Toro MD     REFERRING: Jacob Dejesus MD     Pre-Procedure Diagnosis  1. Persistent atrial fibrillation.  2. SSS.  3. NYHA class III symptoms.  4. Planned AV node ablation.      Procedure Performed  1. Biventricular pacemaker implant.  2. AV node ablation     Anesthesia: MAC      Estimated Blood Loss: Less than 10 mL          Specimens: * No specimens in log *      Complications: None     Fluoroscopy Time: 4.7 minutes    Contrast: 30 cc      The procedure, indications, risks, benefits, and alternatives were discussed with the patient and family members, who desired to proceed after questions were answered and informed consent was documented.      Procedure: After informed consent was obtained, the patient was brought to the Electrophysiology Laboratory in a fasting state and was prepped and draped in sterile fashion. Prophylactic antibiotic was administered 10 minutes prior to skin incision: refer to anesthesia procedural documentation for details. MAC was administered by the anesthesia team with continuous oxygen saturation measurement and blood pressure measurement. A venogram was performed of the LUE and demonstrated a patent axillary and subclavian vein system. Local anesthetic (sensorcaine) was delivered to the left pectoral region and an incision was made parallel to the deltopectoral groove and a pocket was fashioned by blunt dissection. Bovie electrocautery was used for hemostasis. Access x 2 (Micropuncture kit) was obtained under ultrasound guidance using a modified Seldinger technique with placement of a short and long wire down into the RA and advanced below the diaphragm followed by placement of a 6 Fr peel-away sheath over the short guidewire. The right ventricular pace/sense lead was advanced into the right ventricle under fluoroscopy. The helix was advanced into the RV septum. After confirmation of stable pacing characteristics, the sheath was peeled and the lead was

## 2024-03-29 NOTE — PROGRESS NOTES
Raised HOB to sitting position.  No s/s of bleeding or swelling noted in right groin sites.  Denies pain or discomfort at this time.  Gave pt meal and drink.

## 2024-03-29 NOTE — ANESTHESIA PRE PROCEDURE
Department of Anesthesiology  Preprocedure Note       Name:  Rylee Orr   Age:  78 y.o.  :  1945                                          MRN:  [id number]         Date:  3/29/2024      Surgeon: Surgeon(s):  Kiel Toro MD    Procedure: Procedure(s):  Insert PPM biv multi  Ablation AV node    Medications prior to admission:   Prior to Admission medications    Medication Sig Start Date End Date Taking? Authorizing Provider   omeprazole (PRILOSEC) 40 MG delayed release capsule Take 1 capsule by mouth daily TAKE 1 CAPSULE BY MOUTH EVERY DAY 3/14/24   Jeanmarie Resendez MD   fluticasone-umeclidin-vilant (TRELEGY ELLIPTA) 100-62.5-25 MCG/ACT AEPB inhaler Inhale 1 puff into the lungs daily 3/13/24   Zofia Negrete MD   rOPINIRole (REQUIP) 0.5 MG tablet TAKE 1 TABLET BY MOUTH EVERY DAY AT NIGHT 3/1/24   Jeanmarie Resendez MD   mupirocin (BACTROBAN) 2 % ointment Apply topically 2 times daily 7 days. 3/1/24   Jeanmarie Resendez MD   predniSONE 10 MG (48) TBPK As directed per package 24   Scarlett Nick PA   gabapentin (NEURONTIN) 300 MG capsule Take 1 capsule by mouth 3 times daily for 90 days. 24  Aniceto Ndiaye PA   apixaban (ELIQUIS) 5 MG TABS tablet Take 1 tablet by mouth 2 times daily 24   Jacob Dejesus MD   furosemide (LASIX) 40 MG tablet Take 1 tablet by mouth 2 times daily 24   Jeanmarie Resendez MD   flecainide (TAMBOCOR) 100 MG tablet Take 1 tablet by mouth 2 times daily 23   Jeanmarie Resendez MD   metoprolol succinate (TOPROL XL) 50 MG extended release tablet Take 1 tablet by mouth in the morning and at bedtime 23   Jeanmarie Resendez MD   losartan (COZAAR) 50 MG tablet TAKE 1 TABLET BY MOUTH EVERY DAY 23   Jeanmarie Resendez MD   diclofenac sodium (VOLTAREN) 1 % GEL Apply 4 g topically 4 times daily as needed for Pain  Patient not taking: Reported on 2/15/2024 8/14/23   Scarlett Nick PA   spironolactone

## 2024-03-29 NOTE — PROGRESS NOTES
TRANSFER - OUT REPORT:    Verbal report given to MADI Bellamy on Rylee Orr  being transferred to 3rd floor for routine post-op       Report consisted of patient's Situation, Background, Assessment and   Recommendations(SBAR).     Information from the following report(s) Nurse Handoff Report was reviewed with the receiving nurse.           Lines:   Peripheral IV 03/29/24 Distal;Left;Posterior Forearm (Active)        Opportunity for questions and clarification was provided.      Patient transported with:          verbal cues/nonverbal cues (demo/gestures)/1 person assist

## 2024-03-30 ENCOUNTER — APPOINTMENT (OUTPATIENT)
Dept: NON INVASIVE DIAGNOSTICS | Age: 79
DRG: 243 | End: 2024-03-30
Attending: INTERNAL MEDICINE
Payer: MEDICARE

## 2024-03-30 LAB
ABO + RH BLD: NORMAL
ALBUMIN SERPL-MCNC: 2.9 G/DL (ref 3.2–4.6)
ALBUMIN/GLOB SERPL: 0.9 (ref 0.4–1.6)
ALP SERPL-CCNC: 84 U/L (ref 50–136)
ALT SERPL-CCNC: 14 U/L (ref 12–65)
ANION GAP SERPL CALC-SCNC: 4 MMOL/L (ref 2–11)
AST SERPL-CCNC: 17 U/L (ref 15–37)
BILIRUB SERPL-MCNC: 0.5 MG/DL (ref 0.2–1.1)
BLOOD GROUP ANTIBODIES SERPL: NORMAL
BUN SERPL-MCNC: 19 MG/DL (ref 8–23)
CALCIUM SERPL-MCNC: 7.7 MG/DL (ref 8.3–10.4)
CHLORIDE SERPL-SCNC: 102 MMOL/L (ref 103–113)
CO2 SERPL-SCNC: 29 MMOL/L (ref 21–32)
CREAT SERPL-MCNC: 1.3 MG/DL (ref 0.6–1)
ECHO BSA: 2.07 M2
ERYTHROCYTE [DISTWIDTH] IN BLOOD BY AUTOMATED COUNT: 14.5 % (ref 11.9–14.6)
GLOBULIN SER CALC-MCNC: 3.2 G/DL (ref 2.8–4.5)
GLUCOSE SERPL-MCNC: 124 MG/DL (ref 65–100)
HCT VFR BLD AUTO: 23.7 % (ref 35.8–46.3)
HCT VFR BLD AUTO: 35.5 % (ref 35.8–46.3)
HGB BLD-MCNC: 11.7 G/DL (ref 11.7–15.4)
HGB BLD-MCNC: 7.5 G/DL (ref 11.7–15.4)
MCH RBC QN AUTO: 33.6 PG (ref 26.1–32.9)
MCHC RBC AUTO-ENTMCNC: 31.6 G/DL (ref 31.4–35)
MCV RBC AUTO: 106.3 FL (ref 82–102)
NRBC # BLD: 0 K/UL (ref 0–0.2)
PLATELET # BLD AUTO: 96 K/UL (ref 150–450)
PMV BLD AUTO: 10.2 FL (ref 9.4–12.3)
POTASSIUM SERPL-SCNC: 3.7 MMOL/L (ref 3.5–5.1)
PROT SERPL-MCNC: 6.1 G/DL (ref 6.3–8.2)
RBC # BLD AUTO: 2.23 M/UL (ref 4.05–5.2)
SODIUM SERPL-SCNC: 135 MMOL/L (ref 136–146)
SPECIMEN EXP DATE BLD: NORMAL
WBC # BLD AUTO: 8.3 K/UL (ref 4.3–11.1)

## 2024-03-30 PROCEDURE — 36415 COLL VENOUS BLD VENIPUNCTURE: CPT

## 2024-03-30 PROCEDURE — 93325 DOPPLER ECHO COLOR FLOW MAPG: CPT | Performed by: INTERNAL MEDICINE

## 2024-03-30 PROCEDURE — 94640 AIRWAY INHALATION TREATMENT: CPT

## 2024-03-30 PROCEDURE — 85018 HEMOGLOBIN: CPT

## 2024-03-30 PROCEDURE — 6370000000 HC RX 637 (ALT 250 FOR IP): Performed by: INTERNAL MEDICINE

## 2024-03-30 PROCEDURE — 93308 TTE F-UP OR LMTD: CPT | Performed by: INTERNAL MEDICINE

## 2024-03-30 PROCEDURE — 80053 COMPREHEN METABOLIC PANEL: CPT

## 2024-03-30 PROCEDURE — 86850 RBC ANTIBODY SCREEN: CPT

## 2024-03-30 PROCEDURE — 2580000003 HC RX 258: Performed by: NURSE PRACTITIONER

## 2024-03-30 PROCEDURE — 2580000003 HC RX 258: Performed by: INTERNAL MEDICINE

## 2024-03-30 PROCEDURE — 86900 BLOOD TYPING SEROLOGIC ABO: CPT

## 2024-03-30 PROCEDURE — 93308 TTE F-UP OR LMTD: CPT

## 2024-03-30 PROCEDURE — 85014 HEMATOCRIT: CPT

## 2024-03-30 PROCEDURE — 94761 N-INVAS EAR/PLS OXIMETRY MLT: CPT

## 2024-03-30 PROCEDURE — 6370000000 HC RX 637 (ALT 250 FOR IP): Performed by: NURSE PRACTITIONER

## 2024-03-30 PROCEDURE — 86901 BLOOD TYPING SEROLOGIC RH(D): CPT

## 2024-03-30 PROCEDURE — 85027 COMPLETE CBC AUTOMATED: CPT

## 2024-03-30 PROCEDURE — 2700000000 HC OXYGEN THERAPY PER DAY

## 2024-03-30 PROCEDURE — 2580000003 HC RX 258: Performed by: ANESTHESIOLOGY

## 2024-03-30 RX ORDER — HYDROCODONE BITARTRATE AND ACETAMINOPHEN 5; 325 MG/1; MG/1
1 TABLET ORAL EVERY 4 HOURS PRN
Qty: 6 TABLET | Refills: 0 | Status: SHIPPED | OUTPATIENT
Start: 2024-03-30 | End: 2024-04-02

## 2024-03-30 RX ORDER — 0.9 % SODIUM CHLORIDE 0.9 %
500 INTRAVENOUS SOLUTION INTRAVENOUS ONCE
Status: COMPLETED | OUTPATIENT
Start: 2024-03-30 | End: 2024-03-30

## 2024-03-30 RX ORDER — SODIUM CHLORIDE 9 MG/ML
INJECTION, SOLUTION INTRAVENOUS CONTINUOUS
Status: DISCONTINUED | OUTPATIENT
Start: 2024-03-30 | End: 2024-04-01

## 2024-03-30 RX ADMIN — MOMETASONE FUROATE AND FORMOTEROL FUMARATE DIHYDRATE 2 PUFF: 200; 5 AEROSOL RESPIRATORY (INHALATION) at 08:01

## 2024-03-30 RX ADMIN — SODIUM CHLORIDE, PRESERVATIVE FREE 10 ML: 5 INJECTION INTRAVENOUS at 08:48

## 2024-03-30 RX ADMIN — LOSARTAN POTASSIUM 50 MG: 50 TABLET, FILM COATED ORAL at 08:47

## 2024-03-30 RX ADMIN — HYDROCODONE BITARTRATE AND ACETAMINOPHEN 1 TABLET: 5; 325 TABLET ORAL at 13:34

## 2024-03-30 RX ADMIN — GABAPENTIN 300 MG: 100 CAPSULE ORAL at 08:47

## 2024-03-30 RX ADMIN — SODIUM CHLORIDE 500 ML: 9 INJECTION, SOLUTION INTRAVENOUS at 18:01

## 2024-03-30 RX ADMIN — TIOTROPIUM BROMIDE INHALATION SPRAY 2 PUFF: 3.12 SPRAY, METERED RESPIRATORY (INHALATION) at 08:01

## 2024-03-30 RX ADMIN — METOPROLOL SUCCINATE 50 MG: 100 TABLET, EXTENDED RELEASE ORAL at 08:47

## 2024-03-30 RX ADMIN — PANTOPRAZOLE SODIUM 40 MG: 40 TABLET, DELAYED RELEASE ORAL at 05:57

## 2024-03-30 RX ADMIN — SPIRONOLACTONE 50 MG: 25 TABLET ORAL at 08:47

## 2024-03-30 RX ADMIN — MOMETASONE FUROATE AND FORMOTEROL FUMARATE DIHYDRATE 2 PUFF: 200; 5 AEROSOL RESPIRATORY (INHALATION) at 22:20

## 2024-03-30 RX ADMIN — HYDROCODONE BITARTRATE AND ACETAMINOPHEN 1 TABLET: 5; 325 TABLET ORAL at 05:57

## 2024-03-30 RX ADMIN — MAGNESIUM GLUCONATE 500 MG ORAL TABLET 400 MG: 500 TABLET ORAL at 08:47

## 2024-03-30 RX ADMIN — FUROSEMIDE 40 MG: 40 TABLET ORAL at 08:47

## 2024-03-30 RX ADMIN — GABAPENTIN 300 MG: 100 CAPSULE ORAL at 13:34

## 2024-03-30 RX ADMIN — SODIUM CHLORIDE: 9 INJECTION, SOLUTION INTRAVENOUS at 20:32

## 2024-03-30 ASSESSMENT — PAIN SCALES - GENERAL
PAINLEVEL_OUTOF10: 0
PAINLEVEL_OUTOF10: 6
PAINLEVEL_OUTOF10: 0
PAINLEVEL_OUTOF10: 6

## 2024-03-30 ASSESSMENT — PAIN DESCRIPTION - DESCRIPTORS
DESCRIPTORS: ACHING;DISCOMFORT
DESCRIPTORS: ACHING

## 2024-03-30 ASSESSMENT — PAIN DESCRIPTION - ORIENTATION
ORIENTATION: LEFT
ORIENTATION: LEFT

## 2024-03-30 ASSESSMENT — PAIN DESCRIPTION - LOCATION
LOCATION: SHOULDER
LOCATION: SHOULDER

## 2024-03-30 NOTE — DISCHARGE INSTRUCTIONS
DEVICE IMPLANT FOLLOWUP INSTRUCTIONS  You will be discharged with an occlusive dressing over the incision site. This dressing will be removed at the 10 -14-day postop site check with the Device Clinic. Your new device will be checked at that visit and all your device teaching will be given.   Keep the incision site dry until your follow up. Use a hand-held shower or bath.   Do not submerge or soak in pools or tubs for 6 weeks. If you are discharged with a prescription for antibiotics, please take the full prescription until it is gone.  After your site check, you may shower and get the incision site wet. You may let soap and water run on the incision and pat dry. Please do not apply creams, lotions or powders on or near the incision.   Mild bruising and swelling can be normal after implant and will resolve in a few weeks.     Call the office at [phone] if you have any of the following:  -Signs of infection, such as fever over 100 F, drainage from the incision, redness, significant swelling, or warmth at the incision site.  -Significant pain around the site that gets worse. Mild discomfort can be normal.   -Bleeding from the incision site  -Swelling in the arm on the side of the incision site  -Chest pain or shortness of breath     Your device has the capability of transmitting device information from home to our office using a home monitoring system or phone anthony. The information will transmit through a cellular connection outside of your home. Your device data is reviewed during working hours to ensure proper device function. You will be given your equipment and instruction upon your hospital discharge.                                                                       -You will be given a sling to wear upon discharge with instructions when it should be worn.   -Do not lift the affected arm above the shoulder level, on the side the device was put in,  Device [facility] [phone]      Learning About a Biventricular Pacemaker  What is a biventricular pacemaker?  A biventricular pacemaker (say \"mikki-damir-TRICK-yuh-ler\") is a device used to treat heart failure. Treatment that uses this type of pacemaker is called cardiac resynchronization therapy (CRT).  A pacemaker is a small device that is placed under the skin of your chest. It is powered by batteries. It has thin wires, called leads, that pass through a vein into your heart.  How is the device put in place?  You will get medicine before the procedure. This helps you relax and helps prevent pain.  The doctor makes a cut in the skin just below your collarbone. The cut may be on either side of your chest. The doctor will put the pacemaker leads through the cut.  The leads go into a large blood vessel in the upper chest. Then the doctor will guide the leads through the blood vessel into different chambers of the heart.  The doctor will place the pacemaker under the skin of your chest. The doctor will attach the leads to the pacemaker. Then the cut will be closed.  What can you expect when you have a biventricular pacemaker?  A pacemaker can help your heart pump blood better. It may help you feel better so you can be more active. It also may help keep you out of the hospital and help you live longer.  You can live a normal, active life with a pacemaker. But you'll need to use certain electric devices with caution. Some devices have a strong electromagnetic field. This field can keep your pacemaker from working right for a short time. These devices include things in your home, garage, or workplace. Check with your doctor about what you need to avoid and what you need to keep a short distance away from your pacemaker. Many household and office electronics don't affect your pacemaker.  Your doctor will check your pacemaker regularly to make sure it's working right. The battery may last about 10 years. If the battery gets

## 2024-03-30 NOTE — ANESTHESIA POSTPROCEDURE EVALUATION
Department of Anesthesiology  Postprocedure Note    Patient: Rylee Orr  MRN: [id number]  YOB: 1945  Date of evaluation: 3/29/2024    Procedure Summary       Date: 03/29/24 Room / Location: [facility] 2 ALL EVENTS / [facility] CARDIAC CATH LAB    Anesthesia Start: 1230 Anesthesia Stop: 1434    Procedures:       Insert PPM biv multi      Ablation AV node Diagnosis:       Atrial fibrillation with tachycardic ventricular rate (HCC)      (Atrial fibrillation with tachycardic ventricular rate (HCC) [I48.91])    Providers: Kiel Toro MD Responsible Provider: Syed Hooker MD    Anesthesia Type: TIVA ASA Status: 4            Anesthesia Type: TIVA    Brynn Phase I: Brynn Score: 10    Brynn Phase II:      Anesthesia Post Evaluation    Patient location during evaluation: PACU  Patient participation: complete - patient participated  Level of consciousness: awake and alert  Airway patency: patent  Nausea: well controlled.  Cardiovascular status: acceptable.  Respiratory status: acceptable  Hydration status: stable  Pain management: adequate    There were no known notable events for this encounter.

## 2024-03-30 NOTE — PROGRESS NOTES
RN attempting discharge, pt presenting very weak. Unable to sit up straight independently in the bed. Pt states this is not baseline. Also very dyspneic on exertion. NORA Mitchell notified. Discharge delayed. Pt back In bed resting, on heart monitor.

## 2024-03-30 NOTE — DISCHARGE SUMMARY
[facility] Discharge Summary     Patient ID:  Rylee Orr  [id number]  78 y.o.  1945    Admit date: 3/29/2024    Discharge date:  3/29/2024    Admitting Physician: Kiel Toro MD     Discharge Physician:  Dr. Aguilar    Admission Diagnoses: Atrial fibrillation with tachycardic ventricular rate (HCC) [I48.91]  Pacemaker [Z95.0]    Discharge Diagnoses:   Patient Active Problem List   Diagnosis    Essential hypertension    RLS (restless legs syndrome)    Pulmonary hypertension     Centrilobular emphysema     HLD (hyperlipidemia)    Venous stasis    Nocturnal hypoxia    Diastolic dysfunction    Peripheral vascular disease     Atrial fibrillation     Intolerance of continuous positive airway pressure (CPAP) ventilation    MARISA and COPD overlap syndrome     Obesity (BMI 30-39.9)    Coronary artery disease involving native coronary artery of native heart with angina pectoris (HCC)    Chronic systolic (congestive) heart failure    Chronic renal disease, stage III     Type 2 diabetes mellitus    Atrial fibrillation with tachycardic ventricular rate     Pacemaker       Cardiology Procedures:   Device Implantation -- [employer] BIV PM , AV node ablation  Consults: none    Hospital Course: Patient was seen at the office of [facility] Cardiology by Dr. Toro for complaints of persistent atrial fibrillation and severe dyspnea and was subsequently scheduled for a device implantation at [facility] on 3/29/24. Patient was taken to the EP lab and underwent successful implantation of a BSC BIV PPM followed by AV node ablation by Dr. Toro. Patient tolerated the procedure well and was taken to the Telemetry floor for recovery.     Follow up chest xray showed no pneumothorax.   CXR:  IMPRESSION:  Stable postoperative chest. No pneumothorax. Cardiomegaly.    The following morning patient was up feeling well without any complaints of chest pain, shortness of breath, or palpitations. Patient's

## 2024-03-30 NOTE — MANAGEMENT PLAN
ADDENDUM: Noted repeat Hbg 11.7 which is baseline for Pt. However, Cr up to 1.30 (BL 0.9), Na+ marginal. Likely dry + anesthesia and home meds today -- will give IVF. Hold diuretics. Parameters on BP meds. Discussed ECHO with Dr. Bingham via phone -- EF normal, no concerning effusion. RN updated.       Notified by RN that Pts Hgb now 7.5. Last checked 3/22/24 pre-procedure and Hgb was 11.8. Pt is s/p PPM and AVN ablation 3/29/24.     Pt was planned for d/c today. However, while RN was assisting Pt to WC -- Pt with acute weakness and SOB. D/c held. ECHO completed at 1815 and read is pending.     Night RN states Pt w/o hematoma to PM site or RFV access site. This RN had Pt last PM as well and states sites appear unchanged.     Pt on Eliquis PTA but has not received any Eliquis since admit.     NP to bedside. Pt is A&O and not in distress. On BL O2 -- on O2 PTA. No CP or SOB at rest. Lying with HOB 45 w/o dyspnea. Report PPM site is sore, RFV site is not tender at all. Pt denies any abdominal pain, back pain, or difficulty urinating. No BM.    Evaluated L subclavian PPM site. Dressing in place w/o noted drainage or bleeding. No palpable hematoma or area of firmness. There is no crepitus. No bruising around the dressing, or visible on the chest, L arm, L axilla, L flank or under the L breast.     Evaluated RFV cath site. Dressing is c/d/i w/o any obvious drainage. There is no tenderness, no palpable hematoma or firmness. No abd tenderness, distension or bladder distension.     PLAN:  Pt with HR 82 and BP 80-90s/40-50s. She is not symptomatic will recumbent.   -- Will get STAT H/H to confirm value.   -- Will go ahead and get T&C    Unfortunately -- Pts last available lab 8 days ago. Was on Eliquis as OP. Does appear baseline Hgb 11-12 range. She did not have any labs the day of the procedure and did not have labs this AM for comparison.   -- Will get STAT CMP as well    Will review ECHO report when available.     Pending

## 2024-03-30 NOTE — PLAN OF CARE
Problem: Chronic Conditions and Co-morbidities  Goal: Patient's chronic conditions and co-morbidity symptoms are monitored and maintained or improved  3/29/2024 2312 by Hayes Johnson RN  Outcome: Progressing  Flowsheets (Taken 3/29/2024 2312)  Care Plan - Patient's Chronic Conditions and Co-Morbidity Symptoms are Monitored and Maintained or Improved:   Monitor and assess patient's chronic conditions and comorbid symptoms for stability, deterioration, or improvement   Collaborate with multidisciplinary team to address chronic and comorbid conditions and prevent exacerbation or deterioration   Update acute care plan with appropriate goals if chronic or comorbid symptoms are exacerbated and prevent overall improvement and discharge

## 2024-03-31 LAB
ALBUMIN SERPL-MCNC: 2.7 G/DL (ref 3.2–4.6)
ALBUMIN/GLOB SERPL: 0.9 (ref 0.4–1.6)
ALP SERPL-CCNC: 75 U/L (ref 50–136)
ALT SERPL-CCNC: 11 U/L (ref 12–65)
ANION GAP SERPL CALC-SCNC: 5 MMOL/L (ref 2–11)
AST SERPL-CCNC: 19 U/L (ref 15–37)
BILIRUB SERPL-MCNC: 0.8 MG/DL (ref 0.2–1.1)
BUN SERPL-MCNC: 16 MG/DL (ref 8–23)
CALCIUM SERPL-MCNC: 7.8 MG/DL (ref 8.3–10.4)
CHLORIDE SERPL-SCNC: 105 MMOL/L (ref 103–113)
CO2 SERPL-SCNC: 27 MMOL/L (ref 21–32)
CREAT SERPL-MCNC: 1 MG/DL (ref 0.6–1)
ERYTHROCYTE [DISTWIDTH] IN BLOOD BY AUTOMATED COUNT: 14.5 % (ref 11.9–14.6)
GLOBULIN SER CALC-MCNC: 3.1 G/DL (ref 2.8–4.5)
GLUCOSE SERPL-MCNC: 143 MG/DL (ref 65–100)
HCT VFR BLD AUTO: 35 % (ref 35.8–46.3)
HGB BLD-MCNC: 11.3 G/DL (ref 11.7–15.4)
MCH RBC QN AUTO: 33.8 PG (ref 26.1–32.9)
MCHC RBC AUTO-ENTMCNC: 32.3 G/DL (ref 31.4–35)
MCV RBC AUTO: 104.8 FL (ref 82–102)
NRBC # BLD: 0 K/UL (ref 0–0.2)
PLATELET # BLD AUTO: 135 K/UL (ref 150–450)
PMV BLD AUTO: 9.9 FL (ref 9.4–12.3)
POTASSIUM SERPL-SCNC: 3.4 MMOL/L (ref 3.5–5.1)
PROT SERPL-MCNC: 5.8 G/DL (ref 6.3–8.2)
RBC # BLD AUTO: 3.34 M/UL (ref 4.05–5.2)
SODIUM SERPL-SCNC: 137 MMOL/L (ref 136–146)
WBC # BLD AUTO: 8.3 K/UL (ref 4.3–11.1)

## 2024-03-31 PROCEDURE — 2580000003 HC RX 258: Performed by: NURSE PRACTITIONER

## 2024-03-31 PROCEDURE — 94640 AIRWAY INHALATION TREATMENT: CPT

## 2024-03-31 PROCEDURE — 94760 N-INVAS EAR/PLS OXIMETRY 1: CPT

## 2024-03-31 PROCEDURE — 80053 COMPREHEN METABOLIC PANEL: CPT

## 2024-03-31 PROCEDURE — 2700000000 HC OXYGEN THERAPY PER DAY

## 2024-03-31 PROCEDURE — 99232 SBSQ HOSP IP/OBS MODERATE 35: CPT | Performed by: INTERNAL MEDICINE

## 2024-03-31 PROCEDURE — 85027 COMPLETE CBC AUTOMATED: CPT

## 2024-03-31 PROCEDURE — 36415 COLL VENOUS BLD VENIPUNCTURE: CPT

## 2024-03-31 PROCEDURE — 97162 PT EVAL MOD COMPLEX 30 MIN: CPT

## 2024-03-31 PROCEDURE — 2580000003 HC RX 258: Performed by: ANESTHESIOLOGY

## 2024-03-31 PROCEDURE — 6370000000 HC RX 637 (ALT 250 FOR IP): Performed by: INTERNAL MEDICINE

## 2024-03-31 PROCEDURE — 6370000000 HC RX 637 (ALT 250 FOR IP): Performed by: NURSE PRACTITIONER

## 2024-03-31 PROCEDURE — 97530 THERAPEUTIC ACTIVITIES: CPT

## 2024-03-31 RX ORDER — POTASSIUM CHLORIDE 20 MEQ/1
40 TABLET, EXTENDED RELEASE ORAL ONCE
Status: COMPLETED | OUTPATIENT
Start: 2024-03-31 | End: 2024-03-31

## 2024-03-31 RX ADMIN — PANTOPRAZOLE SODIUM 40 MG: 40 TABLET, DELAYED RELEASE ORAL at 06:21

## 2024-03-31 RX ADMIN — MOMETASONE FUROATE AND FORMOTEROL FUMARATE DIHYDRATE 2 PUFF: 200; 5 AEROSOL RESPIRATORY (INHALATION) at 08:00

## 2024-03-31 RX ADMIN — MAGNESIUM GLUCONATE 500 MG ORAL TABLET 400 MG: 500 TABLET ORAL at 21:30

## 2024-03-31 RX ADMIN — SODIUM CHLORIDE, PRESERVATIVE FREE 10 ML: 5 INJECTION INTRAVENOUS at 21:40

## 2024-03-31 RX ADMIN — HYDROCODONE BITARTRATE AND ACETAMINOPHEN 1 TABLET: 5; 325 TABLET ORAL at 06:21

## 2024-03-31 RX ADMIN — GABAPENTIN 300 MG: 100 CAPSULE ORAL at 10:35

## 2024-03-31 RX ADMIN — POTASSIUM CHLORIDE 40 MEQ: 1500 TABLET, EXTENDED RELEASE ORAL at 17:26

## 2024-03-31 RX ADMIN — MOMETASONE FUROATE AND FORMOTEROL FUMARATE DIHYDRATE 2 PUFF: 200; 5 AEROSOL RESPIRATORY (INHALATION) at 19:55

## 2024-03-31 RX ADMIN — SODIUM CHLORIDE: 9 INJECTION, SOLUTION INTRAVENOUS at 10:38

## 2024-03-31 RX ADMIN — SODIUM CHLORIDE, PRESERVATIVE FREE 10 ML: 5 INJECTION INTRAVENOUS at 09:55

## 2024-03-31 RX ADMIN — HYDROCODONE BITARTRATE AND ACETAMINOPHEN 1 TABLET: 5; 325 TABLET ORAL at 17:31

## 2024-03-31 RX ADMIN — ROPINIROLE HYDROCHLORIDE 0.5 MG: 0.5 TABLET, FILM COATED ORAL at 21:38

## 2024-03-31 RX ADMIN — GABAPENTIN 300 MG: 100 CAPSULE ORAL at 14:13

## 2024-03-31 RX ADMIN — MAGNESIUM GLUCONATE 500 MG ORAL TABLET 400 MG: 500 TABLET ORAL at 10:35

## 2024-03-31 RX ADMIN — GABAPENTIN 300 MG: 100 CAPSULE ORAL at 21:30

## 2024-03-31 RX ADMIN — TIOTROPIUM BROMIDE INHALATION SPRAY 2 PUFF: 3.12 SPRAY, METERED RESPIRATORY (INHALATION) at 08:00

## 2024-03-31 ASSESSMENT — PAIN DESCRIPTION - ORIENTATION
ORIENTATION: LEFT
ORIENTATION: LEFT

## 2024-03-31 ASSESSMENT — PAIN DESCRIPTION - LOCATION
LOCATION: SHOULDER
LOCATION: SHOULDER

## 2024-03-31 ASSESSMENT — PAIN SCALES - GENERAL
PAINLEVEL_OUTOF10: 4
PAINLEVEL_OUTOF10: 0
PAINLEVEL_OUTOF10: 9
PAINLEVEL_OUTOF10: 0

## 2024-03-31 ASSESSMENT — PAIN DESCRIPTION - DESCRIPTORS
DESCRIPTORS: ACHING;SORE
DESCRIPTORS: ACHING;SORE

## 2024-03-31 NOTE — THERAPY EVALUATION
ACUTE PHYSICAL THERAPY GOALS:   (Developed with and agreed upon by patient and/or caregiver.)  (1.) Rylee Orr will move from supine to sit and sit to supine , scoot up and down, and roll side to side with MODIFIED INDEPENDENCE within 7 treatment day(s).    (2.) Rylee Orr will transfer from bed to chair and chair to bed with MODIFIED INDEPENDENCE using the least restrictive device within 7 treatment day(s).    (3.) Rylee rOr will ambulate with MODIFIED INDEPENDENCE for 500 feet with the least restrictive device within 7 treatment day(s).   (4.) Rylee Orr will perform standing static and dynamic balance activities x 25 minutes with MODIFIED INDEPENDENCE to improve safety within 7 treatment day(s).  (5.) Rylee Orr will perform therapeutic exercises x 20 min for HEP with INDEPENDENCE to improve strength, endurance, and functional mobility within 7 treatment day(s).     PHYSICAL THERAPY Initial Assessment, Daily Note, and AM  (Link to Caseload Tracking: PT Visit Days : 1  Acknowledge Orders  Time In/Out  PT Charge Capture  Rehab Caseload Tracker    Rylee Orr is a 78 y.o. female   PRIMARY DIAGNOSIS: Atrial fibrillation with tachycardic ventricular rate (HCC)  Atrial fibrillation with tachycardic ventricular rate (HCC) [I48.91]  Pacemaker [Z95.0]  Procedure(s) (LRB):  Insert PPM biv multi (N/A)  Ablation AV node (N/A)  2 Days Post-Op  Reason for Referral: Generalized Muscle Weakness (M62.81)  Other lack of cordination (R27.8)  Difficulty in walking, Not elsewhere classified (R26.2)  Other abnormalities of gait and mobility (R26.89)  Outpatient in a bed: Payor: MEDICARE / Plan: MEDICARE PART A AND B / Product Type: *No Product type* /     ASSESSMENT:     REHAB RECOMMENDATIONS:   Recommendation to date pending progress:  Setting:  Inpatient Rehab Facility    Equipment:    To Be Determined     ASSESSMENT:  Ms. Orr is a 78 year old F who presents s/p BiV PM  placement and AV esau ablation with Dr. Toro on 3/29/24. After procedure pt became significantly weak and could not mobilize, which is far off her baseline which is active and independent, not typically requiring use of DME.   This date pt performs mobility including bed mobility Mod A, sitting balance activities, sit <> stand transfers, standing balance activities to allow for hygiene tasks, and steps to bedside chair with Min A, BUE support at . Pt presents as functioning below her baseline, with deficits in mobility including transfers, gait, balance, and activity tolerance. Pt will benefit from skilled therapy services to address stated deficits to promote return to highest level of function, independence, and safety. Will continue to follow.     [facility]™ “6 Clicks” Basic Mobility Inpatient Short Form  AM-PAC Basic Mobility - Inpatient   How much help is needed turning from your back to your side while in a flat bed without using bedrails?: A Little  How much help is needed moving from lying on your back to sitting on the side of a flat bed without using bedrails?: A Little  How much help is needed moving to and from a bed to a chair?: A Little  How much help is needed standing up from a chair using your arms?: A Little  How much help is needed walking in hospital room?: A Little  How much help is needed climbing 3-5 steps with a railing?: A Lot  -PAC Inpatient Mobility Raw Score : 17  AM-PAC Inpatient T-Scale Score : 42.13  Mobility Inpatient CMS 0-100% Score: 50.57  Mobility Inpatient CMS G-Code Modifier : CK    SUBJECTIVE:   Ms. Orr states, \"Yes I have just not felt well - very worn out\"     Social/Functional Lives With: Spouse (Alzheimer's/dementia)  Home Equipment: Walker, rolling (does not typically use)  Has the patient had two or more falls in the past year or any fall with injury in the past year?: No  Receives Help From: Family  ADL Assistance: Independent  Homemaking

## 2024-03-31 NOTE — PROGRESS NOTES
[facility] CARDIOLOGY PROGRESS NOTE           3/31/2024 10:13 AM    Admit Date: 3/29/2024      Subjective:   Plan for discharge yesterday, however, prior to discharge patient developed hypotension with moving to the wheelchair.  Was dizzy and lightheaded.  Given IV fluids, labs obtained at that time demonstrated hemoglobin of 7.5, recheck 11.7.  Slight elevation of creatinine 1.3 from baseline approximately 1.0.    Discharge was canceled.  Overnight patient reports some dizziness and lightheadedness which continues.  Denies chest pain or chest pressure, racing heart or palpitations, abdominal pain, nausea, vomiting, leg swelling.  Some pain surrounding the left subclavian pacemaker insertion site.  No leg pain or difficulty moving her legs.    ROS:  Cardiovascular:  As noted above    Objective:      Vitals:    03/31/24 0100 03/31/24 0419 03/31/24 0732 03/31/24 0801   BP: 109/60 105/65 (!) 99/46    Pulse: 80 81 80    Resp:  20 18 16   Temp:  99.3 °F (37.4 °C) 98.1 °F (36.7 °C)    TempSrc:  Oral Oral    SpO2: 98% 97% 95%    Weight:  94.5 kg (208 lb 4.8 oz)     Height:         GEN: NAD, awake, alert, interactive   HEAD: normocephalic, atraumatic  CARD: regular rate and rhythm, s1/s2, no murmurs, no JVD, right femoral vein access site clean dry intact, legs warm and well-perfused, no evidence of hematoma at femoral vein access site.  Left subclavian pacemaker dressing clean dry and intact.  PULM: non labored respirations, no wheezes, no rales, no rhonchi  ABD: non tender to palpation, no guarding, no rebound tenderness  EXT: no edema of the legs bilaterally  SKIN: Warm and dry, no pallor.    Data Review:   Recent Labs     03/30/24 1819 03/30/24 1948   NA  --  135*   K  --  3.7   BUN  --  19   WBC 8.3  --    HGB 7.5* 11.7   HCT 23.7* 35.5*   PLT 96*  --      Limited echocardiogram 3/29/2024.    Left Ventricle Normal left ventricular systolic function with a visually estimated EF of 55 - 60%.

## 2024-03-31 NOTE — PROGRESS NOTES
Pt BP 96/53 blood pressures borderline overnight.  Pt states she feels weak/tired but otherwise not symptomatic of low BP.  Vicki Mitchell made aware

## 2024-03-31 NOTE — PLAN OF CARE
Problem: Discharge Planning  Goal: Discharge to home or other facility with appropriate resources  3/30/2024 1010 by Cristiana Clark RN  Outcome: Adequate for Discharge  Flowsheets (Taken 3/30/2024 0835)  Discharge to home or other facility with appropriate resources: Identify barriers to discharge with patient and caregiver

## 2024-03-31 NOTE — PLAN OF CARE
Problem: Discharge Planning  Goal: Discharge to home or other facility with appropriate resources  3/30/2024 1010 by Cristiana Clark RN  Outcome: Adequate for Discharge  Flowsheets (Taken 3/30/2024 0875)  Discharge to home or other facility with appropriate resources: Identify barriers to discharge with patient and caregiver

## 2024-04-01 ENCOUNTER — APPOINTMENT (OUTPATIENT)
Dept: GENERAL RADIOLOGY | Age: 79
DRG: 243 | End: 2024-04-01
Attending: INTERNAL MEDICINE
Payer: MEDICARE

## 2024-04-01 PROBLEM — J96.11 CHRONIC RESPIRATORY FAILURE WITH HYPOXIA (HCC): Status: ACTIVE | Noted: 2024-04-01

## 2024-04-01 PROBLEM — G47.33 OSA (OBSTRUCTIVE SLEEP APNEA): Status: ACTIVE | Noted: 2024-04-01

## 2024-04-01 PROBLEM — J44.9 CHRONIC OBSTRUCTIVE PULMONARY DISEASE (HCC): Status: ACTIVE | Noted: 2024-04-01

## 2024-04-01 LAB
ANION GAP SERPL CALC-SCNC: 4 MMOL/L (ref 2–11)
BUN SERPL-MCNC: 12 MG/DL (ref 8–23)
CALCIUM SERPL-MCNC: 8 MG/DL (ref 8.3–10.4)
CHLORIDE SERPL-SCNC: 106 MMOL/L (ref 103–113)
CO2 SERPL-SCNC: 26 MMOL/L (ref 21–32)
CREAT SERPL-MCNC: 0.8 MG/DL (ref 0.6–1)
GLUCOSE SERPL-MCNC: 101 MG/DL (ref 65–100)
NT PRO BNP: 1478 PG/ML
POTASSIUM SERPL-SCNC: 3.8 MMOL/L (ref 3.5–5.1)
SODIUM SERPL-SCNC: 136 MMOL/L (ref 136–146)

## 2024-04-01 PROCEDURE — 36415 COLL VENOUS BLD VENIPUNCTURE: CPT

## 2024-04-01 PROCEDURE — 2580000003 HC RX 258: Performed by: ANESTHESIOLOGY

## 2024-04-01 PROCEDURE — 6370000000 HC RX 637 (ALT 250 FOR IP)

## 2024-04-01 PROCEDURE — 6360000002 HC RX W HCPCS: Performed by: PHYSICIAN ASSISTANT

## 2024-04-01 PROCEDURE — 80048 BASIC METABOLIC PNL TOTAL CA: CPT

## 2024-04-01 PROCEDURE — 2700000000 HC OXYGEN THERAPY PER DAY

## 2024-04-01 PROCEDURE — 6370000000 HC RX 637 (ALT 250 FOR IP): Performed by: INTERNAL MEDICINE

## 2024-04-01 PROCEDURE — 83880 ASSAY OF NATRIURETIC PEPTIDE: CPT

## 2024-04-01 PROCEDURE — 94761 N-INVAS EAR/PLS OXIMETRY MLT: CPT

## 2024-04-01 PROCEDURE — 94640 AIRWAY INHALATION TREATMENT: CPT

## 2024-04-01 PROCEDURE — 2140000000 HC CCU INTERMEDIATE R&B

## 2024-04-01 PROCEDURE — 99223 1ST HOSP IP/OBS HIGH 75: CPT | Performed by: INTERNAL MEDICINE

## 2024-04-01 PROCEDURE — 02583ZZ DESTRUCTION OF CONDUCTION MECHANISM, PERCUTANEOUS APPROACH: ICD-10-PCS | Performed by: INTERNAL MEDICINE

## 2024-04-01 PROCEDURE — 71046 X-RAY EXAM CHEST 2 VIEWS: CPT

## 2024-04-01 PROCEDURE — 6370000000 HC RX 637 (ALT 250 FOR IP): Performed by: NURSE PRACTITIONER

## 2024-04-01 RX ORDER — FUROSEMIDE 10 MG/ML
40 INJECTION INTRAMUSCULAR; INTRAVENOUS DAILY
Status: DISCONTINUED | OUTPATIENT
Start: 2024-04-01 | End: 2024-04-02

## 2024-04-01 RX ADMIN — SODIUM CHLORIDE, PRESERVATIVE FREE 10 ML: 5 INJECTION INTRAVENOUS at 07:14

## 2024-04-01 RX ADMIN — HYDROCODONE BITARTRATE AND ACETAMINOPHEN 1 TABLET: 5; 325 TABLET ORAL at 05:28

## 2024-04-01 RX ADMIN — GABAPENTIN 300 MG: 100 CAPSULE ORAL at 13:35

## 2024-04-01 RX ADMIN — MOMETASONE FUROATE AND FORMOTEROL FUMARATE DIHYDRATE 2 PUFF: 200; 5 AEROSOL RESPIRATORY (INHALATION) at 20:24

## 2024-04-01 RX ADMIN — HYDROCODONE BITARTRATE AND ACETAMINOPHEN 1 TABLET: 5; 325 TABLET ORAL at 13:36

## 2024-04-01 RX ADMIN — APIXABAN 5 MG: 5 TABLET, FILM COATED ORAL at 21:48

## 2024-04-01 RX ADMIN — GABAPENTIN 300 MG: 100 CAPSULE ORAL at 07:56

## 2024-04-01 RX ADMIN — MAGNESIUM GLUCONATE 500 MG ORAL TABLET 400 MG: 500 TABLET ORAL at 07:56

## 2024-04-01 RX ADMIN — MOMETASONE FUROATE AND FORMOTEROL FUMARATE DIHYDRATE 2 PUFF: 200; 5 AEROSOL RESPIRATORY (INHALATION) at 08:03

## 2024-04-01 RX ADMIN — TIOTROPIUM BROMIDE INHALATION SPRAY 2 PUFF: 3.12 SPRAY, METERED RESPIRATORY (INHALATION) at 08:03

## 2024-04-01 RX ADMIN — MAGNESIUM GLUCONATE 500 MG ORAL TABLET 400 MG: 500 TABLET ORAL at 21:48

## 2024-04-01 RX ADMIN — FUROSEMIDE 40 MG: 10 INJECTION, SOLUTION INTRAMUSCULAR; INTRAVENOUS at 13:35

## 2024-04-01 RX ADMIN — ALBUTEROL SULFATE 2 PUFF: 90 AEROSOL, METERED RESPIRATORY (INHALATION) at 13:49

## 2024-04-01 RX ADMIN — PANTOPRAZOLE SODIUM 40 MG: 40 TABLET, DELAYED RELEASE ORAL at 05:29

## 2024-04-01 RX ADMIN — SODIUM CHLORIDE, PRESERVATIVE FREE 10 ML: 5 INJECTION INTRAVENOUS at 21:50

## 2024-04-01 RX ADMIN — ROPINIROLE HYDROCHLORIDE 0.5 MG: 0.5 TABLET, FILM COATED ORAL at 21:48

## 2024-04-01 RX ADMIN — GABAPENTIN 300 MG: 100 CAPSULE ORAL at 21:48

## 2024-04-01 ASSESSMENT — PAIN DESCRIPTION - DESCRIPTORS: DESCRIPTORS: ACHING;SORE

## 2024-04-01 ASSESSMENT — PAIN SCALES - GENERAL
PAINLEVEL_OUTOF10: 0
PAINLEVEL_OUTOF10: 6
PAINLEVEL_OUTOF10: 7
PAINLEVEL_OUTOF10: 0
PAINLEVEL_OUTOF10: 0

## 2024-04-01 ASSESSMENT — PAIN DESCRIPTION - ORIENTATION
ORIENTATION: LEFT
ORIENTATION: LEFT

## 2024-04-01 ASSESSMENT — PAIN DESCRIPTION - LOCATION
LOCATION: SHOULDER
LOCATION: SHOULDER

## 2024-04-01 NOTE — PLAN OF CARE
Problem: Safety - Adult  Goal: Free from fall injury  Outcome: Progressing  Flowsheets (Taken 3/31/2024 6321)  Free From Fall Injury:   Instruct family/caregiver on patient safety   Based on caregiver fall risk screen, instruct family/caregiver to ask for assistance with transferring infant if caregiver noted to have fall risk factors

## 2024-04-01 NOTE — CARE COORDINATION
CM spoke to patient at bedside on this day. Patient confirmed demographic information. Patient reports that she lives with her spouse, in a 1 story house, and has no steps to enter house. Patient reports that she is independent with ADLs, uses rolator (in her kitchen only when she's tired), and drives. Patient reports that she uses 4L Oxygen at night supplied by [employer] in [place], [place]. Patient reports that she has no home care services. Patient confirmed PCP information and last PCP visit was 1 month ago.     CM informed patient PT/OT are recommending inpatient rehab. Patient is declining rehab at this time because she stated she needs to go home and take care of her spouse who has Alzheimers.     CM will continue to follow patient for any discharge planning needs.        04/01/24 5259   Service Assessment   Patient Orientation Alert and Oriented   Cognition Alert   History Provided By Patient   Primary Caregiver Self   Support Systems Children   Patient's Healthcare Decision Maker is: Legal Next of Kin   PCP Verified by CM Yes  (confirmed PCP is Dr. Jeanmarie Resendez)   Last Visit to PCP Within last 3 months  (last PCP visit was 1 month ago)   Prior Functional Level Independent in ADLs/IADLs   Current Functional Level Other (see comment)  (TBD by clinicals)   Can patient return to prior living arrangement Yes   Ability to make needs known: Good   Family able to assist with home care needs: Yes   Would you like for me to discuss the discharge plan with any other family members/significant others, and if so, who? Yes   Financial Resources Medicare;Other (Comment)  (Medicare Part A and B and [facility])   Community Resources None   CM/SW Referral Other (see comment)  (discharge planning)   Social/Functional History   Lives With Spouse   Type of Home House   Home Layout One level   Home Access Level entry   Bathroom Shower/Tub Walk-in shower   Bathroom Toilet Standard   Bathroom Equipment Shower chair   Bathroom

## 2024-04-01 NOTE — CONSULTS
History and Physical Initial Visit NOTE           4/1/2024    Rylee Orr                        Date of Admission:  3/29/2024    The patient's chart is reviewed and the patient is discussed with the staff.    Subjective:     Patient is a 78 y.o.  female seen and evaluated at the request of Dr. Feliz for COPD.    She presented on 3/29 and was admitted following BiV permanent pacemaker implantation and AV esau ablation with Dr. Toro on 3/29/2024. She was planning to be discharged but this was held after she developed hypotension. She was found to have a hgb drop 11.8 to 7.5. She had been on eliquis. No obvious hematoma.  She is on room air with oxygen saturation of 95%. Afebrile. Post procedure CXR without PTX but did show cardiomegaly.     She has been gracia by us in the past for MCCAULEY. She has underlying GOLD Stage 3 COPD, MARISA with PHTN and CPAP recommended. She has exertion hypoxemia and requires 4 lpm with ambulation when seen in August 2023. Her follow-up echo in January showed RVSP now at 57 mm hg. She has been prescribed lasix 40mg bid and spironolactone 50mg daily.  She admits to not taking her lasix because it's difficult when leaving the house and she has not worn her CPAP in over a month due to nose bleeds and her mask feels small.  At her last visit, they planned to consider monotherapy or dual oral therapy vasodilators if  rising RVSP (last was 38mmHg) which it is. Here, we were asked to see her for COPD.     At home, she has been on 4 lpm. She does use her trelegy and PRN albuterol but has not used much.     Review of Systems: Comprehensive ROS negative except in HPI    Current Outpatient Medications   Medication Instructions    acetaminophen (TYLENOL) 1,000 mg, Oral, EVERY 6 HOURS PRN    albuterol sulfate HFA (PROVENTIL;VENTOLIN;PROAIR) 108 (90 Base) MCG/ACT inhaler 2 puffs, Inhalation, 4 TIMES DAILY PRN    apixaban (ELIQUIS) 5 mg, Oral, 2 TIMES DAILY    colestipol            Recent Labs     03/30/24  1819 03/30/24 1948 03/31/24  1003   WBC 8.3  --  8.3   HGB 7.5* 11.7 11.3*   HCT 23.7* 35.5* 35.0*   PLT 96*  --  135*   NA  --  135* 137   K  --  3.7 3.4*   CL  --  102* 105   CO2  --  29 27   BUN  --  19 16   CREATININE  --  1.30* 1.00   BILITOT  --  0.5 0.8   AST  --  17 19   ALT  --  14 11*   ALKPHOS  --  84 75       Lab Results   Component Value Date/Time     03/31/2024 10:03 AM    K 3.4 03/31/2024 10:03 AM     03/31/2024 10:03 AM    CO2 27 03/31/2024 10:03 AM    BUN 16 03/31/2024 10:03 AM    CREATININE 1.00 03/31/2024 10:03 AM    GLUCOSE 143 03/31/2024 10:03 AM    CALCIUM 7.8 03/31/2024 10:03 AM      Lab Results   Component Value Date     (H) 01/13/2022       ECHO: 03/29/24    ECHO (TTE) LIMITED (PRN CONTRAST/BUBBLE/STRAIN/3D) 03/30/2024  8:26 PM (Final)    Interpretation Summary    Left Ventricle: Normal left ventricular systolic function with a visually estimated EF of 55 - 60%.    Pericardium: Trivial pericardial effusion present. No indication of cardiac tamponade. Evidence includes no IVC dilation.    Signed by: Vsihnu Bingham MD on 3/30/2024  8:26 PM    MICRO: No results for input(s): \"CULTURE\" in the last 72 hours.  No results for input(s): \"COVID19\" in the last 72 hours.  Assessment and Plan:  (Medical Decision Making)     Principal Problem:    Atrial fibrillation with tachycardic ventricular rate (HCC)    Pacemaker  Plan: recent insertion      COPD - Stage 3  On trelegy at home, albuterol PRN. Sats appropriate on RA. Review CXR when available. She is on dulera, spiriva and PRN albuterol here.bronchodilators here. IV lasix ordered. Has IV fluids ordered and will stop. MCCAULEY but seems fairly debilitated at baseline- can barely sit up for exam. ? Needs rehab as she is a primary caregiver for her .      PHTN   Worse on echo, not wearing her CPAP and also not taking her lasix at home. Can discuss further treatment in the outpatient setting.

## 2024-04-01 NOTE — PROGRESS NOTES
[facility] CARDIOLOGY PROGRESS NOTE           4/1/2024 6:35 AM    Admit Date: 3/29/2024    Admit Diagnosis: Atrial fibrillation with tachycardic ventricular rate (HCC) [I48.91]  Pacemaker [Z95.0]      Subjective:   No complaints this AM, no chest pain or shortness of breath, appropriate post op device pocket pain    Interval History: (History of pertinent interval events obtained from nursing staff)  Cardiac device placed this admission, no events overnight, no immediate complications    ROS:  GEN:  No fever or chills  Cardiovascular:  As noted above  Pulmonary:  As noted above  Neuro:  No new focal motor or sensory loss      Objective:     Vitals:    03/31/24 2010 04/01/24 0017 04/01/24 0356 04/01/24 0528   BP: 109/76 (!) 119/51 (!) 130/57    Pulse: 83 81 85    Resp: 17 17 17 20   Temp: 99.4 °F (37.4 °C) 98.4 °F (36.9 °C) 98.6 °F (37 °C)    TempSrc: Oral      SpO2: 97% 95% 95%    Weight:       Height:           Physical Exam:  General-Well Developed, Well Nourished, No Acute Distress, Alert & Oriented x 3, appropriate mood.  CV- regular rate and rhythm no MRG, left infraclavicular pocket with dressing in place, no evidence of hematoma, redness, warmth or excessive drainage  Lung- clear bilaterally  Abd- soft, nontender, nondistended  Ext- no edema bilaterally.    Current Facility-Administered Medications   Medication Dose Route Frequency    0.9 % sodium chloride infusion   IntraVENous Continuous    sodium chloride flush 0.9 % injection 5-40 mL  5-40 mL IntraVENous 2 times per day    sodium chloride flush 0.9 % injection 5-40 mL  5-40 mL IntraVENous PRN    0.9 % sodium chloride infusion   IntraVENous PRN    albuterol sulfate HFA (PROVENTIL;VENTOLIN;PROAIR) 108 (90 Base) MCG/ACT inhaler 2 puff  2 puff Inhalation Q4H PRN    cholestyramine light packet 4 g  4 g Oral Daily PRN    [Held by provider] furosemide (LASIX) tablet 40 mg  40 mg Oral BID    gabapentin (NEURONTIN) capsule 300 mg  300 mg Oral

## 2024-04-02 VITALS
HEIGHT: 66 IN | OXYGEN SATURATION: 92 % | HEART RATE: 84 BPM | WEIGHT: 208.3 LBS | DIASTOLIC BLOOD PRESSURE: 47 MMHG | TEMPERATURE: 97.5 F | BODY MASS INDEX: 33.48 KG/M2 | SYSTOLIC BLOOD PRESSURE: 128 MMHG | RESPIRATION RATE: 16 BRPM

## 2024-04-02 LAB
ANION GAP SERPL CALC-SCNC: 7 MMOL/L (ref 2–11)
BUN SERPL-MCNC: 12 MG/DL (ref 8–23)
CALCIUM SERPL-MCNC: 8.2 MG/DL (ref 8.3–10.4)
CHLORIDE SERPL-SCNC: 102 MMOL/L (ref 103–113)
CO2 SERPL-SCNC: 29 MMOL/L (ref 21–32)
CREAT SERPL-MCNC: 0.8 MG/DL (ref 0.6–1)
GLUCOSE SERPL-MCNC: 120 MG/DL (ref 65–100)
POTASSIUM SERPL-SCNC: 4 MMOL/L (ref 3.5–5.1)
SODIUM SERPL-SCNC: 138 MMOL/L (ref 136–146)

## 2024-04-02 PROCEDURE — 80048 BASIC METABOLIC PNL TOTAL CA: CPT

## 2024-04-02 PROCEDURE — 99232 SBSQ HOSP IP/OBS MODERATE 35: CPT | Performed by: INTERNAL MEDICINE

## 2024-04-02 PROCEDURE — 6370000000 HC RX 637 (ALT 250 FOR IP): Performed by: INTERNAL MEDICINE

## 2024-04-02 PROCEDURE — 6370000000 HC RX 637 (ALT 250 FOR IP)

## 2024-04-02 PROCEDURE — 97530 THERAPEUTIC ACTIVITIES: CPT

## 2024-04-02 PROCEDURE — 94760 N-INVAS EAR/PLS OXIMETRY 1: CPT

## 2024-04-02 PROCEDURE — 94640 AIRWAY INHALATION TREATMENT: CPT

## 2024-04-02 PROCEDURE — 36415 COLL VENOUS BLD VENIPUNCTURE: CPT

## 2024-04-02 PROCEDURE — 94761 N-INVAS EAR/PLS OXIMETRY MLT: CPT

## 2024-04-02 PROCEDURE — 97110 THERAPEUTIC EXERCISES: CPT

## 2024-04-02 PROCEDURE — 2580000003 HC RX 258: Performed by: ANESTHESIOLOGY

## 2024-04-02 PROCEDURE — 2700000000 HC OXYGEN THERAPY PER DAY

## 2024-04-02 RX ORDER — LOSARTAN POTASSIUM 25 MG/1
12.5 TABLET ORAL DAILY
Status: DISCONTINUED | OUTPATIENT
Start: 2024-04-02 | End: 2024-04-02 | Stop reason: HOSPADM

## 2024-04-02 RX ORDER — FUROSEMIDE 20 MG/1
20 TABLET ORAL DAILY
Status: DISCONTINUED | OUTPATIENT
Start: 2024-04-02 | End: 2024-04-02 | Stop reason: HOSPADM

## 2024-04-02 RX ORDER — LOSARTAN POTASSIUM 25 MG/1
12.5 TABLET ORAL DAILY
Qty: 15 TABLET | Refills: 3 | Status: SHIPPED | OUTPATIENT
Start: 2024-04-03 | End: 2024-08-01

## 2024-04-02 RX ORDER — METOPROLOL SUCCINATE 25 MG/1
25 TABLET, EXTENDED RELEASE ORAL DAILY
Status: DISCONTINUED | OUTPATIENT
Start: 2024-04-02 | End: 2024-04-02 | Stop reason: HOSPADM

## 2024-04-02 RX ORDER — FUROSEMIDE 20 MG/1
20 TABLET ORAL DAILY
Qty: 60 TABLET | Refills: 3 | Status: SHIPPED | OUTPATIENT
Start: 2024-04-03

## 2024-04-02 RX ORDER — METOPROLOL SUCCINATE 25 MG/1
25 TABLET, EXTENDED RELEASE ORAL DAILY
Qty: 30 TABLET | Refills: 3 | Status: SHIPPED | OUTPATIENT
Start: 2024-04-03

## 2024-04-02 RX ADMIN — LOSARTAN POTASSIUM 12.5 MG: 25 TABLET, FILM COATED ORAL at 09:45

## 2024-04-02 RX ADMIN — MOMETASONE FUROATE AND FORMOTEROL FUMARATE DIHYDRATE 2 PUFF: 200; 5 AEROSOL RESPIRATORY (INHALATION) at 07:39

## 2024-04-02 RX ADMIN — PANTOPRAZOLE SODIUM 40 MG: 40 TABLET, DELAYED RELEASE ORAL at 06:18

## 2024-04-02 RX ADMIN — METOPROLOL SUCCINATE 25 MG: 25 TABLET, FILM COATED, EXTENDED RELEASE ORAL at 09:45

## 2024-04-02 RX ADMIN — GABAPENTIN 300 MG: 100 CAPSULE ORAL at 09:44

## 2024-04-02 RX ADMIN — MAGNESIUM GLUCONATE 500 MG ORAL TABLET 400 MG: 500 TABLET ORAL at 09:45

## 2024-04-02 RX ADMIN — SODIUM CHLORIDE, PRESERVATIVE FREE 10 ML: 5 INJECTION INTRAVENOUS at 09:48

## 2024-04-02 RX ADMIN — APIXABAN 5 MG: 5 TABLET, FILM COATED ORAL at 09:45

## 2024-04-02 RX ADMIN — TIOTROPIUM BROMIDE INHALATION SPRAY 2 PUFF: 3.12 SPRAY, METERED RESPIRATORY (INHALATION) at 07:39

## 2024-04-02 RX ADMIN — FUROSEMIDE 20 MG: 20 TABLET ORAL at 09:45

## 2024-04-02 ASSESSMENT — PAIN SCALES - GENERAL: PAINLEVEL_OUTOF10: 0

## 2024-04-02 NOTE — PLAN OF CARE
Problem: Safety - Adult  Goal: Free from fall injury  4/2/2024 1429 by Marjorie Mena, RN  Outcome: Adequate for Discharge  4/2/2024 1113 by Dominique Gatica RN  Outcome: Progressing  4/2/2024 0044 by Maria D Spears, RN  Outcome: Progressing

## 2024-04-02 NOTE — PROGRESS NOTES
4 Eyes Skin Assessment     NAME:  Rylee Orr  YOB: 1945  MEDICAL RECORD NUMBER:  [id number]    The patient is being assessed for  Admission (not completed on  admission)    I agree that at least one RN has performed a thorough Head to Toe Skin Assessment on the patient. ALL assessment sites listed below have been assessed.      Areas assessed by both nurses:    Arms, Elbows, Hands, Sacrum. Buttock, Coccyx, Ischium, and Legs. Feet and Heels        Does the Patient have a Wound? No noted wound(s)       Macario Prevention initiated by RN: No  Wound Care Orders initiated by RN: No    Pressure Injury (Stage 3,4, Unstageable, DTI, NWPT, and Complex wounds) if present, place Wound referral order by RN under : No    New Ostomies, if present place, Ostomy referral order under : No     Nurse 1 eSignature: Electronically signed by Marjorie Mena RN on 4/2/24 at 11:03 AM EDT    **SHARE this note so that the co-signing nurse can place an eSignature**    Nurse 2 eSignature: {Esignature:[id number]}

## 2024-04-02 NOTE — PROGRESS NOTES
ACUTE PHYSICAL THERAPY GOALS:   (Developed with and agreed upon by patient and/or caregiver.)      (1.) Rylee Orr will move from supine to sit and sit to supine , scoot up and down, and roll side to side with MODIFIED INDEPENDENCE within 7 treatment day(s).    (2.) Rylee Orr will transfer from bed to chair and chair to bed with MODIFIED INDEPENDENCE using the least restrictive device within 7 treatment day(s).    (3.) Rylee Orr will ambulate with MODIFIED INDEPENDENCE for 500 feet with the least restrictive device within 7 treatment day(s).   (4.) Rylee Orr will perform standing static and dynamic balance activities x 25 minutes with MODIFIED INDEPENDENCE to improve safety within 7 treatment day(s).  (5.) Rylee Orr will perform therapeutic exercises x 20 min for HEP with INDEPENDENCE to improve strength, endurance, and functional mobility within 7 treatment day(s).        PHYSICAL THERAPY: Daily Note AM   (Link to Caseload Tracking: PT Visit Days : 2  Time In/Out PT Charge Capture  Rehab Caseload Tracker  Orders    Rylee rOr is a 78 y.o. female   PRIMARY DIAGNOSIS: Atrial fibrillation with tachycardic ventricular rate (HCC)  Atrial fibrillation with tachycardic ventricular rate (HCC) [I48.91]  Pacemaker [Z95.0]  Procedure(s) (LRB):  Insert PPM biv multi (N/A)  Ablation AV node (N/A)  4 Days Post-Op  Inpatient: Payor: MEDICARE / Plan: MEDICARE PART A AND B / Product Type: *No Product type* /     ASSESSMENT:     REHAB RECOMMENDATIONS:   Recommendation to date pending progress:  Setting:  Home Health Therapy    Equipment:    To Be Determined     ASSESSMENT:  Ms. Orr is up in the chair.  She is anxious to get up and move around.  We talk about concern of her returning home but she insists she will have family around to assist her.  Today she performed gait with rolling walker  with increased activity tolerance and increased gait distance.  On RA her O2  was dropping in the 80s though.  She said she has O2 if she needs it at home.  She wears a cpap at night.       SUBJECTIVE:   Ms. Orr states, \"I wont go to rehab I have to go home.  I will have people to help me\"     Social/Functional Lives With: Spouse  Type of Home: House  Home Layout: One level  Home Access: Level entry  Bathroom Shower/Tub: Walk-in shower  Bathroom Toilet: Standard  Bathroom Equipment: Shower chair  Bathroom Accessibility: Accessible  Home Equipment: Rollator  Has the patient had two or more falls in the past year or any fall with injury in the past year?: No  Receives Help From: Family  ADL Assistance: Independent  Homemaking Assistance: Independent  Homemaking Responsibilities: Yes  Ambulation Assistance: Independent  Transfer Assistance: Independent  Active : Yes  Mode of Transportation: Car  Occupation: Retired  OBJECTIVE:     PAIN: VITALS / O2: PRECAUTION / LINES / DRAINS:   Pre Treatment:    0/10      Post Treatment: 0/10 Vitals        Oxygen    None    RESTRICTIONS/PRECAUTIONS:  Restrictions/Precautions  Restrictions/Precautions: Fall Risk, Bed Alarm  Restrictions/Precautions: Fall Risk, Bed Alarm     MOBILITY: I Mod I S SBA CGA Min Mod Max Total  NT x2 Comments:   Bed Mobility    Rolling [] [] [] [] [] [] [] [] [] [x] []    Supine to Sit [] [] [] [] [] [] [] [] [] [x] []    Scooting [] [] [] [] [] [] [] [] [] [x] []    Sit to Supine [] [] [] [] [] [] [] [] [] [x] []    Transfers    Sit to Stand [] [] [] [] [x] [] [] [] [] [] []    Bed to Chair [] [] [] [] [x] [] [] [] [] [] []    Stand to Sit [] [] [] [] [x] [] [] [] [] [] []     [] [] [] [] [] [] [] [] [] [] []    I=Independent, Mod I=Modified Independent, S=Supervision, SBA=Standby Assistance, CGA=Contact Guard Assistance,   Min=Minimal Assistance, Mod=Moderate Assistance, Max=Maximal Assistance, Total=Total Assistance, NT=Not Tested    BALANCE: Good Fair+ Fair Fair- Poor NT Comments   Sitting Static [] [x] [] [] [] []

## 2024-04-02 NOTE — PROGRESS NOTES
Rylee Orr  Admission Date: 3/29/2024         Daily Progress Note: 4/2/2024    The patient's chart is reviewed and the patient is discussed with the staff.    Background: Patient is a 78 y.o.  female seen and evaluated at the request of Dr. Feliz for COPD.     She presented on 3/29 and was admitted following BiV permanent pacemaker implantation and AV esau ablation with Dr. Toro on 3/29/2024. She was planning to be discharged but this was held after she developed hypotension. She was found to have a hgb drop 11.8 to 7.5. She had been on eliquis. No obvious hematoma.  She is on room air with oxygen saturation of 95%. Afebrile. Post procedure CXR without PTX but did show cardiomegaly.      She has been seen by us in the past for MCCAULEY. She has underlying GOLD Stage 3 COPD, MARISA with PHTN and CPAP was recommended. She has exertion hypoxemia and requires 4 lpm with ambulation when seen in August 2023. Her follow-up echo in January showed RVSP now at 57 mm hg. She has been prescribed lasix 40mg bid and spironolactone 50mg daily.  She admits to not taking her lasix because it's difficult when leaving the house and she has not worn her CPAP in over a month due to nose bleeds and her mask feels small.  At her last visit, they planned to consider monotherapy or dual oral therapy vasodilators if  rising RVSP (last was 38mmHg) which it is. Here, we were asked to see her for COPD.      At home, she has been on 4 lpm. She does use her trelegy and PRN albuterol but has not used much.     Subjective:     States she feels much better today. No peripheral edema. No wheezing noted. Resting in recliner on 3 lpm. BP has improved.     Current Facility-Administered Medications   Medication Dose Route Frequency    furosemide (LASIX) tablet 20 mg  20 mg Oral Daily    metoprolol succinate (TOPROL XL) extended release tablet 25 mg  25 mg Oral Daily    losartan (COZAAR) tablet 12.5 mg  12.5 mg Oral Daily  clinical time.     Impression:     78 y.o female with history of COPD gold stage III, chronic respiratory failure, pulmonary HTN, MARISA, and afib who is s/p BiVPPM and AV node ablation on 3/29. Discharge was held due to hypotension and acute anemia.  Continue 3-4 lpm with exertion as well, has not been wearing CPAP at home, only wearing 4 lpm nightly, Strongly encouraged her to wear her CPAP especially in the setting of rising RVSP on ECHO   Has follow up with [facility] in July     Jacy Madera MD

## 2024-04-02 NOTE — CARE COORDINATION
CM met with patient after RT O2 updated qualifier. Patient qualified for 2 lpm continuous.  Patient reports she is serviced by [employer] for her CPAP and O2. Patient called her son to verify the concentrator which was in her closet. Patient has a portable O2 tank O2 on the Go charging at bedside.  [facility] [employer] sent new O2 order and qualifier. [employer] to bring additional tanks for patient for portable use.CM faxed to [phone].    PT recommending home health at discharge. Patient preferred [facility]. CM informed [facility] unable to admit until later this week. Patient reports she has had [facility] in the past. CM sent order to [facility] and selected to complete.     04/02/24 8793   Discharge Planning   Patient expects to be discharged to: House   Services At/After Discharge   Transition of Care Consult (CM Consult) Home Health  ([facility])   Internal Home Health No   Reason Outside Agency Chosen Unable to staff case   Services At/After Discharge Nursing services;PT;OT    Resource Information Provided? No   Mode of Transport at Discharge Other (see comment)  (Son by car.)   Confirm Follow Up Transport Family   Condition of Participation: Discharge Planning   The Plan for Transition of Care is related to the following treatment goals: Home with home health to assist and support return to baseline function   The Patient and/or Patient Representative was provided with a Choice of Provider? Patient   The Patient and/Or Patient Representative agree with the Discharge Plan? Yes   Freedom of Choice list was provided with basic dialogue that supports the patient's individualized plan of care/goals, treatment preferences, and shares the quality data associated with the providers?  Yes

## 2024-04-02 NOTE — PROGRESS NOTES
[facility] CARDIOLOGY PROGRESS NOTE           4/2/2024 7:13 AM    Admit Date: 3/29/2024      Subjective:   Discharge has been held 2/2 hypotension. BP meds have been held. Pulmonary saw yesterday.     ROS:  Cardiovascular:  As noted above    Objective:      Vitals:    04/01/24 2026 04/01/24 2043 04/01/24 2347 04/02/24 0339   BP:  (!) 141/57 (!) 136/49 (!) 145/66   Pulse: 88 82 79 80   Resp: 18 16 18 18   Temp:  98.4 °F (36.9 °C) 99 °F (37.2 °C) 98.4 °F (36.9 °C)   TempSrc:  Oral Oral Oral   SpO2: 98% 99% 94% 95%   Weight:       Height:         GEN: NAD, awake, alert, interactive   HEAD: normocephalic, atraumatic  CARD: regular rate and rhythm, S1S2, no murmurs, no JVD, right femoral vein access site clean dry intact, legs warm and well-perfused, no evidence of hematoma at femoral vein access site. Left subclavian pacemaker dressing clean dry and intact.  PULM: non labored respirations, no wheezes, no rales, no rhonchi  ABD: non tender to palpation, no guarding, no rebound tenderness  EXT: no edema of the legs bilaterally  SKIN: Warm and dry, no pallor.    Data Review:   Recent Labs     03/30/24  1819 03/30/24  1819 03/30/24  1948 03/31/24  1003 04/01/24  1111 04/02/24  0548   NA  --    < > 135* 137 136 138   K  --    < > 3.7 3.4* 3.8 4.0   BUN  --    < > 19 16 12 12   WBC 8.3  --   --  8.3  --   --    HGB 7.5*  --  11.7 11.3*  --   --    HCT 23.7*  --  35.5* 35.0*  --   --    PLT 96*  --   --  135*  --   --     < > = values in this interval not displayed.       Limited echocardiogram 3/29/2024.    Left Ventricle Normal left ventricular systolic function with a visually estimated EF of 55 - 60%.   Right Ventricle Lead present in the right ventricle.   Right Atrium Lead present in the right atrium.   Mitral Valve Moderate annular calcification of the mitral valve.   IVC/Hepatic Veins IVC diameter is less than or equal to 21 mm and decreases greater than 50% during inspiration; therefore the

## 2024-04-02 NOTE — DISCHARGE SUMMARY
[facility] Cardiology Discharge Summary     Patient ID:  Rylee Orr  [id number]  78 y.o.  1945    Admit date: 3/29/2024    Discharge date:  4/2/2024    Admitting Physician: Kiel Toro MD     Discharge Physician: Dr. Toro    Admission Diagnoses: Atrial fibrillation with tachycardic ventricular rate (HCC) [I48.91]  Pacemaker [Z95.0]    Discharge Diagnoses:   Patient Active Problem List    Diagnosis Date Noted    Pacemaker 03/29/2024    Chronic renal disease, stage III ([facility]) [926096] 02/22/2023    Low back pain with bilateral sciatica 12/27/2022    Chronic systolic (congestive) heart failure 11/22/2022    Acute congestive heart failure (HCC) 11/01/2022    Coronary artery disease involving native coronary artery of native heart with angina pectoris ([facility]) 11/01/2022    Acute on chronic diastolic (congestive) heart failure ([facility]) 11/01/2022    Chronic obstructive pulmonary disease ([facility]) 04/01/2024    Chronic respiratory failure with hypoxia ([facility]) 04/01/2024    MARISA (obstructive sleep apnea) 04/01/2024    Atrial fibrillation with tachycardic ventricular rate (HCC) 02/15/2024    Pulmonary hypertension ([facility]) 07/02/2019    Diastolic dysfunction 07/02/2019    Intolerance of continuous positive airway pressure (CPAP) ventilation 07/02/2019       Cardiology Procedures this admission:   Device Implantation -- [employer] BIV PM , AV node ablation , Limited Echocardiogram  Consults: pulmonary/intensive care    Hospital Course: Patient was seen at the office of [facility] by Dr. Toro for management of persistent atrial fibrillation and severe dyspnea and was subsequently scheduled for an AM device implantation at [facility] on 3/29/24. Patient was taken to the EP lab and underwent successful implantation of BSC BIV PPM followed by AVN ablation by Dr. Toro. Patient tolerated the procedure well and was taken to the telemetry floor for recovery. Follow up chest xray showed no  topically 4 times daily as needed for Pain  Qty: 150 g, Refills: 2      albuterol sulfate HFA (PROVENTIL;VENTOLIN;PROAIR) 108 (90 Base) MCG/ACT inhaler INHALE 2 PUFFS INTO THE LUNGS 4 TIMES DAILY AS NEEDED FOR WHEEZING.  Qty: 1 each, Refills: 11    Associated Diagnoses: Stage 3 severe COPD by GOLD classification ([facility])      acetaminophen (TYLENOL) 500 MG tablet Take 2 tablets by mouth every 6 hours as needed for Pain      magnesium oxide (MAG-OX) 400 MG tablet Take 1 tablet by mouth in the morning and at bedtime  Qty: 60 tablet, Refills: 11      OXYGEN Inhale 4 L/min into the lungs daily as needed for Shortness of Breath via nasal cannula during sleep      colestipol (COLESTID) 1 g tablet Take 1 tablet by mouth 2 times daily as needed (diarrhea)           STOP taking these medications       OZEMPIC, 0.25 OR 0.5 MG/DOSE, 2 MG/3ML SOPN Comments:   Reason for Stopping:         flecainide (TAMBOCOR) 100 MG tablet Comments:   Reason for Stopping:         Semaglutide,0.25 or 0.5MG/DOS, (OZEMPIC, 0.25 OR 0.5 MG/DOSE,) 2 MG/1.5ML SOPN Comments:   Reason for Stopping:         spironolactone (ALDACTONE) 50 MG tablet Comments:   Reason for Stopping:                  Signed:  Mykel Montemayor PA-C  4/2/2024 1:38 PM

## 2024-04-02 NOTE — PROGRESS NOTES
04/02/24 1144   Resting (Room Air)   SpO2 85   HR 85   Resting (On O2)   SpO2 92   HR 87   O2 Flow Rate (l/min) 2 l/min   During Walk (Room Air)   SpO2 83   HR 93   During Walk (On O2)   SpO2 87   HR 85   O2 Flow Rate (l/min) 1 l/min   Need Additional O2 Flow Rate Rows Yes   O2 Flow Rate (l/min) 2 l/min   O2 Saturation 91   After Walk   SpO2 93   HR 86   O2 Flow Rate (l/min) 2 l/min

## 2024-04-02 NOTE — PLAN OF CARE
Problem: Safety - Adult  Goal: Free from fall injury  4/2/2024 1113 by Dominique Gatica, RN  Outcome: Progressing  4/2/2024 0044 by Maria D Spears, RN  Outcome: Progressing

## 2024-04-03 ENCOUNTER — CARE COORDINATION (OUTPATIENT)
Dept: CARE COORDINATION | Facility: CLINIC | Age: 79
End: 2024-04-03

## 2024-04-03 ENCOUNTER — TELEPHONE (OUTPATIENT)
Dept: FAMILY MEDICINE CLINIC | Facility: CLINIC | Age: 79
End: 2024-04-03

## 2024-04-03 DIAGNOSIS — I48.91 ATRIAL FIBRILLATION WITH TACHYCARDIC VENTRICULAR RATE (HCC): Primary | ICD-10-CM

## 2024-04-03 PROCEDURE — 1111F DSCHRG MED/CURRENT MED MERGE: CPT | Performed by: FAMILY MEDICINE

## 2024-04-03 NOTE — CARE COORDINATION
Care Transitions Initial Follow Up Call    Call within 2 business days of discharge: Yes    Patient Current Location:  Home: [facility] [place] [address]    Care Transition Nurse contacted the patient by telephone to perform post hospital discharge assessment. Verified name and  with patient as identifiers. Provided introduction to self, and explanation of the Care Transition Nurse role.     Patient: Rylee Orr Patient : 1945   MRN: [id number]  Reason for Admission: S/P Pacemaker Insertion  Discharge Date: 24 RARS: Readmission Risk Score: 15.5    Last Discharge Facility       Date Complaint Diagnosis Description Type Department Provider    3/29/24  Pacemaker ... Admission (Discharged) [id number] Bourne MD        Was this an external facility discharge? No Discharge Facility:     Challenges to be reviewed by the provider   Additional needs identified to be addressed with provider: No  none               Method of communication with provider: none.      Care Transition Nurse reviewed discharge instructions, medical action plan, and red flags with patient who verbalized understanding. The patient was given an opportunity to ask questions and does not have any further questions or concerns at this time. Were discharge instructions available to patient? Yes. Reviewed appropriate site of care based on symptoms and resources available to patient including: PCP  Specialist  Home health  When to call 911  [email or web address]. The patient agrees to contact the PCP office for questions related to their healthcare.     Advance Care Planning:   Does patient have an Advance Directive: decision maker updated.    Medication reconciliation was performed with patient, who verbalizes understanding of administration of home medications. Medications reviewed, 1111F entered: yes    Was patient discharged with a pulse oximeter? no    Non-face-to-face services provided:  Scheduled appointment

## 2024-04-04 ENCOUNTER — TELEPHONE (OUTPATIENT)
Dept: FAMILY MEDICINE CLINIC | Facility: CLINIC | Age: 79
End: 2024-04-04

## 2024-04-04 NOTE — TELEPHONE ENCOUNTER
Can pt see Syed? Appt was scheduled in the incorrect time slot for Mal. Moved to Syed Mayfield schedule. Please inform pt when TCV is done.

## 2024-04-04 NOTE — TELEPHONE ENCOUNTER
HOME HEALTH NURSE CALL      Name of the Nurse Calling and Facility: [facility]       Best Contact Number to Reach the Nurse: [phone]          Reason For Call: Nursing 1 time a week for 9 weeks   Verbal given

## 2024-04-05 NOTE — TELEPHONE ENCOUNTER
Care Transitions Initial Follow Up Call    Outreach made within 2 business days of discharge: No    Patient: Rylee Orr Patient : 1945   MRN: [id number]  Reason for Admission: A-FibDischarge Date: 24       Spoke with: Patient    Discharge department/facility: [facility] Interactive Patient Contact:  Was patient able to fill all prescriptions: Yes  Was patient instructed to bring all medications to the follow-up visit: Yes  Is patient taking all medications as directed in the discharge summary? Yes  Does patient understand their discharge instructions: Yes  Does patient have questions or concerns that need addressed prior to 7-14 day follow up office visit: no  Pt had placement of Pacemaker.   Appt scheduled and Pt informed and Appt card to be given to Pt's son.  Scheduled appointment with PCP within 7-14 days    Follow Up  Future Appointments   Date Time Provider Department Center   2024  2:00 PM [facility] DEVICE 39 UCDG [facility] AMB   4/15/2024 11:30 AM Syed Mayfield PA-C FPA GVL AMB   2024  4:30 PM Kiel Toro MD DE [facility]VL AMB   6/3/2024 10:15 AM Jeanmarie Resendez MD FPA GVL AMB   6/10/2024  2:20 PM Aniceto Ndiaye PA [facility] AMB   2024  9:10 AM Zofia Negrete MD PPS GVL AMB   2024  2:45 PM Jacob Dejesus MD DE GVL AMB   2024  1:15 PM Scarlett Nick PA POAI GVL AMB       Belen Mazariegos LPN

## 2024-04-09 ENCOUNTER — TELEPHONE (OUTPATIENT)
Age: 79
End: 2024-04-09

## 2024-04-09 DIAGNOSIS — I50.22 CHRONIC SYSTOLIC (CONGESTIVE) HEART FAILURE (HCC): ICD-10-CM

## 2024-04-09 DIAGNOSIS — I50.33 ACUTE ON CHRONIC DIASTOLIC (CONGESTIVE) HEART FAILURE (HCC): Primary | ICD-10-CM

## 2024-04-09 RX ORDER — FUROSEMIDE 40 MG/1
40 TABLET ORAL DAILY
Qty: 30 TABLET | Refills: 11 | Status: SHIPPED | OUTPATIENT
Start: 2024-04-09

## 2024-04-09 NOTE — TELEPHONE ENCOUNTER
Kirstin with [facility] called stating the patient has the following issues :    4+ edema in her feet , ankles up to knee  Base line wt is 202  Weight gain to 207  Lungs are clear  Has been taking lasix 20 MG

## 2024-04-09 NOTE — TELEPHONE ENCOUNTER
Patient may take Lasix 40 mg twice daily x 5 days and then start Lasix 40 mg daily.  Please check BMP and magnesium in 1 week.

## 2024-04-09 NOTE — TELEPHONE ENCOUNTER
I notified home health nurse Kirstin of MD response and she v/u.Med escribed as below.  Requested Prescriptions     Signed Prescriptions Disp Refills    furosemide (LASIX) 40 MG tablet 30 tablet 11     Sig: Take 1 tablet by mouth daily     Authorizing Provider: NATIVIDAD MCKINNON     Ordering User: SADIA LARA     Home health will draw labs.

## 2024-04-09 NOTE — TELEPHONE ENCOUNTER
Kirstin with [facility] called stating the patient has the following issues :     4+ edema in her feet , ankles up to knee  Base line wt is 202  Weight gain to 207  Lungs are clear  Has been taking lasix 20 MG  Has Acute on chronic diastolic CHF as well as PVD.  Should she take extra Lasix for a few days?

## 2024-04-10 ENCOUNTER — CARE COORDINATION (OUTPATIENT)
Dept: CARE COORDINATION | Facility: CLINIC | Age: 79
End: 2024-04-10

## 2024-04-10 NOTE — CARE COORDINATION
Care Transitions Follow Up Call    Patient Current Location:  Home: [facility] [place] [address]    [facility] Care Coordinator contacted the patient by telephone to follow up after admission on 3.29.24.  Verified name and  with patient as identifiers.    Patient: Rylee Orr  Patient : 1945   MRN: [id number]  Reason for Admission: elective/planned surgery: Insert PPM biv multi; Ablation AV node   Discharge Date: 24 RARS: Readmission Risk Score: 15.5      Needs to be reviewed by the provider   Additional needs identified to be addressed with provider: No  none             Method of communication with provider: none.    Patient reports she is progressing slowly, [facility] is active and has added SW, who is with patient at this time.  Noted PCP office completed second HOLLEY.  Patient verifies follow ups and those remain as previously scheduled.  Patient has had some swelling in lower extremities and weight gain, HH nurse contacted cardiologist and medication changes have been implemented.  Patient denies any other needs or concerns at this time.    Addressed changes since last contact:  none  Discussed follow-up appointments. If no appointment was previously scheduled, appointment scheduling offered: Yes.   Is follow up appointment scheduled within 7 days of discharge? Per post op plan    Follow Up  Future Appointments   Date Time Provider Department Center   2024  2:00 PM [facility] DEVICE 39 DG GVL AMB   4/15/2024 11:30 AM Syed Mayfield PA-C A [facility]VL AMB   2024  4:30 PM Kiel Toro MD DE [facility]VL AMB   6/3/2024 10:15 AM Jeanmarie Resendez MD FPA [facility]VL AMB   6/10/2024  2:20 PM Aniceto Ndiaye PA POAG GVL AMB   2024  9:10 AM Zofia Negrete MD PPS [facility] AMB   2024  2:45 PM Jacob Dejesus MD [facility] AM[facility]   2024  1:15 PM Scarlett Nick PA POAI GVL AMB         [facility] Care Coordinator reviewed medical action plan and red flags with patient and discussed

## 2024-04-11 ENCOUNTER — TELEPHONE (OUTPATIENT)
Dept: FAMILY MEDICINE CLINIC | Facility: CLINIC | Age: 79
End: 2024-04-11

## 2024-04-11 NOTE — TELEPHONE ENCOUNTER
Pawel Lal Requesting verbal orders occupational therapy for once a week for 6-8 weeks. Please give her a call back at [phone].

## 2024-04-12 ENCOUNTER — NURSE ONLY (OUTPATIENT)
Age: 79
End: 2024-04-12

## 2024-04-12 DIAGNOSIS — Z98.890 HX OF ATRIOVENTRICULAR NODE ABLATION: ICD-10-CM

## 2024-04-12 DIAGNOSIS — I50.9 ACUTE CONGESTIVE HEART FAILURE (HCC): ICD-10-CM

## 2024-04-12 DIAGNOSIS — I50.22 CHRONIC SYSTOLIC (CONGESTIVE) HEART FAILURE (HCC): Primary | ICD-10-CM

## 2024-04-12 DIAGNOSIS — I48.91 ATRIAL FIBRILLATION (HCC): ICD-10-CM

## 2024-04-12 DIAGNOSIS — I48.19 PERSISTENT ATRIAL FIBRILLATION (HCC): ICD-10-CM

## 2024-04-15 ENCOUNTER — OFFICE VISIT (OUTPATIENT)
Dept: FAMILY MEDICINE CLINIC | Facility: CLINIC | Age: 79
End: 2024-04-15

## 2024-04-15 VITALS
RESPIRATION RATE: 17 BRPM | BODY MASS INDEX: 33.59 KG/M2 | TEMPERATURE: 97.4 F | DIASTOLIC BLOOD PRESSURE: 53 MMHG | HEIGHT: 66 IN | HEART RATE: 73 BPM | OXYGEN SATURATION: 96 % | WEIGHT: 209 LBS | SYSTOLIC BLOOD PRESSURE: 136 MMHG

## 2024-04-15 DIAGNOSIS — I11.0 HYPERTENSIVE HEART DISEASE WITH HEART FAILURE (HCC): ICD-10-CM

## 2024-04-15 DIAGNOSIS — R79.89 ELEVATED BRAIN NATRIURETIC PEPTIDE (BNP) LEVEL: ICD-10-CM

## 2024-04-15 DIAGNOSIS — I10 ESSENTIAL HYPERTENSION: ICD-10-CM

## 2024-04-15 DIAGNOSIS — R60.0 BILATERAL LOWER EXTREMITY EDEMA: ICD-10-CM

## 2024-04-15 DIAGNOSIS — J44.9 STAGE 3 SEVERE COPD BY GOLD CLASSIFICATION (HCC): ICD-10-CM

## 2024-04-15 DIAGNOSIS — M54.50 CHRONIC BILATERAL LOW BACK PAIN, UNSPECIFIED WHETHER SCIATICA PRESENT: ICD-10-CM

## 2024-04-15 DIAGNOSIS — Z09 HOSPITAL DISCHARGE FOLLOW-UP: Primary | ICD-10-CM

## 2024-04-15 DIAGNOSIS — Z95.0 STATUS POST PLACEMENT OF CARDIAC PACEMAKER: ICD-10-CM

## 2024-04-15 DIAGNOSIS — N18.31 STAGE 3A CHRONIC KIDNEY DISEASE (HCC): ICD-10-CM

## 2024-04-15 DIAGNOSIS — G89.29 CHRONIC BILATERAL LOW BACK PAIN, UNSPECIFIED WHETHER SCIATICA PRESENT: ICD-10-CM

## 2024-04-15 DIAGNOSIS — I48.91 ATRIAL FIBRILLATION WITH TACHYCARDIC VENTRICULAR RATE (HCC): ICD-10-CM

## 2024-04-15 LAB
ANION GAP SERPL CALC-SCNC: 4 MMOL/L (ref 2–11)
BUN SERPL-MCNC: 17 MG/DL (ref 8–23)
CALCIUM SERPL-MCNC: 8.7 MG/DL (ref 8.3–10.4)
CHLORIDE SERPL-SCNC: 102 MMOL/L (ref 103–113)
CO2 SERPL-SCNC: 32 MMOL/L (ref 21–32)
CREAT SERPL-MCNC: 0.9 MG/DL (ref 0.6–1)
GLUCOSE SERPL-MCNC: 96 MG/DL (ref 65–100)
MAGNESIUM SERPL-MCNC: 1.9 MG/DL (ref 1.8–2.4)
NT PRO BNP: 2824 PG/ML
POTASSIUM SERPL-SCNC: 3.9 MMOL/L (ref 3.5–5.1)
SODIUM SERPL-SCNC: 138 MMOL/L (ref 136–146)

## 2024-04-15 SDOH — ECONOMIC STABILITY: FOOD INSECURITY: WITHIN THE PAST 12 MONTHS, YOU WORRIED THAT YOUR FOOD WOULD RUN OUT BEFORE YOU GOT MONEY TO BUY MORE.: NEVER TRUE

## 2024-04-15 SDOH — ECONOMIC STABILITY: INCOME INSECURITY: HOW HARD IS IT FOR YOU TO PAY FOR THE VERY BASICS LIKE FOOD, HOUSING, MEDICAL CARE, AND HEATING?: NOT HARD AT ALL

## 2024-04-15 SDOH — ECONOMIC STABILITY: FOOD INSECURITY: WITHIN THE PAST 12 MONTHS, THE FOOD YOU BOUGHT JUST DIDN'T LAST AND YOU DIDN'T HAVE MONEY TO GET MORE.: NEVER TRUE

## 2024-04-15 ASSESSMENT — PATIENT HEALTH QUESTIONNAIRE - PHQ9
9. THOUGHTS THAT YOU WOULD BE BETTER OFF DEAD, OR OF HURTING YOURSELF: NOT AT ALL
SUM OF ALL RESPONSES TO PHQ QUESTIONS 1-9: 0
2. FEELING DOWN, DEPRESSED OR HOPELESS: NOT AT ALL
SUM OF ALL RESPONSES TO PHQ QUESTIONS 1-9: 0
SUM OF ALL RESPONSES TO PHQ QUESTIONS 1-9: 0
SUM OF ALL RESPONSES TO PHQ9 QUESTIONS 1 & 2: 0
1. LITTLE INTEREST OR PLEASURE IN DOING THINGS: NOT AT ALL
SUM OF ALL RESPONSES TO PHQ QUESTIONS 1-9: 0

## 2024-04-15 NOTE — PROGRESS NOTES
oxygen.   HENT:      Head: Normocephalic.   Cardiovascular:      Rate and Rhythm: Normal rate and regular rhythm.      Heart sounds: Normal heart sounds.   Pulmonary:      Effort: Pulmonary effort is normal. No respiratory distress.      Breath sounds: Normal breath sounds. No wheezing, rhonchi or rales.   Musculoskeletal:      Right lower leg: Edema present.      Left lower le+ Edema present.   Neurological:      Mental Status: She is alert.   Psychiatric:         Mood and Affect: Mood normal.         Behavior: Behavior normal.         Thought Content: Thought content normal.       ASSESSMENT & PLAN    ICD-10-CM    1. Hospital discharge follow-up  Z09       2. Atrial fibrillation with tachycardic ventricular rate (HCC)  I48.91       3. Status post placement of cardiac pacemaker  Z95.0       4. Bilateral lower extremity edema  R60.0 Basic Metabolic Panel     Brain Natriuretic Peptide     Magnesium     Magnesium     Brain Natriuretic Peptide     Basic Metabolic Panel      5. Elevated brain natriuretic peptide (BNP) level  R79.89 Brain Natriuretic Peptide     Brain Natriuretic Peptide      6. Stage 3 severe COPD by GOLD classification ([facility])  J44.9 Basic Metabolic Panel     Magnesium     Magnesium     Basic Metabolic Panel      7. Stage 3a chronic kidney disease (HCC)  N18.31       8. Essential hypertension  I10       9. Chronic bilateral low back pain, unspecified whether sciatica present  M54.50     G89.29       10. Hypertensive heart disease with heart failure ([facility])  I11.0 Brain Natriuretic Peptide     Brain Natriuretic Peptide           1. Hospital discharge follow-up  *Hospital records reviewed and discussed.    2. Atrial fibrillation with tachycardic ventricular rate (HCC)  *Status post pacemaker placement.  Recovering well.    3. Status post placement of cardiac pacemaker  *Doing well status postplacement.  Following with cardiology.    4. Bilateral lower extremity edema  *Will recheck labs today.  *Will

## 2024-04-16 ENCOUNTER — TELEPHONE (OUTPATIENT)
Dept: FAMILY MEDICINE CLINIC | Facility: CLINIC | Age: 79
End: 2024-04-16

## 2024-04-16 ENCOUNTER — TELEPHONE (OUTPATIENT)
Age: 79
End: 2024-04-16

## 2024-04-16 ASSESSMENT — ENCOUNTER SYMPTOMS
CHEST TIGHTNESS: 0
WHEEZING: 0
SHORTNESS OF BREATH: 1
COUGH: 0

## 2024-04-16 NOTE — TELEPHONE ENCOUNTER
Home Health called said still short of breath Weight Gain today 202 They did labs on her today FYI  They are in touch with Will FAUST

## 2024-04-16 NOTE — TELEPHONE ENCOUNTER
Patient states [facility] took labs this morning and she is asking if we can call with the results.

## 2024-04-16 NOTE — TELEPHONE ENCOUNTER
Would contact cardiology as her BNP level was higher, would increase the Lasix to 1 twice daily instead of a half of the 1 in the afternoon.  Let me know if they have difficulty getting in touch with cardiology

## 2024-04-17 ENCOUNTER — CARE COORDINATION (OUTPATIENT)
Dept: CARE COORDINATION | Facility: CLINIC | Age: 79
End: 2024-04-17

## 2024-04-17 NOTE — CARE COORDINATION
Care Transitions Follow Up Call    Patient Current Location:  Home: [facility] [place] [address]    [facility] Care Coordinator contacted the patient by telephone to follow up after admission on 3.29.24.  Verified name and  with patient as identifiers.    Patient: Rylee Orr  Patient : 1945   MRN: [id number]  Reason for Admission: elective/planned surgery: Insert PPM biv multi; Ablation AV node   Discharge Date: 24 RARS: Readmission Risk Score: 15.5      Needs to be reviewed by the provider   Additional needs identified to be addressed with provider: No  none             Method of communication with provider: none.    Patient reports she is progressing slowly, c/o swelling in lower extremities and SOB,  nurse contacted providers and notified of weight gain and SOB, labs were done per patient and she is awaiting results and instructions at this time. Patient reports she has attended follow up as scheduled. Patient denies new needs or concerns and will reach out if she is not contacted with guidance.    Addressed changes since last contact:  none      Follow Up  Future Appointments   Date Time Provider Department Center   6/3/2024 10:15 AM Jeanmarie Resendez MD FPA GVL AMB   6/10/2024  2:20 PM Aniceto Ndiaye PA POAG GVL AMB   2024  2:00 PM Tom Alegria, APRN - CNP DG GVL AMB   2024  2:00 PM [facility] DEVICE 39 UCDG GVL AMB   2024  9:10 AM Zofia Negrete MD PPS GVL AMB   2024  2:45 PM Jacob Dejesus MD UCDE GVL AMB   2024  1:15 PM Scarlett iNck PA POTANNER GVL AMB         N Care Coordinator reviewed medical action plan and red flags with patient and discussed any barriers to care and/or understanding of plan of care after discharge. Discussed appropriate site of care based on symptoms and resources available to patient including: PCP  Specialist  Home health  When to call 911  [employer]. The patient agrees to contact the PCP

## 2024-04-18 ENCOUNTER — TELEPHONE (OUTPATIENT)
Age: 79
End: 2024-04-18

## 2024-04-18 NOTE — TELEPHONE ENCOUNTER
Called patient and helped her manually send transmission.  BiV pacemaker with chronic AF and recent AV node ablation.  99% BiV paced.  Normal pacemaker function.  No ventricular high rate events.  Possible fluid overload with recent weight gain?  Please call patient back.  Device normal.  Thank you.

## 2024-04-18 NOTE — TELEPHONE ENCOUNTER
Pt called c/o SOB    Worsen on exertion  Better with rest    Duration: 3 weeks    Sx:  Wheezing  Weight gain of 2lb in 24hrs    Denies:   CP  Palp  Dizziness,   Light headed  Headache    BP:151/72  HR:75    Meds:  Eliquis 5mg BID  Lasix 40mg   Losartan 12.5mg  Toprol XL 25mg    No

## 2024-04-18 NOTE — TELEPHONE ENCOUNTER
Pt states she had a pacemaker and ablation 3 weeks ago and has been very SOB pt would also like to know her blood work results

## 2024-04-18 NOTE — TELEPHONE ENCOUNTER
Those numbers are essentially stable for her.  But this is the reason we are increasing her furosemide.  Patient needs to continue to focus on diet and weight loss.  Most of this elevated BNP is driven by morbid obesity and severe pulmonary hypertension.

## 2024-04-18 NOTE — TELEPHONE ENCOUNTER
Spoke with pt and review  note. Pt v/u .    Pt was concern about their BNP lab results.     4/15/2024    BNP- 2'824      4/1/2024    BNP- 1'478

## 2024-04-22 ENCOUNTER — TELEPHONE (OUTPATIENT)
Age: 79
End: 2024-04-22

## 2024-04-22 DIAGNOSIS — I10 ESSENTIAL HYPERTENSION: ICD-10-CM

## 2024-04-22 DIAGNOSIS — I50.22 CHRONIC SYSTOLIC (CONGESTIVE) HEART FAILURE (HCC): Primary | ICD-10-CM

## 2024-04-22 NOTE — TELEPHONE ENCOUNTER
Please refer to note on 4/22/24    States that the pt had these labs done last week. Wants to know if she needs to do them again. She will be faxing us the results from last week

## 2024-04-22 NOTE — TELEPHONE ENCOUNTER
Since her heart surgery is having bad cramps in her left leg and right hand and arm. What to do? Please call

## 2024-04-22 NOTE — TELEPHONE ENCOUNTER
Pt.is on Lasix 80mg qday and has been having a lot of cramping since pacemaker inserted.Cramping in left leg and her rt.hand.Most recent blood work was 4/15 but potassium and Mg.okay at the time.Cramps occur in leg mostly at night but in hand/arm during the day.Does she need labs rechecked since she is on Lasix?Should we defer to PCP?

## 2024-04-22 NOTE — TELEPHONE ENCOUNTER
I called and informed pt.of MD response and she v/u.Labs entered as below:  Orders Placed This Encounter   Procedures    Basic Metabolic Panel     Standing Status:   Future     Standing Expiration Date:   4/22/2025    Magnesium     Standing Status:   Future     Standing Expiration Date:   4/22/2025

## 2024-04-23 ENCOUNTER — TELEPHONE (OUTPATIENT)
Age: 79
End: 2024-04-23

## 2024-04-23 NOTE — TELEPHONE ENCOUNTER
Called 4/15 w/elevated BNP Lasix was increased to 40mg bid x7 days.Day 7 is tomorrow.Home Health called in today and reported pt.gained 2 pounds.    I spoke w/pt.she said wt.was only up a little over pound not 2 pounds.She said she had a really big meal last night.Feels fine thinks that her intake of food last night caused her wt.gain.Will monitor going forward.

## 2024-04-23 NOTE — TELEPHONE ENCOUNTER
Home health called on  behalf of Patient. Patient wanted to report weight gain after taking LASIX. Pt currently weighs 207.2, last week she weighed 205. Patient states that her SOB is improving.

## 2024-04-29 ENCOUNTER — TELEPHONE (OUTPATIENT)
Dept: FAMILY MEDICINE CLINIC | Facility: CLINIC | Age: 79
End: 2024-04-29

## 2024-04-29 RX ORDER — POTASSIUM CHLORIDE 750 MG/1
10 TABLET, EXTENDED RELEASE ORAL DAILY
Qty: 90 TABLET | Refills: 1 | Status: SHIPPED | OUTPATIENT
Start: 2024-04-29

## 2024-04-29 NOTE — TELEPHONE ENCOUNTER
Sent in prescription for:     Requested Prescriptions     Signed Prescriptions Disp Refills    potassium chloride (KLOR-CON M) 10 MEQ extended release tablet 90 tablet 1     Sig: Take 1 tablet by mouth daily     Authorizing Provider: PABLO BRUCE

## 2024-04-29 NOTE — TELEPHONE ENCOUNTER
Called Pt after hosp discharge.   Stating she is needing a Rx for Potassium.  Pt presently taking Lasix.  [place]   Pt has a follow up appt scheduled 05/03/24

## 2024-04-29 NOTE — TELEPHONE ENCOUNTER
Care Transitions Initial Follow Up Call    Outreach made within 2 business days of discharge: Yes    Patient: Rylee Orr Patient : 1945   MRN: [id number]  Reason for Admission:Pneumonia and edema  Discharge Date: 24Spoke with: Patient    Discharge department/facility: [facility] Interactive Patient Contact:  Was patient able to fill all prescriptions: No: Pt needing Rx for Potassium   Informed will discuss with Dr Resendez and Rx can be sent in.Was patient instructed to bring all medications to the follow-up visit: Yes  Is patient taking all medications as directed in the discharge summary? Yes  Does patient understand their discharge instructions: Yes  Does patient have questions or concerns that need addressed prior to 7-14 day follow up office visit: no     Pt requesting a diet to be printed at office visit for Low Sodium Diet  Scheduled appointment with PCP within 7-14 days    Follow Up  Future Appointments   Date Time Provider Department Center   5/3/2024  3:00 PM Syed Mayfield PA-C FPA [facility] AMB   5/10/2024  4:00 PM Jacob Dejesus MD DE [facility] AMB   6/3/2024 10:15 AM Jeanmarie Resendez MD FPA [facility] AMB   6/10/2024  2:20 PM Aniceto Ndiaye PA POAG [facility] AMB   2024  2:00 PM Tom Alegria, APRN - CNP DG [facility] AMB   2024  2:00 PM [facility] DEVICE 39 UCDG [facility] AMB   2024  9:10 AM Zofia Negrete MD PPS [facility] AMB   2024  2:45 PM Jacob Dejesus MD UCDE [facility] AMB   2024  1:15 PM Scarlett Nick PA TANNER [facility] AMB       Belen Mazariegos LPN

## 2024-04-29 NOTE — TELEPHONE ENCOUNTER
Spoke with Sarah with [facility].  Stating has resumed Home Health visits.   Twice this week then weekly

## 2024-05-01 ENCOUNTER — CARE COORDINATION (OUTPATIENT)
Dept: CARE COORDINATION | Facility: CLINIC | Age: 79
End: 2024-05-01

## 2024-05-01 RX ORDER — LOSARTAN POTASSIUM 25 MG/1
12.5 TABLET ORAL DAILY
Qty: 45 TABLET | Refills: 3 | Status: SHIPPED | OUTPATIENT
Start: 2024-05-01

## 2024-05-01 NOTE — TELEPHONE ENCOUNTER
Pt is down to 5 pills and wants to know if Dr Toro wants her to stay on it. If so she will need a refill.    Medication:  Losartan (COZAAR) 25 MG tablet    [facility]   [address] [place]  [address] [phone]

## 2024-05-01 NOTE — TELEPHONE ENCOUNTER
Requested Prescriptions     Pending Prescriptions Disp Refills    losartan (COZAAR) 25 MG tablet 45 tablet 3     Sig: Take 0.5 tablets by mouth daily

## 2024-05-01 NOTE — CARE COORDINATION
Opened chart for [facility] outreach, noted on chart review patient returned to hospital and was readmitted on 4/24/24 at [facility] for acute care.  PCP office completed [facility].  No further outreach is indicated.

## 2024-05-10 ENCOUNTER — OFFICE VISIT (OUTPATIENT)
Age: 79
End: 2024-05-10

## 2024-05-10 ENCOUNTER — OFFICE VISIT (OUTPATIENT)
Dept: FAMILY MEDICINE CLINIC | Facility: CLINIC | Age: 79
End: 2024-05-10

## 2024-05-10 VITALS
WEIGHT: 205.4 LBS | RESPIRATION RATE: 17 BRPM | BODY MASS INDEX: 33.01 KG/M2 | HEIGHT: 66 IN | DIASTOLIC BLOOD PRESSURE: 66 MMHG | OXYGEN SATURATION: 96 % | SYSTOLIC BLOOD PRESSURE: 151 MMHG | TEMPERATURE: 97.5 F | HEART RATE: 72 BPM

## 2024-05-10 VITALS
DIASTOLIC BLOOD PRESSURE: 80 MMHG | HEIGHT: 67 IN | BODY MASS INDEX: 32.18 KG/M2 | WEIGHT: 205 LBS | SYSTOLIC BLOOD PRESSURE: 139 MMHG | HEART RATE: 71 BPM

## 2024-05-10 DIAGNOSIS — G47.33 OSA (OBSTRUCTIVE SLEEP APNEA): ICD-10-CM

## 2024-05-10 DIAGNOSIS — I48.19 PERSISTENT ATRIAL FIBRILLATION (HCC): Primary | ICD-10-CM

## 2024-05-10 DIAGNOSIS — I48.11 LONGSTANDING PERSISTENT ATRIAL FIBRILLATION (HCC): Chronic | ICD-10-CM

## 2024-05-10 DIAGNOSIS — Z95.0 STATUS POST PLACEMENT OF CARDIAC PACEMAKER: ICD-10-CM

## 2024-05-10 DIAGNOSIS — I10 ESSENTIAL HYPERTENSION: ICD-10-CM

## 2024-05-10 DIAGNOSIS — G47.33 OSA AND COPD OVERLAP SYNDROME (HCC): Chronic | ICD-10-CM

## 2024-05-10 DIAGNOSIS — E87.6 HYPOKALEMIA: ICD-10-CM

## 2024-05-10 DIAGNOSIS — J96.21 ACUTE ON CHRONIC RESPIRATORY FAILURE WITH HYPOXEMIA (HCC): ICD-10-CM

## 2024-05-10 DIAGNOSIS — I50.22 CHRONIC SYSTOLIC (CONGESTIVE) HEART FAILURE (HCC): ICD-10-CM

## 2024-05-10 DIAGNOSIS — J96.11 CHRONIC RESPIRATORY FAILURE WITH HYPOXIA (HCC): ICD-10-CM

## 2024-05-10 DIAGNOSIS — I87.8 VENOUS STASIS: Chronic | ICD-10-CM

## 2024-05-10 DIAGNOSIS — J44.9 OSA AND COPD OVERLAP SYNDROME (HCC): Chronic | ICD-10-CM

## 2024-05-10 DIAGNOSIS — E78.00 PURE HYPERCHOLESTEROLEMIA: ICD-10-CM

## 2024-05-10 DIAGNOSIS — Z95.0 STATUS POST BIVENTRICULAR PACEMAKER: Chronic | ICD-10-CM

## 2024-05-10 DIAGNOSIS — J18.9 PNEUMONIA OF RIGHT LOWER LOBE DUE TO INFECTIOUS ORGANISM: ICD-10-CM

## 2024-05-10 DIAGNOSIS — E66.9 OBESITY (BMI 30-39.9): ICD-10-CM

## 2024-05-10 DIAGNOSIS — I25.119 CORONARY ARTERY DISEASE INVOLVING NATIVE CORONARY ARTERY OF NATIVE HEART WITH ANGINA PECTORIS (HCC): ICD-10-CM

## 2024-05-10 DIAGNOSIS — I27.20 PULMONARY HYPERTENSION (HCC): ICD-10-CM

## 2024-05-10 DIAGNOSIS — E11.00 TYPE 2 DIABETES MELLITUS WITH HYPEROSMOLARITY WITHOUT COMA, WITHOUT LONG-TERM CURRENT USE OF INSULIN (HCC): ICD-10-CM

## 2024-05-10 DIAGNOSIS — Z09 HOSPITAL DISCHARGE FOLLOW-UP: Primary | ICD-10-CM

## 2024-05-10 DIAGNOSIS — I48.91 ATRIAL FIBRILLATION WITH TACHYCARDIC VENTRICULAR RATE (HCC): ICD-10-CM

## 2024-05-10 PROBLEM — I50.9 ACUTE CONGESTIVE HEART FAILURE (HCC): Status: RESOLVED | Noted: 2022-11-01 | Resolved: 2024-05-10

## 2024-05-10 PROBLEM — I50.33 ACUTE ON CHRONIC DIASTOLIC (CONGESTIVE) HEART FAILURE (HCC): Status: RESOLVED | Noted: 2022-11-01 | Resolved: 2024-05-10

## 2024-05-10 PROBLEM — I73.9 PERIPHERAL VASCULAR DISEASE (HCC): Status: RESOLVED | Noted: 2020-07-02 | Resolved: 2024-05-10

## 2024-05-10 LAB
ANION GAP SERPL CALC-SCNC: 9 MMOL/L (ref 9–18)
BUN SERPL-MCNC: 14 MG/DL (ref 8–23)
CALCIUM SERPL-MCNC: 8.7 MG/DL (ref 8.8–10.2)
CHLORIDE SERPL-SCNC: 105 MMOL/L (ref 98–107)
CO2 SERPL-SCNC: 27 MMOL/L (ref 20–28)
CREAT SERPL-MCNC: 0.83 MG/DL (ref 0.6–1.1)
GLUCOSE SERPL-MCNC: 99 MG/DL (ref 70–99)
POTASSIUM SERPL-SCNC: 4.2 MMOL/L (ref 3.5–5.1)
SODIUM SERPL-SCNC: 142 MMOL/L (ref 136–145)

## 2024-05-10 ASSESSMENT — PATIENT HEALTH QUESTIONNAIRE - PHQ9
SUM OF ALL RESPONSES TO PHQ9 QUESTIONS 1 & 2: 0
2. FEELING DOWN, DEPRESSED OR HOPELESS: NOT AT ALL
SUM OF ALL RESPONSES TO PHQ QUESTIONS 1-9: 0
1. LITTLE INTEREST OR PLEASURE IN DOING THINGS: NOT AT ALL

## 2024-05-10 ASSESSMENT — ENCOUNTER SYMPTOMS
ABDOMINAL PAIN: 0
SHORTNESS OF BREATH: 1
COUGH: 0
BACK PAIN: 1

## 2024-05-10 NOTE — PROGRESS NOTES
[facility] CARDIOLOGY  [address] [place] [address], [address]  [place] [address]      05/10/24      NAME:  Rylee Orr  : 1945  MRN: [id number]      SUBJECTIVE:   Rylee Orr is a 78 y.o. female seen for a follow up visit regarding the following:     Chief Complaint   Patient presents with    Coronary Artery Disease    Atrial Fibrillation       HPI:   78 y.o. male with history of severe dyspnea and chest pressure with exertion.  Patient has a strong family history of premature CAD and MI.  She has HTN, Chol, and long history of tobacco abuse.  She also has a history of DVTs and PE many years ago.  She had cardiac catheterization 2014 with normal coronaries.  She was seen in office 2014 with atrial fibrillation with RVR that spontaneously converted.  She is now developed persistent atrial fibrillation status post AV node ablation and biventricular pacemaker.  This has been stable.  Patient struggles with morbid obesity and chronic hypoxic hypercapnia.  Requires CPAP and O2 at home.  She has moderate pulmonary hypertension.  Most recent echocardiogram 2024 with normal left ventricular systolic function.  She had trivial pericardial effusion.  Echocardiogram 2024 demonstrated PA systolic pressure of 57 mmHg.  This is chronic and stable.  Patient was hospitalized 2024 secondary to acute hypoxic hypercapnic respiratory failure.  She was treated for community-acquired pneumonia and COPD exacerbation.            Past Medical History, Past Surgical History, Family history, Social History, and Medications were all reviewed with the patient today and updated as necessary.     Current Outpatient Medications   Medication Sig Dispense Refill    losartan (COZAAR) 25 MG tablet Take 0.5 tablets by mouth daily 45 tablet 3    potassium chloride (KLOR-CON M) 10 MEQ extended release tablet Take 1 tablet by mouth daily 90 tablet 1    furosemide (LASIX) 40 MG tablet Take 1 tablet by mouth daily 30 tablet

## 2024-05-10 NOTE — PROGRESS NOTES
and regular rhythm.      Heart sounds: Normal heart sounds.   Pulmonary:      Effort: Pulmonary effort is normal. No respiratory distress.      Breath sounds: Normal breath sounds. No wheezing, rhonchi or rales.   Neurological:      Mental Status: She is alert.   Psychiatric:         Mood and Affect: Mood normal.         Behavior: Behavior normal.         Thought Content: Thought content normal.       ASSESSMENT & PLAN    ICD-10-CM    1. Hospital discharge follow-up  Z09       2. Acute on chronic respiratory failure with hypoxemia (HCC)  J96.21       3. Pneumonia of right lower lobe due to infectious organism  J18.9       4. Chronic systolic (congestive) heart failure (HCC)  I50.22       5. Essential hypertension  I10       6. Atrial fibrillation with tachycardic ventricular rate (HCC)  I48.91       7. Status post placement of cardiac pacemaker  Z95.0       8. Hypokalemia  E87.6 Basic Metabolic Panel     Basic Metabolic Panel           1. Hospital discharge follow-up  *Hospital records reviewed and discussed.    2. Acute on chronic respiratory failure with hypoxemia (HCC)  *Resolved during hospitalization.    3. Pneumonia of right lower lobe due to infectious organism  *Symptoms steadily improving after course of antibiotics.  *Patient has a previously scheduled follow-up in 1 month.  We can recheck chest x-ray at that time.    4. Chronic systolic (congestive) heart failure (HCC)  *Stable issue.  Following with cardiology.  She has a follow-up scheduled later this afternoon.    5. Essential hypertension  *Blood pressure is elevated today.  Patient would like to get cardiology input at her visit this afternoon.    6. Atrial fibrillation with tachycardic ventricular rate (HCC)  *Stable.  Pacemaker in place.  Following with cardiology.    7. Status post placement of cardiac pacemaker  *As above.    8. Hypokalemia  *Patient currently taking KCl 10 mEq daily.  Will recheck metabolic panel today.  - Basic Metabolic

## 2024-05-16 ENCOUNTER — TELEPHONE (OUTPATIENT)
Dept: PULMONOLOGY | Age: 79
End: 2024-05-16

## 2024-05-16 NOTE — TELEPHONE ENCOUNTER
Patient was in the hospital been out a couple wks. She is asking for a Inogen POC. Patient says that the tanks are to heavy to get in the car.

## 2024-05-20 NOTE — TELEPHONE ENCOUNTER
Spoke to patient and informed her will have to walk her at her follow up visit on 6/4/2024 to get her liter flow that is needed before can send order in for POC. She states when she was in the hospital was told to turn 02 down to 1lpm continuous. Will assess oxygen needs at follow up visit and voices understanding.

## 2024-05-29 ENCOUNTER — TELEPHONE (OUTPATIENT)
Dept: FAMILY MEDICINE CLINIC | Facility: CLINIC | Age: 79
End: 2024-05-29

## 2024-05-29 NOTE — TELEPHONE ENCOUNTER
HOME HEALTH NURSE CALL      Name of the Nurse Calling and Facility: [facility]       Best Contact Number to Reach the Nurse: [phone]          Reason For Call: Nursing Recertified for 1 time a week fpr 3 weeks and every other week until end of cert

## 2024-06-03 ENCOUNTER — OFFICE VISIT (OUTPATIENT)
Dept: FAMILY MEDICINE CLINIC | Facility: CLINIC | Age: 79
End: 2024-06-03
Payer: MEDICARE

## 2024-06-03 VITALS
WEIGHT: 205 LBS | SYSTOLIC BLOOD PRESSURE: 156 MMHG | OXYGEN SATURATION: 94 % | BODY MASS INDEX: 32.18 KG/M2 | DIASTOLIC BLOOD PRESSURE: 78 MMHG | RESPIRATION RATE: 16 BRPM | TEMPERATURE: 98 F | HEIGHT: 67 IN | HEART RATE: 72 BPM

## 2024-06-03 DIAGNOSIS — N39.0 URINARY TRACT INFECTION WITHOUT HEMATURIA, SITE UNSPECIFIED: ICD-10-CM

## 2024-06-03 DIAGNOSIS — I25.119 CORONARY ARTERY DISEASE INVOLVING NATIVE CORONARY ARTERY OF NATIVE HEART WITH ANGINA PECTORIS (HCC): ICD-10-CM

## 2024-06-03 DIAGNOSIS — J44.9 OSA AND COPD OVERLAP SYNDROME (HCC): Chronic | ICD-10-CM

## 2024-06-03 DIAGNOSIS — I27.20 PULMONARY HYPERTENSION (HCC): ICD-10-CM

## 2024-06-03 DIAGNOSIS — I10 ESSENTIAL HYPERTENSION: Primary | ICD-10-CM

## 2024-06-03 DIAGNOSIS — G47.33 OSA AND COPD OVERLAP SYNDROME (HCC): Chronic | ICD-10-CM

## 2024-06-03 DIAGNOSIS — E78.00 PURE HYPERCHOLESTEROLEMIA: ICD-10-CM

## 2024-06-03 DIAGNOSIS — I48.11 LONGSTANDING PERSISTENT ATRIAL FIBRILLATION (HCC): Chronic | ICD-10-CM

## 2024-06-03 DIAGNOSIS — D64.9 ANEMIA, UNSPECIFIED TYPE: ICD-10-CM

## 2024-06-03 DIAGNOSIS — M47.816 LUMBAR SPONDYLOSIS: ICD-10-CM

## 2024-06-03 LAB
ALBUMIN SERPL-MCNC: 3.7 G/DL (ref 3.2–4.6)
ALBUMIN/GLOB SERPL: 1.4 (ref 1–1.9)
ALP SERPL-CCNC: 77 U/L (ref 35–104)
ALT SERPL-CCNC: 17 U/L (ref 12–65)
ANION GAP SERPL CALC-SCNC: 10 MMOL/L (ref 9–18)
AST SERPL-CCNC: 33 U/L (ref 15–37)
BASOPHILS # BLD: 0.1 K/UL (ref 0–0.2)
BASOPHILS NFR BLD: 1 % (ref 0–2)
BILIRUB SERPL-MCNC: 0.4 MG/DL (ref 0–1.2)
BILIRUBIN, URINE, POC: NEGATIVE
BLOOD URINE, POC: ABNORMAL
BUN SERPL-MCNC: 18 MG/DL (ref 8–23)
CALCIUM SERPL-MCNC: 8.8 MG/DL (ref 8.8–10.2)
CHLORIDE SERPL-SCNC: 107 MMOL/L (ref 98–107)
CO2 SERPL-SCNC: 26 MMOL/L (ref 20–28)
CREAT SERPL-MCNC: 0.88 MG/DL (ref 0.6–1.1)
DIFFERENTIAL METHOD BLD: ABNORMAL
EOSINOPHIL # BLD: 0.1 K/UL (ref 0–0.8)
EOSINOPHIL NFR BLD: 1 % (ref 0.5–7.8)
ERYTHROCYTE [DISTWIDTH] IN BLOOD BY AUTOMATED COUNT: 14.6 % (ref 11.9–14.6)
GLOBULIN SER CALC-MCNC: 2.6 G/DL (ref 2.3–3.5)
GLUCOSE SERPL-MCNC: 88 MG/DL (ref 70–99)
GLUCOSE URINE, POC: NEGATIVE
HCT VFR BLD AUTO: 36.3 % (ref 35.8–46.3)
HGB BLD-MCNC: 11.2 G/DL (ref 11.7–15.4)
IMM GRANULOCYTES # BLD AUTO: 0.1 K/UL (ref 0–0.5)
IMM GRANULOCYTES NFR BLD AUTO: 1 % (ref 0–5)
KETONES, URINE, POC: NEGATIVE
LEUKOCYTE ESTERASE, URINE, POC: ABNORMAL
LYMPHOCYTES # BLD: 2.1 K/UL (ref 0.5–4.6)
LYMPHOCYTES NFR BLD: 30 % (ref 13–44)
MCH RBC QN AUTO: 32 PG (ref 26.1–32.9)
MCHC RBC AUTO-ENTMCNC: 30.9 G/DL (ref 31.4–35)
MCV RBC AUTO: 103.7 FL (ref 82–102)
MONOCYTES # BLD: 0.6 K/UL (ref 0.1–1.3)
MONOCYTES NFR BLD: 8 % (ref 4–12)
NEUTS SEG # BLD: 4.1 K/UL (ref 1.7–8.2)
NEUTS SEG NFR BLD: 59 % (ref 43–78)
NITRITE, URINE, POC: NEGATIVE
NRBC # BLD: 0 K/UL (ref 0–0.2)
PH, URINE, POC: 5 (ref 4.6–8)
PLATELET # BLD AUTO: 147 K/UL (ref 150–450)
PMV BLD AUTO: 10.8 FL (ref 9.4–12.3)
POTASSIUM SERPL-SCNC: 4.1 MMOL/L (ref 3.5–5.1)
PROT SERPL-MCNC: 6.2 G/DL (ref 6.3–8.2)
PROTEIN,URINE, POC: NEGATIVE
RBC # BLD AUTO: 3.5 M/UL (ref 4.05–5.2)
SODIUM SERPL-SCNC: 142 MMOL/L (ref 136–145)
SPECIFIC GRAVITY, URINE, POC: 1 (ref 1–1.03)
URINALYSIS CLARITY, POC: CLEAR
URINALYSIS COLOR, POC: YELLOW
UROBILINOGEN, POC: ABNORMAL
WBC # BLD AUTO: 7 K/UL (ref 4.3–11.1)

## 2024-06-03 PROCEDURE — 3077F SYST BP >= 140 MM HG: CPT | Performed by: FAMILY MEDICINE

## 2024-06-03 PROCEDURE — 3023F SPIROM DOC REV: CPT | Performed by: FAMILY MEDICINE

## 2024-06-03 PROCEDURE — 1123F ACP DISCUSS/DSCN MKR DOCD: CPT | Performed by: FAMILY MEDICINE

## 2024-06-03 PROCEDURE — 1036F TOBACCO NON-USER: CPT | Performed by: FAMILY MEDICINE

## 2024-06-03 PROCEDURE — G8400 PT W/DXA NO RESULTS DOC: HCPCS | Performed by: FAMILY MEDICINE

## 2024-06-03 PROCEDURE — 81002 URINALYSIS NONAUTO W/O SCOPE: CPT | Performed by: FAMILY MEDICINE

## 2024-06-03 PROCEDURE — 99214 OFFICE O/P EST MOD 30 MIN: CPT | Performed by: FAMILY MEDICINE

## 2024-06-03 PROCEDURE — G8427 DOCREV CUR MEDS BY ELIG CLIN: HCPCS | Performed by: FAMILY MEDICINE

## 2024-06-03 PROCEDURE — 1090F PRES/ABSN URINE INCON ASSESS: CPT | Performed by: FAMILY MEDICINE

## 2024-06-03 PROCEDURE — G8417 CALC BMI ABV UP PARAM F/U: HCPCS | Performed by: FAMILY MEDICINE

## 2024-06-03 PROCEDURE — 3078F DIAST BP <80 MM HG: CPT | Performed by: FAMILY MEDICINE

## 2024-06-03 RX ORDER — ASCORBIC ACID 500 MG
500 TABLET ORAL DAILY
Qty: 90 TABLET | Refills: 3 | COMMUNITY
Start: 2024-06-03

## 2024-06-03 RX ORDER — GABAPENTIN 300 MG/1
300 CAPSULE ORAL 3 TIMES DAILY
Qty: 90 CAPSULE | Refills: 2 | Status: SHIPPED | OUTPATIENT
Start: 2024-06-03 | End: 2024-09-01

## 2024-06-03 RX ORDER — FERROUS SULFATE 325(65) MG
325 TABLET ORAL
Qty: 90 TABLET | Refills: 1 | COMMUNITY
Start: 2024-06-03 | End: 2024-06-04

## 2024-06-03 ASSESSMENT — ENCOUNTER SYMPTOMS: SHORTNESS OF BREATH: 1

## 2024-06-03 NOTE — PROGRESS NOTES
Average packs/day: 3.0 packs/day for 20.0 years (60.0 ttl pk-yrs)     Types: Cigarettes     Start date: 1978     Quit date: 1998     Years since quittin.7     Passive exposure: Past    Smokeless tobacco: Never   Vaping Use    Vaping Use: Never used   Substance Use Topics    Alcohol use: No    Drug use: No       Review of Systems   Constitutional:  Positive for fatigue.   Respiratory:  Positive for shortness of breath.    Genitourinary:  Negative for dysuria.       Blood pressure (!) 156/78, pulse 72, temperature 98 °F (36.7 °C), temperature source Temporal, resp. rate 16, height 1.702 m (5' 7\"), weight 93 kg (205 lb), SpO2 94 %, not currently breastfeeding.    Physical Exam  Vitals reviewed.   Constitutional:       General: She is not in acute distress.     Appearance: Normal appearance.   Cardiovascular:      Rate and Rhythm: Normal rate and regular rhythm.      Pulses: Normal pulses.   Pulmonary:      Effort: Pulmonary effort is normal.      Breath sounds: Normal breath sounds.   Neurological:      General: No focal deficit present.      Mental Status: She is alert and oriented to person, place, and time.       Chest x-ray shows lungs are well-expanded, pacemaker is in place and no acute infiltrate noted  Results for orders placed or performed in visit on 24   AMB POC URINALYSIS DIP STICK MANUAL W/O MICRO   Result Value Ref Range    Color (UA POC) Yellow     Clarity (UA POC) Clear     Glucose, Urine, POC Negative     Bilirubin, Urine, POC Negative     Ketones, Urine, POC Negative     Specific Gravity, Urine, POC 1.005 1.001 - 1.035    Blood (UA POC) Trace     pH, Urine, POC 5.0 4.6 - 8.0    Protein, Urine, POC Negative     Urobilinogen, POC 0.2 mg/dL     Nitrite, Urine, POC Negative     Leukocyte Esterase, Urine, POC 2+          ASSESSMENT and PLAN  Rylee was seen today for hypertension, diabetes and pneumonia.    Diagnoses and all orders for this visit:    Essential hypertension  -     CBC

## 2024-06-03 NOTE — PROGRESS NOTES
Patient Name:  Rylee Orr                               YOB: 1945  MRN: [id number]                                                Office Visit 6/4/2024    ASSESSMENT AND PLAN:  (Medical Decision Making)    1. Dyspnea on exertion  Due to combined Gold stage III COPD, HFpEF, chronic A-fib (now much better controlled), Group 1 precapillary PH.  Recently had ablation and BiV pacemaker implantation.    2. Pulmonary hypertension (HCC)  Group 1 hemodynamics in setting of concomitant HFpEF.  Will reassess 6MWT now.  Her last PVR of >5WU.   She is on appropriate diuretics but having more trouble with dyspnea and hypoxia.  Recommend initiation of tadalafil.  6-minute walk test     PULMONARY MEDICATION JUSTIFICATION     DX: I27.20 - Pulmonary Hypertension    Is patient post-menopausal? YES - Age-related  Is patient currently on a Calcium Channel Blocker?  NO.      Why not?  On beta blocker for cardiac reasons and has struggled with hypotension   Previous Meds Tried and Failed:     N/A   Failure Reason:   N/A     3. Stage 3 severe COPD by GOLD classification (HCC)  Continue triple inhaler therapy with either Trelegy or Breztri.  Pulmonary function testing patient agrees to identify the preferred inhaler according to her insurance plan.    4. Exercise hypoxemia  Improved hypoxemia with normal sat at rest but requires 1lpm with exertion.      5.  MARISA  Completely noncompliant with CPAP for a variety of reasons.  Needs sleep center evaluation ASAP.    No orders of the defined types were placed in this encounter.        Zofia Negrete MD    Total time for encounter on day of encounter was 40 minutes.  This time includes chart prep, review of tests/procedures, review of other provider's notes, documentation and counseling patient regarding disease process and medications.     ___________________________________________________________________         ______      REASON FOR VISIT:   Chief Complaint

## 2024-06-04 ENCOUNTER — OFFICE VISIT (OUTPATIENT)
Dept: PULMONOLOGY | Age: 79
End: 2024-06-04
Payer: MEDICARE

## 2024-06-04 VITALS
SYSTOLIC BLOOD PRESSURE: 148 MMHG | OXYGEN SATURATION: 95 % | RESPIRATION RATE: 16 BRPM | HEART RATE: 72 BPM | DIASTOLIC BLOOD PRESSURE: 84 MMHG | TEMPERATURE: 97.7 F | WEIGHT: 203 LBS | HEIGHT: 67 IN | BODY MASS INDEX: 31.86 KG/M2

## 2024-06-04 DIAGNOSIS — J44.9 STAGE 3 SEVERE COPD BY GOLD CLASSIFICATION (HCC): ICD-10-CM

## 2024-06-04 DIAGNOSIS — R09.02 EXERCISE HYPOXEMIA: ICD-10-CM

## 2024-06-04 DIAGNOSIS — Z09 HOSPITAL DISCHARGE FOLLOW-UP: ICD-10-CM

## 2024-06-04 DIAGNOSIS — G47.33 OSA (OBSTRUCTIVE SLEEP APNEA): ICD-10-CM

## 2024-06-04 DIAGNOSIS — R06.09 DYSPNEA ON EXERTION: Primary | ICD-10-CM

## 2024-06-04 DIAGNOSIS — I27.20 PULMONARY HYPERTENSION (HCC): ICD-10-CM

## 2024-06-04 PROCEDURE — 99215 OFFICE O/P EST HI 40 MIN: CPT | Performed by: INTERNAL MEDICINE

## 2024-06-04 PROCEDURE — G8427 DOCREV CUR MEDS BY ELIG CLIN: HCPCS | Performed by: INTERNAL MEDICINE

## 2024-06-04 PROCEDURE — 3079F DIAST BP 80-89 MM HG: CPT | Performed by: INTERNAL MEDICINE

## 2024-06-04 PROCEDURE — G8417 CALC BMI ABV UP PARAM F/U: HCPCS | Performed by: INTERNAL MEDICINE

## 2024-06-04 PROCEDURE — 3077F SYST BP >= 140 MM HG: CPT | Performed by: INTERNAL MEDICINE

## 2024-06-04 PROCEDURE — 1111F DSCHRG MED/CURRENT MED MERGE: CPT | Performed by: INTERNAL MEDICINE

## 2024-06-04 PROCEDURE — 1090F PRES/ABSN URINE INCON ASSESS: CPT | Performed by: INTERNAL MEDICINE

## 2024-06-04 PROCEDURE — 1123F ACP DISCUSS/DSCN MKR DOCD: CPT | Performed by: INTERNAL MEDICINE

## 2024-06-04 PROCEDURE — G8400 PT W/DXA NO RESULTS DOC: HCPCS | Performed by: INTERNAL MEDICINE

## 2024-06-04 PROCEDURE — 1036F TOBACCO NON-USER: CPT | Performed by: INTERNAL MEDICINE

## 2024-06-04 PROCEDURE — 3023F SPIROM DOC REV: CPT | Performed by: INTERNAL MEDICINE

## 2024-06-04 RX ORDER — FUROSEMIDE 20 MG/1
10 TABLET ORAL DAILY
Qty: 45 TABLET | Refills: 3 | Status: SHIPPED | OUTPATIENT
Start: 2024-06-04

## 2024-06-04 NOTE — PATIENT INSTRUCTIONS
________________________________________________    **If your inhalers for COPD are asthma are expensive, do not buy them.  Instead, please find out know what inhalers for COPD or asthma are \"preferred\" on your insurance drug formulary by calling the member services phone number on the back of the insurance card.  The cost of the medication is dramatically different depending on this.  Once the preferred inhalers are known, please call us back with a list of the preferred options.  Call [phone] and ask to speak with a nurse.  We will choose the best option available for you and prescribe.**    Ask for Paris Iverson  ________________________________________________

## 2024-06-04 NOTE — RESULT ENCOUNTER NOTE
The blood sugar is good at 88, kidney function and liver enzymes are normal.  hemoglobin is good at 11.2 and white blood count is normal.

## 2024-06-05 ENCOUNTER — OFFICE VISIT (OUTPATIENT)
Dept: SLEEP MEDICINE | Age: 79
End: 2024-06-05
Payer: MEDICARE

## 2024-06-05 VITALS
RESPIRATION RATE: 17 BRPM | HEART RATE: 70 BPM | SYSTOLIC BLOOD PRESSURE: 126 MMHG | HEIGHT: 66 IN | WEIGHT: 206 LBS | TEMPERATURE: 97.7 F | BODY MASS INDEX: 33.11 KG/M2 | DIASTOLIC BLOOD PRESSURE: 73 MMHG | OXYGEN SATURATION: 90 %

## 2024-06-05 DIAGNOSIS — J44.9 STAGE 3 SEVERE COPD BY GOLD CLASSIFICATION (HCC): Primary | ICD-10-CM

## 2024-06-05 DIAGNOSIS — G47.34 NOCTURNAL HYPOXEMIA: ICD-10-CM

## 2024-06-05 DIAGNOSIS — G47.33 OSA ON CPAP: Primary | ICD-10-CM

## 2024-06-05 PROCEDURE — G8400 PT W/DXA NO RESULTS DOC: HCPCS | Performed by: PHYSICIAN ASSISTANT

## 2024-06-05 PROCEDURE — G8427 DOCREV CUR MEDS BY ELIG CLIN: HCPCS | Performed by: PHYSICIAN ASSISTANT

## 2024-06-05 PROCEDURE — 3074F SYST BP LT 130 MM HG: CPT | Performed by: PHYSICIAN ASSISTANT

## 2024-06-05 PROCEDURE — 1123F ACP DISCUSS/DSCN MKR DOCD: CPT | Performed by: PHYSICIAN ASSISTANT

## 2024-06-05 PROCEDURE — 1036F TOBACCO NON-USER: CPT | Performed by: PHYSICIAN ASSISTANT

## 2024-06-05 PROCEDURE — 1090F PRES/ABSN URINE INCON ASSESS: CPT | Performed by: PHYSICIAN ASSISTANT

## 2024-06-05 PROCEDURE — 99214 OFFICE O/P EST MOD 30 MIN: CPT | Performed by: PHYSICIAN ASSISTANT

## 2024-06-05 PROCEDURE — 3078F DIAST BP <80 MM HG: CPT | Performed by: PHYSICIAN ASSISTANT

## 2024-06-05 PROCEDURE — G8417 CALC BMI ABV UP PARAM F/U: HCPCS | Performed by: PHYSICIAN ASSISTANT

## 2024-06-05 ASSESSMENT — SLEEP AND FATIGUE QUESTIONNAIRES
HOW LIKELY ARE YOU TO NOD OFF OR FALL ASLEEP IN A CAR, WHILE STOPPED FOR A FEW MINUTES IN TRAFFIC: WOULD NEVER DOZE
ESS TOTAL SCORE: 2
HOW LIKELY ARE YOU TO NOD OFF OR FALL ASLEEP WHILE SITTING QUIETLY AFTER LUNCH WITHOUT ALCOHOL: WOULD NEVER DOZE
HOW LIKELY ARE YOU TO NOD OFF OR FALL ASLEEP WHILE SITTING AND READING: WOULD NEVER DOZE
HOW LIKELY ARE YOU TO NOD OFF OR FALL ASLEEP WHILE WATCHING TV: WOULD NEVER DOZE
HOW LIKELY ARE YOU TO NOD OFF OR FALL ASLEEP WHILE SITTING INACTIVE IN A PUBLIC PLACE: WOULD NEVER DOZE
HOW LIKELY ARE YOU TO NOD OFF OR FALL ASLEEP WHEN YOU ARE A PASSENGER IN A CAR FOR AN HOUR WITHOUT A BREAK: SLIGHT CHANCE OF DOZING
HOW LIKELY ARE YOU TO NOD OFF OR FALL ASLEEP WHILE LYING DOWN TO REST IN THE AFTERNOON WHEN CIRCUMSTANCES PERMIT: SLIGHT CHANCE OF DOZING
HOW LIKELY ARE YOU TO NOD OFF OR FALL ASLEEP WHILE SITTING AND TALKING TO SOMEONE: WOULD NEVER DOZE

## 2024-06-05 NOTE — PROGRESS NOTES
sounds are normal.     NEURO:   The patient is alert and oriented to person, place, and time.  Memory appears intact and mood is normal.  No gross sensorimotor deficits are present.          ASSESSMENT:  (Medical Decision Making)      Diagnosis Orders   1. MARISA on CPAP -pt to resume use of PAP therapy with a nasal pillow mask and a chin strap. She will let us know if this does not work and orders will be adjusted to a different mask.  DME - DURABLE MEDICAL EQUIPMENT      2. Nocturnal hypoxemia -now only using 1.5-2L O2 qhs  DME - DURABLE MEDICAL EQUIPMENT           PLAN:  Pt to try a nasal pillow mask with a chin strap  Continue O2 1.5-2L prn and qhs  Follow up in 4 months or sooner if needed     Orders Placed This Encounter   Procedures    DME - DURABLE MEDICAL EQUIPMENT     [facility] [facility]  Phone: [address] [place]  [facility] [address]  Dept: [phone]      Patient Name: Rylee Orr  : 1945  Gender: female  Address: [address] [place] [address]   Patient phone: [phone] (home)       Primary Insurance: Payor: MEDICARE / Plan: MEDICARE PART A AND B / Product Type: *No Product type* /   Subscriber ID: [id number] - (Medicare)      AMB Supply Order  Order Details     DME Location:[facility], pt needs mask fit with pillow and chin strap urgently    Order Date: 2024   There were no encounter diagnoses.             (  X   )Supplies Needed        Machine   (     ) CPAP Unit  (     ) Auto CPAP Unit  (     ) BiLevel Unit  (     ) Auto BiLevel Unit  (     ) ASV        (     ) Bilevel ST      Length of need: 12 months    Pressure:   cmH20  EPR:     Starting Ramp Pressure:   cm H20  Ramp Time: min        Patient had a diagnostic Apnea Hypopnea Index (AHI) of :    *SUPPLIES* Replace all as needed, or per coverage guidelines     Masks Type:  (    ) -Full Face Mask (1 per 3 mon)  (    ) -Full Mask (1 per month)

## 2024-06-05 NOTE — TELEPHONE ENCOUNTER
Patient contacted insurance to see what is comparable to Trelegy that they will cover. She was told Breztri is covered, but it is $449. Trelegy is $471. Please advise.

## 2024-06-05 NOTE — PATIENT INSTRUCTIONS
The company who will be taking care of your CPAP supplies is:        Address: [address] [place] [address] [address]  [place] [address]  Phone: [phone]  Fax: [phone]

## 2024-06-06 LAB
BACTERIA SPEC CULT: NORMAL
BACTERIA SPEC CULT: NORMAL
SERVICE CMNT-IMP: NORMAL

## 2024-06-10 ENCOUNTER — NURSE ONLY (OUTPATIENT)
Dept: PULMONOLOGY | Age: 79
End: 2024-06-10

## 2024-06-10 ENCOUNTER — OFFICE VISIT (OUTPATIENT)
Dept: ORTHOPEDIC SURGERY | Age: 79
End: 2024-06-10
Payer: MEDICARE

## 2024-06-10 VITALS
HEART RATE: 70 BPM | WEIGHT: 200 LBS | DIASTOLIC BLOOD PRESSURE: 66 MMHG | SYSTOLIC BLOOD PRESSURE: 141 MMHG | HEIGHT: 66 IN | OXYGEN SATURATION: 92 % | RESPIRATION RATE: 14 BRPM | BODY MASS INDEX: 32.14 KG/M2

## 2024-06-10 DIAGNOSIS — J44.9 STAGE 3 SEVERE COPD BY GOLD CLASSIFICATION (HCC): Primary | ICD-10-CM

## 2024-06-10 DIAGNOSIS — M47.816 LUMBAR SPONDYLOSIS: Primary | ICD-10-CM

## 2024-06-10 PROCEDURE — 1123F ACP DISCUSS/DSCN MKR DOCD: CPT | Performed by: PHYSICIAN ASSISTANT

## 2024-06-10 PROCEDURE — G8417 CALC BMI ABV UP PARAM F/U: HCPCS | Performed by: PHYSICIAN ASSISTANT

## 2024-06-10 PROCEDURE — G8428 CUR MEDS NOT DOCUMENT: HCPCS | Performed by: PHYSICIAN ASSISTANT

## 2024-06-10 PROCEDURE — 1090F PRES/ABSN URINE INCON ASSESS: CPT | Performed by: PHYSICIAN ASSISTANT

## 2024-06-10 PROCEDURE — G8400 PT W/DXA NO RESULTS DOC: HCPCS | Performed by: PHYSICIAN ASSISTANT

## 2024-06-10 PROCEDURE — 99213 OFFICE O/P EST LOW 20 MIN: CPT | Performed by: PHYSICIAN ASSISTANT

## 2024-06-10 PROCEDURE — G2211 COMPLEX E/M VISIT ADD ON: HCPCS | Performed by: PHYSICIAN ASSISTANT

## 2024-06-10 PROCEDURE — 1036F TOBACCO NON-USER: CPT | Performed by: PHYSICIAN ASSISTANT

## 2024-06-10 NOTE — TELEPHONE ENCOUNTER
Spoke to patient and she states the insurance company did not give her any other alternatives and can not afford either medication. Wants to know what you suggest. She is aware Dr. Negrete is out of the office and will not be back until tomorrow and voices understanding.

## 2024-06-10 NOTE — PROGRESS NOTES
[facility] Pulmonary and Critical Care  [facility]  [address], [address]   [place] [id number]  T: [phone]  F: [phone]       6 MINUTE WALK TEST     Patient's Name Rylee Orr   Age 78 y.o.    Gender female   Height 5'6   Weight 200lb   Ordering Provider  NICHOLE JOE MD          Time Dyspnea  (Zan Scale)  Fatigue  (Zan Scale)  Blood Pressure Heart Rate Oxygen SAT RA or   w / O2?    BASELINE 1:01  3 2 141/66 70 92 RA                                                                              POST TEST  1:04 5 5 173/79 90 86 RA     Does the patient use any walking aids? NO.   Medications taken before test are up to date:  Yes  Supplemental oxygen during the test: NO    Stopped or paused before 6 minutes? Yes  Other symptoms at end of exercise: Other Dyspnea    Number of complete laps: 2 x 60 meters = 120 meters + final partial lap: 24 meters = 144 meters    Total distance walked in 6 minutes:  144  Meters  Predicted distance: 362 meters    Percent of predicted distance: 39.76 %                Tech comments: Patient could not walk the whole 6 minutes, she was only able to walk for a total of 3 minutes. She had to stop due to being extremely short of breath.    Test Performed by: THOMAS OSUNA MA      6mw calculator:  (DELETE AFTER USE)    OMNI CALCULATOR

## 2024-06-10 NOTE — PROGRESS NOTES
care related to patient's single, serious, or complex PAIN condition with the following diagnoses: Lumbar spondylosis   Chronic Pain Condition that is not stable = not at the patient's treatment goal

## 2024-06-18 RX ORDER — TADALAFIL 20 MG/1
40 TABLET ORAL DAILY
Qty: 60 TABLET | Refills: 11 | Status: SHIPPED | OUTPATIENT
Start: 2024-06-18

## 2024-06-18 NOTE — TELEPHONE ENCOUNTER
Enrollment for Tadalafil processed on 6/5/24    New Script has been pended for Dr. Negrete's signature.

## 2024-06-27 ENCOUNTER — HOSPITAL ENCOUNTER (EMERGENCY)
Age: 79
Discharge: HOME | End: 2024-06-28
Attending: EMERGENCY MEDICINE
Payer: MEDICARE

## 2024-06-27 DIAGNOSIS — M54.6 ACUTE THORACIC BACK PAIN, UNSPECIFIED BACK PAIN LATERALITY: Primary | ICD-10-CM

## 2024-06-27 LAB
BASOPHILS # BLD: 0 K/UL (ref 0–0.2)
BASOPHILS NFR BLD: 1 % (ref 0–2)
DIFFERENTIAL METHOD BLD: ABNORMAL
EOSINOPHIL # BLD: 0.2 K/UL (ref 0–0.8)
EOSINOPHIL NFR BLD: 2 % (ref 0.5–7.8)
ERYTHROCYTE [DISTWIDTH] IN BLOOD BY AUTOMATED COUNT: 15.1 % (ref 11.9–14.6)
HCT VFR BLD AUTO: 36.2 % (ref 35.8–46.3)
HGB BLD-MCNC: 11.7 G/DL (ref 11.7–15.4)
IMM GRANULOCYTES # BLD AUTO: 0.1 K/UL (ref 0–0.5)
IMM GRANULOCYTES NFR BLD AUTO: 2 % (ref 0–5)
LYMPHOCYTES # BLD: 1.7 K/UL (ref 0.5–4.6)
LYMPHOCYTES NFR BLD: 22 % (ref 13–44)
MCH RBC QN AUTO: 31.3 PG (ref 26.1–32.9)
MCHC RBC AUTO-ENTMCNC: 32.3 G/DL (ref 31.4–35)
MCV RBC AUTO: 96.8 FL (ref 82–102)
MONOCYTES # BLD: 0.6 K/UL (ref 0.1–1.3)
MONOCYTES NFR BLD: 8 % (ref 4–12)
NEUTS SEG # BLD: 5.1 K/UL (ref 1.7–8.2)
NEUTS SEG NFR BLD: 65 % (ref 43–78)
NRBC # BLD: 0 K/UL (ref 0–0.2)
PLATELET # BLD AUTO: 175 K/UL (ref 150–450)
PMV BLD AUTO: 9.8 FL (ref 9.4–12.3)
RBC # BLD AUTO: 3.74 M/UL (ref 4.05–5.2)
WBC # BLD AUTO: 7.8 K/UL (ref 4.3–11.1)

## 2024-06-27 PROCEDURE — 99285 EMERGENCY DEPT VISIT HI MDM: CPT

## 2024-06-27 PROCEDURE — 96374 THER/PROPH/DIAG INJ IV PUSH: CPT

## 2024-06-27 PROCEDURE — 6360000002 HC RX W HCPCS: Performed by: EMERGENCY MEDICINE

## 2024-06-27 PROCEDURE — 83690 ASSAY OF LIPASE: CPT

## 2024-06-27 PROCEDURE — 84484 ASSAY OF TROPONIN QUANT: CPT

## 2024-06-27 PROCEDURE — 80053 COMPREHEN METABOLIC PANEL: CPT

## 2024-06-27 PROCEDURE — 85025 COMPLETE CBC W/AUTO DIFF WBC: CPT

## 2024-06-27 RX ORDER — FLUTICASONE PROPIONATE AND SALMETEROL 250; 50 UG/1; UG/1
1 POWDER RESPIRATORY (INHALATION) EVERY 12 HOURS
Qty: 1 EACH | Refills: 11 | Status: SHIPPED | OUTPATIENT
Start: 2024-06-27

## 2024-06-27 RX ADMIN — FENTANYL CITRATE 25 MCG: 50 INJECTION, SOLUTION INTRAMUSCULAR; INTRAVENOUS at 23:28

## 2024-06-27 ASSESSMENT — PAIN SCALES - GENERAL
PAINLEVEL_OUTOF10: 10
PAINLEVEL_OUTOF10: 10

## 2024-06-27 ASSESSMENT — ENCOUNTER SYMPTOMS
NAUSEA: 0
SHORTNESS OF BREATH: 0
VOMITING: 0
RHINORRHEA: 0
COUGH: 0

## 2024-06-27 ASSESSMENT — PAIN DESCRIPTION - LOCATION: LOCATION: BACK

## 2024-06-27 ASSESSMENT — PAIN - FUNCTIONAL ASSESSMENT: PAIN_FUNCTIONAL_ASSESSMENT: 0-10

## 2024-06-27 NOTE — TELEPHONE ENCOUNTER
Spoke to patient and she states she is willing to try the wixela. Will place rx and send to Ciara HU NP to sign. Wants rx to go to [facility]. Patient is going to use [facility].

## 2024-06-28 ENCOUNTER — APPOINTMENT (OUTPATIENT)
Dept: CT IMAGING | Age: 79
End: 2024-06-28
Payer: MEDICARE

## 2024-06-28 VITALS
HEART RATE: 71 BPM | HEIGHT: 66 IN | SYSTOLIC BLOOD PRESSURE: 100 MMHG | OXYGEN SATURATION: 96 % | BODY MASS INDEX: 32.95 KG/M2 | WEIGHT: 205 LBS | TEMPERATURE: 97.9 F | RESPIRATION RATE: 15 BRPM | DIASTOLIC BLOOD PRESSURE: 89 MMHG

## 2024-06-28 LAB
ALBUMIN SERPL-MCNC: 3.9 G/DL (ref 3.2–4.6)
ALBUMIN/GLOB SERPL: 1.1 (ref 1–1.9)
ALP SERPL-CCNC: 106 U/L (ref 35–104)
ALT SERPL-CCNC: 13 U/L (ref 12–65)
ANION GAP SERPL CALC-SCNC: 10 MMOL/L (ref 9–18)
AST SERPL-CCNC: 31 U/L (ref 15–37)
BILIRUB SERPL-MCNC: 0.3 MG/DL (ref 0–1.2)
BUN SERPL-MCNC: 20 MG/DL (ref 8–23)
CALCIUM SERPL-MCNC: 9 MG/DL (ref 8.8–10.2)
CHLORIDE SERPL-SCNC: 97 MMOL/L (ref 98–107)
CO2 SERPL-SCNC: 30 MMOL/L (ref 20–28)
CREAT SERPL-MCNC: 0.89 MG/DL (ref 0.6–1.1)
GLOBULIN SER CALC-MCNC: 3.5 G/DL (ref 2.3–3.5)
GLUCOSE SERPL-MCNC: 106 MG/DL (ref 70–99)
LIPASE SERPL-CCNC: 20 U/L (ref 13–60)
POTASSIUM SERPL-SCNC: 4.3 MMOL/L (ref 3.5–5.1)
PROT SERPL-MCNC: 7.3 G/DL (ref 6.3–8.2)
SODIUM SERPL-SCNC: 137 MMOL/L (ref 136–145)
TROPONIN T SERPL HS-MCNC: 11 NG/L (ref 0–14)
TROPONIN T SERPL HS-MCNC: 12 NG/L (ref 0–14)

## 2024-06-28 PROCEDURE — 2580000003 HC RX 258: Performed by: EMERGENCY MEDICINE

## 2024-06-28 PROCEDURE — 96375 TX/PRO/DX INJ NEW DRUG ADDON: CPT

## 2024-06-28 PROCEDURE — 96376 TX/PRO/DX INJ SAME DRUG ADON: CPT

## 2024-06-28 PROCEDURE — 71275 CT ANGIOGRAPHY CHEST: CPT

## 2024-06-28 PROCEDURE — 6370000000 HC RX 637 (ALT 250 FOR IP): Performed by: EMERGENCY MEDICINE

## 2024-06-28 PROCEDURE — 6360000002 HC RX W HCPCS: Performed by: EMERGENCY MEDICINE

## 2024-06-28 PROCEDURE — 6360000004 HC RX CONTRAST MEDICATION: Performed by: EMERGENCY MEDICINE

## 2024-06-28 PROCEDURE — 72131 CT LUMBAR SPINE W/O DYE: CPT

## 2024-06-28 PROCEDURE — 74177 CT ABD & PELVIS W/CONTRAST: CPT

## 2024-06-28 RX ORDER — MORPHINE SULFATE 4 MG/ML
4 INJECTION INTRAVENOUS ONCE
Status: COMPLETED | OUTPATIENT
Start: 2024-06-28 | End: 2024-06-28

## 2024-06-28 RX ORDER — SODIUM CHLORIDE 9 MG/ML
INJECTION, SOLUTION INTRAVENOUS CONTINUOUS
Status: DISCONTINUED | OUTPATIENT
Start: 2024-06-28 | End: 2024-06-28 | Stop reason: HOSPADM

## 2024-06-28 RX ORDER — LIDOCAINE 50 MG/G
1 PATCH TOPICAL DAILY
Qty: 10 PATCH | Refills: 0 | Status: SHIPPED | OUTPATIENT
Start: 2024-06-28 | End: 2024-07-08

## 2024-06-28 RX ORDER — LIDOCAINE 4 G/G
2 PATCH TOPICAL
Status: DISCONTINUED | OUTPATIENT
Start: 2024-06-28 | End: 2024-06-28 | Stop reason: HOSPADM

## 2024-06-28 RX ORDER — ONDANSETRON 2 MG/ML
4 INJECTION INTRAMUSCULAR; INTRAVENOUS ONCE
Status: COMPLETED | OUTPATIENT
Start: 2024-06-28 | End: 2024-06-28

## 2024-06-28 RX ADMIN — MORPHINE SULFATE 4 MG: 4 INJECTION INTRAVENOUS at 01:05

## 2024-06-28 RX ADMIN — IOPAMIDOL 100 ML: 755 INJECTION, SOLUTION INTRAVENOUS at 05:10

## 2024-06-28 RX ADMIN — IOPAMIDOL 100 ML: 755 INJECTION, SOLUTION INTRAVENOUS at 02:03

## 2024-06-28 RX ADMIN — FENTANYL CITRATE 25 MCG: 50 INJECTION, SOLUTION INTRAMUSCULAR; INTRAVENOUS at 03:37

## 2024-06-28 RX ADMIN — ONDANSETRON 4 MG: 2 INJECTION INTRAMUSCULAR; INTRAVENOUS at 01:06

## 2024-06-28 RX ADMIN — SODIUM CHLORIDE 1000 ML: 9 INJECTION, SOLUTION INTRAVENOUS at 02:21

## 2024-06-28 ASSESSMENT — PAIN SCALES - GENERAL
PAINLEVEL_OUTOF10: 10
PAINLEVEL_OUTOF10: 10

## 2024-06-28 ASSESSMENT — PAIN DESCRIPTION - LOCATION: LOCATION: BACK

## 2024-06-28 NOTE — ED PROVIDER NOTES
Emergency Department Provider Note       PCP: Jeanmarie Resendez MD   Age: 78 y.o.   Sex: female     DISPOSITION       No diagnosis found.    Medical Decision Making     I will check her basic blood work to see if there are any potential other causes of her pain.  I will try to review the images from [facility].  I will treat her pain with some IV fentanyl.  ED Course as of 06/28/24 0154   Fri Jun 28, 2024   0148 Her lumbar dedicated CT shows no evidence for an L5 fracture.  I am uncertain as to what the source of her pain may be.  She had a CT of her chest at [facility] that showed no obvious pulmonary or thoracic source for her pain.  Therefore, I will add on a troponin and get a CT scan of her abdomen to look for other causes of her pain [AC]      ED Course User Index  [AC] Grayson Mckay MD     1 acute complicated illness or injury.  Parental controlled substances given in the ED.  Shared medical decision making was utilized in creating the patients health plan today.    I independently ordered and reviewed each unique test.       I interpreted the CT Scan no obvious bony abnormality in the lumbar spine.              History     78-year-old lady presents with concerns about pain in her back related to a recent vertebral fracture.  She says she was diagnosed at [facility] and although she was sent home with some hydrocodone she said it is not helping.  She denies any bowel or bladder problems.  She says she has a bandlike pain around her chest just underneath her breasts that starts in her back.    She denies any fevers or chills.    No other associated symptoms.    Elements of this note were created using speech recognition software.  As such, errors of speech recognition may be present.        ROS     Review of Systems   Constitutional:  Negative for chills and fever.   HENT:  Negative for congestion and rhinorrhea.    Respiratory:  Negative for cough and shortness of breath.

## 2024-06-28 NOTE — DISCHARGE INSTRUCTIONS
It is important for you to follow up with your physician this week and have your back pain and your kidney function checked.  You had multiple doses of IV contrast for your CT images today.  Drink plenty of fluid.  If you have increasing pain, difficulty breathing, or any other concerning symptoms, please return to the ER immediately.  You are not to drive until your primary care physician clears you to do so.

## 2024-06-28 NOTE — ED TRIAGE NOTES
Pt brought in by [facility] from home with complaint of back pain. Recently diagnosed with L5 fx. States prescribed pain medication is not working. Last took it at between 5 and 6p.

## 2024-06-28 NOTE — ED PROVIDER NOTES
Emergency Department Provider Signout / Continuation of Care Note         DISPOSITION Decision To Discharge 06/28/2024 06:44:37 AM       ICD-10-CM    1. Acute thoracic back pain, unspecified back pain laterality  M54.6           The patient's care was signed out to me at shift change.      Final Plan      Pt comes to the ED for ongoing L sided shoulder (scapular) and lateral chest/'rib' pain that has been ongoing for the past 6 days.  Pt states the pain started when she was sitting at her table, talking on her phone and snapping green beans.  Pt states she was seen the following Monday (6/24/24) at [facility].  Pt with     CTA chest that showed:  Impression  No evidence of acute pulmonary embolus.   Again seen are pulmonary nodules measuring up to 8 mm in size in the right lung base. CT in one to 4 months recommended for further evaluation per recommendations from comparison exam.  New mild L5 compression deformity.    Pt then dc'd home.  States her family member gave her massages for the pain, which did not help.  Pt lives at home with her .  States he has limited capacity to help her secondary to his medical problems.  Pt denies having pain like this previously.  Today, pt with increasing pain, given Morphine and Fentanyl, continues to have pain.  Dedicated CT Lumbar spine WITHOUT acute fracture.  CT A/P ordered and results pending upon sign-out.  Results of CT A/P show diverticulosis without diverticulitis.  Troponin negative.  Pt re-evaluated, continues to cry-out in pain.  No rash/dermatomal lesions appreciated.  Pt with normal breath sounds.  States she is on Eliquis daily.  Had pacemaker placed previously.  3rd dose of narcotic medications ordered.  Labs reviewed.  Normal GFR, given the amount of pain pt is in at this time, concern for aortic dissection.  CT angio chest/abdomen ordered.      0637: Pt asking for dc home at this time.  Imaging still pending.     0643: CT without acute findings.

## 2024-07-01 ENCOUNTER — OFFICE VISIT (OUTPATIENT)
Age: 79
End: 2024-07-01
Payer: MEDICARE

## 2024-07-01 DIAGNOSIS — M54.50 LOW BACK PAIN, UNSPECIFIED BACK PAIN LATERALITY, UNSPECIFIED CHRONICITY, UNSPECIFIED WHETHER SCIATICA PRESENT: Primary | ICD-10-CM

## 2024-07-01 DIAGNOSIS — S22.050A CLOSED WEDGE COMPRESSION FRACTURE OF T5 VERTEBRA, INITIAL ENCOUNTER (HCC): ICD-10-CM

## 2024-07-01 PROCEDURE — 1090F PRES/ABSN URINE INCON ASSESS: CPT | Performed by: NURSE PRACTITIONER

## 2024-07-01 PROCEDURE — G8428 CUR MEDS NOT DOCUMENT: HCPCS | Performed by: NURSE PRACTITIONER

## 2024-07-01 PROCEDURE — 1036F TOBACCO NON-USER: CPT | Performed by: NURSE PRACTITIONER

## 2024-07-01 PROCEDURE — G8417 CALC BMI ABV UP PARAM F/U: HCPCS | Performed by: NURSE PRACTITIONER

## 2024-07-01 PROCEDURE — G8400 PT W/DXA NO RESULTS DOC: HCPCS | Performed by: NURSE PRACTITIONER

## 2024-07-01 PROCEDURE — 99204 OFFICE O/P NEW MOD 45 MIN: CPT | Performed by: NURSE PRACTITIONER

## 2024-07-01 PROCEDURE — 1123F ACP DISCUSS/DSCN MKR DOCD: CPT | Performed by: NURSE PRACTITIONER

## 2024-07-01 RX ORDER — HYDROCODONE BITARTRATE AND ACETAMINOPHEN 7.5; 325 MG/1; MG/1
1 TABLET ORAL EVERY 6 HOURS PRN
Qty: 20 TABLET | Refills: 0 | Status: SHIPPED | OUTPATIENT
Start: 2024-07-01 | End: 2024-07-06

## 2024-07-01 RX ORDER — BACLOFEN 10 MG/1
10 TABLET ORAL 2 TIMES DAILY PRN
Qty: 20 TABLET | Refills: 0 | Status: SHIPPED | OUTPATIENT
Start: 2024-07-01

## 2024-07-01 NOTE — PROGRESS NOTES
Name: Rylee Orr  YOB: 1945  Gender: female  MRN: [id number]    Back Problem       History of Present Illness:      This is a very pleasant 78 y.o. year old female who   has a past medical history of Aortic atherosclerosis (HCC), Arrhythmia, Arthritis, Atrial fibrillation (HCC), Cancer (HCC), Chest pain, Chronic obstructive pulmonary disease (HCC), COPD exacerbation ([facility]), Dyspnea, Essential hypertension, GERD (gastroesophageal reflux disease), HLD (hyperlipidemia), Hypertension, Menopause, Morbid obesity (HCC), Nausea & vomiting, Pulmonary embolus (HCC), Sleep apnea, Thromboembolus (HCC), and Venous embolism and thrombosis..  Patient was recently seen by NORA Gary with the nonsurgical spine team.  She underwent facet injections by Dr. Ramsey in January 2024.  Patient was doing well.  However developed acute onset of left shoulder pain, upper back pain.  She presented to the emergency room.  She is actually been to both [facility] and [facility].  Patient had a chest CT, abdominal and pelvic CT and a lumbar CT scan.  She was told at one facility that she had an L5 compression fracture.  She has a history of an L3 fracture.  Family history of osteoporosis and multiple compression fractures.  She is a COPD patient on chronic oxygen.  She does have a pacemaker that was recently placed.  She has tried hydrocodone, tramadol and lidocaine patches with no relief.  She states that she started hurting in her left shoulder that hurts all in the mid back.  It is very diffuse in nature but is a 10 out of 10.    Upon personal review of patient's CT scans which was not documented by the radiologist there is a T5 compression fracture that is noted on her chest CT scan.  Unclear if this was a typo in someone had somewhere transcribed L5.  Patient pain is all concordant with upper to mid thoracic pain.             7/1/2024    10:48 AM   AMB PAIN ASSESSMENT   Severity of Pain 10   Frequency of Pain

## 2024-07-02 ENCOUNTER — TELEPHONE (OUTPATIENT)
Dept: FAMILY MEDICINE CLINIC | Facility: CLINIC | Age: 79
End: 2024-07-02

## 2024-07-02 ENCOUNTER — TELEPHONE (OUTPATIENT)
Dept: ORTHOPEDIC SURGERY | Age: 79
End: 2024-07-02

## 2024-07-02 NOTE — TELEPHONE ENCOUNTER
Sarah with Home Health called Requesting verbal orders for CMP.  Stating Pt has been seen in ER Several times and has had CT scans.     Pt with Increased swelling and weight.     Verbal orders given for CMP to be drawn.

## 2024-07-02 NOTE — TELEPHONE ENCOUNTER
Call Sarah at Home Health [phone] about some blood work that needs to be done Weight has gone up Swelling in legs

## 2024-07-02 NOTE — TELEPHONE ENCOUNTER
Sarah is calling because she is wondering if she needs to add PT and OT evals for her since she is weak or if she needs that with the fracture.

## 2024-07-03 ENCOUNTER — TELEPHONE (OUTPATIENT)
Dept: FAMILY MEDICINE CLINIC | Facility: CLINIC | Age: 79
End: 2024-07-03

## 2024-07-03 NOTE — TELEPHONE ENCOUNTER
Spoke with yonis informed we would hold off on any pt for lumbar as of now. To avoid any increase pain symptoms from fx.

## 2024-07-04 DIAGNOSIS — J44.9 STAGE 3 SEVERE COPD BY GOLD CLASSIFICATION (HCC): ICD-10-CM

## 2024-07-10 ENCOUNTER — OFFICE VISIT (OUTPATIENT)
Age: 79
End: 2024-07-10

## 2024-07-10 VITALS
HEART RATE: 72 BPM | WEIGHT: 202 LBS | HEIGHT: 66 IN | DIASTOLIC BLOOD PRESSURE: 84 MMHG | BODY MASS INDEX: 32.47 KG/M2 | SYSTOLIC BLOOD PRESSURE: 140 MMHG

## 2024-07-10 DIAGNOSIS — M54.50 CHRONIC BILATERAL LOW BACK PAIN WITHOUT SCIATICA: ICD-10-CM

## 2024-07-10 DIAGNOSIS — G47.33 OSA AND COPD OVERLAP SYNDROME (HCC): Chronic | ICD-10-CM

## 2024-07-10 DIAGNOSIS — M80.08XA AGE-RELATED OSTEOPOROSIS WITH CURRENT PATHOLOGICAL FRACTURE, VERTEBRA(E), INITIAL ENCOUNTER FOR FRACTURE (HCC): ICD-10-CM

## 2024-07-10 DIAGNOSIS — M54.6 ACUTE MIDLINE THORACIC BACK PAIN: Primary | ICD-10-CM

## 2024-07-10 DIAGNOSIS — J44.9 OSA AND COPD OVERLAP SYNDROME (HCC): Chronic | ICD-10-CM

## 2024-07-10 DIAGNOSIS — G89.29 CHRONIC BILATERAL LOW BACK PAIN WITHOUT SCIATICA: ICD-10-CM

## 2024-07-10 PROCEDURE — 1036F TOBACCO NON-USER: CPT | Performed by: RADIOLOGY

## 2024-07-10 PROCEDURE — 3023F SPIROM DOC REV: CPT | Performed by: RADIOLOGY

## 2024-07-10 PROCEDURE — 3079F DIAST BP 80-89 MM HG: CPT | Performed by: RADIOLOGY

## 2024-07-10 RX ORDER — METOPROLOL SUCCINATE 50 MG/1
TABLET, EXTENDED RELEASE ORAL
Qty: 180 TABLET | Refills: 3 | OUTPATIENT
Start: 2024-07-10

## 2024-07-10 RX ORDER — TADALAFIL 20 MG/1
40 TABLET ORAL DAILY
Qty: 60 TABLET | Refills: 11 | Status: ACTIVE | OUTPATIENT
Start: 2024-07-10

## 2024-07-10 NOTE — PATIENT INSTRUCTIONS
We will order a nuclear medicine bone scan of your spine and DEXA scan to evaluate for osteoporosis.  We will call you with your results after you have your imaging to determine if you are definitely a candidate for T5 kyphoplasty.

## 2024-07-10 NOTE — TELEPHONE ENCOUNTER
Patient came to window states she has not received her Tadalafil.  Order was canceled due to not being able to reach patient. Patient wants to get medication from [facility] where it is most cost efficient for her. New rx placed and routed to Dr. Negrete to sign.

## 2024-07-11 ENCOUNTER — TELEPHONE (OUTPATIENT)
Age: 79
End: 2024-07-11

## 2024-07-11 ENCOUNTER — TELEPHONE (OUTPATIENT)
Dept: ORTHOPEDIC SURGERY | Age: 79
End: 2024-07-11

## 2024-07-11 ENCOUNTER — NURSE ONLY (OUTPATIENT)
Age: 79
End: 2024-07-11

## 2024-07-11 ENCOUNTER — OFFICE VISIT (OUTPATIENT)
Age: 79
End: 2024-07-11
Payer: MEDICARE

## 2024-07-11 VITALS
HEIGHT: 66 IN | SYSTOLIC BLOOD PRESSURE: 130 MMHG | HEART RATE: 70 BPM | BODY MASS INDEX: 32.3 KG/M2 | WEIGHT: 201 LBS | DIASTOLIC BLOOD PRESSURE: 80 MMHG

## 2024-07-11 DIAGNOSIS — I27.20 PULMONARY HYPERTENSION (HCC): ICD-10-CM

## 2024-07-11 DIAGNOSIS — G47.33 OSA AND COPD OVERLAP SYNDROME (HCC): Chronic | ICD-10-CM

## 2024-07-11 DIAGNOSIS — I25.119 CORONARY ARTERY DISEASE INVOLVING NATIVE CORONARY ARTERY OF NATIVE HEART WITH ANGINA PECTORIS (HCC): ICD-10-CM

## 2024-07-11 DIAGNOSIS — Z98.890 HX OF ATRIOVENTRICULAR NODE ABLATION: ICD-10-CM

## 2024-07-11 DIAGNOSIS — I48.19 PERSISTENT ATRIAL FIBRILLATION (HCC): Primary | ICD-10-CM

## 2024-07-11 DIAGNOSIS — S22.050A TRAUMATIC COMPRESSION FRACTURE OF T5 THORACIC VERTEBRA, CLOSED, INITIAL ENCOUNTER (HCC): Primary | ICD-10-CM

## 2024-07-11 DIAGNOSIS — J44.9 OSA AND COPD OVERLAP SYNDROME (HCC): Chronic | ICD-10-CM

## 2024-07-11 DIAGNOSIS — I50.33 ACUTE ON CHRONIC DIASTOLIC (CONGESTIVE) HEART FAILURE (HCC): ICD-10-CM

## 2024-07-11 DIAGNOSIS — I10 ESSENTIAL HYPERTENSION: ICD-10-CM

## 2024-07-11 DIAGNOSIS — I48.11 LONGSTANDING PERSISTENT ATRIAL FIBRILLATION (HCC): ICD-10-CM

## 2024-07-11 DIAGNOSIS — I48.11 LONGSTANDING PERSISTENT ATRIAL FIBRILLATION (HCC): Primary | Chronic | ICD-10-CM

## 2024-07-11 PROCEDURE — 1036F TOBACCO NON-USER: CPT | Performed by: NURSE PRACTITIONER

## 2024-07-11 PROCEDURE — 3075F SYST BP GE 130 - 139MM HG: CPT | Performed by: NURSE PRACTITIONER

## 2024-07-11 PROCEDURE — 1123F ACP DISCUSS/DSCN MKR DOCD: CPT | Performed by: NURSE PRACTITIONER

## 2024-07-11 PROCEDURE — 99214 OFFICE O/P EST MOD 30 MIN: CPT | Performed by: NURSE PRACTITIONER

## 2024-07-11 PROCEDURE — G8428 CUR MEDS NOT DOCUMENT: HCPCS | Performed by: NURSE PRACTITIONER

## 2024-07-11 PROCEDURE — 3079F DIAST BP 80-89 MM HG: CPT | Performed by: NURSE PRACTITIONER

## 2024-07-11 PROCEDURE — 1090F PRES/ABSN URINE INCON ASSESS: CPT | Performed by: NURSE PRACTITIONER

## 2024-07-11 PROCEDURE — G8400 PT W/DXA NO RESULTS DOC: HCPCS | Performed by: NURSE PRACTITIONER

## 2024-07-11 PROCEDURE — G8417 CALC BMI ABV UP PARAM F/U: HCPCS | Performed by: NURSE PRACTITIONER

## 2024-07-11 PROCEDURE — 3023F SPIROM DOC REV: CPT | Performed by: NURSE PRACTITIONER

## 2024-07-11 RX ORDER — SPIRONOLACTONE 50 MG/1
TABLET, FILM COATED ORAL
Qty: 90 TABLET | Refills: 3 | OUTPATIENT
Start: 2024-07-11

## 2024-07-11 RX ORDER — FLUTICASONE FUROATE, UMECLIDINIUM BROMIDE AND VILANTEROL TRIFENATATE 100; 62.5; 25 UG/1; UG/1; UG/1
POWDER RESPIRATORY (INHALATION)
Qty: 60 EACH | Refills: 11 | OUTPATIENT
Start: 2024-07-11

## 2024-07-11 RX ORDER — TRAMADOL HYDROCHLORIDE 50 MG/1
50 TABLET ORAL EVERY 6 HOURS PRN
COMMUNITY

## 2024-07-11 ASSESSMENT — ENCOUNTER SYMPTOMS: BACK PAIN: 1

## 2024-07-11 NOTE — PROGRESS NOTES
[facility] CARDIOLOGY, [address], [address]  [place] [address]  PHONE: [phone]  1945    SUBJECTIVE:   Rylee Orr is a 78 y.o. female seen for a follow up visit regarding the following:     Chief Complaint   Patient presents with    Atrial Fibrillation    Coronary Artery Disease       HPI:    Rylee Orr is a very pleasant 78 y.o. female with a past medical and cardiac history significant for stage 3 severe COPD, pHTN from COPD and MARISA, who was seen by EP for Afib with RVR.  In SR the bradycardia affected her.   Pt underwent BSC BIV PPM and AVN ablation on 3/29/2024.  She returns today for 3 month post device follow up.  Device check shows normal function.  No c/o chest pain or palpations.  Shortness of breath is at her baseline.          Cardiac PMH: (Old records have been reviewed and summarized below)  [facility] 2022: nonobstructive CAD  Echo 1/2024: EF 55-60%  Reviewed office note       Past Medical History, Past Surgical History, Family history, Social History, and Medications were all reviewed with the patient today and updated as necessary.     Current Outpatient Medications   Medication Sig Dispense Refill    traMADol (ULTRAM) 50 MG tablet Take 1 tablet by mouth every 6 hours as needed for Pain. Max Daily Amount: 200 mg      baclofen (LIORESAL) 10 MG tablet Take 1 tablet by mouth 2 times daily as needed (muscle spasm) 20 tablet 0    furosemide (LASIX) 20 MG tablet Take 0.5 tablets by mouth daily In addition to 40mg tablet for total of 50mg daily 45 tablet 3    gabapentin (NEURONTIN) 300 MG capsule Take 1 capsule by mouth 3 times daily for 90 days. 90 capsule 2    vitamin C (ASCORBIC ACID) 500 MG tablet Take 1 tablet by mouth daily 90 tablet 3    losartan (COZAAR) 25 MG tablet Take 0.5 tablets by mouth daily 45 tablet 3    potassium chloride (KLOR-CON M) 10 MEQ extended release tablet Take 1 tablet by mouth daily 90 tablet 1    furosemide (LASIX) 40 MG tablet Take 1 tablet

## 2024-07-11 NOTE — PROGRESS NOTES
Take 1 tablet by mouth daily 90 tablet 1    furosemide (LASIX) 40 MG tablet Take 1 tablet by mouth daily 30 tablet 11    metoprolol succinate (TOPROL XL) 25 MG extended release tablet Take 1 tablet by mouth daily 30 tablet 3    omeprazole (PRILOSEC) 40 MG delayed release capsule Take 1 capsule by mouth daily TAKE 1 CAPSULE BY MOUTH EVERY DAY 90 capsule 3    fluticasone-umeclidin-vilant (TRELEGY ELLIPTA) 100-62.5-25 MCG/ACT AEPB inhaler Inhale 1 puff into the lungs daily 3 each 3    rOPINIRole (REQUIP) 0.5 MG tablet TAKE 1 TABLET BY MOUTH EVERY DAY AT NIGHT 90 tablet 3    apixaban (ELIQUIS) 5 MG TABS tablet Take 1 tablet by mouth 2 times daily 180 tablet 3    diclofenac sodium (VOLTAREN) 1 % GEL Apply 4 g topically 4 times daily as needed for Pain 150 g 2    albuterol sulfate HFA (PROVENTIL;VENTOLIN;PROAIR) 108 (90 Base) MCG/ACT inhaler INHALE 2 PUFFS INTO THE LUNGS 4 TIMES DAILY AS NEEDED FOR WHEEZING. 1 each 11    acetaminophen (TYLENOL) 500 MG tablet Take 2 tablets by mouth every 6 hours as needed for Pain      magnesium oxide (MAG-OX) 400 MG tablet Take 1 tablet by mouth in the morning and at bedtime 60 tablet 11    OXYGEN Inhale 4 L/min into the lungs daily as needed for Shortness of Breath via nasal cannula during sleep      colestipol (COLESTID) 1 g tablet Take 1 tablet by mouth 2 times daily as needed (diarrhea)      Tadalafil, PAH, 20 MG tablet Take 2 tablets by mouth daily 60 tablet 11     No current facility-administered medications for this visit.        LABORATORY VALUES: CBC with Differential:    Lab Results   Component Value Date/Time    WBC 7.8 06/27/2024 11:27 PM    RBC 3.74 06/27/2024 11:27 PM    HGB 11.7 06/27/2024 11:27 PM    HCT 36.2 06/27/2024 11:27 PM     06/27/2024 11:27 PM    MCV 96.8 06/27/2024 11:27 PM    MCH 31.3 06/27/2024 11:27 PM    MCHC 32.3 06/27/2024 11:27 PM    RDW 15.1 06/27/2024 11:27 PM    NRBC 0.00 06/27/2024 11:27 PM    LYMPHOPCT 22 06/27/2024 11:27 PM    MONOPCT 8

## 2024-07-11 NOTE — TELEPHONE ENCOUNTER
Pts provider Aaliyah MELENDEZ is ordering a STAT Dexa scan because she doesn't think the pt can wait 2 weeks for the mri. She is unsure if she still will need the mri done. Please call providers office at [phone]

## 2024-07-11 NOTE — TELEPHONE ENCOUNTER
Voicemail left for patient updating her that Dr. Dickson recommends we obtain a nuclear medicine bone scan which can be performed on 7-15 to assess the acuity of her T5 fracture as well as to evaluate for any potential additional missed thoracic or lumbar fractures.  Kyphoplasty is only indicated in acute fractures and not chronic ones.    Due to patient having a pacemaker she cannot have an MRI for 14 days and given her level of pain we do not think she should wait.  She will also have a DEXA scan on 7-15 as she does not have a definitive diagnosis of osteoporosis which is required for insurance to preauthorize a kyphoplasty procedure.

## 2024-07-11 NOTE — TELEPHONE ENCOUNTER
Spoke with Janet, she will send a message to cathryn to call me.Sounds like I can cancel the mri but I want to get the okay and make sure there is nothing else for me to do on my end.

## 2024-07-12 ENCOUNTER — TELEPHONE (OUTPATIENT)
Dept: PULMONOLOGY | Age: 79
End: 2024-07-12

## 2024-07-12 ENCOUNTER — CARE COORDINATION (OUTPATIENT)
Dept: CARE COORDINATION | Facility: CLINIC | Age: 79
End: 2024-07-12

## 2024-07-12 RX ORDER — TADALAFIL 20 MG/1
40 TABLET ORAL DAILY
Qty: 60 TABLET | Refills: 11 | Status: SHIPPED | OUTPATIENT
Start: 2024-07-12

## 2024-07-12 ASSESSMENT — ENCOUNTER SYMPTOMS: DYSPNEA ASSOCIATED WITH: EXERTION

## 2024-07-12 NOTE — TELEPHONE ENCOUNTER
Patient called the office stating that the Tadalafil is on back order through [facility]. She would like to get a new script sent to [facility] in [place] and use the [employer] Card. New Script has been e-scribed as requested.

## 2024-07-12 NOTE — CARE COORDINATION
Ambulatory Care Coordination Note     2024 5:07 PM     Patient Current Location:  Home: [place] [address]     This patient was received as a referral from [facility] report .    ACM contacted the patient by telephone. Verified name and  with patient as identifiers. Provided introduction to self, and explanation of the ACM role.   Patient accepted care management services at this time.          ACM: Bria Chew RN     Challenges to be reviewed by the provider   Additional needs identified to be addressed with provider No  none               Method of communication with provider: none.    Care Summary Note:     Discussed ED visit, for Comp fx L5.  hx COPD/HF  PHTN followed by Kyra Hernández.  Pt has Interim HH every 2wks, reports Feet swelling, lt > rt. lasix increased 50mg approx 3wks ago, will sometimes take 60mg.  Raising feet up at night. Denies SOB, on 2ltrs O2 continuous .  Discussed calling UC to report continued sxs of swelling, no resp sxs at this time. Pt states she is comfortable w/ calling and will call this ACM w/ questions or concerns.   Offered patient enrollment in the Remote Patient Monitoring (RPM) program for in-home monitoring: deferred at this time, will discuss w/ next outreach.     Assessments Completed:   COPD Assessment       No patient-reported symptoms         Symptoms:  None: Yes      Symptom course: stable  Breathlessness: exertion  Change in chronic cough?: No/At Baseline  Change in sputum?: No/At Baseline       and   General Assessment    Do you have any symptoms that are causing concern?: Yes  Progression since Onset: Unchanged  Reported Symptoms: Other (Comment: feet swelling)          Medications Reviewed:   Not completed during this call:      Advance Care Planning:   Not reviewed during this call     Care Planning:   Education Documentation  No documentation found.  Education Comments  No comments found.     ,    Goals Addressed

## 2024-07-15 ENCOUNTER — TELEPHONE (OUTPATIENT)
Age: 79
End: 2024-07-15

## 2024-07-15 ENCOUNTER — HOSPITAL ENCOUNTER (OUTPATIENT)
Dept: MAMMOGRAPHY | Age: 79
Discharge: HOME | End: 2024-07-18
Payer: MEDICARE

## 2024-07-15 ENCOUNTER — HOSPITAL ENCOUNTER (OUTPATIENT)
Dept: NUCLEAR MEDICINE | Age: 79
End: 2024-07-15
Payer: MEDICARE

## 2024-07-15 ENCOUNTER — APPOINTMENT (OUTPATIENT)
Dept: NUCLEAR MEDICINE | Age: 79
End: 2024-07-15
Payer: MEDICARE

## 2024-07-15 DIAGNOSIS — M54.6 ACUTE MIDLINE THORACIC BACK PAIN: ICD-10-CM

## 2024-07-15 DIAGNOSIS — M80.08XA AGE-RELATED OSTEOPOROSIS WITH CURRENT PATHOLOGICAL FRACTURE, VERTEBRA(E), INITIAL ENCOUNTER FOR FRACTURE (HCC): ICD-10-CM

## 2024-07-15 PROCEDURE — 77080 DXA BONE DENSITY AXIAL: CPT

## 2024-07-15 NOTE — TELEPHONE ENCOUNTER
She had a  bone density scan today and has another scan tomorrow due to a fractured vertebrae.The fracture happened about 2 weeks ago and had been in a brace.She has  swelling in her legs but her left leg worse than right.Left worse than right.Redness in legs but left worse than rt.Legs not hot to the touch.Swelling improves w/elevation of legs.Swlling down in the morning s and worsens through day.She is on Lasix 40mg and 10mg to equal 50mg daily.She is not SOB.She is sitting more at times w/fracture.She is asking if she needs to increase her lasix ?

## 2024-07-15 NOTE — TELEPHONE ENCOUNTER
Right  leg swollen and worse than the right one. She takes 50mg Lasix Should she take more or what to do. She is having a procedure tomorrow before having vertibrae T5 fixed Please call

## 2024-07-15 NOTE — RESULT ENCOUNTER NOTE
The bone density is slightly decreased in the range of osteopenia but not osteoporosis.  Would make sure that the patient takes over-the-counter calcium with vitamin D.

## 2024-07-16 ENCOUNTER — HOSPITAL ENCOUNTER (OUTPATIENT)
Dept: NUCLEAR MEDICINE | Age: 79
Discharge: HOME | End: 2024-07-19
Payer: MEDICARE

## 2024-07-16 DIAGNOSIS — S22.050A TRAUMATIC COMPRESSION FRACTURE OF T5 THORACIC VERTEBRA, CLOSED, INITIAL ENCOUNTER (HCC): ICD-10-CM

## 2024-07-16 PROCEDURE — 78830 RP LOCLZJ TUM SPECT W/CT 1: CPT

## 2024-07-16 PROCEDURE — 78300 BONE IMAGING LIMITED AREA: CPT

## 2024-07-16 PROCEDURE — 3430000000 HC RX DIAGNOSTIC RADIOPHARMACEUTICAL: Performed by: PHYSICIAN ASSISTANT

## 2024-07-16 PROCEDURE — A9503 TC99M MEDRONATE: HCPCS | Performed by: PHYSICIAN ASSISTANT

## 2024-07-16 RX ORDER — TC 99M MEDRONATE 20 MG/10ML
26.9 INJECTION, POWDER, LYOPHILIZED, FOR SOLUTION INTRAVENOUS
Status: COMPLETED | OUTPATIENT
Start: 2024-07-16 | End: 2024-07-16

## 2024-07-16 RX ADMIN — TC 99M MEDRONATE 26.9 MILLICURIE: 20 INJECTION, POWDER, LYOPHILIZED, FOR SOLUTION INTRAVENOUS at 09:00

## 2024-07-16 NOTE — TELEPHONE ENCOUNTER
Just keep feet elevated while resting and at night while sleeping.  Wear support stockings if up on feet for more than 1 hr

## 2024-07-17 ENCOUNTER — TELEPHONE (OUTPATIENT)
Age: 79
End: 2024-07-17

## 2024-07-17 DIAGNOSIS — M80.08XA AGE-RELATED OSTEOPOROSIS WITH CURRENT PATHOLOGICAL FRACTURE, VERTEBRA(E), INITIAL ENCOUNTER FOR FRACTURE (HCC): Primary | ICD-10-CM

## 2024-07-17 NOTE — TELEPHONE ENCOUNTER
I notified patient today that her NM bone scan does show an acute T5 VCF amenable to kyphoplasty and her DEXA scan shows low bone mineral density so her insurance should approve her kyphoplasty.  Patient would like to proceed with being scheduled for T5 kyphoplasty with anesthesia.  I will have our  reach out to her for her appointment date and time.  She will need to hold her Eliquis 48 hours prior to her procedure.

## 2024-07-17 NOTE — TELEPHONE ENCOUNTER
Need clearance for medication hold for a Kyphoplasty on the 23rd of July. Zackery with intake [phone]  For Eliquis 48 hour hold

## 2024-07-17 NOTE — TELEPHONE ENCOUNTER
Patient Active Problem List     Diagnosis Date Noted    Status post biventricular pacemaker 03/29/2024       Priority: High    Chronic renal disease, stage III ([facility]) [460334] 02/22/2023       Priority: Medium    Low back pain with bilateral sciatica 12/27/2022       Priority: Medium    Coronary artery disease involving native coronary artery of native heart with angina pectoris ([facility]) 11/01/2022       Priority: Medium    Type 2 diabetes mellitus 12/19/2023       Priority: Low    Abscess of leg, right 07/02/2020       Priority: Low    Cellulitis of lower extremity 06/24/2020       Priority: Low    Open wound of left lower extremity 06/24/2020       Priority: Low    Cellulitis of left lower extremity 06/15/2020       Priority: Low    Venous stasis 09/26/2019       Priority: Low    Nocturnal hypoxia 08/13/2019       Priority: Low    Pulmonary hypertension (HCC) 07/02/2019       Priority: Low    Diastolic dysfunction 07/02/2019       Priority: Low    Intolerance of continuous positive airway pressure (CPAP) ventilation 07/02/2019       Priority: Low    RLS (restless legs syndrome) 09/25/2017       Priority: Low    MARISA and COPD overlap syndrome ([facility]) 09/25/2017       Priority: Low    Obesity (BMI 30-39.9) 09/25/2017       Priority: Low    Essential hypertension 01/21/2016       Priority: Low    HLD (hyperlipidemia) 01/21/2016       Priority: Low    Longstanding persistent atrial fibrillation ([facility]) 01/21/2016       Priority: Low    Centrilobular emphysema ([facility]) 09/12/2014       Priority: Low    Chronic obstructive pulmonary disease ([facility]) 04/01/2024    Chronic respiratory failure with hypoxia ([facility]) 04/01/2024    MARISA (obstructive sleep apnea) 04/01/2024

## 2024-07-19 ENCOUNTER — TELEPHONE (OUTPATIENT)
Dept: PULMONOLOGY | Age: 79
End: 2024-07-19

## 2024-07-19 NOTE — TELEPHONE ENCOUNTER
Patient says that the pharmacy told her that they do not have this prescription .     Tadalafil, PAH, 20 MG tablet     Patient ask for a call

## 2024-07-22 ENCOUNTER — ANESTHESIA EVENT (OUTPATIENT)
Dept: INTERVENTIONAL RADIOLOGY/VASCULAR | Age: 79
End: 2024-07-22
Payer: MEDICARE

## 2024-07-22 DIAGNOSIS — I27.20 PULMONARY HYPERTENSION (HCC): Primary | ICD-10-CM

## 2024-07-22 RX ORDER — SODIUM CHLORIDE 0.9 % (FLUSH) 0.9 %
5-40 SYRINGE (ML) INJECTION PRN
OUTPATIENT
Start: 2024-07-22

## 2024-07-22 RX ORDER — IBUPROFEN 600 MG/1
1 TABLET ORAL PRN
OUTPATIENT
Start: 2024-07-22

## 2024-07-22 RX ORDER — SODIUM CHLORIDE 9 MG/ML
INJECTION, SOLUTION INTRAVENOUS PRN
OUTPATIENT
Start: 2024-07-22

## 2024-07-22 RX ORDER — HYDROMORPHONE HYDROCHLORIDE 2 MG/ML
0.25 INJECTION, SOLUTION INTRAMUSCULAR; INTRAVENOUS; SUBCUTANEOUS EVERY 5 MIN PRN
OUTPATIENT
Start: 2024-07-22

## 2024-07-22 RX ORDER — SODIUM CHLORIDE, SODIUM LACTATE, POTASSIUM CHLORIDE, CALCIUM CHLORIDE 600; 310; 30; 20 MG/100ML; MG/100ML; MG/100ML; MG/100ML
INJECTION, SOLUTION INTRAVENOUS CONTINUOUS
OUTPATIENT
Start: 2024-07-22

## 2024-07-22 RX ORDER — OXYCODONE HYDROCHLORIDE 5 MG/1
5 TABLET ORAL
OUTPATIENT
Start: 2024-07-22 | End: 2024-07-23

## 2024-07-22 RX ORDER — ONDANSETRON 2 MG/ML
4 INJECTION INTRAMUSCULAR; INTRAVENOUS
OUTPATIENT
Start: 2024-07-22 | End: 2024-07-23

## 2024-07-22 RX ORDER — LIDOCAINE HYDROCHLORIDE 10 MG/ML
1 INJECTION, SOLUTION INFILTRATION; PERINEURAL
OUTPATIENT
Start: 2024-07-22 | End: 2024-07-23

## 2024-07-22 RX ORDER — DEXTROSE MONOHYDRATE 100 MG/ML
INJECTION, SOLUTION INTRAVENOUS CONTINUOUS PRN
OUTPATIENT
Start: 2024-07-22

## 2024-07-22 RX ORDER — SODIUM CHLORIDE 0.9 % (FLUSH) 0.9 %
5-40 SYRINGE (ML) INJECTION EVERY 12 HOURS SCHEDULED
OUTPATIENT
Start: 2024-07-22

## 2024-07-22 RX ORDER — HALOPERIDOL 5 MG/ML
1 INJECTION INTRAMUSCULAR
OUTPATIENT
Start: 2024-07-22 | End: 2024-07-23

## 2024-07-22 RX ORDER — NALOXONE HYDROCHLORIDE 0.4 MG/ML
INJECTION, SOLUTION INTRAMUSCULAR; INTRAVENOUS; SUBCUTANEOUS PRN
OUTPATIENT
Start: 2024-07-22

## 2024-07-22 NOTE — TELEPHONE ENCOUNTER
Spoke to patient and informed her the Tadalafil has been approved through [facility]. Gave her Les Castillo's (Pharmacy Liaison) number to call and she voices understanding.    Les notified and will give her a call back.

## 2024-07-22 NOTE — TELEPHONE ENCOUNTER
Received the following email concerning patients Tadalafil, please advise.    From: Gee Ulloa <[email or web address]>  Sent: 2024 12:52 PM  To: Les Castillo <[email or web address]>  Subject: secure - Rylee Orr dose question based on CrCl        Good afternoon     For patient Rylee Orr  1945 can you reach out to the office and confirm dose on the tadalafil for the patient.  Per epic patient has a CrCl of 58 ml/min which looks to require a dose adjustment per Lexic[employer].  Lexicomp states for PAH patient should have an initial dose of 20mg every 24 hours (see below for more information).  Doctor wrote for 40mg QD and just want to confirm they still want this dose.           Thank you!     FARIBA Ulloa, PharmD.  Specialty Pharmacist  [address] [place] [address]  Phone: [phone] (option 2)  Fax: [phone]

## 2024-07-23 ENCOUNTER — HOSPITAL ENCOUNTER (OUTPATIENT)
Dept: INTERVENTIONAL RADIOLOGY/VASCULAR | Age: 79
Discharge: HOME | End: 2024-07-26
Attending: RADIOLOGY
Payer: MEDICARE

## 2024-07-23 ENCOUNTER — ANESTHESIA (OUTPATIENT)
Dept: INTERVENTIONAL RADIOLOGY/VASCULAR | Age: 79
End: 2024-07-23
Payer: MEDICARE

## 2024-07-23 VITALS
OXYGEN SATURATION: 100 % | WEIGHT: 201 LBS | SYSTOLIC BLOOD PRESSURE: 157 MMHG | HEIGHT: 66 IN | TEMPERATURE: 97.9 F | BODY MASS INDEX: 32.3 KG/M2 | HEART RATE: 75 BPM | DIASTOLIC BLOOD PRESSURE: 70 MMHG | RESPIRATION RATE: 16 BRPM

## 2024-07-23 DIAGNOSIS — M80.08XA AGE-RELATED OSTEOPOROSIS WITH CURRENT PATHOLOGICAL FRACTURE, VERTEBRA(E), INITIAL ENCOUNTER FOR FRACTURE (HCC): ICD-10-CM

## 2024-07-23 PROCEDURE — 2500000003 HC RX 250 WO HCPCS: Performed by: RADIOLOGY

## 2024-07-23 PROCEDURE — 7100000000 HC PACU RECOVERY - FIRST 15 MIN: Performed by: RADIOLOGY

## 2024-07-23 PROCEDURE — 22513 PERQ VERTEBRAL AUGMENTATION: CPT | Performed by: RADIOLOGY

## 2024-07-23 PROCEDURE — 6360000002 HC RX W HCPCS

## 2024-07-23 PROCEDURE — 2709999900 IR KYPHOPLASTY THORACIC 1 VERTEBRAL BODY

## 2024-07-23 PROCEDURE — 6360000002 HC RX W HCPCS: Performed by: RADIOLOGY

## 2024-07-23 PROCEDURE — 3700000000 HC ANESTHESIA ATTENDED CARE: Performed by: RADIOLOGY

## 2024-07-23 PROCEDURE — 3700000001 HC ADD 15 MINUTES (ANESTHESIA): Performed by: RADIOLOGY

## 2024-07-23 PROCEDURE — 2580000003 HC RX 258

## 2024-07-23 PROCEDURE — 2500000003 HC RX 250 WO HCPCS

## 2024-07-23 PROCEDURE — 7100000001 HC PACU RECOVERY - ADDTL 15 MIN: Performed by: RADIOLOGY

## 2024-07-23 RX ORDER — SODIUM CHLORIDE, SODIUM LACTATE, POTASSIUM CHLORIDE, CALCIUM CHLORIDE 600; 310; 30; 20 MG/100ML; MG/100ML; MG/100ML; MG/100ML
INJECTION, SOLUTION INTRAVENOUS CONTINUOUS PRN
Status: DISCONTINUED | OUTPATIENT
Start: 2024-07-23 | End: 2024-07-23 | Stop reason: SDUPTHER

## 2024-07-23 RX ORDER — PROPOFOL 10 MG/ML
INJECTION, EMULSION INTRAVENOUS PRN
Status: DISCONTINUED | OUTPATIENT
Start: 2024-07-23 | End: 2024-07-23 | Stop reason: SDUPTHER

## 2024-07-23 RX ORDER — EPHEDRINE SULFATE 5 MG/ML
INJECTION INTRAVENOUS PRN
Status: DISCONTINUED | OUTPATIENT
Start: 2024-07-23 | End: 2024-07-23 | Stop reason: SDUPTHER

## 2024-07-23 RX ORDER — SUCCINYLCHOLINE CHLORIDE 20 MG/ML
INJECTION INTRAMUSCULAR; INTRAVENOUS PRN
Status: DISCONTINUED | OUTPATIENT
Start: 2024-07-23 | End: 2024-07-23 | Stop reason: SDUPTHER

## 2024-07-23 RX ORDER — DEXAMETHASONE SODIUM PHOSPHATE 4 MG/ML
INJECTION, SOLUTION INTRA-ARTICULAR; INTRALESIONAL; INTRAMUSCULAR; INTRAVENOUS; SOFT TISSUE PRN
Status: DISCONTINUED | OUTPATIENT
Start: 2024-07-23 | End: 2024-07-23 | Stop reason: SDUPTHER

## 2024-07-23 RX ORDER — LIDOCAINE HYDROCHLORIDE 20 MG/ML
INJECTION, SOLUTION EPIDURAL; INFILTRATION; INTRACAUDAL; PERINEURAL PRN
Status: DISCONTINUED | OUTPATIENT
Start: 2024-07-23 | End: 2024-07-23 | Stop reason: SDUPTHER

## 2024-07-23 RX ORDER — BACLOFEN 10 MG/1
10 TABLET ORAL 2 TIMES DAILY
Qty: 10 TABLET | Refills: 0 | Status: SHIPPED | OUTPATIENT
Start: 2024-07-23 | End: 2024-07-28

## 2024-07-23 RX ORDER — ACETAMINOPHEN 325 MG/1
650 TABLET ORAL EVERY 4 HOURS PRN
Status: DISCONTINUED | OUTPATIENT
Start: 2024-07-23 | End: 2024-07-27 | Stop reason: HOSPADM

## 2024-07-23 RX ORDER — LIDOCAINE HYDROCHLORIDE 10 MG/ML
INJECTION, SOLUTION EPIDURAL; INFILTRATION; INTRACAUDAL; PERINEURAL PRN
Status: COMPLETED | OUTPATIENT
Start: 2024-07-23 | End: 2024-07-23

## 2024-07-23 RX ORDER — BUPIVACAINE HYDROCHLORIDE 5 MG/ML
INJECTION, SOLUTION EPIDURAL; INTRACAUDAL PRN
Status: COMPLETED | OUTPATIENT
Start: 2024-07-23 | End: 2024-07-23

## 2024-07-23 RX ORDER — KETAMINE HYDROCHLORIDE 50 MG/ML
INJECTION, SOLUTION INTRAMUSCULAR; INTRAVENOUS PRN
Status: DISCONTINUED | OUTPATIENT
Start: 2024-07-23 | End: 2024-07-23 | Stop reason: SDUPTHER

## 2024-07-23 RX ORDER — ONDANSETRON 2 MG/ML
INJECTION INTRAMUSCULAR; INTRAVENOUS PRN
Status: DISCONTINUED | OUTPATIENT
Start: 2024-07-23 | End: 2024-07-23 | Stop reason: SDUPTHER

## 2024-07-23 RX ORDER — PHENYLEPHRINE HYDROCHLORIDE 10 MG/ML
INJECTION INTRAVENOUS PRN
Status: DISCONTINUED | OUTPATIENT
Start: 2024-07-23 | End: 2024-07-23 | Stop reason: SDUPTHER

## 2024-07-23 RX ADMIN — LIDOCAINE HYDROCHLORIDE 100 MG: 20 INJECTION, SOLUTION EPIDURAL; INFILTRATION; INTRACAUDAL; PERINEURAL at 12:36

## 2024-07-23 RX ADMIN — LIDOCAINE HYDROCHLORIDE 5 ML: 10 INJECTION, SOLUTION EPIDURAL; INFILTRATION; INTRACAUDAL; PERINEURAL at 13:27

## 2024-07-23 RX ADMIN — PHENYLEPHRINE HYDROCHLORIDE 200 MCG: 10 INJECTION INTRAVENOUS at 13:10

## 2024-07-23 RX ADMIN — DEXAMETHASONE SODIUM PHOSPHATE 4 MG: 4 INJECTION INTRA-ARTICULAR; INTRALESIONAL; INTRAMUSCULAR; INTRAVENOUS; SOFT TISSUE at 12:47

## 2024-07-23 RX ADMIN — BUPIVACAINE HYDROCHLORIDE 12 ML: 5 INJECTION, SOLUTION EPIDURAL; INTRACAUDAL; PERINEURAL at 13:28

## 2024-07-23 RX ADMIN — KETAMINE HYDROCHLORIDE 20 MG: 50 INJECTION, SOLUTION INTRAMUSCULAR; INTRAVENOUS at 12:43

## 2024-07-23 RX ADMIN — EPHEDRINE SULFATE 5 MG: 5 INJECTION INTRAVENOUS at 12:59

## 2024-07-23 RX ADMIN — ONDANSETRON 4 MG: 2 INJECTION INTRAMUSCULAR; INTRAVENOUS at 12:50

## 2024-07-23 RX ADMIN — PHENYLEPHRINE HYDROCHLORIDE 200 MCG: 10 INJECTION INTRAVENOUS at 13:04

## 2024-07-23 RX ADMIN — PHENYLEPHRINE HYDROCHLORIDE 200 MCG: 10 INJECTION INTRAVENOUS at 12:52

## 2024-07-23 RX ADMIN — SODIUM CHLORIDE, SODIUM LACTATE, POTASSIUM CHLORIDE, AND CALCIUM CHLORIDE: 600; 310; 30; 20 INJECTION, SOLUTION INTRAVENOUS at 12:28

## 2024-07-23 RX ADMIN — Medication 200 MG: at 12:37

## 2024-07-23 RX ADMIN — PROPOFOL 140 MG: 10 INJECTION, EMULSION INTRAVENOUS at 12:36

## 2024-07-23 RX ADMIN — EPHEDRINE SULFATE 5 MG: 5 INJECTION INTRAVENOUS at 13:04

## 2024-07-23 ASSESSMENT — ENCOUNTER SYMPTOMS: SHORTNESS OF BREATH: 1

## 2024-07-23 ASSESSMENT — COPD QUESTIONNAIRES: CAT_SEVERITY: MODERATE

## 2024-07-23 NOTE — ANESTHESIA PROCEDURE NOTES
Airway  Date/Time: 7/23/2024 12:38 PM  Urgency: elective    Airway not difficult    General Information and Staff    Patient location during procedure: OR  Performed: resident/CRNA   Performed by: Syed Freedman Jr., APRN - CRNA  Authorized by: Nevaeh Oneill MD      Indications and Patient Condition  Indications for airway management: anesthesia  Spontaneous Ventilation: absent  Sedation level: deep  Preoxygenated: yes  Patient position: sniffing  MILS not maintained throughout  Mask difficulty assessment: vent by bag mask    Final Airway Details  Final airway type: endotracheal airway      Successful airway: ETT  Cuffed: yes   Successful intubation technique: direct laryngoscopy  Facilitating devices/methods: intubating stylet  Endotracheal tube insertion site: oral  Blade: Trey  Blade size: #3  ETT size (mm): 7.0  Cormack-Lehane Classification: grade I - full view of glottis  Placement verified by: chest auscultation and capnometry   Measured from: lips  ETT to lips (cm): 21  Number of attempts at approach: 1  Ventilation between attempts: bag mask  Number of other approaches attempted: 0    Additional Comments  Lips and dentition remain same as baseline  no

## 2024-07-23 NOTE — ANESTHESIA PRE PROCEDURE
last liquid consumption: 07/22/24                        Date of last solid food consumption: 07/22/24    BMI:   Wt Readings from Last 3 Encounters:   07/23/24 91.2 kg (201 lb)   07/11/24 91.2 kg (201 lb)   07/10/24 91.6 kg (202 lb)     Body mass index is 32.44 kg/m².    CBC:   Lab Results   Component Value Date/Time    WBC 7.8 06/27/2024 11:27 PM    RBC 3.74 06/27/2024 11:27 PM    HGB 11.7 06/27/2024 11:27 PM    HCT 36.2 06/27/2024 11:27 PM    MCV 96.8 06/27/2024 11:27 PM    RDW 15.1 06/27/2024 11:27 PM     06/27/2024 11:27 PM       CMP:   Lab Results   Component Value Date/Time     06/27/2024 11:27 PM    K 4.3 06/27/2024 11:27 PM    CL 97 06/27/2024 11:27 PM    CO2 30 06/27/2024 11:27 PM    BUN 20 06/27/2024 11:27 PM    CREATININE 0.89 06/27/2024 11:27 PM    GFRAA >60 02/07/2022 07:36 AM    AGRATIO 2.0 12/10/2020 02:25 PM    LABGLOM 67 06/27/2024 11:27 PM    LABGLOM 65 04/15/2024 12:22 PM    GLUCOSE 106 06/27/2024 11:27 PM    CALCIUM 9.0 06/27/2024 11:27 PM    BILITOT 0.3 06/27/2024 11:27 PM    ALKPHOS 106 06/27/2024 11:27 PM    ALKPHOS 91 12/10/2020 02:25 PM    AST 31 06/27/2024 11:27 PM    ALT 13 06/27/2024 11:27 PM       POC Tests: No results for input(s): \"POCGLU\", \"POCNA\", \"POCK\", \"POCCL\", \"POCBUN\", \"POCHEMO\", \"POCHCT\" in the last 72 hours.    Coags:   Lab Results   Component Value Date/Time    PROTIME 17.6 03/22/2024 01:54 PM    INR 1.4 03/22/2024 01:54 PM    APTT 36.1 03/01/2024 09:17 AM       HCG (If Applicable): No results found for: \"PREGTESTUR\", \"PREGSERUM\", \"HCG\", \"HCGQUANT\"     ABGs: No results found for: \"PHART\", \"PO2ART\", \"LAH6WAG\", \"VIG7MRC\", \"BEART\", \"Z9KNUYCR\"     Type & Screen (If Applicable):  No results found for: \"LABABO\"    Drug/Infectious Status (If Applicable):  Lab Results   Component Value Date/Time    HEPCAB NONREACTIVE 02/04/2021 12:46 PM       COVID-19 Screening (If Applicable):   Lab Results   Component Value Date/Time    COVID19 Not detected 02/15/2023 01:55 PM

## 2024-07-23 NOTE — PERIOP NOTE
TRANSFER - OUT REPORT:    Verbal report given to MADI Lira on Rylee Orr  being transferred to IR room 5 for routine post-op       Report consisted of patient’s Situation, Background, Assessment and   Recommendations(SBAR).     Information from the following report(s) Nurse Handoff Report, Cardiac Rhythm paced, Quality Measures, and Neuro Assessment was reviewed with the receiving nurse.    Lines:   Peripheral IV 07/23/24 Left;Posterior Hand (Active)   Site Assessment Clean, dry & intact 07/23/24 1137   Line Status Blood return noted;Normal saline locked 07/23/24 1137   Phlebitis Assessment No symptoms 07/23/24 1137   Infiltration Assessment 0 07/23/24 1137   Alcohol Cap Used No 07/23/24 1137   Dressing Status New dressing applied 07/23/24 1137   Dressing Type Transparent 07/23/24 1137   Dressing Intervention New 07/23/24 1137        Opportunity for questions and clarification was provided.      Patient transported with:   O2 @ 3 liters  Registered Nurse    VTE prophylaxis orders have been written for Rylee Orr.    Patient and family given floor number and nurses name.  Family updated re: pt status after security code verified.

## 2024-07-23 NOTE — DISCHARGE INSTRUCTIONS
If you have any questions about your procedure, please call the Interventional Radiology department at [phone].     After business hours (5pm) and weekends, call the answering service at [phone] and ask for the Radiologist on call to be paged.         Si tiene Preguntas acerca del procedimiento, por favor llame al departamento de Radiología Intervencional al [phone].     Después de horas de oficina (5 pm) y los fines de semana, llamar al servicio de llamadas al [phone] y pregunte por el Radiologo de [place].

## 2024-07-23 NOTE — H&P
[facility]  Department of Interventional Radiology  [phone]    History and Physical    Patient:  Rylee Orr MRN:  [id number]  SSN:  [id number]    YOB: 1945  Age:  79 y.o.  Sex:  female      Primary Care Provider:  Jeanmarie Resendez MD  Referring Physician:  Akhil Dickson MD    Subjective:     Chief Complaint: Osteoporotic T 5 VCF    History of the Present Illness:  The patient is a 79 y.o. female who presents T 5 kyphoplasty with general anesthesia.        Patient has history of A-fib anticoagulated on Eliquis.  Held anticoagulation 48 hours, pacemaker, COPD with oxygen dependence, aortic atherosclerosis, MARISA, hypertension, and VTE.  She has a history of chronic lower back pain and last received facet lumbar injections January 2024.  She was evaluated in our office on 7--24 after being referred from [facility] for a T5 vertebral compression fracture noted on a chest CT scan.  She also has history of an L3 fracture.  Patient has been having significant thoracic and lumbar back pain for a little over a month after doing some minor cleaning and food preparation in her kitchen.  After being evaluated in our office a nuclear medicine bone scan was obtained to better assess the acuity of the fracture as well as a DEXA scan since she had not previously been diagnosed with osteoporosis.  Imaging revealed an acute T5 VCF and low bone mineral density.  Patient is n.p.o. today.      Past Medical History:   Diagnosis Date    Aortic atherosclerosis (HCC)     noted on lumbar spine film 3/9/2023    Arrhythmia     palpitations.     Arthritis     rt shoulder    Atrial fibrillation (HCC) 01/21/2016    takes eliquis    Cancer (HCC)     skin ca on nose    Chest pain     Chronic obstructive pulmonary disease (HCC)     COPD exacerbation (HCC) 9/25/2017    Dyspnea     Essential hypertension 1/21/2016    GERD (gastroesophageal reflux disease)     managed by meds

## 2024-07-23 NOTE — OP NOTE
[facility]  Department of Interventional Radiology  [phone]        Interventional Radiology Brief Procedure Note    Patient: Rylee Orr MRN: [id number]  SSN: [id number]    YOB: 1945  Age: 79 y.o.  Sex: female      Date of Procedure: 7/23/2024    Pre-Procedure Diagnosis: T5 fracture    Post-Procedure Diagnosis: SAME    Procedure(s): Kyphoplasty    Brief Description of Procedure: as above    Performed By: LOYD GRIJALVA MD     Assistants: None    Anesthesia:General    Estimated Blood Loss: Less than 10ml    Specimens:  None    Implants:  None    Findings: subacute T5 fracture  successful kyphoplasty    Complications: None    Recommendations: routine      Follow Up: prn    Signed By: LOYD GRIJALVA MD     July 23, 2024

## 2024-07-23 NOTE — ANESTHESIA POSTPROCEDURE EVALUATION
Department of Anesthesiology  Postprocedure Note    Patient: Rylee Orr  MRN: [id number]  YOB: 1945  Date of evaluation: 7/23/2024    Procedure Summary       Date: 07/23/24 Room / Location: [facility]    Anesthesia Start: 1220 Anesthesia Stop: 1337    Procedure: IR KYPHOPLASTY THORACIC FIRST LEVEL Diagnosis:       Age-related osteoporosis with current pathological fracture, vertebra(e), initial encounter for fracture (HCC)      (Recent compression fracture involving T5.)    Scheduled Providers: Nevaeh Oneill MD; Syed Freedman Jr., APRN - CRNA Responsible Provider: Nevaeh Oneill MD    Anesthesia Type: General ASA Status: 4            Anesthesia Type: General    Brynn Phase I: Brynn Score: 9    Brynn Phase II:      Anesthesia Post Evaluation    Patient location during evaluation: PACU  Patient participation: complete - patient participated  Level of consciousness: awake and alert  Airway patency: patent  Nausea: well controlled.  Cardiovascular status: acceptable.  Respiratory status: acceptable  Hydration status: stable  Pain management: adequate    No notable events documented.

## 2024-07-23 NOTE — OR NURSING
Patient in IR BIPLANE room for procedure.  Anesthesia has assumed care of patient for the duration of procedure.  Please see anesthesia record for documentation.

## 2024-07-23 NOTE — FLOWSHEET NOTE
Recovery period without difficulty. Pt alert and oriented and denies pain. Dressing is clean, dry, and intact. Dr Dickson at bedside. Pt escorted to lobby discharge area via wheelchair. Vital signs and Brynn score completed.

## 2024-07-24 ENCOUNTER — CARE COORDINATION (OUTPATIENT)
Dept: CARE COORDINATION | Facility: CLINIC | Age: 79
End: 2024-07-24

## 2024-07-24 ASSESSMENT — ENCOUNTER SYMPTOMS: DYSPNEA ASSOCIATED WITH: EXERTION

## 2024-07-24 NOTE — CARE COORDINATION
Ambulatory Care Coordination Note     2024 12:59 PM     Patient Current Location:  Home: [place] [address]     ACM contacted the patient by telephone. Verified name and  with patient as identifiers.         ACM: Bria Chew RN     Challenges to be reviewed by the provider   Additional needs identified to be addressed with provider No  none               Method of communication with provider: none.    Care Summary Note:     Discussed kyphoplasty procedure yesterday, and dc instructions. Pt has Bandaid over incision understands no shower today and not to over exert self. Discussed sxs of infection to observe and report, and deep breathing exercises to clear lungs throughout day. Pt reports Hacking dry cough after procedure.     Assessments Completed:   COPD Assessment              Symptoms:  None: Yes      Symptom course: stable  Breathlessness: exertion  Increase use of rapid acting/rescue inhaled medications?: No  Change in chronic cough?: No/At Baseline  Change in sputum?: No/At Baseline  Self Monitoring - SaO2: Yes       and   General Assessment    Do you have any symptoms that are causing concern?: No          Medications Reviewed:   Completed during a previous call     Advance Care Planning:   Not reviewed during this call     Care Planning:   Education Documentation  Educate on COPD Zone, taught by Bria Chew RN at 2024 12:54 PM.  Learner: Patient  Readiness: Eager  Method: Explanation  Response: Verbalizes Understanding    Educate chronic obstructive pulmonary disease exacerbation signs and symptoms, taught by Bria Chew RN at 2024 12:54 PM.  Learner: Patient  Readiness: Eager  Method: Explanation  Response: Verbalizes Understanding    Discuss COPD Overview, taught by Bria Chew RN at 2024 12:54 PM.  Learner: Patient  Readiness: Eager  Method: Explanation  Response: Verbalizes Understanding    Education Comments  No comments found.     ,    Goals

## 2024-07-26 ENCOUNTER — TELEPHONE (OUTPATIENT)
Dept: INTERVENTIONAL RADIOLOGY/VASCULAR | Age: 79
End: 2024-07-26

## 2024-07-26 RX ORDER — TADALAFIL 20 MG/1
20 TABLET ORAL DAILY
Qty: 30 TABLET | Refills: 11 | Status: ACTIVE | OUTPATIENT
Start: 2024-07-26

## 2024-07-26 NOTE — TELEPHONE ENCOUNTER
bilateral rib pain since her T5 kypho 3 days ago. only taking Tylenol. Pain is better today, but yesterday was severe. No weakness or numbness. I encouraged her to take her muscle relaxer, pain medication, voltaren, lidoderm patch. call mon if no better          Reason for Call

## 2024-07-29 ENCOUNTER — OFFICE VISIT (OUTPATIENT)
Age: 79
End: 2024-07-29
Payer: MEDICARE

## 2024-07-29 VITALS
BODY MASS INDEX: 34.91 KG/M2 | WEIGHT: 197 LBS | SYSTOLIC BLOOD PRESSURE: 110 MMHG | DIASTOLIC BLOOD PRESSURE: 54 MMHG | HEIGHT: 63 IN | HEART RATE: 76 BPM

## 2024-07-29 DIAGNOSIS — I48.11 LONGSTANDING PERSISTENT ATRIAL FIBRILLATION (HCC): Primary | Chronic | ICD-10-CM

## 2024-07-29 DIAGNOSIS — N18.32 STAGE 3B CHRONIC KIDNEY DISEASE (HCC): ICD-10-CM

## 2024-07-29 DIAGNOSIS — I27.20 PULMONARY HYPERTENSION (HCC): ICD-10-CM

## 2024-07-29 DIAGNOSIS — I87.8 VENOUS STASIS: Chronic | ICD-10-CM

## 2024-07-29 DIAGNOSIS — J43.2 CENTRILOBULAR EMPHYSEMA (HCC): Chronic | ICD-10-CM

## 2024-07-29 DIAGNOSIS — G47.33 OSA (OBSTRUCTIVE SLEEP APNEA): ICD-10-CM

## 2024-07-29 DIAGNOSIS — J96.11 CHRONIC RESPIRATORY FAILURE WITH HYPOXIA (HCC): ICD-10-CM

## 2024-07-29 DIAGNOSIS — I10 ESSENTIAL HYPERTENSION: ICD-10-CM

## 2024-07-29 DIAGNOSIS — E66.9 OBESITY (BMI 30-39.9): ICD-10-CM

## 2024-07-29 DIAGNOSIS — I25.119 CORONARY ARTERY DISEASE INVOLVING NATIVE CORONARY ARTERY OF NATIVE HEART WITH ANGINA PECTORIS (HCC): ICD-10-CM

## 2024-07-29 DIAGNOSIS — E78.00 PURE HYPERCHOLESTEROLEMIA: ICD-10-CM

## 2024-07-29 PROCEDURE — 3078F DIAST BP <80 MM HG: CPT | Performed by: INTERNAL MEDICINE

## 2024-07-29 PROCEDURE — 3074F SYST BP LT 130 MM HG: CPT | Performed by: INTERNAL MEDICINE

## 2024-07-29 PROCEDURE — G8399 PT W/DXA RESULTS DOCUMENT: HCPCS | Performed by: INTERNAL MEDICINE

## 2024-07-29 PROCEDURE — 1090F PRES/ABSN URINE INCON ASSESS: CPT | Performed by: INTERNAL MEDICINE

## 2024-07-29 PROCEDURE — G8417 CALC BMI ABV UP PARAM F/U: HCPCS | Performed by: INTERNAL MEDICINE

## 2024-07-29 PROCEDURE — 1036F TOBACCO NON-USER: CPT | Performed by: INTERNAL MEDICINE

## 2024-07-29 PROCEDURE — G8427 DOCREV CUR MEDS BY ELIG CLIN: HCPCS | Performed by: INTERNAL MEDICINE

## 2024-07-29 PROCEDURE — 99214 OFFICE O/P EST MOD 30 MIN: CPT | Performed by: INTERNAL MEDICINE

## 2024-07-29 PROCEDURE — 3023F SPIROM DOC REV: CPT | Performed by: INTERNAL MEDICINE

## 2024-07-29 PROCEDURE — 1123F ACP DISCUSS/DSCN MKR DOCD: CPT | Performed by: INTERNAL MEDICINE

## 2024-07-29 RX ORDER — LOSARTAN POTASSIUM 25 MG/1
12.5 TABLET ORAL DAILY
Qty: 45 TABLET | Refills: 3 | Status: SHIPPED | OUTPATIENT
Start: 2024-07-29

## 2024-07-29 RX ORDER — MAGNESIUM OXIDE 400 MG/1
400 TABLET ORAL 2 TIMES DAILY
Qty: 180 TABLET | Refills: 3 | Status: SHIPPED | OUTPATIENT
Start: 2024-07-29

## 2024-07-29 RX ORDER — METOPROLOL SUCCINATE 25 MG/1
25 TABLET, EXTENDED RELEASE ORAL DAILY
Qty: 90 TABLET | Refills: 3 | Status: SHIPPED | OUTPATIENT
Start: 2024-07-29

## 2024-07-29 ASSESSMENT — ENCOUNTER SYMPTOMS
ABDOMINAL PAIN: 0
SHORTNESS OF BREATH: 1
BACK PAIN: 1
COUGH: 0

## 2024-07-29 NOTE — PROGRESS NOTES
[facility] CARDIOLOGY  [address] [place] [address], [address]  [place] [address]      24      NAME:  Rylee Orr  : 1945  MRN: [id number]      SUBJECTIVE:   Rylee Orr is a 79 y.o. female seen for a follow up visit regarding the following:     Chief Complaint   Patient presents with    Atrial Fibrillation    Coronary Artery Disease       HPI:   79 y.o. male with history of severe dyspnea and chest pressure with exertion.  Patient has a strong family history of premature CAD and MI.  She has HTN, Chol, and long history of tobacco abuse.  She also has a history of DVTs and PE many years ago.  She had cardiac catheterization 2014 with normal coronaries.  She was seen in office 2014 with atrial fibrillation with RVR that spontaneously converted.  She has since developed persistent atrial fibrillation status post AV node ablation and biventricular pacemaker.  This has been stable.  Patient struggles with morbid obesity and chronic hypoxic hypercapnia.  Requires CPAP and O2 at home.  She has moderate pulmonary hypertension.  Most recent echocardiogram 2024 with normal left ventricular systolic function.  She had trivial pericardial effusion.  Echocardiogram 2024 demonstrated PA systolic pressure of 57 mmHg.  This is chronic and stable.  Patient was hospitalized 2024 secondary to acute hypoxic hypercapnic respiratory failure.  She was treated for community-acquired pneumonia and COPD exacerbation.            Past Medical History, Past Surgical History, Family history, Social History, and Medications were all reviewed with the patient today and updated as necessary.     Current Outpatient Medications   Medication Sig Dispense Refill    metoprolol succinate (TOPROL XL) 25 MG extended release tablet Take 1 tablet by mouth daily 90 tablet 3    magnesium oxide (MAG-OX) 400 MG tablet Take 1 tablet by mouth in the morning and at bedtime 180 tablet 3    losartan (COZAAR) 25 MG tablet Take

## 2024-07-30 ENCOUNTER — TELEPHONE (OUTPATIENT)
Dept: FAMILY MEDICINE CLINIC | Facility: CLINIC | Age: 79
End: 2024-07-30

## 2024-07-30 NOTE — TELEPHONE ENCOUNTER
Sarah from [employer] stated that patient has been re-certified for nursing services: 1/wk for 3 wks then every wk until end of cert. Needs verbal approval.

## 2024-08-05 ENCOUNTER — TELEPHONE (OUTPATIENT)
Dept: FAMILY MEDICINE CLINIC | Facility: CLINIC | Age: 79
End: 2024-08-05

## 2024-08-05 NOTE — TELEPHONE ENCOUNTER
----- Message from Cory Gold sent at 8/5/2024  9:30 AM EDT -----  Regarding: ECC Escalation To Practice  ECC Escalation To Practice      Type of Escalation: Red Flag Symptom  --------------------------------------------------------------------------------------------------------------------------    Information for Provider:  Patient is looking for appointment for: Symptom surgery complication  Reasons for Message: Unable to reach practice     Additional Information Patient is experiencing severe back pains due to her last surgery  --------------------------------------------------------------------------------------------------------------------------    Relationship to Patient: Self     Call Back Info: OK to leave message on voicemail  Preferred Call Back Number: Phone [phone] (home)

## 2024-08-06 ENCOUNTER — TELEPHONE (OUTPATIENT)
Dept: ORTHOPEDIC SURGERY | Age: 79
End: 2024-08-06

## 2024-08-06 DIAGNOSIS — S22.050A CLOSED WEDGE COMPRESSION FRACTURE OF T5 VERTEBRA, INITIAL ENCOUNTER (HCC): Primary | ICD-10-CM

## 2024-08-06 NOTE — TELEPHONE ENCOUNTER
She left a voicemail message that patient is complaining of 6-8/10 back pain. Dr. Resendez has approved Tylenol but she is out of her pain meds. She also wants to know when she is to follow up. Please give Joanie a call.

## 2024-08-07 NOTE — TELEPHONE ENCOUNTER
Spoke with patient instructed her to contact IR regarding reoccuring thoracic back pain.She is saying that IR didn't want to do Mri. I did also recommend that we get a pain mgmt referral going as IR is a temporary measure but they would not be who would follow her with this pain it would pain mgmt since there is nothing that we would fix surgically. I faxed referral to [facility] expedited to get her scheduled. She will call IR in the meantime.

## 2024-08-08 ENCOUNTER — CARE COORDINATION (OUTPATIENT)
Dept: CARE COORDINATION | Facility: CLINIC | Age: 79
End: 2024-08-08

## 2024-08-08 ENCOUNTER — TELEPHONE (OUTPATIENT)
Age: 79
End: 2024-08-08

## 2024-08-08 RX ORDER — TIZANIDINE 2 MG/1
2 TABLET ORAL 3 TIMES DAILY PRN
Qty: 21 TABLET | Refills: 0 | Status: SHIPPED | OUTPATIENT
Start: 2024-08-08 | End: 2024-08-15

## 2024-08-08 NOTE — TELEPHONE ENCOUNTER
Patient called and said she is having some new pain since her  T 5 kyphoplasty on 7/23.  She c/o spasm like discomfort in her right thoracic back region lateral and below the treated area.  Pain is mild and she is taking nothing OTC for her discomfort.  She also c/o anterior rib discomfort under her left breast.  She denies any chest pain or shortness of breath.  I urged her to see her PCP about her anterior rib pain.  I offered to get new imaging for her back discomfort but she prefers to try a muscle relaxant and will be seen in our office for follow up next Thursday.  Tizanidine will be sent to her pharmacy.

## 2024-08-08 NOTE — CARE COORDINATION
Ambulatory Care Coordination Note     2024 4:50 PM     Patient Current Location:  Home: [place] [address]     Patient contacted the patient by telephone. Verified name and  with patient as identifiers.         ACM: Bria Chew RN     Challenges to be reviewed by the provider   Additional needs identified to be addressed with provider No  none               Method of communication with provider: none.    Care Summary Note:     Pt had recent kyphoplasty and still in great deal of pain. Has attempted to reach out to surgery and advised to call IR. ACM contacted IR on pt behalf who will reach out to pt. Denies concerns with breathing or otherwise at this time. Will follow for outcome.    Offered patient enrollment in the Remote Patient Monitoring (RPM) program for in-home monitoring: Deferred at this time because pt in pain; will discuss at next outreach.     Assessments Completed:   General Assessment    Do you have any symptoms that are causing concern?: Yes  Progression since Onset: Unchanged  Reported Symptoms: Pain          Medications Reviewed:   Completed during a previous call     Advance Care Planning:   Not reviewed during this call     Care Planning:    Goals Addressed                   This Visit's Progress     Conditions and Symptoms   On track     I will schedule office visits, as directed by my provider.  I will keep my appointment or reschedule if I have to cancel.  I will notify my provider of any barriers to my plan of care.  I will follow my Zone Management tool to seek urgent or emergent care.  I will notify my provider of any symptoms that indicate a worsening of my condition.    Barriers: none  Plan for overcoming my barriers: N/A  Confidence: 10/10  Anticipated Goal Completion Date: 10/12/24                 PCP/Specialist follow up:   Future Appointments         Provider Specialty Dept Phone    8/15/2024 1:30 PM Aaliyah Bourne PA Radiology [phone]

## 2024-08-12 ENCOUNTER — TELEPHONE (OUTPATIENT)
Dept: FAMILY MEDICINE CLINIC | Facility: CLINIC | Age: 79
End: 2024-08-12

## 2024-08-12 DIAGNOSIS — Z12.31 SCREENING MAMMOGRAM FOR HIGH-RISK PATIENT: Primary | ICD-10-CM

## 2024-08-12 NOTE — TELEPHONE ENCOUNTER
----- Message from Robi ATWOOD sent at 8/12/2024  4:10 PM EDT -----  Regarding: ECC Referral Request  ECC Referral Request    Reason for referral request: Lab/Test Order    Specialist/Lab/Test patient is requesting (if known): Mammogram    Specialist Phone Number (if applicable): NA    Additional Information patient needs a referral for a Mammogram  --------------------------------------------------------------------------------------------------------------------------    Relationship to Patient: Self     Call Back Information: OK to leave message on voicemail  Preferred Call Back Number: Phone [phone]

## 2024-08-13 ENCOUNTER — TELEPHONE (OUTPATIENT)
Dept: PULMONOLOGY | Age: 79
End: 2024-08-13

## 2024-08-13 NOTE — TELEPHONE ENCOUNTER
Contacted patient regarding side effect of diarrhea, reports she takes 2 cholestid Q AM & night & 2 immodium Q AM and able to manage this side effect. Will call if becomes unmanageable.

## 2024-08-13 NOTE — TELEPHONE ENCOUNTER
Patient is complaining of having diarrhea after starting :      Tadalafil, PAH, 20 MG tablet    Asking if there is anything else she can take

## 2024-08-15 ENCOUNTER — OFFICE VISIT (OUTPATIENT)
Age: 79
End: 2024-08-15

## 2024-08-15 VITALS
SYSTOLIC BLOOD PRESSURE: 131 MMHG | BODY MASS INDEX: 35.26 KG/M2 | WEIGHT: 199 LBS | HEART RATE: 71 BPM | HEIGHT: 63 IN | DIASTOLIC BLOOD PRESSURE: 78 MMHG

## 2024-08-15 DIAGNOSIS — S22.050S CLOSED WEDGE COMPRESSION FRACTURE OF T5 VERTEBRA, SEQUELA: Primary | ICD-10-CM

## 2024-08-15 DIAGNOSIS — M54.6 ACUTE RIGHT-SIDED THORACIC BACK PAIN: ICD-10-CM

## 2024-08-15 DIAGNOSIS — M54.50 LUMBAR BACK PAIN: ICD-10-CM

## 2024-08-15 RX ORDER — OMEGA-3S/DHA/EPA/FISH OIL/D3 300MG-1000
400 CAPSULE ORAL DAILY
COMMUNITY

## 2024-08-15 NOTE — PATIENT INSTRUCTIONS
Please call central scheduling tomorrow if you have not heard from them to schedule your repeat NM bone scan at [phone].    We will call you to discuss results and if you have a new fracture.

## 2024-08-17 ASSESSMENT — ENCOUNTER SYMPTOMS: BACK PAIN: 1

## 2024-08-19 ENCOUNTER — TELEPHONE (OUTPATIENT)
Dept: INTERVENTIONAL RADIOLOGY/VASCULAR | Age: 79
End: 2024-08-19

## 2024-08-19 ENCOUNTER — HOSPITAL ENCOUNTER (OUTPATIENT)
Dept: NUCLEAR MEDICINE | Age: 79
Discharge: HOME | End: 2024-08-22
Payer: MEDICARE

## 2024-08-19 ENCOUNTER — TELEPHONE (OUTPATIENT)
Dept: FAMILY MEDICINE CLINIC | Facility: CLINIC | Age: 79
End: 2024-08-19

## 2024-08-19 DIAGNOSIS — S22.32XA CLOSED FRACTURE OF ONE RIB OF LEFT SIDE, INITIAL ENCOUNTER: Primary | ICD-10-CM

## 2024-08-19 DIAGNOSIS — S22.050S CLOSED WEDGE COMPRESSION FRACTURE OF T5 VERTEBRA, SEQUELA: ICD-10-CM

## 2024-08-19 PROCEDURE — A9503 TC99M MEDRONATE: HCPCS | Performed by: PHYSICIAN ASSISTANT

## 2024-08-19 PROCEDURE — 78300 BONE IMAGING LIMITED AREA: CPT

## 2024-08-19 PROCEDURE — 3430000000 HC RX DIAGNOSTIC RADIOPHARMACEUTICAL: Performed by: PHYSICIAN ASSISTANT

## 2024-08-19 PROCEDURE — 78830 RP LOCLZJ TUM SPECT W/CT 1: CPT

## 2024-08-19 RX ORDER — HYDROCODONE BITARTRATE AND ACETAMINOPHEN 5; 325 MG/1; MG/1
1 TABLET ORAL EVERY 6 HOURS PRN
Qty: 20 TABLET | Refills: 0 | Status: SHIPPED | OUTPATIENT
Start: 2024-08-19 | End: 2024-08-24

## 2024-08-19 RX ORDER — TC 99M MEDRONATE 20 MG/10ML
27 INJECTION, POWDER, LYOPHILIZED, FOR SOLUTION INTRAVENOUS
Status: COMPLETED | OUTPATIENT
Start: 2024-08-19 | End: 2024-08-19

## 2024-08-19 RX ADMIN — TC 99M MEDRONATE 27 MILLICURIE: 20 INJECTION, POWDER, LYOPHILIZED, FOR SOLUTION INTRAVENOUS at 09:20

## 2024-08-19 NOTE — TELEPHONE ENCOUNTER
Patient called stating that she has been at [facility] today having scans done. She just received a call while on her way home that she has a rib fracture going up into her breast bone. She is asking if Dr Resendez would send in something for pain for her to take at night so she can get some rest?  She uses [facility] in [place].

## 2024-08-19 NOTE — TELEPHONE ENCOUNTER
Sent in prescription for:     Requested Prescriptions     Signed Prescriptions Disp Refills    HYDROcodone-acetaminophen (NORCO) 5-325 MG per tablet 20 tablet 0     Sig: Take 1 tablet by mouth every 6 hours as needed for Pain for up to 5 days. Intended supply: 5 days. Take lowest dose possible to manage pain Max Daily Amount: 4 tablets     Authorizing Provider: PABLO BRUCE

## 2024-08-21 ENCOUNTER — TELEPHONE (OUTPATIENT)
Dept: PULMONOLOGY | Age: 79
End: 2024-08-21

## 2024-08-21 NOTE — TELEPHONE ENCOUNTER
Patient was suppose to get a portable 02 concentrator and she has not heard from anyone.  She uses [facility] in [place]

## 2024-08-22 ENCOUNTER — CARE COORDINATION (OUTPATIENT)
Dept: CARE COORDINATION | Facility: CLINIC | Age: 79
End: 2024-08-22

## 2024-08-22 ASSESSMENT — ENCOUNTER SYMPTOMS: DYSPNEA ASSOCIATED WITH: MINIMAL EXERTION

## 2024-08-22 NOTE — CARE COORDINATION
symptoms that indicate a worsening of my condition.    Barriers: none  Plan for overcoming my barriers: N/A  Confidence: 10/10  Anticipated Goal Completion Date: 10/12/24                 PCP/Specialist follow up:   Future Appointments         Provider Specialty Dept Phone    9/6/2024 2:15 PM Jeanmarie Resendez MD Family Medicine [phone]    9/26/2024 1:00 PM PP PFT LAB Pulmonology [phone]    9/26/2024 1:45 PM PP NURSE ONLY Pulmonology [phone]    9/26/2024 2:40 PM Ciara Mena, APRN - NP Pulmonology [phone]    12/10/2024 12:30 PM Aniceto Ndiaye PA Orthopedic Surgery [phone]    12/19/2024 1:30 PM Jacob Dejesus MD Cardiology [phone]    1/14/2025 11:10 AM Carmen Rodriguez PA Sleep Medicine [phone]    1/16/2025 1:15 PM Kiel Toro MD Cardiology [phone]            Follow Up:   Plan for next ACM outreach in approximately 2 weeks to complete:  - goal progression  - education .   Patient  is agreeable to this plan.         Awake

## 2024-08-22 NOTE — TELEPHONE ENCOUNTER
Left message for patient about POC on voicemail. She did not desat during walk on 6/4 visit with Dr. Negrete.

## 2024-09-06 ENCOUNTER — OFFICE VISIT (OUTPATIENT)
Dept: FAMILY MEDICINE CLINIC | Facility: CLINIC | Age: 79
End: 2024-09-06

## 2024-09-06 VITALS
HEIGHT: 63 IN | HEART RATE: 71 BPM | DIASTOLIC BLOOD PRESSURE: 60 MMHG | SYSTOLIC BLOOD PRESSURE: 150 MMHG | OXYGEN SATURATION: 96 % | WEIGHT: 194.8 LBS | TEMPERATURE: 98.4 F | BODY MASS INDEX: 34.52 KG/M2 | RESPIRATION RATE: 16 BRPM

## 2024-09-06 DIAGNOSIS — J44.9 OSA AND COPD OVERLAP SYNDROME (HCC): ICD-10-CM

## 2024-09-06 DIAGNOSIS — G47.33 OSA AND COPD OVERLAP SYNDROME (HCC): ICD-10-CM

## 2024-09-06 DIAGNOSIS — I50.22 CHRONIC SYSTOLIC (CONGESTIVE) HEART FAILURE (HCC): ICD-10-CM

## 2024-09-06 DIAGNOSIS — R06.02 SOB (SHORTNESS OF BREATH): ICD-10-CM

## 2024-09-06 DIAGNOSIS — E55.9 VITAMIN D DEFICIENCY: ICD-10-CM

## 2024-09-06 DIAGNOSIS — R53.83 FATIGUE, UNSPECIFIED TYPE: ICD-10-CM

## 2024-09-06 DIAGNOSIS — I48.11 LONGSTANDING PERSISTENT ATRIAL FIBRILLATION (HCC): Primary | Chronic | ICD-10-CM

## 2024-09-06 DIAGNOSIS — I25.119 CORONARY ARTERY DISEASE INVOLVING NATIVE CORONARY ARTERY OF NATIVE HEART WITH ANGINA PECTORIS (HCC): ICD-10-CM

## 2024-09-06 DIAGNOSIS — Z12.31 ENCOUNTER FOR SCREENING MAMMOGRAM FOR BREAST CANCER: ICD-10-CM

## 2024-09-06 LAB
25(OH)D3 SERPL-MCNC: 26.8 NG/ML (ref 30–100)
ALBUMIN SERPL-MCNC: 3.9 G/DL (ref 3.2–4.6)
ALBUMIN/GLOB SERPL: 1.5 (ref 1–1.9)
ALP SERPL-CCNC: 83 U/L (ref 35–104)
ALT SERPL-CCNC: 11 U/L (ref 12–65)
ANION GAP SERPL CALC-SCNC: 10 MMOL/L (ref 9–18)
AST SERPL-CCNC: 24 U/L (ref 15–37)
BASOPHILS # BLD: 0 K/UL (ref 0–0.2)
BASOPHILS NFR BLD: 0 % (ref 0–2)
BILIRUB SERPL-MCNC: 0.4 MG/DL (ref 0–1.2)
BUN SERPL-MCNC: 14 MG/DL (ref 8–23)
CALCIUM SERPL-MCNC: 9.3 MG/DL (ref 8.8–10.2)
CHLORIDE SERPL-SCNC: 104 MMOL/L (ref 98–107)
CO2 SERPL-SCNC: 29 MMOL/L (ref 20–28)
CREAT SERPL-MCNC: 0.87 MG/DL (ref 0.6–1.1)
DIFFERENTIAL METHOD BLD: ABNORMAL
EOSINOPHIL # BLD: 0.1 K/UL (ref 0–0.8)
EOSINOPHIL NFR BLD: 1 % (ref 0.5–7.8)
ERYTHROCYTE [DISTWIDTH] IN BLOOD BY AUTOMATED COUNT: 15.3 % (ref 11.9–14.6)
GLOBULIN SER CALC-MCNC: 2.6 G/DL (ref 2.3–3.5)
GLUCOSE SERPL-MCNC: 89 MG/DL (ref 70–99)
HCT VFR BLD AUTO: 34.4 % (ref 35.8–46.3)
HGB BLD-MCNC: 11.2 G/DL (ref 11.7–15.4)
IMM GRANULOCYTES # BLD AUTO: 0.1 K/UL (ref 0–0.5)
IMM GRANULOCYTES NFR BLD AUTO: 1 % (ref 0–5)
LYMPHOCYTES # BLD: 1.8 K/UL (ref 0.5–4.6)
LYMPHOCYTES NFR BLD: 30 % (ref 13–44)
MAGNESIUM SERPL-MCNC: 2.1 MG/DL (ref 1.8–2.4)
MCH RBC QN AUTO: 31.4 PG (ref 26.1–32.9)
MCHC RBC AUTO-ENTMCNC: 32.6 G/DL (ref 31.4–35)
MCV RBC AUTO: 96.4 FL (ref 82–102)
MONOCYTES # BLD: 0.6 K/UL (ref 0.1–1.3)
MONOCYTES NFR BLD: 10 % (ref 4–12)
NEUTS SEG # BLD: 3.5 K/UL (ref 1.7–8.2)
NEUTS SEG NFR BLD: 58 % (ref 43–78)
NRBC # BLD: 0 K/UL (ref 0–0.2)
NT PRO BNP: 2278 PG/ML (ref 0–450)
PLATELET # BLD AUTO: 143 K/UL (ref 150–450)
PMV BLD AUTO: 10.4 FL (ref 9.4–12.3)
POTASSIUM SERPL-SCNC: 4 MMOL/L (ref 3.5–5.1)
PROT SERPL-MCNC: 6.5 G/DL (ref 6.3–8.2)
RBC # BLD AUTO: 3.57 M/UL (ref 4.05–5.2)
SODIUM SERPL-SCNC: 142 MMOL/L (ref 136–145)
TSH, 3RD GENERATION: 3.5 UIU/ML (ref 0.27–4.2)
WBC # BLD AUTO: 6.1 K/UL (ref 4.3–11.1)

## 2024-09-06 RX ORDER — TIOTROPIUM BROMIDE 18 UG/1
18 CAPSULE ORAL; RESPIRATORY (INHALATION) DAILY
Qty: 90 CAPSULE | Refills: 1 | Status: SHIPPED | OUTPATIENT
Start: 2024-09-06

## 2024-09-06 RX ORDER — ERGOCALCIFEROL 1.25 MG/1
50000 CAPSULE, LIQUID FILLED ORAL WEEKLY
Qty: 12 CAPSULE | Refills: 1 | Status: SHIPPED | OUTPATIENT
Start: 2024-09-06

## 2024-09-06 ASSESSMENT — ENCOUNTER SYMPTOMS
COUGH: 0
WHEEZING: 1
SHORTNESS OF BREATH: 1

## 2024-09-06 NOTE — PROGRESS NOTES
HISTORY OF PRESENT ILLNESS     Rylee Orr is a 79 y.o. female who presents for       HPI  Patient comes in today with some concerns about shortness of breath and required use of oxygen more.  Patient after she had a pacemaker is noticed that since she has been home she has had to use the oxygen more during the day as her O2 sats will drop at times into the lower 80s.  She does sleep with oxygen at night with her CPAP and is followed by pulmonary.  They did switch her from [facility] because of cost to [facility].  She does have a history of CHF and CAD and has been followed by cardiology.  She also was started on tadalafil for pulmonary hypertension and has a follow-up later this month for recheck on that.  No significant cough, sinus congestion or fever    Past Medical History:   Diagnosis Date    Aortic atherosclerosis (HCC)     noted on lumbar spine film 3/9/2023    Arrhythmia     palpitations.     Arthritis     rt shoulder    Atrial fibrillation (HCC) 01/21/2016    takes eliquis    Cancer (HCC)     skin ca on nose    Chest pain     Chronic obstructive pulmonary disease (HCC)     COPD exacerbation (HCC) 9/25/2017    Dyspnea     Essential hypertension 1/21/2016    GERD (gastroesophageal reflux disease)     managed by meds    HLD (hyperlipidemia) 1/21/2016    Hypertension     managed by meds    Menopause     Morbid obesity (HCC)     Nausea & vomiting     Pulmonary embolus (HCC) 1/21/2016    Sleep apnea     no cpap    Thromboembolus ([facility]) 4 month ago.     right leg    Venous embolism and thrombosis        Allergies   Allergen Reactions    Latex Other (See Comments) and Dermatitis     Other reaction(s): Rash-Allergy    Codeine Other (See Comments)     Headache  Other reaction(s): Other- (not listed) - Allergy  Headache    Shellfish Allergy Hives    Shellfish-Derived Products Hives       Current Outpatient Medications   Medication Sig Dispense Refill    vitamin D (ERGOCALCIFEROL) 1.25 MG (65803 UT) CAPS capsule Take

## 2024-09-09 ENCOUNTER — TELEPHONE (OUTPATIENT)
Dept: FAMILY MEDICINE CLINIC | Facility: CLINIC | Age: 79
End: 2024-09-09

## 2024-09-09 DIAGNOSIS — I50.22 CHRONIC SYSTOLIC (CONGESTIVE) HEART FAILURE (HCC): Primary | ICD-10-CM

## 2024-09-09 RX ORDER — FUROSEMIDE 40 MG
40 TABLET ORAL 2 TIMES DAILY
Qty: 60 TABLET | Refills: 11 | Status: SHIPPED
Start: 2024-09-09

## 2024-09-11 ENCOUNTER — CARE COORDINATION (OUTPATIENT)
Dept: CARE COORDINATION | Facility: CLINIC | Age: 79
End: 2024-09-11

## 2024-09-13 ENCOUNTER — HOSPITAL ENCOUNTER (OUTPATIENT)
Dept: MAMMOGRAPHY | Age: 79
Discharge: HOME | End: 2024-09-16
Attending: FAMILY MEDICINE
Payer: MEDICARE

## 2024-09-13 DIAGNOSIS — Z12.31 SCREENING MAMMOGRAM FOR HIGH-RISK PATIENT: ICD-10-CM

## 2024-09-13 PROCEDURE — 77067 SCR MAMMO BI INCL CAD: CPT

## 2024-09-18 ENCOUNTER — CARE COORDINATION (OUTPATIENT)
Dept: CARE COORDINATION | Facility: CLINIC | Age: 79
End: 2024-09-18

## 2024-09-20 ENCOUNTER — CARE COORDINATION (OUTPATIENT)
Dept: CARE COORDINATION | Facility: CLINIC | Age: 79
End: 2024-09-20

## 2024-09-25 ENCOUNTER — TELEPHONE (OUTPATIENT)
Dept: FAMILY MEDICINE CLINIC | Facility: CLINIC | Age: 79
End: 2024-09-25

## 2024-09-26 ENCOUNTER — NURSE ONLY (OUTPATIENT)
Dept: PULMONOLOGY | Age: 79
End: 2024-09-26

## 2024-09-26 ENCOUNTER — OFFICE VISIT (OUTPATIENT)
Dept: PULMONOLOGY | Age: 79
End: 2024-09-26
Payer: MEDICARE

## 2024-09-26 VITALS
HEART RATE: 70 BPM | HEIGHT: 63 IN | OXYGEN SATURATION: 95 % | SYSTOLIC BLOOD PRESSURE: 138 MMHG | BODY MASS INDEX: 33.49 KG/M2 | WEIGHT: 189 LBS | DIASTOLIC BLOOD PRESSURE: 62 MMHG | RESPIRATION RATE: 20 BRPM | TEMPERATURE: 97.5 F

## 2024-09-26 DIAGNOSIS — G47.33 OSA (OBSTRUCTIVE SLEEP APNEA): ICD-10-CM

## 2024-09-26 DIAGNOSIS — R06.09 DYSPNEA ON EXERTION: ICD-10-CM

## 2024-09-26 DIAGNOSIS — I27.20 PULMONARY HYPERTENSION (HCC): ICD-10-CM

## 2024-09-26 DIAGNOSIS — J96.11 CHRONIC RESPIRATORY FAILURE WITH HYPOXIA: ICD-10-CM

## 2024-09-26 DIAGNOSIS — J44.9 STAGE 3 SEVERE COPD BY GOLD CLASSIFICATION (HCC): ICD-10-CM

## 2024-09-26 DIAGNOSIS — R91.1 PULMONARY NODULE: ICD-10-CM

## 2024-09-26 DIAGNOSIS — R06.09 DYSPNEA ON EXERTION: Primary | ICD-10-CM

## 2024-09-26 DIAGNOSIS — I27.20 PULMONARY HYPERTENSION (HCC): Primary | ICD-10-CM

## 2024-09-26 LAB
DISTANCE WALKED: 787 FT
FEV 1 , POC: 0.77 L
FEV1 % PRED, POC: 41 %
FEV1/FVC, POC: NORMAL
FVC % PRED, POC: 1.47 %
FVC, POC: NORMAL
SPO2: 95 %

## 2024-09-26 PROCEDURE — G8417 CALC BMI ABV UP PARAM F/U: HCPCS | Performed by: STUDENT IN AN ORGANIZED HEALTH CARE EDUCATION/TRAINING PROGRAM

## 2024-09-26 PROCEDURE — 3023F SPIROM DOC REV: CPT | Performed by: STUDENT IN AN ORGANIZED HEALTH CARE EDUCATION/TRAINING PROGRAM

## 2024-09-26 PROCEDURE — 1123F ACP DISCUSS/DSCN MKR DOCD: CPT | Performed by: STUDENT IN AN ORGANIZED HEALTH CARE EDUCATION/TRAINING PROGRAM

## 2024-09-26 PROCEDURE — G8399 PT W/DXA RESULTS DOCUMENT: HCPCS | Performed by: STUDENT IN AN ORGANIZED HEALTH CARE EDUCATION/TRAINING PROGRAM

## 2024-09-26 PROCEDURE — G8427 DOCREV CUR MEDS BY ELIG CLIN: HCPCS | Performed by: STUDENT IN AN ORGANIZED HEALTH CARE EDUCATION/TRAINING PROGRAM

## 2024-09-26 PROCEDURE — 1090F PRES/ABSN URINE INCON ASSESS: CPT | Performed by: STUDENT IN AN ORGANIZED HEALTH CARE EDUCATION/TRAINING PROGRAM

## 2024-09-26 PROCEDURE — 3075F SYST BP GE 130 - 139MM HG: CPT | Performed by: STUDENT IN AN ORGANIZED HEALTH CARE EDUCATION/TRAINING PROGRAM

## 2024-09-26 PROCEDURE — 1036F TOBACCO NON-USER: CPT | Performed by: STUDENT IN AN ORGANIZED HEALTH CARE EDUCATION/TRAINING PROGRAM

## 2024-09-26 PROCEDURE — 99215 OFFICE O/P EST HI 40 MIN: CPT | Performed by: STUDENT IN AN ORGANIZED HEALTH CARE EDUCATION/TRAINING PROGRAM

## 2024-09-26 PROCEDURE — 3078F DIAST BP <80 MM HG: CPT | Performed by: STUDENT IN AN ORGANIZED HEALTH CARE EDUCATION/TRAINING PROGRAM

## 2024-09-26 RX ORDER — FAMOTIDINE 20 MG/1
20 TABLET, FILM COATED ORAL 2 TIMES DAILY
Qty: 60 TABLET | Refills: 3 | Status: SHIPPED | OUTPATIENT
Start: 2024-09-26

## 2024-09-26 RX ORDER — POTASSIUM CHLORIDE 750 MG/1
TABLET, EXTENDED RELEASE ORAL
COMMUNITY
Start: 2024-07-29

## 2024-09-26 RX ORDER — FLUTICASONE PROPIONATE AND SALMETEROL 250; 50 UG/1; UG/1
1 POWDER RESPIRATORY (INHALATION) EVERY 12 HOURS
Qty: 1 EACH | Refills: 11 | Status: SHIPPED | OUTPATIENT
Start: 2024-09-26

## 2024-09-26 ASSESSMENT — PULMONARY FUNCTION TESTS
FEV1_PERCENT_PREDICTED_POC: 41
FVC_PERCENT_PREDICTED_POC: 1.47

## 2024-10-08 ENCOUNTER — TELEPHONE (OUTPATIENT)
Age: 79
End: 2024-10-08

## 2024-10-08 NOTE — TELEPHONE ENCOUNTER
Patient called stating she needs the following :    Samples  Eliquis 5 MG   in [place]    Please call and advise.

## 2024-10-09 NOTE — TELEPHONE ENCOUNTER
Attempted to call patient, [facility] does not currently have Eliquis samples. Unable to reach patient or leave a voicemail

## 2024-10-10 ENCOUNTER — HOSPITAL ENCOUNTER (OUTPATIENT)
Dept: CT IMAGING | Age: 79
Discharge: HOME | End: 2024-10-13
Payer: MEDICARE

## 2024-10-10 DIAGNOSIS — R91.1 PULMONARY NODULE: ICD-10-CM

## 2024-10-10 DIAGNOSIS — I50.22 CHRONIC SYSTOLIC (CONGESTIVE) HEART FAILURE (HCC): ICD-10-CM

## 2024-10-10 LAB
ANION GAP SERPL CALC-SCNC: 10 MMOL/L (ref 9–18)
BUN SERPL-MCNC: 22 MG/DL (ref 8–23)
CALCIUM SERPL-MCNC: 8.4 MG/DL (ref 8.8–10.2)
CHLORIDE SERPL-SCNC: 103 MMOL/L (ref 98–107)
CO2 SERPL-SCNC: 28 MMOL/L (ref 20–28)
CREAT SERPL-MCNC: 0.94 MG/DL (ref 0.6–1.1)
GLUCOSE SERPL-MCNC: 90 MG/DL (ref 70–99)
MAGNESIUM SERPL-MCNC: 2.4 MG/DL (ref 1.8–2.4)
POTASSIUM SERPL-SCNC: 4.3 MMOL/L (ref 3.5–5.1)
SODIUM SERPL-SCNC: 141 MMOL/L (ref 136–145)

## 2024-10-10 PROCEDURE — 71250 CT THORAX DX C-: CPT

## 2024-10-15 ENCOUNTER — TELEPHONE (OUTPATIENT)
Age: 79
End: 2024-10-15

## 2024-10-15 NOTE — TELEPHONE ENCOUNTER
Patient is out of Eliquis 5 mg bid and she is out, do you have samples that she can  in [place]?  She has 3 days left   no

## 2024-10-16 ENCOUNTER — TELEPHONE (OUTPATIENT)
Dept: PULMONOLOGY | Age: 79
End: 2024-10-16

## 2024-10-16 NOTE — TELEPHONE ENCOUNTER
Called patient and she states she has an account with [facility] and her prescription is good till January

## 2024-10-16 NOTE — TELEPHONE ENCOUNTER
Called patient and informed her that samples will be left at [place] office.    Patient states she has an old account with [facility] and will call and see if it is still active

## 2024-10-16 NOTE — TELEPHONE ENCOUNTER
Pt is requesting to speak with you again about her medications. She had questions whether generic matters or not with her Eliquis.

## 2024-10-22 NOTE — TELEPHONE ENCOUNTER
Called and spoke with the patient regarding her CT result I rely the message from Ms. Mena \"Please let Ms. Orr know that her most recent CT chest looks better when compared to June. She does still have the 7 mm nodule in the right lower lobe but it is stable and not changed. We will check another chest CT in 6 months to make sure it is stable. \" She voices understanding. Stanley CURTIS

## 2024-10-24 ENCOUNTER — OFFICE VISIT (OUTPATIENT)
Dept: ORTHOPEDIC SURGERY | Age: 79
End: 2024-10-24
Payer: MEDICARE

## 2024-10-24 DIAGNOSIS — M17.12 PRIMARY OSTEOARTHRITIS OF LEFT KNEE: Primary | ICD-10-CM

## 2024-10-24 PROCEDURE — G8427 DOCREV CUR MEDS BY ELIG CLIN: HCPCS | Performed by: PHYSICIAN ASSISTANT

## 2024-10-24 PROCEDURE — 1160F RVW MEDS BY RX/DR IN RCRD: CPT | Performed by: PHYSICIAN ASSISTANT

## 2024-10-24 PROCEDURE — 99213 OFFICE O/P EST LOW 20 MIN: CPT | Performed by: PHYSICIAN ASSISTANT

## 2024-10-24 PROCEDURE — G8484 FLU IMMUNIZE NO ADMIN: HCPCS | Performed by: PHYSICIAN ASSISTANT

## 2024-10-24 PROCEDURE — 1090F PRES/ABSN URINE INCON ASSESS: CPT | Performed by: PHYSICIAN ASSISTANT

## 2024-10-24 PROCEDURE — G8399 PT W/DXA RESULTS DOCUMENT: HCPCS | Performed by: PHYSICIAN ASSISTANT

## 2024-10-24 PROCEDURE — 1159F MED LIST DOCD IN RCRD: CPT | Performed by: PHYSICIAN ASSISTANT

## 2024-10-24 PROCEDURE — G8417 CALC BMI ABV UP PARAM F/U: HCPCS | Performed by: PHYSICIAN ASSISTANT

## 2024-10-24 PROCEDURE — 1123F ACP DISCUSS/DSCN MKR DOCD: CPT | Performed by: PHYSICIAN ASSISTANT

## 2024-10-24 PROCEDURE — 1036F TOBACCO NON-USER: CPT | Performed by: PHYSICIAN ASSISTANT

## 2024-10-24 PROCEDURE — 20610 DRAIN/INJ JOINT/BURSA W/O US: CPT | Performed by: PHYSICIAN ASSISTANT

## 2024-10-24 RX ORDER — METHYLPREDNISOLONE ACETATE 40 MG/ML
80 INJECTION, SUSPENSION INTRA-ARTICULAR; INTRALESIONAL; INTRAMUSCULAR; SOFT TISSUE ONCE
Status: COMPLETED | OUTPATIENT
Start: 2024-10-24 | End: 2024-10-24

## 2024-10-24 RX ADMIN — METHYLPREDNISOLONE ACETATE 80 MG: 40 INJECTION, SUSPENSION INTRA-ARTICULAR; INTRALESIONAL; INTRAMUSCULAR; SOFT TISSUE at 14:37

## 2024-10-24 NOTE — PROGRESS NOTES
pain and swelling.  We discussed the possibility that this could eventually progress to a surgical issue, if symptoms become more limiting.

## 2024-11-20 DIAGNOSIS — M17.12 PRIMARY OSTEOARTHRITIS OF LEFT KNEE: Primary | ICD-10-CM

## 2024-11-21 ENCOUNTER — OFFICE VISIT (OUTPATIENT)
Dept: ORTHOPEDIC SURGERY | Age: 79
End: 2024-11-21
Payer: MEDICARE

## 2024-11-21 DIAGNOSIS — M17.12 PRIMARY OSTEOARTHRITIS OF LEFT KNEE: Primary | ICD-10-CM

## 2024-11-21 PROCEDURE — 1036F TOBACCO NON-USER: CPT | Performed by: ORTHOPAEDIC SURGERY

## 2024-11-21 PROCEDURE — 20610 DRAIN/INJ JOINT/BURSA W/O US: CPT | Performed by: NURSE PRACTITIONER

## 2024-11-21 PROCEDURE — 1123F ACP DISCUSS/DSCN MKR DOCD: CPT | Performed by: NURSE PRACTITIONER

## 2024-11-21 PROCEDURE — 99213 OFFICE O/P EST LOW 20 MIN: CPT | Performed by: NURSE PRACTITIONER

## 2024-11-21 PROCEDURE — G8399 PT W/DXA RESULTS DOCUMENT: HCPCS | Performed by: NURSE PRACTITIONER

## 2024-11-21 PROCEDURE — G8417 CALC BMI ABV UP PARAM F/U: HCPCS | Performed by: NURSE PRACTITIONER

## 2024-11-21 PROCEDURE — G8484 FLU IMMUNIZE NO ADMIN: HCPCS | Performed by: NURSE PRACTITIONER

## 2024-11-21 PROCEDURE — G8428 CUR MEDS NOT DOCUMENT: HCPCS | Performed by: NURSE PRACTITIONER

## 2024-11-21 PROCEDURE — 1090F PRES/ABSN URINE INCON ASSESS: CPT | Performed by: NURSE PRACTITIONER

## 2024-11-24 NOTE — PATIENT INSTRUCTIONS
*Recommend alternating ice (for 10 min) and heat (for 30 min), 3-4 times a day as able/needed.  *Once we have your labs back we may consider adjusting your dose of Lasix to help with the swelling.     2 = A lot of assistance

## 2024-12-10 DIAGNOSIS — M47.816 LUMBAR SPONDYLOSIS: ICD-10-CM

## 2024-12-10 RX ORDER — GABAPENTIN 300 MG/1
300 CAPSULE ORAL 3 TIMES DAILY
Qty: 90 CAPSULE | Refills: 1 | Status: SHIPPED | OUTPATIENT
Start: 2024-12-10 | End: 2025-03-10

## 2024-12-12 ENCOUNTER — OFFICE VISIT (OUTPATIENT)
Dept: ORTHOPEDIC SURGERY | Age: 79
End: 2024-12-12

## 2024-12-12 DIAGNOSIS — M72.2 PLANTAR FASCIITIS OF LEFT FOOT: Primary | ICD-10-CM

## 2024-12-12 PROCEDURE — M5022 MISC PLANTAR FASCIITIS STRAP: HCPCS | Performed by: ORTHOPAEDIC SURGERY

## 2024-12-12 NOTE — PROGRESS NOTES
Patient was prescribed a Night splint for the patient's left foot. The patient wears a size 7.5 shoe and I fitted the patient with a Ssize night splint. Additionally, I instructed the patient on proper use and care of device as well as ensuring patient could safely get device on and off. I explained to the patient that wearing the splint, the foot is held in a certain position, called “dorsiflexion “. This means that the fascia is stretched, not allowing it to contract and become tighter overnight. The great thing about a night splint is that the remedy is quite gentle and the fascia will be returned to its proper length over a period of time. Once stretched out, the plantar fascia will become less tense and therefore will cause less pain.   Patient was prescribed a plantar fasciitis strap for the patient's leftfoot. I fitted the plantar fasciitis strap and instructed the patient on proper placement of strap to start on the outside of foot and pull up along the inside of the foot/ankle. I explained to the patient that the Plantar Fasciitis Strap is indicated to help relieve pain from inflammation caused by mild to moderate plantar fasciitis by applying pressure and tension over the medial calcaneal tubercle and plantar fascia.    Patient read and signed documenting they understand and agree to [facility]'s current DME return policy.     The above DME items were also checked for same/similar on the Medicare portal. An ABN was issued if necessary.  
Hypertension     managed by meds    Menopause     Morbid obesity     Nausea & vomiting     Pulmonary embolus (HCC) 1/21/2016    Sleep apnea     no cpap    Thromboembolus (HCC) 4 month ago.     right leg    Venous embolism and thrombosis      Past Surgical History:   Procedure Laterality Date    APPENDECTOMY      CHOLECYSTECTOMY      EP DEVICE PROCEDURE N/A 3/29/2024    Insert PPM biv multi performed by Kiel Toro MD at [facility] CARDIAC CATH LAB    EP DEVICE PROCEDURE N/A 3/29/2024    Ablation AV node performed by Kiel Toro MD at [facility] CARDIAC CATH LAB    IR KYPHOPLASTY THORACIC FIRST LEVEL  7/23/2024    IR KYPHOPLASTY THORACIC FIRST LEVEL 7/23/2024 [facility] RADIOLOGY SPECIALS    ORTHOPEDIC SURGERY      bilateral feet,     ROTATOR CUFF REPAIR Right     ROTATOR CUFF REPAIR Left     RADHA AND BSO (CERVIX REMOVED)      AGE 28    UROLOGICAL SURGERY      bladder tack     Current Outpatient Medications on File Prior to Visit   Medication Sig Dispense Refill    gabapentin (NEURONTIN) 300 MG capsule Take 1 capsule by mouth 3 times daily for 90 days. 90 capsule 1    potassium chloride (KLOR-CON) 10 MEQ extended release tablet       fluticasone-salmeterol (WIXELA INHUB) 250-50 MCG/ACT AEPB diskus inhaler Inhale 1 puff into the lungs in the morning and 1 puff in the evening. PLEASE USE [employer] COUPON AND DO NOT FILE INSURANCE. 1 each 11    famotidine (PEPCID) 20 MG tablet Take 1 tablet by mouth 2 times daily 60 tablet 3    furosemide (LASIX) 40 MG tablet Take 1 tablet by mouth 2 times daily 60 tablet 11    vitamin D (ERGOCALCIFEROL) 1.25 MG (80494 UT) CAPS capsule Take 1 capsule by mouth once a week 12 capsule 1    vitamin D3 (CHOLECALCIFEROL) 10 MCG (400 UNIT) TABS tablet Take 1 tablet by mouth daily      metoprolol succinate (TOPROL XL) 25 MG extended release tablet Take 1 tablet by mouth daily 90 tablet 3    magnesium oxide (MAG-OX) 400 MG tablet Take 1 tablet by mouth in the morning and at bedtime 180 tablet 3

## 2024-12-19 ENCOUNTER — OFFICE VISIT (OUTPATIENT)
Age: 79
End: 2024-12-19
Payer: MEDICARE

## 2024-12-19 VITALS
WEIGHT: 182 LBS | SYSTOLIC BLOOD PRESSURE: 139 MMHG | HEART RATE: 71 BPM | BODY MASS INDEX: 32.25 KG/M2 | DIASTOLIC BLOOD PRESSURE: 68 MMHG | HEIGHT: 63 IN

## 2024-12-19 DIAGNOSIS — I27.20 PULMONARY HYPERTENSION (HCC): ICD-10-CM

## 2024-12-19 DIAGNOSIS — I48.11 LONGSTANDING PERSISTENT ATRIAL FIBRILLATION (HCC): Primary | ICD-10-CM

## 2024-12-19 DIAGNOSIS — E11.00 TYPE 2 DIABETES MELLITUS WITH HYPEROSMOLARITY WITHOUT COMA, WITHOUT LONG-TERM CURRENT USE OF INSULIN (HCC): ICD-10-CM

## 2024-12-19 DIAGNOSIS — I10 ESSENTIAL HYPERTENSION: ICD-10-CM

## 2024-12-19 DIAGNOSIS — E78.00 PURE HYPERCHOLESTEROLEMIA: ICD-10-CM

## 2024-12-19 DIAGNOSIS — E66.9 OBESITY (BMI 30-39.9): ICD-10-CM

## 2024-12-19 DIAGNOSIS — Z95.0 STATUS POST BIVENTRICULAR PACEMAKER: Chronic | ICD-10-CM

## 2024-12-19 DIAGNOSIS — G47.33 OSA (OBSTRUCTIVE SLEEP APNEA): ICD-10-CM

## 2024-12-19 DIAGNOSIS — J43.2 CENTRILOBULAR EMPHYSEMA (HCC): Chronic | ICD-10-CM

## 2024-12-19 DIAGNOSIS — I25.119 CORONARY ARTERY DISEASE INVOLVING NATIVE CORONARY ARTERY OF NATIVE HEART WITH ANGINA PECTORIS (HCC): ICD-10-CM

## 2024-12-19 PROCEDURE — 93000 ELECTROCARDIOGRAM COMPLETE: CPT | Performed by: INTERNAL MEDICINE

## 2024-12-19 PROCEDURE — G8427 DOCREV CUR MEDS BY ELIG CLIN: HCPCS | Performed by: INTERNAL MEDICINE

## 2024-12-19 PROCEDURE — 3023F SPIROM DOC REV: CPT | Performed by: INTERNAL MEDICINE

## 2024-12-19 PROCEDURE — 1126F AMNT PAIN NOTED NONE PRSNT: CPT | Performed by: INTERNAL MEDICINE

## 2024-12-19 PROCEDURE — G8417 CALC BMI ABV UP PARAM F/U: HCPCS | Performed by: INTERNAL MEDICINE

## 2024-12-19 PROCEDURE — 1090F PRES/ABSN URINE INCON ASSESS: CPT | Performed by: INTERNAL MEDICINE

## 2024-12-19 PROCEDURE — 1159F MED LIST DOCD IN RCRD: CPT | Performed by: INTERNAL MEDICINE

## 2024-12-19 PROCEDURE — G8484 FLU IMMUNIZE NO ADMIN: HCPCS | Performed by: INTERNAL MEDICINE

## 2024-12-19 PROCEDURE — G8399 PT W/DXA RESULTS DOCUMENT: HCPCS | Performed by: INTERNAL MEDICINE

## 2024-12-19 PROCEDURE — 3075F SYST BP GE 130 - 139MM HG: CPT | Performed by: INTERNAL MEDICINE

## 2024-12-19 PROCEDURE — 99214 OFFICE O/P EST MOD 30 MIN: CPT | Performed by: INTERNAL MEDICINE

## 2024-12-19 PROCEDURE — 1036F TOBACCO NON-USER: CPT | Performed by: INTERNAL MEDICINE

## 2024-12-19 PROCEDURE — 3078F DIAST BP <80 MM HG: CPT | Performed by: INTERNAL MEDICINE

## 2024-12-19 PROCEDURE — 1123F ACP DISCUSS/DSCN MKR DOCD: CPT | Performed by: INTERNAL MEDICINE

## 2024-12-19 RX ORDER — METOPROLOL SUCCINATE 25 MG/1
25 TABLET, EXTENDED RELEASE ORAL DAILY
Qty: 90 TABLET | Refills: 3 | Status: SHIPPED | OUTPATIENT
Start: 2024-12-19

## 2024-12-19 RX ORDER — MAGNESIUM OXIDE 400 MG/1
400 TABLET ORAL 2 TIMES DAILY
Qty: 180 TABLET | Refills: 3 | Status: SHIPPED | OUTPATIENT
Start: 2024-12-19

## 2024-12-19 RX ORDER — LOSARTAN POTASSIUM 25 MG/1
12.5 TABLET ORAL DAILY
Qty: 45 TABLET | Refills: 3 | Status: SHIPPED | OUTPATIENT
Start: 2024-12-19

## 2024-12-19 ASSESSMENT — ENCOUNTER SYMPTOMS
COUGH: 0
ABDOMINAL PAIN: 0
BACK PAIN: 1
SHORTNESS OF BREATH: 1

## 2024-12-19 NOTE — PROGRESS NOTES
[facility] CARDIOLOGY  [address] [place] [address], [address]  [place] [address]      24      NAME:  Rylee Orr  : 1945  MRN: [id number]      SUBJECTIVE:   Rylee Orr is a 79 y.o. female seen for a follow up visit regarding the following:     Chief Complaint   Patient presents with    Atrial Fibrillation    Shortness of Breath       HPI:   79 y.o. male with history of severe dyspnea and chest pressure with exertion.  Patient has a strong family history of premature CAD and MI.  She has HTN, Chol, and long history of tobacco abuse.  She also has a history of DVTs and PE many years ago.  She had cardiac catheterization 2014 with normal coronaries.  She was seen in office 2014 with atrial fibrillation with RVR that spontaneously converted.  She has since developed persistent atrial fibrillation status post AV node ablation and biventricular pacemaker.  This has been stable.  Patient struggles with morbid obesity and chronic hypoxic hypercapnia.  Requires CPAP and O2 at home.  She has moderate pulmonary hypertension.  Most recent echocardiogram 2024 with normal left ventricular systolic function.  She had trivial pericardial effusion.  Echocardiogram 2024 demonstrated PA systolic pressure of 57 mmHg.  This is chronic and stable.  Patient was hospitalized 2024 secondary to acute hypoxic hypercapnic respiratory failure.  She was treated for community-acquired pneumonia and COPD exacerbation.            Past Medical History, Past Surgical History, Family history, Social History, and Medications were all reviewed with the patient today and updated as necessary.     Current Outpatient Medications   Medication Sig Dispense Refill    metoprolol succinate (TOPROL XL) 25 MG extended release tablet Take 1 tablet by mouth daily 90 tablet 3    magnesium oxide (MAG-OX) 400 MG tablet Take 1 tablet by mouth in the morning and at bedtime 180 tablet 3    losartan (COZAAR) 25 MG tablet Take 0.5

## 2024-12-27 ENCOUNTER — OFFICE VISIT (OUTPATIENT)
Dept: FAMILY MEDICINE CLINIC | Facility: CLINIC | Age: 79
End: 2024-12-27

## 2024-12-27 VITALS
TEMPERATURE: 97.9 F | BODY MASS INDEX: 31.71 KG/M2 | HEART RATE: 71 BPM | DIASTOLIC BLOOD PRESSURE: 62 MMHG | OXYGEN SATURATION: 94 % | WEIGHT: 179 LBS | HEIGHT: 63 IN | SYSTOLIC BLOOD PRESSURE: 152 MMHG

## 2024-12-27 DIAGNOSIS — J44.1 COPD WITH ACUTE EXACERBATION (HCC): Primary | ICD-10-CM

## 2024-12-27 DIAGNOSIS — J32.9 RHINOSINUSITIS: ICD-10-CM

## 2024-12-27 RX ORDER — AZELASTINE 1 MG/ML
1 SPRAY, METERED NASAL 2 TIMES DAILY
Qty: 60 ML | Refills: 1 | Status: SHIPPED | OUTPATIENT
Start: 2024-12-27

## 2024-12-27 RX ORDER — AZITHROMYCIN 250 MG/1
TABLET, FILM COATED ORAL
Qty: 6 TABLET | Refills: 0 | Status: SHIPPED | OUTPATIENT
Start: 2024-12-27 | End: 2025-01-06

## 2024-12-27 RX ORDER — FLUTICASONE PROPIONATE 50 MCG
2 SPRAY, SUSPENSION (ML) NASAL DAILY
Qty: 48 G | Refills: 1 | Status: SHIPPED | OUTPATIENT
Start: 2024-12-27

## 2024-12-27 RX ORDER — PREDNISONE 20 MG/1
TABLET ORAL
Qty: 18 TABLET | Refills: 0 | Status: SHIPPED | OUTPATIENT
Start: 2024-12-27

## 2024-12-27 RX ORDER — BENZONATATE 100 MG/1
CAPSULE ORAL
Qty: 20 CAPSULE | Refills: 0 | Status: SHIPPED | OUTPATIENT
Start: 2024-12-27

## 2024-12-27 NOTE — PROGRESS NOTES
health conditions.Information on many health conditions is provided by the American Academy of Family Physicians: https://familydoctor.org/  Please bring any questions to me at your next visit.    Medication List:    Current Outpatient Medications   Medication Sig Dispense Refill    predniSONE (DELTASONE) 20 MG tablet 3 tabs po qd x 3 days then 2 tabs po qd x 3 days then one tab po qd x 3 days 18 tablet 0    azithromycin (ZITHROMAX) 250 MG tablet 500mg on day 1 followed by 250mg on days 2 - 5 6 tablet 0    benzonatate (TESSALON PERLES) 100 MG capsule One po tid prn cough 20 capsule 0    fluticasone (FLONASE) 50 MCG/ACT nasal spray 2 sprays by Each Nostril route daily 48 g 1    azelastine (ASTELIN) 0.1 % nasal spray 1 spray by Nasal route 2 times daily Use in each nostril as directed 60 mL 1    metoprolol succinate (TOPROL XL) 25 MG extended release tablet Take 1 tablet by mouth daily 90 tablet 3    magnesium oxide (MAG-OX) 400 MG tablet Take 1 tablet by mouth in the morning and at bedtime 180 tablet 3    losartan (COZAAR) 25 MG tablet Take 0.5 tablets by mouth daily 45 tablet 3    apixaban (ELIQUIS) 5 MG TABS tablet Take 1 tablet by mouth 2 times daily 180 tablet 3    gabapentin (NEURONTIN) 300 MG capsule Take 1 capsule by mouth 3 times daily for 90 days. 90 capsule 1    famotidine (PEPCID) 20 MG tablet Take 1 tablet by mouth 2 times daily 60 tablet 3    furosemide (LASIX) 40 MG tablet Take 1 tablet by mouth 2 times daily 60 tablet 11    vitamin D (ERGOCALCIFEROL) 1.25 MG (72927 UT) CAPS capsule Take 1 capsule by mouth once a week 12 capsule 1    Tadalafil, PAH, 20 MG tablet Take 1 tablet by mouth daily 30 tablet 11    furosemide (LASIX) 20 MG tablet Take 0.5 tablets by mouth daily In addition to 40mg tablet for total of 50mg daily 45 tablet 3    rOPINIRole (REQUIP) 0.5 MG tablet TAKE 1 TABLET BY MOUTH EVERY DAY AT NIGHT 90 tablet 3    diclofenac sodium (VOLTAREN) 1 % GEL Apply 4 g topically 4 times daily as needed

## 2025-01-10 ENCOUNTER — TELEMEDICINE (OUTPATIENT)
Dept: PULMONOLOGY | Age: 80
End: 2025-01-10

## 2025-01-10 DIAGNOSIS — J96.11 CHRONIC RESPIRATORY FAILURE WITH HYPOXIA: ICD-10-CM

## 2025-01-10 DIAGNOSIS — G47.33 OSA (OBSTRUCTIVE SLEEP APNEA): ICD-10-CM

## 2025-01-10 DIAGNOSIS — R06.09 DYSPNEA ON EXERTION: ICD-10-CM

## 2025-01-10 DIAGNOSIS — R91.1 PULMONARY NODULE: ICD-10-CM

## 2025-01-10 DIAGNOSIS — I27.20 PULMONARY HYPERTENSION (HCC): Primary | ICD-10-CM

## 2025-01-10 DIAGNOSIS — J44.9 STAGE 3 SEVERE COPD BY GOLD CLASSIFICATION (HCC): ICD-10-CM

## 2025-01-10 NOTE — PROGRESS NOTES
Patient Name:  Rylee Orr                               YOB: 1945  MRN: [id number]                                                Office Visit 1/10/2025  Rylee Orr is a 79 y.o. female evaluated via telephone on 1/10/2025 for Follow-up    Documentation:  I communicated with the patient and/or health care decision maker about COPD, pulmonary hypertension, chronic respiratory failure with hypoxia, MARISA, and pulmonary nodule.     Details of this discussion including any medical advice provided:   Patient doing well on 20mg daily tadalafil and renal function and weight is stable on 60mg lasix. Patient was encouraged to continue 2 lpm oxygen with exertion and to wear CPAP nightly. Will follow up with sleep clinic this month and cardiology in March with repeat ECHO. Patient understands to notify us and cardiology if shortness of breath worsens, weight increases 2-5 lbs in 24 hours, or oxygen requirements increase.       Total Time: minutes: 5-10 minutes    Rylee Orr was evaluated through a synchronous (real-time) audio encounter. Patient identification was verified at the start of the visit. She (or guardian if applicable) is aware that this is a billable service, which includes applicable co-pays. This visit was conducted with the patient's (and/or legal guardian's) verbal consent. She has not had a related appointment within my department in the past 7 days or scheduled within the next 24 hours.   The patient was located at Home: [address] [place] [id number].  The provider was located at Home (Appt Dept State): [place].    Note: not billable if this call serves to triage the patient into an appointment for the relevant concern    Ciara Mena, REJI - NP       ASSESSMENT AND PLAN:  (Medical Decision Making)    1. Pulmonary hypertension (HCC)  Stable currently and tolerating 20mg daily tadalafil. Weight is stable, no reported edema at this time and renal function is

## 2025-01-10 NOTE — PATIENT INSTRUCTIONS
Your CT of chest to follow right lung nodule is due in April 2025. You can get your CT of the chest done at [facility] locations     [facility]) Rad Department Scheduling  [phone]  Locations:  [facility], [facility] or [facility]      When your CT has been done, we recommend you call us if you haven't heard about your results within a week.

## 2025-01-16 ENCOUNTER — OFFICE VISIT (OUTPATIENT)
Age: 80
End: 2025-01-16

## 2025-01-16 VITALS
WEIGHT: 182 LBS | BODY MASS INDEX: 32.25 KG/M2 | SYSTOLIC BLOOD PRESSURE: 138 MMHG | HEIGHT: 63 IN | DIASTOLIC BLOOD PRESSURE: 80 MMHG | HEART RATE: 71 BPM

## 2025-01-16 DIAGNOSIS — I48.11 LONGSTANDING PERSISTENT ATRIAL FIBRILLATION (HCC): Primary | Chronic | ICD-10-CM

## 2025-01-16 ASSESSMENT — ENCOUNTER SYMPTOMS
SHORTNESS OF BREATH: 0
EYES NEGATIVE: 1
GASTROINTESTINAL NEGATIVE: 1
ALLERGIC/IMMUNOLOGIC NEGATIVE: 1

## 2025-01-16 NOTE — PROGRESS NOTES
Prolonged respiratory phases, distant lung sounds.   GI: Soft, NT, ND, +BS  Neuro: Alert and oriented, nonfocal  Psych: Appropriate affect  Skin: Normal color and skin turgor  MSK: Normal muscle bulk and tone    Medical problems and test results were reviewed with the patient today.     No results found for any visits on 01/16/25.

## 2025-01-31 RX ORDER — FAMOTIDINE 20 MG/1
20 TABLET, FILM COATED ORAL 2 TIMES DAILY
Qty: 60 TABLET | Refills: 3 | Status: SHIPPED | OUTPATIENT
Start: 2025-01-31

## 2025-01-31 NOTE — TELEPHONE ENCOUNTER
Refill requested:     famotidine (PEPCID) 20 MG tablet      Please send to [facility] in [place]    Next appt 3/14

## 2025-02-27 ENCOUNTER — OFFICE VISIT (OUTPATIENT)
Dept: ORTHOPEDIC SURGERY | Age: 80
End: 2025-02-27

## 2025-02-27 DIAGNOSIS — M17.12 PRIMARY OSTEOARTHRITIS OF LEFT KNEE: Primary | ICD-10-CM

## 2025-02-27 PROCEDURE — 1036F TOBACCO NON-USER: CPT | Performed by: ORTHOPAEDIC SURGERY

## 2025-02-27 RX ORDER — METHYLPREDNISOLONE ACETATE 40 MG/ML
40 INJECTION, SUSPENSION INTRA-ARTICULAR; INTRALESIONAL; INTRAMUSCULAR; SOFT TISSUE ONCE
Status: COMPLETED | OUTPATIENT
Start: 2025-02-27 | End: 2025-02-27

## 2025-02-27 RX ADMIN — METHYLPREDNISOLONE ACETATE 40 MG: 40 INJECTION, SUSPENSION INTRA-ARTICULAR; INTRALESIONAL; INTRAMUSCULAR; SOFT TISSUE at 13:44

## 2025-02-27 NOTE — PATIENT INSTRUCTIONS
Learning About Joint Injections  What are joint injections?     Joint injections are shots into a joint, such as the knee or shoulder. They are used to put in medicines, such as pain relievers and steroid medicines. Steroids can help reduce inflammation. A steroid shot can sometimes help with short-term pain relief when other treatments haven't worked.  How are they done?  First, the area over the joint will be cleaned. Your doctor may then use a tiny needle to numb the skin in the area where you will get the joint injection.  If a tiny needle is used to numb the area, your doctor will use another needle to inject the medicine. Your doctor may use a pain reliever, a steroid, or both. You may feel some pressure or discomfort.  Your doctor may put ice on the area before you go home.  What can you expect after a joint injection?  You will probably go home soon after your shot. You may have numbness around the joint for a few hours.  If your shot included both a pain reliever and a steroid, then the pain will probably go away right away. But it might come back after a few hours. This might happen if the pain reliever wears off and the steroid hasn't started to work yet. Steroids don't always work. But when they do, the pain relief can last for several days to a few months or longer.  Your doctor may tell you to use ice on the area. You can also use ice if the pain comes back. Put ice or a cold pack on your joint for 10 to 20 minutes at a time. Put a thin cloth between the ice and your skin.  Follow your doctor's instructions carefully.  Follow-up care is a key part of your treatment and safety. Be sure to make and go to all appointments, and call your doctor if you are having problems. It's also a good idea to know your test results and keep a list of the medicines you take.  Current as of: July 31, 2024  Content Version: 14.3  © 2024 [employer].   Care instructions adapted under license by [facility]

## 2025-03-07 ENCOUNTER — HOSPITAL ENCOUNTER (EMERGENCY)
Age: 80
Discharge: HOME | End: 2025-03-07
Payer: MEDICARE

## 2025-03-07 ENCOUNTER — OFFICE VISIT (OUTPATIENT)
Dept: FAMILY MEDICINE CLINIC | Facility: CLINIC | Age: 80
End: 2025-03-07
Payer: MEDICARE

## 2025-03-07 ENCOUNTER — APPOINTMENT (OUTPATIENT)
Dept: GENERAL RADIOLOGY | Age: 80
End: 2025-03-07
Payer: MEDICARE

## 2025-03-07 VITALS
DIASTOLIC BLOOD PRESSURE: 61 MMHG | WEIGHT: 175 LBS | RESPIRATION RATE: 20 BRPM | SYSTOLIC BLOOD PRESSURE: 127 MMHG | BODY MASS INDEX: 31.01 KG/M2 | HEIGHT: 63 IN | OXYGEN SATURATION: 98 % | HEART RATE: 70 BPM | TEMPERATURE: 98.6 F

## 2025-03-07 VITALS
OXYGEN SATURATION: 90 % | TEMPERATURE: 97.9 F | HEIGHT: 63 IN | SYSTOLIC BLOOD PRESSURE: 191 MMHG | DIASTOLIC BLOOD PRESSURE: 73 MMHG | RESPIRATION RATE: 26 BRPM | HEART RATE: 64 BPM | BODY MASS INDEX: 32.24 KG/M2

## 2025-03-07 DIAGNOSIS — J10.1 INFLUENZA A: Primary | ICD-10-CM

## 2025-03-07 DIAGNOSIS — R05.9 COUGH, UNSPECIFIED TYPE: ICD-10-CM

## 2025-03-07 DIAGNOSIS — R06.02 SOB (SHORTNESS OF BREATH): Primary | ICD-10-CM

## 2025-03-07 DIAGNOSIS — J44.9 STAGE 3 SEVERE COPD BY GOLD CLASSIFICATION (HCC): ICD-10-CM

## 2025-03-07 DIAGNOSIS — I10 ESSENTIAL HYPERTENSION: ICD-10-CM

## 2025-03-07 DIAGNOSIS — I48.11 LONGSTANDING PERSISTENT ATRIAL FIBRILLATION (HCC): ICD-10-CM

## 2025-03-07 LAB
ALBUMIN SERPL-MCNC: 3.7 G/DL (ref 3.2–4.6)
ALBUMIN/GLOB SERPL: 1.2 (ref 1–1.9)
ALP SERPL-CCNC: 99 U/L (ref 35–104)
ALT SERPL-CCNC: 12 U/L (ref 8–45)
ANION GAP SERPL CALC-SCNC: 11 MMOL/L (ref 7–16)
AST SERPL-CCNC: 23 U/L (ref 15–37)
BASOPHILS # BLD: 0.02 K/UL (ref 0–0.2)
BASOPHILS NFR BLD: 0.3 % (ref 0–2)
BILIRUB SERPL-MCNC: 0.6 MG/DL (ref 0–1.2)
BUN SERPL-MCNC: 16 MG/DL (ref 8–23)
CALCIUM SERPL-MCNC: 8.7 MG/DL (ref 8.8–10.2)
CHLORIDE SERPL-SCNC: 102 MMOL/L (ref 98–107)
CO2 SERPL-SCNC: 27 MMOL/L (ref 20–29)
CREAT SERPL-MCNC: 1 MG/DL (ref 0.6–1.1)
DIFFERENTIAL METHOD BLD: ABNORMAL
EOSINOPHIL # BLD: 0.06 K/UL (ref 0–0.8)
EOSINOPHIL NFR BLD: 0.9 % (ref 0.5–7.8)
ERYTHROCYTE [DISTWIDTH] IN BLOOD BY AUTOMATED COUNT: 14.1 % (ref 11.9–14.6)
FLUAV RNA SPEC QL NAA+PROBE: DETECTED
FLUBV RNA SPEC QL NAA+PROBE: NOT DETECTED
GLOBULIN SER CALC-MCNC: 3.2 G/DL (ref 2.3–3.5)
GLUCOSE SERPL-MCNC: 106 MG/DL (ref 70–99)
HCT VFR BLD AUTO: 40.9 % (ref 35.8–46.3)
HGB BLD-MCNC: 13.4 G/DL (ref 11.7–15.4)
IMM GRANULOCYTES # BLD AUTO: 0.06 K/UL (ref 0–0.5)
IMM GRANULOCYTES NFR BLD AUTO: 0.9 % (ref 0–5)
LACTATE SERPL-SCNC: 1.1 MMOL/L (ref 0.5–2)
LYMPHOCYTES # BLD: 1.01 K/UL (ref 0.5–4.6)
LYMPHOCYTES NFR BLD: 14.4 % (ref 13–44)
MCH RBC QN AUTO: 33.4 PG (ref 26.1–32.9)
MCHC RBC AUTO-ENTMCNC: 32.8 G/DL (ref 31.4–35)
MCV RBC AUTO: 102 FL (ref 82–102)
MONOCYTES # BLD: 0.58 K/UL (ref 0.1–1.3)
MONOCYTES NFR BLD: 8.3 % (ref 4–12)
NEUTS SEG # BLD: 5.3 K/UL (ref 1.7–8.2)
NEUTS SEG NFR BLD: 75.2 % (ref 43–78)
NRBC # BLD: 0 K/UL (ref 0–0.2)
NT PRO BNP: 1296 PG/ML (ref 0–450)
PLATELET # BLD AUTO: 116 K/UL (ref 150–450)
PMV BLD AUTO: 10.3 FL (ref 9.4–12.3)
POTASSIUM SERPL-SCNC: 4 MMOL/L (ref 3.5–5.1)
PROCALCITONIN SERPL-MCNC: 0.07 NG/ML (ref 0–0.1)
PROT SERPL-MCNC: 6.9 G/DL (ref 6.3–8.2)
RBC # BLD AUTO: 4.01 M/UL (ref 4.05–5.2)
RSV RNA NPH QL NAA+PROBE: NOT DETECTED
SARS-COV-2 RDRP RESP QL NAA+PROBE: NOT DETECTED
SODIUM SERPL-SCNC: 140 MMOL/L (ref 136–145)
SOURCE: NORMAL
SOURCE: NORMAL
WBC # BLD AUTO: 7 K/UL (ref 4.3–11.1)

## 2025-03-07 PROCEDURE — 85025 COMPLETE CBC W/AUTO DIFF WBC: CPT

## 2025-03-07 PROCEDURE — 1036F TOBACCO NON-USER: CPT | Performed by: PHYSICIAN ASSISTANT

## 2025-03-07 PROCEDURE — 1123F ACP DISCUSS/DSCN MKR DOCD: CPT | Performed by: PHYSICIAN ASSISTANT

## 2025-03-07 PROCEDURE — 96374 THER/PROPH/DIAG INJ IV PUSH: CPT

## 2025-03-07 PROCEDURE — G8417 CALC BMI ABV UP PARAM F/U: HCPCS | Performed by: PHYSICIAN ASSISTANT

## 2025-03-07 PROCEDURE — G8427 DOCREV CUR MEDS BY ELIG CLIN: HCPCS | Performed by: PHYSICIAN ASSISTANT

## 2025-03-07 PROCEDURE — 83605 ASSAY OF LACTIC ACID: CPT

## 2025-03-07 PROCEDURE — 1090F PRES/ABSN URINE INCON ASSESS: CPT | Performed by: PHYSICIAN ASSISTANT

## 2025-03-07 PROCEDURE — 87634 RSV DNA/RNA AMP PROBE: CPT

## 2025-03-07 PROCEDURE — 94640 AIRWAY INHALATION TREATMENT: CPT

## 2025-03-07 PROCEDURE — G8399 PT W/DXA RESULTS DOCUMENT: HCPCS | Performed by: PHYSICIAN ASSISTANT

## 2025-03-07 PROCEDURE — 1160F RVW MEDS BY RX/DR IN RCRD: CPT | Performed by: PHYSICIAN ASSISTANT

## 2025-03-07 PROCEDURE — 87635 SARS-COV-2 COVID-19 AMP PRB: CPT

## 2025-03-07 PROCEDURE — 87502 INFLUENZA DNA AMP PROBE: CPT

## 2025-03-07 PROCEDURE — 96375 TX/PRO/DX INJ NEW DRUG ADDON: CPT

## 2025-03-07 PROCEDURE — 94760 N-INVAS EAR/PLS OXIMETRY 1: CPT

## 2025-03-07 PROCEDURE — 99285 EMERGENCY DEPT VISIT HI MDM: CPT

## 2025-03-07 PROCEDURE — 6370000000 HC RX 637 (ALT 250 FOR IP)

## 2025-03-07 PROCEDURE — 3023F SPIROM DOC REV: CPT | Performed by: PHYSICIAN ASSISTANT

## 2025-03-07 PROCEDURE — 93005 ELECTROCARDIOGRAM TRACING: CPT

## 2025-03-07 PROCEDURE — 1159F MED LIST DOCD IN RCRD: CPT | Performed by: PHYSICIAN ASSISTANT

## 2025-03-07 PROCEDURE — 6360000002 HC RX W HCPCS

## 2025-03-07 PROCEDURE — 80053 COMPREHEN METABOLIC PANEL: CPT

## 2025-03-07 PROCEDURE — 1125F AMNT PAIN NOTED PAIN PRSNT: CPT | Performed by: PHYSICIAN ASSISTANT

## 2025-03-07 PROCEDURE — 99214 OFFICE O/P EST MOD 30 MIN: CPT | Performed by: PHYSICIAN ASSISTANT

## 2025-03-07 PROCEDURE — 3077F SYST BP >= 140 MM HG: CPT | Performed by: PHYSICIAN ASSISTANT

## 2025-03-07 PROCEDURE — 84145 PROCALCITONIN (PCT): CPT

## 2025-03-07 PROCEDURE — 2500000003 HC RX 250 WO HCPCS

## 2025-03-07 PROCEDURE — 71046 X-RAY EXAM CHEST 2 VIEWS: CPT

## 2025-03-07 PROCEDURE — 83880 ASSAY OF NATRIURETIC PEPTIDE: CPT

## 2025-03-07 PROCEDURE — 3078F DIAST BP <80 MM HG: CPT | Performed by: PHYSICIAN ASSISTANT

## 2025-03-07 RX ORDER — IPRATROPIUM BROMIDE AND ALBUTEROL SULFATE 2.5; .5 MG/3ML; MG/3ML
1 SOLUTION RESPIRATORY (INHALATION)
Status: COMPLETED | OUTPATIENT
Start: 2025-03-07 | End: 2025-03-07

## 2025-03-07 RX ORDER — OSELTAMIVIR PHOSPHATE 30 MG/1
30 CAPSULE ORAL 2 TIMES DAILY
Qty: 10 CAPSULE | Refills: 0 | Status: SHIPPED | OUTPATIENT
Start: 2025-03-07 | End: 2025-03-12

## 2025-03-07 RX ORDER — FUROSEMIDE 10 MG/ML
40 INJECTION INTRAMUSCULAR; INTRAVENOUS ONCE
Status: COMPLETED | OUTPATIENT
Start: 2025-03-07 | End: 2025-03-07

## 2025-03-07 RX ORDER — OSELTAMIVIR PHOSPHATE 75 MG/1
75 CAPSULE ORAL 2 TIMES DAILY
Qty: 10 CAPSULE | Refills: 0 | Status: SHIPPED | OUTPATIENT
Start: 2025-03-07 | End: 2025-03-07

## 2025-03-07 RX ADMIN — WATER 125 MG: 1 INJECTION INTRAMUSCULAR; INTRAVENOUS; SUBCUTANEOUS at 17:29

## 2025-03-07 RX ADMIN — IPRATROPIUM BROMIDE AND ALBUTEROL SULFATE 1 DOSE: 2.5; .5 SOLUTION RESPIRATORY (INHALATION) at 16:43

## 2025-03-07 RX ADMIN — FUROSEMIDE 40 MG: 10 INJECTION, SOLUTION INTRAVENOUS at 19:45

## 2025-03-07 ASSESSMENT — PAIN DESCRIPTION - LOCATION: LOCATION: GENERALIZED

## 2025-03-07 ASSESSMENT — PAIN DESCRIPTION - DESCRIPTORS: DESCRIPTORS: ACHING

## 2025-03-07 ASSESSMENT — PAIN - FUNCTIONAL ASSESSMENT: PAIN_FUNCTIONAL_ASSESSMENT: 0-10

## 2025-03-07 ASSESSMENT — ENCOUNTER SYMPTOMS
SINUS PAIN: 0
RHINORRHEA: 1
COUGH: 1
SORE THROAT: 0
SHORTNESS OF BREATH: 1
EYE DISCHARGE: 0
CHEST TIGHTNESS: 0
WHEEZING: 1

## 2025-03-07 ASSESSMENT — PAIN DESCRIPTION - ORIENTATION: ORIENTATION: MID

## 2025-03-07 ASSESSMENT — PATIENT HEALTH QUESTIONNAIRE - PHQ9: DEPRESSION UNABLE TO ASSESS: URGENT/EMERGENT SITUATION

## 2025-03-07 ASSESSMENT — PAIN SCALES - GENERAL: PAINLEVEL_OUTOF10: 10

## 2025-03-07 NOTE — PROGRESS NOTES
History:   Procedure Laterality Date    APPENDECTOMY      CHOLECYSTECTOMY      EP DEVICE PROCEDURE N/A 3/29/2024    Insert PPM biv multi performed by Kiel Toro MD at [facility] CARDIAC CATH LAB    EP DEVICE PROCEDURE N/A 3/29/2024    Ablation AV node performed by Kiel Toro MD at [facility] CARDIAC CATH LAB    IR KYPHOPLASTY THORACIC 1 VERTEBRAL BODY  2024    IR KYPHOPLASTY THORACIC FIRST LEVEL 2024 [facility] RADIOLOGY SPECIALS    ORTHOPEDIC SURGERY      bilateral feet,     ROTATOR CUFF REPAIR Right     ROTATOR CUFF REPAIR Left     RADHA AND BSO (CERVIX REMOVED)      AGE 28    UROLOGICAL SURGERY      bladder tack       Family History:  Family History   Problem Relation Age of Onset    Stroke Mother     Heart Disease Mother     Cancer Father         prostate    Stroke Father     Coronary Art Dis Father         premature    Heart Disease Brother     Heart Attack Brother     Cancer Sister     Cancer Sister         breast    Breast Cancer Sister 71       Social History:   Social History     Social History Narrative    Not on file      Social History     Socioeconomic History    Marital status:      Spouse name: Not on file    Number of children: Not on file    Years of education: Not on file    Highest education level: Not on file   Occupational History    Not on file   Tobacco Use    Smoking status: Former     Current packs/day: 0.00     Average packs/day: 3.0 packs/day for 20.0 years (60.0 ttl pk-yrs)     Types: Cigarettes     Start date: 1978     Quit date: 1998     Years since quittin.5     Passive exposure: Past    Smokeless tobacco: Never   Vaping Use    Vaping status: Never Used   Substance and Sexual Activity    Alcohol use: No    Drug use: No    Sexual activity: Not on file   Other Topics Concern    Not on file   Social History Narrative    Not on file     Social Determinants of Health     Financial Resource Strain: Low Risk  (2024)    Received from [facility]

## 2025-03-07 NOTE — ED TRIAGE NOTES
Pt to triage for SHOB since this morning. Pt states she was sent by her PCP Dr Mark due to her O2 in the low 90s% while on 3LNC which is her baseline dose. States that he wanted to get her a cxr but was unable to due to no x-ray tech. Pt states she has been feeling unwell the past few days but today has been worse

## 2025-03-08 LAB
EKG ATRIAL RATE: 74 BPM
EKG DIAGNOSIS: NORMAL
EKG Q-T INTERVAL: 438 MS
EKG QRS DURATION: 108 MS
EKG QTC CALCULATION (BAZETT): 473 MS
EKG R AXIS: -43 DEGREES
EKG T AXIS: 88 DEGREES
EKG VENTRICULAR RATE: 70 BPM

## 2025-03-08 PROCEDURE — 93010 ELECTROCARDIOGRAM REPORT: CPT | Performed by: INTERNAL MEDICINE

## 2025-03-08 NOTE — DISCHARGE INSTRUCTIONS
You were diagnosed with the flu today. Your oxygen levels were stable at rest and while walking today in the ER. Your chest xray did not show any signs of pneumonia. I will give you a prescription for tamiflu to take, this will not cure the flu but it may decrease the duration of your symptoms. The only thing that will cure the flu is time. Please continue to take your medications as prescribed at home. I checked and tamiflu does not interact with any of your normal medications. Isolate as able from your  to prevent spread. Follow up with your primary care provider in approximately 5 days. If your symptoms worsen, return to the ER for reevaluation.

## 2025-03-08 NOTE — ED PROVIDER NOTES
Emergency Department Provider Note       PCP: Jeanmarie Resendez MD   Age: 79 y.o.   Sex: female     DISPOSITION Decision To Discharge 03/07/2025 08:13:31 PM    ICD-10-CM    1. Influenza A  J10.1           Medical Decision Making     Patient presents with 3-day history of shortness of breath at rest and with exertion.  She is on 3 L nasal cannula oxygen at baseline.  She appears chronically ill.  Vital signs are stable.  She is afebrile, oxygen saturation 98% on 2 L.  No tachycardia or tachypnea.  Wheezing present on auscultation.  Workup showed CBC and CMP unremarkable.  No leukocytosis.  Lactic and procalcitonin WNL.  BNP elevated at 1296.  Will give dose of Lasix in the ER.  Patient did test positive for influenza.  Chest x-ray without any signs of consolidation or pneumothorax.  Patient given DuoNeb and steroids in the ER.  I ambulated patient personally on her home oxygen and she maintained oxygen saturations greater than 97%.  States she feels well when walking.  She had a steady gait.  I did ask Dr. Shultz to take a look as well and he agrees with plan for discharge home.  Will discharge home on Tamiflu and close follow-up with PCP.  Strict return precautions discussed.  Patient verbalized understanding is agreeable for discharge home.     1 or more acute illnesses that pose a threat to life or bodily function.   Prescription drug management performed.  Patient was discharged risks and benefits of hospitalization were considered.  Shared medical decision making was utilized in creating the patients health plan today.  I independently ordered and reviewed each unique test.           I interpreted the X-rays no consolidation or pneumothorax.  ED provider's independent EKG interpretation normal rate, normal rhythm, no ST elevation            History     Patient is a 79-year-old female with past medical history significant for venous stenosis, A-fib on anticoagulation, CAD, HTN, CHF, type 2 diabetes, COPD  CHOLECYSTECTOMY      EP DEVICE PROCEDURE N/A 3/29/2024    Insert PPM biv multi performed by Kiel Toro MD at [facility] CARDIAC CATH LAB    EP DEVICE PROCEDURE N/A 3/29/2024    Ablation AV node performed by Kiel Toro MD at [facility] CARDIAC CATH LAB    IR KYPHOPLASTY THORACIC 1 VERTEBRAL BODY  2024    IR KYPHOPLASTY THORACIC FIRST LEVEL 2024 [facility] RADIOLOGY SPECIALS    ORTHOPEDIC SURGERY      bilateral feet,     ROTATOR CUFF REPAIR Right     ROTATOR CUFF REPAIR Left     RADHA AND BSO (CERVIX REMOVED)      AGE 28    UROLOGICAL SURGERY      bladder tack        Social History     Socioeconomic History    Marital status:    Tobacco Use    Smoking status: Former     Current packs/day: 0.00     Average packs/day: 3.0 packs/day for 20.0 years (60.0 ttl pk-yrs)     Types: Cigarettes     Start date: 1978     Quit date: 1998     Years since quittin.5     Passive exposure: Past    Smokeless tobacco: Never   Vaping Use    Vaping status: Never Used   Substance and Sexual Activity    Alcohol use: No    Drug use: No     Social Determinants of Health     Financial Resource Strain: Low Risk  (2024)    Received from [facility]    Financial Resource Strain     Difficulty Paying Living Expenses: Not hard at all     Difficulty Paying Medical Expenses: No   Food Insecurity: No Food Insecurity (2024)    Received from [facility]    Food Insecurity     Worried about Running Out of Food in the Last Year: Never true     Ran Out of Food in the Last Year: Never true   Transportation Needs: No Transportation Needs (2024)    Received from [facility]    Transportation Needs     Lack of Transportation: No   Physical Activity: Inactive (2024)    Received from [facility]    Physical Activity     Days of Exercise per Week: 0 days     On average, how many minutes do you exercise per day?: 0     Total Minutes of Exercise Per Week: 0   Stress:

## 2025-03-14 ENCOUNTER — OFFICE VISIT (OUTPATIENT)
Dept: FAMILY MEDICINE CLINIC | Facility: CLINIC | Age: 80
End: 2025-03-14
Payer: MEDICARE

## 2025-03-14 VITALS
HEART RATE: 70 BPM | OXYGEN SATURATION: 96 % | SYSTOLIC BLOOD PRESSURE: 151 MMHG | TEMPERATURE: 98.2 F | DIASTOLIC BLOOD PRESSURE: 68 MMHG | RESPIRATION RATE: 16 BRPM

## 2025-03-14 DIAGNOSIS — E55.9 VITAMIN D DEFICIENCY: ICD-10-CM

## 2025-03-14 DIAGNOSIS — N18.32 STAGE 3B CHRONIC KIDNEY DISEASE (HCC): ICD-10-CM

## 2025-03-14 DIAGNOSIS — M47.816 LUMBAR SPONDYLOSIS: ICD-10-CM

## 2025-03-14 DIAGNOSIS — J20.9 ACUTE BRONCHITIS, UNSPECIFIED ORGANISM: Primary | ICD-10-CM

## 2025-03-14 DIAGNOSIS — G25.81 RLS (RESTLESS LEGS SYNDROME): ICD-10-CM

## 2025-03-14 DIAGNOSIS — E11.9 TYPE 2 DIABETES MELLITUS WITHOUT COMPLICATION, WITHOUT LONG-TERM CURRENT USE OF INSULIN (HCC): ICD-10-CM

## 2025-03-14 PROCEDURE — 99213 OFFICE O/P EST LOW 20 MIN: CPT | Performed by: FAMILY MEDICINE

## 2025-03-14 PROCEDURE — 1036F TOBACCO NON-USER: CPT | Performed by: FAMILY MEDICINE

## 2025-03-14 PROCEDURE — G8399 PT W/DXA RESULTS DOCUMENT: HCPCS | Performed by: FAMILY MEDICINE

## 2025-03-14 PROCEDURE — 1123F ACP DISCUSS/DSCN MKR DOCD: CPT | Performed by: FAMILY MEDICINE

## 2025-03-14 PROCEDURE — 3078F DIAST BP <80 MM HG: CPT | Performed by: FAMILY MEDICINE

## 2025-03-14 PROCEDURE — 1159F MED LIST DOCD IN RCRD: CPT | Performed by: FAMILY MEDICINE

## 2025-03-14 PROCEDURE — 3077F SYST BP >= 140 MM HG: CPT | Performed by: FAMILY MEDICINE

## 2025-03-14 PROCEDURE — G8417 CALC BMI ABV UP PARAM F/U: HCPCS | Performed by: FAMILY MEDICINE

## 2025-03-14 PROCEDURE — G8427 DOCREV CUR MEDS BY ELIG CLIN: HCPCS | Performed by: FAMILY MEDICINE

## 2025-03-14 PROCEDURE — 1090F PRES/ABSN URINE INCON ASSESS: CPT | Performed by: FAMILY MEDICINE

## 2025-03-14 RX ORDER — CEFUROXIME AXETIL 500 MG/1
500 TABLET ORAL 2 TIMES DAILY
Qty: 20 TABLET | Refills: 0 | Status: SHIPPED | OUTPATIENT
Start: 2025-03-14 | End: 2025-03-24

## 2025-03-14 RX ORDER — ERGOCALCIFEROL 1.25 MG/1
50000 CAPSULE, LIQUID FILLED ORAL WEEKLY
Qty: 12 CAPSULE | Refills: 1 | Status: SHIPPED | OUTPATIENT
Start: 2025-03-14

## 2025-03-14 RX ORDER — METHYLPREDNISOLONE 4 MG/1
TABLET ORAL
Qty: 1 KIT | Refills: 0 | Status: SHIPPED | OUTPATIENT
Start: 2025-03-14

## 2025-03-14 RX ORDER — ROPINIROLE 0.5 MG/1
TABLET, FILM COATED ORAL
Qty: 90 TABLET | Refills: 3 | Status: SHIPPED | OUTPATIENT
Start: 2025-03-14

## 2025-03-14 RX ORDER — GABAPENTIN 300 MG/1
300 CAPSULE ORAL 3 TIMES DAILY
Qty: 90 CAPSULE | Refills: 1 | Status: SHIPPED | OUTPATIENT
Start: 2025-03-14 | End: 2025-06-12

## 2025-03-14 SDOH — ECONOMIC STABILITY: FOOD INSECURITY: WITHIN THE PAST 12 MONTHS, THE FOOD YOU BOUGHT JUST DIDN'T LAST AND YOU DIDN'T HAVE MONEY TO GET MORE.: PATIENT DECLINED

## 2025-03-14 SDOH — ECONOMIC STABILITY: FOOD INSECURITY: WITHIN THE PAST 12 MONTHS, YOU WORRIED THAT YOUR FOOD WOULD RUN OUT BEFORE YOU GOT MONEY TO BUY MORE.: PATIENT DECLINED

## 2025-03-14 ASSESSMENT — PATIENT HEALTH QUESTIONNAIRE - PHQ9
1. LITTLE INTEREST OR PLEASURE IN DOING THINGS: NOT AT ALL
SUM OF ALL RESPONSES TO PHQ QUESTIONS 1-9: 0
2. FEELING DOWN, DEPRESSED OR HOPELESS: NOT AT ALL
SUM OF ALL RESPONSES TO PHQ QUESTIONS 1-9: 0

## 2025-03-14 ASSESSMENT — ENCOUNTER SYMPTOMS
WHEEZING: 1
SHORTNESS OF BREATH: 0
COUGH: 1

## 2025-03-15 NOTE — PROGRESS NOTES
AT NIGHT    Vitamin D deficiency  -     vitamin D (ERGOCALCIFEROL) 1.25 MG (27483 UT) CAPS capsule; Take 1 capsule by mouth once a week      Will treat for bronchitis.  Follow-up and Dispositions    Return in about 6 weeks (around 4/25/2025), or if symptoms worsen or fail to improve.          Jeanmarie Resendez MD  03/14/25    Dictated using voice recognition software. Proofread, but unrecognized errors may exist.

## 2025-04-14 ENCOUNTER — HOSPITAL ENCOUNTER (OUTPATIENT)
Dept: CT IMAGING | Age: 80
Discharge: HOME | End: 2025-04-16
Payer: MEDICARE

## 2025-04-14 DIAGNOSIS — R91.1 PULMONARY NODULE: ICD-10-CM

## 2025-04-14 PROCEDURE — 71250 CT THORAX DX C-: CPT

## 2025-04-23 ENCOUNTER — OFFICE VISIT (OUTPATIENT)
Age: 80
End: 2025-04-23
Payer: MEDICARE

## 2025-04-23 VITALS
BODY MASS INDEX: 31.36 KG/M2 | DIASTOLIC BLOOD PRESSURE: 72 MMHG | HEIGHT: 63 IN | SYSTOLIC BLOOD PRESSURE: 138 MMHG | WEIGHT: 177 LBS

## 2025-04-23 DIAGNOSIS — I48.11 LONGSTANDING PERSISTENT ATRIAL FIBRILLATION (HCC): ICD-10-CM

## 2025-04-23 DIAGNOSIS — J43.1 PANLOBULAR EMPHYSEMA (HCC): ICD-10-CM

## 2025-04-23 DIAGNOSIS — E78.00 PURE HYPERCHOLESTEROLEMIA: Primary | ICD-10-CM

## 2025-04-23 DIAGNOSIS — E11.00 TYPE 2 DIABETES MELLITUS WITH HYPEROSMOLARITY WITHOUT COMA, WITHOUT LONG-TERM CURRENT USE OF INSULIN (HCC): ICD-10-CM

## 2025-04-23 DIAGNOSIS — E66.9 OBESITY (BMI 30-39.9): ICD-10-CM

## 2025-04-23 DIAGNOSIS — J96.11 CHRONIC RESPIRATORY FAILURE WITH HYPOXIA (HCC): ICD-10-CM

## 2025-04-23 DIAGNOSIS — J43.2 CENTRILOBULAR EMPHYSEMA (HCC): Chronic | ICD-10-CM

## 2025-04-23 DIAGNOSIS — I10 ESSENTIAL HYPERTENSION: ICD-10-CM

## 2025-04-23 DIAGNOSIS — I25.119 CORONARY ARTERY DISEASE INVOLVING NATIVE CORONARY ARTERY OF NATIVE HEART WITH ANGINA PECTORIS: ICD-10-CM

## 2025-04-23 DIAGNOSIS — J44.9 OSA AND COPD OVERLAP SYNDROME (HCC): Chronic | ICD-10-CM

## 2025-04-23 DIAGNOSIS — I27.20 PULMONARY HYPERTENSION (HCC): ICD-10-CM

## 2025-04-23 DIAGNOSIS — I87.8 VENOUS STASIS: Chronic | ICD-10-CM

## 2025-04-23 DIAGNOSIS — N18.32 STAGE 3B CHRONIC KIDNEY DISEASE (HCC): ICD-10-CM

## 2025-04-23 DIAGNOSIS — Z95.0 STATUS POST BIVENTRICULAR PACEMAKER: Chronic | ICD-10-CM

## 2025-04-23 DIAGNOSIS — G47.33 OSA AND COPD OVERLAP SYNDROME (HCC): Chronic | ICD-10-CM

## 2025-04-23 PROCEDURE — 1126F AMNT PAIN NOTED NONE PRSNT: CPT | Performed by: INTERNAL MEDICINE

## 2025-04-23 PROCEDURE — 1123F ACP DISCUSS/DSCN MKR DOCD: CPT | Performed by: INTERNAL MEDICINE

## 2025-04-23 PROCEDURE — 3075F SYST BP GE 130 - 139MM HG: CPT | Performed by: INTERNAL MEDICINE

## 2025-04-23 PROCEDURE — 3078F DIAST BP <80 MM HG: CPT | Performed by: INTERNAL MEDICINE

## 2025-04-23 PROCEDURE — 3023F SPIROM DOC REV: CPT | Performed by: INTERNAL MEDICINE

## 2025-04-23 PROCEDURE — G8417 CALC BMI ABV UP PARAM F/U: HCPCS | Performed by: INTERNAL MEDICINE

## 2025-04-23 PROCEDURE — 1090F PRES/ABSN URINE INCON ASSESS: CPT | Performed by: INTERNAL MEDICINE

## 2025-04-23 PROCEDURE — G8399 PT W/DXA RESULTS DOCUMENT: HCPCS | Performed by: INTERNAL MEDICINE

## 2025-04-23 PROCEDURE — 1036F TOBACCO NON-USER: CPT | Performed by: INTERNAL MEDICINE

## 2025-04-23 PROCEDURE — 99214 OFFICE O/P EST MOD 30 MIN: CPT | Performed by: INTERNAL MEDICINE

## 2025-04-23 PROCEDURE — G8427 DOCREV CUR MEDS BY ELIG CLIN: HCPCS | Performed by: INTERNAL MEDICINE

## 2025-04-23 PROCEDURE — 1159F MED LIST DOCD IN RCRD: CPT | Performed by: INTERNAL MEDICINE

## 2025-04-23 RX ORDER — LOSARTAN POTASSIUM 25 MG/1
12.5 TABLET ORAL DAILY
Qty: 45 TABLET | Refills: 3 | Status: SHIPPED | OUTPATIENT
Start: 2025-04-23

## 2025-04-23 RX ORDER — MAGNESIUM OXIDE 400 MG/1
400 TABLET ORAL 2 TIMES DAILY
Qty: 180 TABLET | Refills: 3 | Status: SHIPPED | OUTPATIENT
Start: 2025-04-23

## 2025-04-23 RX ORDER — METOPROLOL SUCCINATE 25 MG/1
25 TABLET, EXTENDED RELEASE ORAL DAILY
Qty: 90 TABLET | Refills: 3 | Status: SHIPPED | OUTPATIENT
Start: 2025-04-23

## 2025-04-23 ASSESSMENT — ENCOUNTER SYMPTOMS
SHORTNESS OF BREATH: 1
ABDOMINAL PAIN: 0
COUGH: 0
BACK PAIN: 1

## 2025-04-23 NOTE — PROGRESS NOTES
today for hypertension.    Diagnoses and all orders for this visit:    Persistent atrial fibrillation (HCC) -status post AV node ablation and biventricular pacemaker.    Coronary artery disease -moderate by cardiac catheterization 02/2022.  Patient has been intolerant of statin therapy in the past.  Will monitor for now.  Patient has struggled with cost of Repatha.    Chronic Respiratory Failure - Now on O2.      Essential hypertension with goal blood pressure less than 130/85 -most of this is driven by morbid obesity and struggles with some medical noncompliance.  She is also noncompliant with treatment for obstructive sleep apnea.    Pure hypercholesterolemia - Not on therapy and managed per PCP.  Patient reports intolerance of statin therapy.  She was prescribed Repatha for which she is not taking.    Dyspnea on exertion -most of this is driven by morbid obesity and severe deconditioning.  Due to medical noncompliance with CPAP she has developed severe pulmonary hypertension.    Pulmonary Hypertension  - PASP 57 by echo 03/2025    MARISA - Unable to tolerate CPAP and now off of therapy.  Has moderate/severe PHTN.  Has been wearing O2 at night.      Venous stasis - This is multifactorial. Keep feet elevated and wear support stockings.      Morbid Obesity - Unchanged and continues to gain weight.  This is a significant contributor to PHTN/dyspnea.      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

## 2025-04-24 ENCOUNTER — RESULTS FOLLOW-UP (OUTPATIENT)
Dept: PULMONOLOGY | Age: 80
End: 2025-04-24

## 2025-05-27 NOTE — PROGRESS NOTES
DAILY, Use in each nostril as directed    benzonatate (TESSALON PERLES) 100 MG capsule One po tid prn cough    diclofenac sodium (VOLTAREN) 4 g, Topical, 4 TIMES DAILY PRN    famotidine (PEPCID) 20 mg, Oral, 2 TIMES DAILY    fluticasone (FLONASE) 50 MCG/ACT nasal spray 2 sprays, Each Nostril, DAILY    fluticasone-salmeterol (WIXELA INHUB) 250-50 MCG/ACT AEPB diskus inhaler 1 puff, Inhalation, EVERY 12 HOURS, PLEASE USE [employer]RX COUPON AND DO NOT FILE INSURANCE    furosemide (LASIX) 10 mg, Oral, DAILY, In addition to 40mg tablet for total of 50mg daily    furosemide (LASIX) 40 mg, Oral, 2 TIMES DAILY    gabapentin (NEURONTIN) 300 mg, Oral, 3 TIMES DAILY    losartan (COZAAR) 12.5 mg, Oral, DAILY    magnesium oxide (MAG-OX) 400 mg, Oral, 2 times daily    methylPREDNISolone (MEDROL, RAMOS,) 4 MG tablet Take by mouth.    metoprolol succinate (TOPROL XL) 25 mg, Oral, DAILY    OXYGEN 3 L/min, DAILY PRN    potassium chloride (KLOR-CON M) 10 MEQ extended release tablet 10 mEq, Oral, DAILY    rOPINIRole (REQUIP) 0.5 MG tablet TAKE 1 TABLET BY MOUTH EVERY DAY AT NIGHT    Tadalafil (PAH) 20 mg, Oral, DAILY    vitamin D (ERGOCALCIFEROL) 50,000 Units, Oral, WEEKLY      caffeine

## 2025-05-28 ENCOUNTER — CLINICAL SUPPORT (OUTPATIENT)
Dept: PULMONOLOGY | Age: 80
End: 2025-05-28
Payer: MEDICARE

## 2025-05-28 ENCOUNTER — OFFICE VISIT (OUTPATIENT)
Dept: PULMONOLOGY | Age: 80
End: 2025-05-28
Payer: MEDICARE

## 2025-05-28 VITALS
WEIGHT: 180.6 LBS | SYSTOLIC BLOOD PRESSURE: 141 MMHG | TEMPERATURE: 98.1 F | HEIGHT: 63 IN | BODY MASS INDEX: 32 KG/M2 | OXYGEN SATURATION: 99 % | HEART RATE: 71 BPM | DIASTOLIC BLOOD PRESSURE: 70 MMHG | RESPIRATION RATE: 24 BRPM

## 2025-05-28 DIAGNOSIS — J96.11 CHRONIC RESPIRATORY FAILURE WITH HYPOXIA (HCC): ICD-10-CM

## 2025-05-28 DIAGNOSIS — I27.20 PULMONARY HYPERTENSION (HCC): ICD-10-CM

## 2025-05-28 DIAGNOSIS — R09.02 EXERCISE HYPOXEMIA: Primary | ICD-10-CM

## 2025-05-28 DIAGNOSIS — R06.09 DYSPNEA ON EXERTION: ICD-10-CM

## 2025-05-28 DIAGNOSIS — I27.20 PULMONARY HYPERTENSION (HCC): Primary | ICD-10-CM

## 2025-05-28 DIAGNOSIS — J44.9 STAGE 3 SEVERE COPD BY GOLD CLASSIFICATION (HCC): ICD-10-CM

## 2025-05-28 DIAGNOSIS — R91.1 PULMONARY NODULE: ICD-10-CM

## 2025-05-28 PROCEDURE — 1090F PRES/ABSN URINE INCON ASSESS: CPT | Performed by: STUDENT IN AN ORGANIZED HEALTH CARE EDUCATION/TRAINING PROGRAM

## 2025-05-28 PROCEDURE — 94618 PULMONARY STRESS TESTING: CPT | Performed by: STUDENT IN AN ORGANIZED HEALTH CARE EDUCATION/TRAINING PROGRAM

## 2025-05-28 PROCEDURE — G8428 CUR MEDS NOT DOCUMENT: HCPCS | Performed by: STUDENT IN AN ORGANIZED HEALTH CARE EDUCATION/TRAINING PROGRAM

## 2025-05-28 PROCEDURE — 1036F TOBACCO NON-USER: CPT | Performed by: STUDENT IN AN ORGANIZED HEALTH CARE EDUCATION/TRAINING PROGRAM

## 2025-05-28 PROCEDURE — G8399 PT W/DXA RESULTS DOCUMENT: HCPCS | Performed by: STUDENT IN AN ORGANIZED HEALTH CARE EDUCATION/TRAINING PROGRAM

## 2025-05-28 PROCEDURE — G8417 CALC BMI ABV UP PARAM F/U: HCPCS | Performed by: STUDENT IN AN ORGANIZED HEALTH CARE EDUCATION/TRAINING PROGRAM

## 2025-05-28 PROCEDURE — 1159F MED LIST DOCD IN RCRD: CPT | Performed by: STUDENT IN AN ORGANIZED HEALTH CARE EDUCATION/TRAINING PROGRAM

## 2025-05-28 PROCEDURE — G2211 COMPLEX E/M VISIT ADD ON: HCPCS | Performed by: STUDENT IN AN ORGANIZED HEALTH CARE EDUCATION/TRAINING PROGRAM

## 2025-05-28 PROCEDURE — 1123F ACP DISCUSS/DSCN MKR DOCD: CPT | Performed by: STUDENT IN AN ORGANIZED HEALTH CARE EDUCATION/TRAINING PROGRAM

## 2025-05-28 PROCEDURE — 3023F SPIROM DOC REV: CPT | Performed by: STUDENT IN AN ORGANIZED HEALTH CARE EDUCATION/TRAINING PROGRAM

## 2025-05-28 PROCEDURE — 99215 OFFICE O/P EST HI 40 MIN: CPT | Performed by: STUDENT IN AN ORGANIZED HEALTH CARE EDUCATION/TRAINING PROGRAM

## 2025-05-28 NOTE — PROGRESS NOTES
[facility] Pulmonary and Critical Care  [facility]  [address], [address]   [place] [id number]  T: [phone]  F: [phone]       6 MINUTE WALK TEST     Patient's Name Rylee Orr   Age 79 y.o.    Gender female   Height 5'3\"   Weight 180.6 lbs   Ordering Provider  LIZ SEGUNDO NP          Time Dyspnea  (Zan Scale)  Fatigue  (Zan Scale)  Blood Pressure Heart Rate Oxygen SAT RA or   w / O2?    BASELINE 1444    140 / 76 70 98% 3L-resting         73 97% Ra-resting        71 90% Ra-amb          72 89% Ra-amb           +2L         72 88% +3L        81 86% +4L        75 95%    POST TEST  1457   141 / 70 71 99% 4L-resting     Does the patient use any walking aids? NO.   Medications taken before test are up to date:  Yes  Supplemental oxygen during the test: OTHER: Started on RA, then placed on O2    Stopped or paused before 6 minutes? Yes, patient paused prison through the test for about 30 seconds to catch her breath  Other symptoms at end of exercise: None    Number of complete laps: 5 x 60 meters = 300 meters + final partial lap: 30 meters = 330 meters    Total distance walked in 6 minutes:  330  Meters  Predicted distance: 360.4 meters    Percent of predicted distance: 91.56 %                Tech comments:    Test Performed by: Briana Geiger RCP

## 2025-05-29 ENCOUNTER — OFFICE VISIT (OUTPATIENT)
Dept: ORTHOPEDIC SURGERY | Age: 80
End: 2025-05-29

## 2025-05-29 DIAGNOSIS — M17.12 PRIMARY OSTEOARTHRITIS OF LEFT KNEE: Primary | ICD-10-CM

## 2025-05-29 RX ORDER — METHYLPREDNISOLONE ACETATE 40 MG/ML
40 INJECTION, SUSPENSION INTRA-ARTICULAR; INTRALESIONAL; INTRAMUSCULAR; SOFT TISSUE ONCE
Status: COMPLETED | OUTPATIENT
Start: 2025-05-29 | End: 2025-05-29

## 2025-05-29 RX ADMIN — METHYLPREDNISOLONE ACETATE 40 MG: 40 INJECTION, SUSPENSION INTRA-ARTICULAR; INTRALESIONAL; INTRAMUSCULAR; SOFT TISSUE at 15:11

## 2025-05-29 NOTE — PROGRESS NOTES
route 2 times daily Use in each nostril as directed 12/27/24   Marilou Guerra MD   fluticasone-salmeterol (WIXELA INHUB) 250-50 MCG/ACT AEPB diskus inhaler Inhale 1 puff into the lungs in the morning and 1 puff in the evening. PLEASE USE [employer]RX COUPON AND DO NOT FILE INSURANCE. 9/26/24   Ciara Mena, APRN - NP   furosemide (LASIX) 40 MG tablet Take 1 tablet by mouth 2 times daily 9/9/24   Jeanmarie Resendez MD   Tadalafil, PAH, 20 MG tablet Take 1 tablet by mouth daily 7/26/24   Zofia Negrete MD   furosemide (LASIX) 20 MG tablet Take 0.5 tablets by mouth daily In addition to 40mg tablet for total of 50mg daily  Patient taking differently: Take 2 tablets by mouth daily 6/4/24   Zofia Negrete MD   potassium chloride (KLOR-CON M) 10 MEQ extended release tablet Take 1 tablet by mouth daily 4/29/24   Jeanmarie Resendez MD   diclofenac sodium (VOLTAREN) 1 % GEL Apply 4 g topically 4 times daily as needed for Pain 8/14/23   Scarlett Nick PA   albuterol sulfate HFA (PROVENTIL;VENTOLIN;PROAIR) 108 (90 Base) MCG/ACT inhaler INHALE 2 PUFFS INTO THE LUNGS 4 TIMES DAILY AS NEEDED FOR WHEEZING. 7/3/23   Zofia Negrete MD   acetaminophen (TYLENOL) 500 MG tablet Take 2 tablets by mouth every 6 hours as needed for Pain    ProviderAbdirizak MD   OXYGEN Inhale 3 L/min into the lungs daily as needed for Shortness of Breath via nasal cannula during sleep    Automatic Reconciliation, Ar       Family History:     Family History   Problem Relation Age of Onset    Stroke Mother     Heart Disease Mother     Cancer Father         prostate    Stroke Father     Coronary Art Dis Father         premature    Heart Disease Brother     Heart Attack Brother     Cancer Sister     Cancer Sister         breast    Breast Cancer Sister 71       Social History:      Social History     Tobacco Use    Smoking status: Former     Current packs/day: 0.00     Average packs/day: 3.0 packs/day for 20.0 years (60.0 ttl pk-yrs)     Types:

## 2025-05-30 ENCOUNTER — TELEPHONE (OUTPATIENT)
Dept: PULMONOLOGY | Age: 80
End: 2025-05-30

## 2025-05-30 NOTE — TELEPHONE ENCOUNTER
Patient is asking for some more samples of the Trelegy 200  . She says it really works for breathing . Ask for a call when she may can come to get them

## 2025-06-17 ENCOUNTER — TELEPHONE (OUTPATIENT)
Dept: PULMONOLOGY | Age: 80
End: 2025-06-17

## 2025-06-17 NOTE — TELEPHONE ENCOUNTER
Patient called wanting to know if have any samples of Trelegy 200mcg that she can come  tomorrow.   General (Pediatric)

## 2025-07-25 ENCOUNTER — OFFICE VISIT (OUTPATIENT)
Dept: FAMILY MEDICINE CLINIC | Facility: CLINIC | Age: 80
End: 2025-07-25
Payer: MEDICARE

## 2025-07-25 VITALS
DIASTOLIC BLOOD PRESSURE: 65 MMHG | BODY MASS INDEX: 32.41 KG/M2 | HEIGHT: 63 IN | OXYGEN SATURATION: 97 % | HEART RATE: 71 BPM | SYSTOLIC BLOOD PRESSURE: 134 MMHG | TEMPERATURE: 98.2 F | RESPIRATION RATE: 16 BRPM | WEIGHT: 182.9 LBS

## 2025-07-25 DIAGNOSIS — L03.031 CELLULITIS OF GREAT TOE OF RIGHT FOOT: ICD-10-CM

## 2025-07-25 DIAGNOSIS — I10 ESSENTIAL HYPERTENSION: ICD-10-CM

## 2025-07-25 DIAGNOSIS — M47.816 LUMBAR SPONDYLOSIS: ICD-10-CM

## 2025-07-25 DIAGNOSIS — L30.9 DERMATITIS OF LOWER EXTREMITY: Primary | ICD-10-CM

## 2025-07-25 PROCEDURE — 3075F SYST BP GE 130 - 139MM HG: CPT | Performed by: PHYSICIAN ASSISTANT

## 2025-07-25 PROCEDURE — 99213 OFFICE O/P EST LOW 20 MIN: CPT | Performed by: PHYSICIAN ASSISTANT

## 2025-07-25 PROCEDURE — G2211 COMPLEX E/M VISIT ADD ON: HCPCS | Performed by: PHYSICIAN ASSISTANT

## 2025-07-25 PROCEDURE — 3078F DIAST BP <80 MM HG: CPT | Performed by: PHYSICIAN ASSISTANT

## 2025-07-25 PROCEDURE — 1159F MED LIST DOCD IN RCRD: CPT | Performed by: PHYSICIAN ASSISTANT

## 2025-07-25 PROCEDURE — 1160F RVW MEDS BY RX/DR IN RCRD: CPT | Performed by: PHYSICIAN ASSISTANT

## 2025-07-25 PROCEDURE — G8417 CALC BMI ABV UP PARAM F/U: HCPCS | Performed by: PHYSICIAN ASSISTANT

## 2025-07-25 PROCEDURE — G8399 PT W/DXA RESULTS DOCUMENT: HCPCS | Performed by: PHYSICIAN ASSISTANT

## 2025-07-25 PROCEDURE — 1123F ACP DISCUSS/DSCN MKR DOCD: CPT | Performed by: PHYSICIAN ASSISTANT

## 2025-07-25 PROCEDURE — 1036F TOBACCO NON-USER: CPT | Performed by: PHYSICIAN ASSISTANT

## 2025-07-25 PROCEDURE — G8427 DOCREV CUR MEDS BY ELIG CLIN: HCPCS | Performed by: PHYSICIAN ASSISTANT

## 2025-07-25 PROCEDURE — 1090F PRES/ABSN URINE INCON ASSESS: CPT | Performed by: PHYSICIAN ASSISTANT

## 2025-07-25 RX ORDER — DOXYCYCLINE HYCLATE 100 MG
100 TABLET ORAL 2 TIMES DAILY
Qty: 14 TABLET | Refills: 0 | Status: SHIPPED | OUTPATIENT
Start: 2025-07-25 | End: 2025-08-01

## 2025-07-25 RX ORDER — GABAPENTIN 300 MG/1
300 CAPSULE ORAL 3 TIMES DAILY
Qty: 90 CAPSULE | Refills: 5 | Status: SHIPPED | OUTPATIENT
Start: 2025-07-25 | End: 2026-01-21

## 2025-07-25 RX ORDER — MOMETASONE FUROATE 1 MG/G
CREAM TOPICAL
Qty: 30 G | Refills: 1 | Status: SHIPPED | OUTPATIENT
Start: 2025-07-25

## 2025-07-25 ASSESSMENT — PATIENT HEALTH QUESTIONNAIRE - PHQ9
SUM OF ALL RESPONSES TO PHQ QUESTIONS 1-9: 0
1. LITTLE INTEREST OR PLEASURE IN DOING THINGS: NOT AT ALL
SUM OF ALL RESPONSES TO PHQ QUESTIONS 1-9: 0
2. FEELING DOWN, DEPRESSED OR HOPELESS: NOT AT ALL

## 2025-07-25 ASSESSMENT — ENCOUNTER SYMPTOMS
COLOR CHANGE: 1
SHORTNESS OF BREATH: 0

## 2025-07-25 NOTE — PROGRESS NOTES
[facility] [address] [address]  Phone [phone]      Patient: Rylee Orr  YOB: 1945  Age 80 y.o.  Sex female  Medical Record:  [id number]  Visit Date: 07/25/25  Author:  Syed Mayfield PA-C    [facility] Clinic Note    Chief Complaint   Patient presents with    Leg Pain     Bottom of left leg, red, sore    Toe Pain     Right foot big toe, red.  Has seen podiatry.  Very painful        History of Present Illness  History of Present Illness  The patient is an 80-year-old female who presents for evaluation of a rash on the left lower leg and for redness and discomfort involving the right great toe.    She reports persistent rash and itching of a patch on her left lower leg. The condition has been present for approximately 2 months. She recalls scratching the area during a previous rehabilitation stay, after which a nurse applied a bandage. Despite this, the area has not healed. She notes that the swelling and itch intensifies at night. She has been applying Neosporin and a greasy lotion for dry skin to the area.    Approximately 2.5 weeks ago, she consulted Dr. Palomino, a podiatrist in [place], for a large scab on her right great toe.  The podiatrist debrided the area per patient report and she notes that the skin seems to be healing.  In the last few days she has noted some increased redness of the skin of the toe with mild associated discomfort.  Denies increased warmth.  No history of gout.  Denies fever or chills.        Past History:    Past Medical history   Past Medical History:   Diagnosis Date    Aortic atherosclerosis     noted on lumbar spine film 3/9/2023    Arrhythmia     palpitations.     Arthritis     rt shoulder    Atrial fibrillation (HCC) 01/21/2016    takes eliquis    Cancer (HCC)     skin ca on nose    Chest pain     Chronic obstructive pulmonary disease (HCC)     COPD exacerbation (HCC) 9/25/2017    Dyspnea     Essential

## 2025-07-25 NOTE — PATIENT INSTRUCTIONS
*Apply the mometasone cream to the area on your left lower leg, once a day. Once the rash resolves, you can stop it.  *Take the antibiotic (doxycycline) twice a day for a week. If toe pain is not improving, make sure to follow up with the podiatrist.

## 2025-07-28 NOTE — PROGRESS NOTES
Patient Name:  Rylee Orr                               YOB: 1945  MRN: [id number]                                                Office Visit 7/30/2025    ASSESSMENT AND PLAN:  (Medical Decision Making)    1. Dyspnea on exertion  Doing fairly well currently.  She is very busy caring for her  and will try to minimize testing until 6 month follow up.    2. Pulmonary hypertension (HCC)  Group 1 hemodynamics in setting of concomitant HFpEF.   Tolerating tadalafil.  No volume overload on daily lasix.     3. Stage 3 severe COPD by GOLD classification (HCC)  Samples of trelegy provided and she has an rx for wixela.      Zofia Negrete MD    Total time for encounter on day of encounter was 40 minutes.  This time includes chart prep, review of tests/procedures, review of other provider's notes, documentation and counseling patient regarding disease process and medications.     ___________________________________________________________________         ______    The patient (or guardian, if applicable) and other individuals in attendance with the patient were advised that Artificial Intelligence will be utilized during this visit to record, process the conversation to generate a clinical note, and support improvement of the AI technology. The patient (or guardian, if applicable) and other individuals in attendance at the appointment consented to the use of AI, including the recording.        REASON FOR VISIT:   Chief Complaint   Patient presents with    Dyspnea on exertion       HISTORY OF PRESENT ILLNESS:    Ms. Rylee Orr is a 80 y.o. female with a PMH of MARISA now on CPAP (3lpm), obesity, DVT of RLE (1971), PE (1980), diastolic dysfunction, atrial fibrillation  on eliquis, 90-pack-year smoking history with GOLD stage III COPD,  who is followed today for pulmonary hypertension with functional class 3 symptoms of exertional dyspnea and dyspnea bending over.      Today she is kind of

## 2025-07-29 ENCOUNTER — OFFICE VISIT (OUTPATIENT)
Dept: PULMONOLOGY | Age: 80
End: 2025-07-29
Payer: MEDICARE

## 2025-07-29 VITALS
TEMPERATURE: 97 F | HEART RATE: 70 BPM | WEIGHT: 182.3 LBS | RESPIRATION RATE: 18 BRPM | DIASTOLIC BLOOD PRESSURE: 74 MMHG | BODY MASS INDEX: 30.37 KG/M2 | OXYGEN SATURATION: 96 % | SYSTOLIC BLOOD PRESSURE: 124 MMHG | HEIGHT: 65 IN

## 2025-07-29 DIAGNOSIS — R06.09 DYSPNEA ON EXERTION: Primary | ICD-10-CM

## 2025-07-29 DIAGNOSIS — J44.9 STAGE 3 SEVERE COPD BY GOLD CLASSIFICATION (HCC): ICD-10-CM

## 2025-07-29 DIAGNOSIS — I27.20 PULMONARY HYPERTENSION (HCC): ICD-10-CM

## 2025-07-29 PROCEDURE — 3023F SPIROM DOC REV: CPT | Performed by: INTERNAL MEDICINE

## 2025-07-29 PROCEDURE — 3078F DIAST BP <80 MM HG: CPT | Performed by: INTERNAL MEDICINE

## 2025-07-29 PROCEDURE — 1036F TOBACCO NON-USER: CPT | Performed by: INTERNAL MEDICINE

## 2025-07-29 PROCEDURE — G8399 PT W/DXA RESULTS DOCUMENT: HCPCS | Performed by: INTERNAL MEDICINE

## 2025-07-29 PROCEDURE — 1123F ACP DISCUSS/DSCN MKR DOCD: CPT | Performed by: INTERNAL MEDICINE

## 2025-07-29 PROCEDURE — 3074F SYST BP LT 130 MM HG: CPT | Performed by: INTERNAL MEDICINE

## 2025-07-29 PROCEDURE — 99215 OFFICE O/P EST HI 40 MIN: CPT | Performed by: INTERNAL MEDICINE

## 2025-07-29 PROCEDURE — G8417 CALC BMI ABV UP PARAM F/U: HCPCS | Performed by: INTERNAL MEDICINE

## 2025-07-29 PROCEDURE — 1090F PRES/ABSN URINE INCON ASSESS: CPT | Performed by: INTERNAL MEDICINE

## 2025-07-29 PROCEDURE — G8427 DOCREV CUR MEDS BY ELIG CLIN: HCPCS | Performed by: INTERNAL MEDICINE

## 2025-07-29 PROCEDURE — 1160F RVW MEDS BY RX/DR IN RCRD: CPT | Performed by: INTERNAL MEDICINE

## 2025-07-29 PROCEDURE — 1126F AMNT PAIN NOTED NONE PRSNT: CPT | Performed by: INTERNAL MEDICINE

## 2025-07-29 PROCEDURE — 1159F MED LIST DOCD IN RCRD: CPT | Performed by: INTERNAL MEDICINE

## 2025-07-29 RX ORDER — FLUTICASONE PROPIONATE AND SALMETEROL 250; 50 UG/1; UG/1
2 POWDER RESPIRATORY (INHALATION) 2 TIMES DAILY
Qty: 1 EACH | Refills: 11 | Status: SHIPPED | OUTPATIENT
Start: 2025-07-29

## 2025-08-18 ENCOUNTER — CLINICAL SUPPORT (OUTPATIENT)
Age: 80
End: 2025-08-18

## 2025-08-18 DIAGNOSIS — I48.11 LONGSTANDING PERSISTENT ATRIAL FIBRILLATION (HCC): Primary | ICD-10-CM

## 2025-08-18 DIAGNOSIS — I50.33 ACUTE ON CHRONIC DIASTOLIC (CONGESTIVE) HEART FAILURE (HCC): ICD-10-CM

## 2025-08-18 DIAGNOSIS — I50.22 CHRONIC SYSTOLIC (CONGESTIVE) HEART FAILURE (HCC): ICD-10-CM

## 2025-08-18 DIAGNOSIS — Z98.890 HX OF ATRIOVENTRICULAR NODE ABLATION: ICD-10-CM

## 2025-08-27 DIAGNOSIS — I27.20 PULMONARY HYPERTENSION (HCC): ICD-10-CM

## 2025-08-29 RX ORDER — TADALAFIL 20 MG/1
1 TABLET ORAL DAILY
Qty: 30 TABLET | Refills: 11 | Status: ACTIVE | OUTPATIENT
Start: 2025-08-29

## (undated) DEVICE — GLIDESHEATH SLENDER STAINLESS STEEL KIT: Brand: GLIDESHEATH SLENDER

## (undated) DEVICE — NDL SPNE QNCKE 18GX3.5IN LF --

## (undated) DEVICE — PLASMABLADE X PS210-030S-LIGHT 3.0SL: Brand: PLASMABLADE™ X

## (undated) DEVICE — SURGICAL PROCEDURE PACK BASIC ST FRANCIS

## (undated) DEVICE — (D)PREP SKN CHLRAPRP APPL 26ML -- CONVERT TO ITEM 371833

## (undated) DEVICE — 18 GA N.G. KIT, 10 PACK: Brand: SITE-RITE

## (undated) DEVICE — SET IRRIG W 96IN TBNG 4 LN FLX BG

## (undated) DEVICE — STOCKINETTE,IMPERVIOUS,12X48,STERILE: Brand: MEDLINE

## (undated) DEVICE — SUTURE ABSORBABLE MONOFILAMENT 4-0 CV15 6 IN PUR V-LOC 90 VLOCM1203

## (undated) DEVICE — DRAPE,SHOULDER,BEACH CHAIR,STERILE: Brand: MEDLINE

## (undated) DEVICE — SOFT SILICONE HYDROCELLULAR FOAM DRESSING WITH LOCK AWAY LAYER: Brand: ALLEVYN LIFE XL 21X21 CTN10

## (undated) DEVICE — KIT ANGIO CNTRST ADMIN W O BWL WORLEY

## (undated) DEVICE — MASTISOL ADHESIVE LIQ 2/3ML

## (undated) DEVICE — DRESSING POSTOP AG PRISMASEAL 3.5X6IN

## (undated) DEVICE — DEVICE COMPR 17 CM 120 CC SAFEGUARD FOCUS LF

## (undated) DEVICE — SET IRRIG L94IN ID0.281IN W/ 4.5IN DST FLX CONN 2 LD ON OFF

## (undated) DEVICE — ZIMMER® STERILE DISPOSABLE TOURNIQUET CUFF WITH PLC, DUAL PORT, SINGLE BLADDER, 30 IN. (76 CM)

## (undated) DEVICE — Device

## (undated) DEVICE — 1.2MM XBRAID TT, 100% UHMWPE, VIOLET CO-BRAID: Brand: XBRAID

## (undated) DEVICE — SLING ARM LG 2-37INX9-20IN PCH --

## (undated) DEVICE — SUTURE MCRYL SZ 3-0 L27IN ABSRB UD L19MM PS-2 3/8 CIR PRIM Y427H

## (undated) DEVICE — INTENDED FOR TISSUE SEPARATION, AND OTHER PROCEDURES THAT REQUIRE A SHARP SURGICAL BLADE TO PUNCTURE OR CUT.: Brand: BARD-PARKER ® STAINLESS STEEL BLADES

## (undated) DEVICE — [AGGRESSIVE 6-FLUTE BARREL BUR, ARTHROSCOPIC SHAVER BLADE,  DO NOT RESTERILIZE,  DO NOT USE IF PACKAGE IS DAMAGED,  KEEP DRY,  KEEP AWAY FROM SUNLIGHT]: Brand: FORMULA

## (undated) DEVICE — PRECISION THIN (9.0 X 0.38 X 31.0MM)

## (undated) DEVICE — TUBING PMP FOR CARTO SYS SMARTABLATE

## (undated) DEVICE — SET IRRIG DST FLX M CONN

## (undated) DEVICE — MICROPUNCTURE INTRODUCER SET SILHOUETTE TRANSITIONLESS WITH NITINOL WIRE GUIDE: Brand: MICROPUNCTURE

## (undated) DEVICE — 90-S ACCELERATOR, SUCTION PROBE, NON-BENDABLE, MAX CUT LEVEL 11: Brand: SERFAS ENERGY

## (undated) DEVICE — SYR LR LCK 1ML GRAD NSAF 30ML --

## (undated) DEVICE — GUARDIAN LVC: Brand: GUARDIAN

## (undated) DEVICE — LIQUIBAND RAPID ADHESIVE 36/CS 0.8ML: Brand: MEDLINE

## (undated) DEVICE — PATCH REF EXT FOR CARTO 3 SYS (EA = 6 PACKS)

## (undated) DEVICE — 3M™ COBAN™ SELF-ADHERENT WRAP, 1586S, STERILE, 6 IN X 5 YD (15 CM X 4,5 M), 12 ROLLS/CASE: Brand: 3M™ COBAN™

## (undated) DEVICE — (D)STRIP SKN CLSR 0.5X4IN WHT --

## (undated) DEVICE — SUTURE NEEDLE SHUTTLE STERILE: Brand: ELITE

## (undated) DEVICE — GUIDEWIRE VASC L260CM DIA0.035IN RAD 3MM J TIP L7CM PTFE

## (undated) DEVICE — SYR 50ML LR LCK 1ML GRAD NSAF --

## (undated) DEVICE — 3M™ COBAN™ NL STERILE NON-LATEX SELF-ADHERENT WRAP, 2086S, 6 IN X 5 YD (15 CM X 4,5 M), 12 ROLLS/CASE: Brand: 3M™ COBAN™

## (undated) DEVICE — SUTURE ABSORBABLE MONOFILAMENT 3-0 PS 24 CM 26 MM VIO PDS +

## (undated) DEVICE — NEEDLE HYPO 18GA L1.5IN PNK S STL HUB POLYPR SHLD REG BVL

## (undated) DEVICE — AGENT HEMSTAT 3GM PURIFIED PLNT STARCH PWD ABSRB ARISTA AH

## (undated) DEVICE — 18G NG KIT WITH 96IN PROBE COVER (10 PK): Brand: SITE-RITE

## (undated) DEVICE — [RESECTOR CUTTER, ARTHROSCOPIC SHAVER BLADE,  DO NOT RESTERILIZE,  DO NOT USE IF PACKAGE IS DAMAGED,  KEEP DRY,  KEEP AWAY FROM SUNLIGHT]: Brand: FORMULA

## (undated) DEVICE — CATHETER DIAG AD 5FR L110CM 145DEG COR GRY HYDRPHLC NYL

## (undated) DEVICE — SOLUTION IRRIG 3000ML 0.9% SOD CHL FLX CONT 0797208] ICU MEDICAL INC]

## (undated) DEVICE — AMD ANTIMICROBIAL GAUZE SPONGES,12 PLY USP TYPE VII, 0.2% POLYHEXAMETHYLENE BIGUANIDE HCI (PHMB): Brand: CURITY

## (undated) DEVICE — GOWN,SIRUS,NONRNF,SETINSLV,XL,20/CS: Brand: MEDLINE

## (undated) DEVICE — PADDING CAST W4INXL4YD ST COT COHESIVE HND TEARABLE SPEC

## (undated) DEVICE — SYR 10ML LUER LOK 1/5ML GRAD --

## (undated) DEVICE — CATHETER ANGIO 5FR L100CM GRY S STL NYL JL3.5 3 SEG BRAID L

## (undated) DEVICE — NEEDLE HYPO 18GA L1.5IN THN WALL PIVOTING SHLD BVL ORIENTED

## (undated) DEVICE — IMMOBILIZER SHLDR M UNIV 65X17IN BLK LIGHWEIGHT PCH SZ REUSE

## (undated) DEVICE — XEROFORM OCCLUSIVE GAUZE STRIP OVERWRAP, 3% BISMUTH TRIBROMOPHENATE IN PETROLATUM BLEND: Brand: XEROFORM

## (undated) DEVICE — REM POLYHESIVE ADULT PATIENT RETURN ELECTRODE: Brand: VALLEYLAB

## (undated) DEVICE — CATHETER DIAG AD 5FR L100CM COR NYL JUDKINS R 5 DILATED

## (undated) DEVICE — KENDALL SCD EXPRESS SLEEVES, KNEE LENGTH, MEDIUM: Brand: KENDALL SCD

## (undated) DEVICE — SHTH INTRO W/GW 7F 11CM W/O OB -- AVANTI+ - ORDER AS EA

## (undated) DEVICE — BLADE SHV CUT MENIS AGG + 4MM --

## (undated) DEVICE — SYSTEM CLOSURE 6-12 FR VEN VASC VASCADE MVP

## (undated) DEVICE — CATHETER ABLAT 8FR L115CM 1-6-2MM SPC TIP 3.5MM FJ CRV

## (undated) DEVICE — PINNACLE TIF INTRODUCER SHEATH: Brand: PINNACLE

## (undated) DEVICE — [THREADED CANNULA.  DO NOT RESTERILIZE,  DO NOT USE IF PACKAGE IS DAMAGED]: Brand: DRI-LOK

## (undated) DEVICE — ULTRABRAID NO.2 WHITE SUTURE AND                                    NEEDLE ASSEMBLY, 38 INCH, 10 PER                                    BOX, STERILE: Brand: ULTRABRAID

## (undated) DEVICE — STERILE HOOK LOCK LATEX FREE ELASTIC BANDAGE 6INX5YD: Brand: HOOK LOCK™

## (undated) DEVICE — RESTRAINT UNIVERSAL HEAD DISP --

## (undated) DEVICE — GUIDE COR SNUS L40CM DIA9FR 0.035IN STD CRV ADV UNIQUE

## (undated) DEVICE — GUIDE WIRE WITH HYDROPHILIC COATING: Brand: ACUITY WHISPER VIEW™

## (undated) DEVICE — INTRODUCER SPLIT SHEATH PRELUDE SNAP 6FR X 13CM

## (undated) DEVICE — DEVICE COMPR REG 24 CM VASC BND

## (undated) DEVICE — SUTURE ETHBND EXCEL SZ 0 L18IN NONABSORBABLE GRN L26MM MO-6 CX45D

## (undated) DEVICE — T-DRAPE,EXTREMITY,STERILE: Brand: MEDLINE